# Patient Record
Sex: MALE | Race: WHITE | Employment: OTHER | RURAL
[De-identification: names, ages, dates, MRNs, and addresses within clinical notes are randomized per-mention and may not be internally consistent; named-entity substitution may affect disease eponyms.]

---

## 2017-01-09 ENCOUNTER — OFFICE VISIT (OUTPATIENT)
Dept: PRIMARY CARE CLINIC | Age: 62
End: 2017-01-09
Payer: COMMERCIAL

## 2017-01-09 VITALS
BODY MASS INDEX: 32.86 KG/M2 | RESPIRATION RATE: 20 BRPM | OXYGEN SATURATION: 98 % | HEART RATE: 88 BPM | WEIGHT: 216.8 LBS | SYSTOLIC BLOOD PRESSURE: 124 MMHG | DIASTOLIC BLOOD PRESSURE: 76 MMHG | TEMPERATURE: 98.4 F | HEIGHT: 68 IN

## 2017-01-09 DIAGNOSIS — D72.820 LYMPHOCYTOSIS: ICD-10-CM

## 2017-01-09 DIAGNOSIS — R73.02 GLUCOSE INTOLERANCE (IMPAIRED GLUCOSE TOLERANCE): ICD-10-CM

## 2017-01-09 DIAGNOSIS — J01.00 ACUTE NON-RECURRENT MAXILLARY SINUSITIS: ICD-10-CM

## 2017-01-09 DIAGNOSIS — E78.00 PURE HYPERCHOLESTEROLEMIA: Primary | ICD-10-CM

## 2017-01-09 DIAGNOSIS — I10 ESSENTIAL HYPERTENSION: ICD-10-CM

## 2017-01-09 DIAGNOSIS — Z13.29 THYROID DISORDER SCREEN: ICD-10-CM

## 2017-01-09 PROCEDURE — 99214 OFFICE O/P EST MOD 30 MIN: CPT | Performed by: NURSE PRACTITIONER

## 2017-01-09 RX ORDER — AMOXICILLIN AND CLAVULANATE POTASSIUM 875; 125 MG/1; MG/1
1 TABLET, FILM COATED ORAL 2 TIMES DAILY
Qty: 20 TABLET | Refills: 0 | Status: SHIPPED | OUTPATIENT
Start: 2017-01-09 | End: 2017-01-19

## 2017-01-09 ASSESSMENT — ENCOUNTER SYMPTOMS
GASTROINTESTINAL NEGATIVE: 1
EYES NEGATIVE: 1
SINUS PRESSURE: 1
COUGH: 1

## 2017-01-24 RX ORDER — ATORVASTATIN CALCIUM 40 MG/1
TABLET, FILM COATED ORAL
Qty: 30 TABLET | Refills: 0 | Status: SHIPPED | OUTPATIENT
Start: 2017-01-24 | End: 2017-04-12 | Stop reason: SDUPTHER

## 2017-03-16 RX ORDER — ASPIRIN 81 MG/1
81 TABLET ORAL DAILY
Qty: 90 TABLET | Refills: 3 | Status: SHIPPED | OUTPATIENT
Start: 2017-03-16

## 2017-03-16 RX ORDER — ERGOCALCIFEROL 1.25 MG/1
CAPSULE ORAL
Qty: 12 CAPSULE | Refills: 3 | Status: SHIPPED | OUTPATIENT
Start: 2017-03-16 | End: 2019-11-14 | Stop reason: ALTCHOICE

## 2017-03-16 RX ORDER — ASPIRIN 81 MG
TABLET, DELAYED RELEASE (ENTERIC COATED) ORAL
Qty: 30 TABLET | Refills: 9 | Status: SHIPPED | OUTPATIENT
Start: 2017-03-16 | End: 2017-04-12 | Stop reason: SDUPTHER

## 2017-03-16 RX ORDER — ERGOCALCIFEROL 1.25 MG/1
CAPSULE ORAL
Qty: 4 CAPSULE | Refills: 0 | Status: SHIPPED | OUTPATIENT
Start: 2017-03-16 | End: 2017-05-10 | Stop reason: SDUPTHER

## 2017-04-12 ENCOUNTER — OFFICE VISIT (OUTPATIENT)
Dept: CARDIOLOGY | Facility: CLINIC | Age: 62
End: 2017-04-12

## 2017-04-12 VITALS
HEART RATE: 78 BPM | BODY MASS INDEX: 33.71 KG/M2 | HEIGHT: 67 IN | DIASTOLIC BLOOD PRESSURE: 68 MMHG | SYSTOLIC BLOOD PRESSURE: 114 MMHG | WEIGHT: 214.8 LBS

## 2017-04-12 DIAGNOSIS — Z72.0 TOBACCO ABUSE: ICD-10-CM

## 2017-04-12 DIAGNOSIS — I10 ESSENTIAL HYPERTENSION: ICD-10-CM

## 2017-04-12 DIAGNOSIS — I25.10 CORONARY ARTERY DISEASE INVOLVING NATIVE CORONARY ARTERY OF NATIVE HEART WITHOUT ANGINA PECTORIS: Primary | ICD-10-CM

## 2017-04-12 DIAGNOSIS — E78.5 HYPERLIPIDEMIA LDL GOAL <70: ICD-10-CM

## 2017-04-12 PROCEDURE — 99213 OFFICE O/P EST LOW 20 MIN: CPT | Performed by: INTERNAL MEDICINE

## 2017-04-12 RX ORDER — ASPIRIN 81 MG/1
81 TABLET ORAL DAILY
Qty: 90 TABLET | Refills: 3 | Status: SHIPPED | OUTPATIENT
Start: 2017-04-12 | End: 2018-04-07

## 2017-04-12 RX ORDER — NITROGLYCERIN 0.4 MG/1
TABLET SUBLINGUAL
Qty: 100 TABLET | Refills: 11 | Status: SHIPPED | OUTPATIENT
Start: 2017-04-12 | End: 2021-01-05 | Stop reason: SDUPTHER

## 2017-04-12 RX ORDER — METOPROLOL SUCCINATE 25 MG/1
25 TABLET, EXTENDED RELEASE ORAL DAILY
Qty: 90 TABLET | Refills: 3 | Status: SHIPPED | OUTPATIENT
Start: 2017-04-12 | End: 2018-07-17 | Stop reason: SDUPTHER

## 2017-04-12 RX ORDER — ATORVASTATIN CALCIUM 40 MG/1
40 TABLET, FILM COATED ORAL NIGHTLY
Qty: 90 TABLET | Refills: 3 | Status: SHIPPED | OUTPATIENT
Start: 2017-04-12 | End: 2018-07-17 | Stop reason: SDUPTHER

## 2017-04-12 RX ORDER — LISINOPRIL 5 MG/1
5 TABLET ORAL DAILY
Qty: 90 TABLET | Refills: 3 | Status: SHIPPED | OUTPATIENT
Start: 2017-04-12 | End: 2018-07-17 | Stop reason: SDUPTHER

## 2017-04-12 NOTE — ASSESSMENT & PLAN NOTE
· Asymptomatic  · Continue low-dose aspirin, high intensity statin therapy and beta blocker therapy

## 2017-04-12 NOTE — PROGRESS NOTES
Encounter Date:04/12/2017    Patient ID: Erik Bowser is a  62 y.o. male who resides in [place], [place].    CC/Reason for visit:  Coronary Artery Disease (Follow up); Hypertension; and Hyperlipidemia          Erik Bowser returns to my office today as a follow up for coronary artery disease, hypertension, and hyperlipidemia. Since last visit, patient has been feeling well overall from a cardiac standpoint. He notes he gets fatigued sometimes. He denies chest pressure/tightness. He does note that he occasionally feels fluttering in his chest. He is struggling with shoulder pain due to his rotator cuff is, which have been repaired previously.    Review of Systems   Constitution: Negative for weakness and malaise/fatigue.   HENT: Positive for tinnitus.    Eyes: Negative for vision loss in left eye and vision loss in right eye.   Cardiovascular: Negative for chest pain, dyspnea on exertion, near-syncope, orthopnea, palpitations, paroxysmal nocturnal dyspnea and syncope.   Respiratory: Positive for snoring.    Hematologic/Lymphatic: Bruises/bleeds easily.   Musculoskeletal: Positive for arthritis, gout and muscle cramps. Negative for myalgias.   Neurological: Negative for brief paralysis, excessive daytime sleepiness, focal weakness, numbness and paresthesias.   All other systems reviewed and are negative.      The patient's past medical, social, and family history reviewed in the patient's electronic medical record.    Allergies  Rosuvastatin    Outpatient Prescriptions Marked as Taking for the 4/12/17 encounter (Office Visit) with Ronan Lacy MD   Medication Sig Dispense Refill   • aspirin (ASPIRIN LOW DOSE) 81 MG EC tablet Take 1 tablet by mouth Daily for 360 days. 90 tablet 3   • atorvastatin (LIPITOR) 40 MG tablet Take 1 tablet by mouth Every Night for 360 days. 90 tablet 3   • B Complex Vitamins (VITAMIN-B COMPLEX PO) Take  by mouth Daily.     • Ergocalciferol (VITAMIN D2 PO) Take  by mouth 1  "(One) Time Per Week.     • lisinopril (PRINIVIL,ZESTRIL) 5 MG tablet Take 1 tablet by mouth Daily. 90 tablet 3   • metoprolol succinate XL (TOPROL-XL) 25 MG 24 hr tablet Take 1 tablet by mouth Daily. 90 tablet 3   • [DISCONTINUED] ASPIRIN LOW DOSE 81 MG EC tablet TAKE ONE TABLET BY MOUTH DAILY 30 tablet 9   • [DISCONTINUED] atorvastatin (LIPITOR) 40 MG tablet TAKE ONE TABLET BY MOUTH EVERY NIGHT AT BEDTIME . NEEDS LABS FOR ADDITIONAL REFILLS 30 tablet 0   • [DISCONTINUED] lisinopril (PRINIVIL,ZESTRIL) 5 MG tablet Take 1 tablet by mouth daily. 90 tablet 3   • [DISCONTINUED] metoprolol succinate XL (TOPROL-XL) 25 MG 24 hr tablet Take 1 tablet by mouth daily. 90 tablet 3         Blood pressure 114/68, pulse 78, height 67\" (170.2 cm), weight 214 lb 12.8 oz (97.4 kg).  Body mass index is 33.64 kg/(m^2).    Physical Exam   Constitutional: He is oriented to person, place, and time. He appears well-developed and well-nourished.   HENT:   Head: Normocephalic and atraumatic.   Eyes: Pupils are equal, round, and reactive to light. No scleral icterus.   Neck: No JVD present. Carotid bruit is not present. No thyromegaly present.   Cardiovascular: Normal rate, regular rhythm, S1 normal and S2 normal.  Exam reveals no gallop.    No murmur heard.  Pulmonary/Chest: Effort normal and breath sounds normal.   Abdominal: Soft. There is no tenderness.   Neurological: He is alert and oriented to person, place, and time.   Skin: Skin is warm and dry. No cyanosis. Nails show no clubbing.   Psychiatric: He has a normal mood and affect. His behavior is normal.       Data Review:     Procedures         Problem List Items Addressed This Visit        Cardiovascular and Mediastinum    Coronary artery disease involving native coronary artery of native heart without angina pectoris - Primary    Overview     · Cardiac catheterization for inferior STEMI by Dr. Mahendra Lacy (1/20/2015):  RCA occlusion status post ISH (Xience 3.5 x 23 mm).  LVEF " 45%.  · Echocardiogram (1/21/2015):  LVEF 55%, no valvular abnormalities, 01/21/2015.         Current Assessment & Plan     · Asymptomatic  · Continue low-dose aspirin, high intensity statin therapy and beta blocker therapy         Relevant Medications    aspirin (ASPIRIN LOW DOSE) 81 MG EC tablet    metoprolol succinate XL (TOPROL-XL) 25 MG 24 hr tablet    nitroglycerin (NITROSTAT) 0.4 MG SL tablet    Essential hypertension    Current Assessment & Plan     · Controlled         Relevant Medications    metoprolol succinate XL (TOPROL-XL) 25 MG 24 hr tablet    lisinopril (PRINIVIL,ZESTRIL) 5 MG tablet    Hyperlipidemia LDL goal <70    Overview     · High intensity statin therapy indicated given the presence coronary disease         Current Assessment & Plan     · Continue atorvastatin with a goal LDL less than 70         Relevant Medications    atorvastatin (LIPITOR) 40 MG tablet       Other    Tobacco abuse    Current Assessment & Plan     · Tobacco cessation strongly recommended  · Patient declined assistance                    · Continue medical therapy  · Tobacco cessation recommended  Return in about 1 year (around 4/12/2018).      Ronan Lacy MD  4/12/2017    Scribed for Ronan Lacy MD by Ricike Sullivan. 4/12/2017  4:56 PM    I, Ronan Lacy MD, personally performed the services described in this documentation as scribed by the above named individual in my presence, and it is both accurate and complete.  4/12/2017  4:56 PM

## 2017-05-10 ENCOUNTER — OFFICE VISIT (OUTPATIENT)
Dept: PRIMARY CARE CLINIC | Age: 62
End: 2017-05-10
Payer: COMMERCIAL

## 2017-05-10 ENCOUNTER — HOSPITAL ENCOUNTER (OUTPATIENT)
Dept: OTHER | Age: 62
Discharge: HOME | End: 2017-05-10
Attending: NURSE PRACTITIONER | Admitting: NURSE PRACTITIONER

## 2017-05-10 VITALS
HEART RATE: 74 BPM | BODY MASS INDEX: 32.58 KG/M2 | WEIGHT: 215 LBS | HEIGHT: 68 IN | OXYGEN SATURATION: 96 % | DIASTOLIC BLOOD PRESSURE: 68 MMHG | SYSTOLIC BLOOD PRESSURE: 118 MMHG | RESPIRATION RATE: 20 BRPM

## 2017-05-10 DIAGNOSIS — G89.29 CHRONIC PAIN OF BOTH SHOULDERS: ICD-10-CM

## 2017-05-10 DIAGNOSIS — E78.5 HYPERLIPIDEMIA: ICD-10-CM

## 2017-05-10 DIAGNOSIS — M79.642 LEFT HAND PAIN: ICD-10-CM

## 2017-05-10 DIAGNOSIS — Z13.29 THYROID DISORDER SCREEN: ICD-10-CM

## 2017-05-10 DIAGNOSIS — R73.02 GLUCOSE INTOLERANCE (IMPAIRED GLUCOSE TOLERANCE): ICD-10-CM

## 2017-05-10 DIAGNOSIS — E78.00 PURE HYPERCHOLESTEROLEMIA: ICD-10-CM

## 2017-05-10 DIAGNOSIS — M25.512 CHRONIC PAIN OF BOTH SHOULDERS: Primary | ICD-10-CM

## 2017-05-10 DIAGNOSIS — M25.512 CHRONIC PAIN OF BOTH SHOULDERS: ICD-10-CM

## 2017-05-10 DIAGNOSIS — G89.29 CHRONIC PAIN OF BOTH SHOULDERS: Primary | ICD-10-CM

## 2017-05-10 DIAGNOSIS — M25.511 CHRONIC PAIN OF BOTH SHOULDERS: Primary | ICD-10-CM

## 2017-05-10 DIAGNOSIS — I10 ESSENTIAL HYPERTENSION: ICD-10-CM

## 2017-05-10 DIAGNOSIS — M25.511 CHRONIC PAIN OF BOTH SHOULDERS: ICD-10-CM

## 2017-05-10 LAB
A/G RATIO: 2 (ref 0.8–2)
ALBUMIN SERPL-MCNC: 4.3 G/DL (ref 3.4–4.8)
ALP BLD-CCNC: 91 U/L (ref 25–100)
ALT SERPL-CCNC: 24 U/L (ref 4–36)
ANION GAP SERPL CALCULATED.3IONS-SCNC: 12 MMOL/L (ref 3–16)
AST SERPL-CCNC: 22 U/L (ref 8–33)
BILIRUB SERPL-MCNC: 1.4 MG/DL (ref 0.3–1.2)
BUN BLDV-MCNC: 12 MG/DL (ref 6–20)
CALCIUM SERPL-MCNC: 9.8 MG/DL (ref 8.5–10.5)
CHLORIDE BLD-SCNC: 103 MMOL/L (ref 98–107)
CHOLESTEROL, TOTAL: 158 MG/DL (ref 0–200)
CO2: 26 MMOL/L (ref 20–30)
CREAT SERPL-MCNC: 0.7 MG/DL (ref 0.4–1.2)
GFR AFRICAN AMERICAN: >59
GFR NON-AFRICAN AMERICAN: >59
GLOBULIN: 2.2 G/DL
GLUCOSE BLD-MCNC: 107 MG/DL (ref 74–106)
HBA1C MFR BLD: 5.6 %
HCT VFR BLD CALC: 45.9 % (ref 40–54)
HDLC SERPL-MCNC: 32 MG/DL (ref 40–60)
HEMOGLOBIN: 15.8 G/DL (ref 13–18)
LDL CHOLESTEROL CALCULATED: 76 MG/DL
MCH RBC QN AUTO: 32.2 PG (ref 27–32)
MCHC RBC AUTO-ENTMCNC: 34.4 G/DL (ref 31–35)
MCV RBC AUTO: 93.7 FL (ref 80–100)
PDW BLD-RTO: 13 % (ref 11–16)
PLATELET # BLD: 327 K/UL (ref 150–400)
PMV BLD AUTO: 10 FL (ref 6–10)
POTASSIUM SERPL-SCNC: 4.6 MMOL/L (ref 3.4–5.1)
RBC # BLD: 4.9 M/UL (ref 4.5–6)
SODIUM BLD-SCNC: 141 MMOL/L (ref 136–145)
TOTAL PROTEIN: 6.5 G/DL (ref 6.4–8.3)
TRIGL SERPL-MCNC: 248 MG/DL (ref 0–249)
TSH SERPL DL<=0.05 MIU/L-ACNC: 2.2 UIU/ML (ref 0.35–5.5)
VLDLC SERPL CALC-MCNC: 50 MG/DL
WBC # BLD: 11.7 K/UL (ref 4–11)

## 2017-05-10 PROCEDURE — 99214 OFFICE O/P EST MOD 30 MIN: CPT | Performed by: NURSE PRACTITIONER

## 2017-05-10 RX ORDER — TRAMADOL HYDROCHLORIDE 50 MG/1
50 TABLET ORAL 2 TIMES DAILY PRN
Qty: 60 TABLET | Refills: 2 | Status: SHIPPED | OUTPATIENT
Start: 2017-05-10 | End: 2017-06-09

## 2017-05-10 ASSESSMENT — ENCOUNTER SYMPTOMS
RESPIRATORY NEGATIVE: 1
EYES NEGATIVE: 1
GASTROINTESTINAL NEGATIVE: 1

## 2017-05-12 ENCOUNTER — TELEPHONE (OUTPATIENT)
Dept: PRIMARY CARE CLINIC | Age: 62
End: 2017-05-12

## 2017-05-24 ENCOUNTER — OFFICE VISIT (OUTPATIENT)
Dept: PRIMARY CARE CLINIC | Age: 62
End: 2017-05-24
Payer: COMMERCIAL

## 2017-05-24 VITALS
SYSTOLIC BLOOD PRESSURE: 130 MMHG | HEART RATE: 68 BPM | BODY MASS INDEX: 32.69 KG/M2 | OXYGEN SATURATION: 93 % | WEIGHT: 215 LBS | DIASTOLIC BLOOD PRESSURE: 70 MMHG

## 2017-05-24 DIAGNOSIS — R60.0 FACIAL EDEMA: ICD-10-CM

## 2017-05-24 DIAGNOSIS — J01.90 ACUTE SINUSITIS, RECURRENCE NOT SPECIFIED, UNSPECIFIED LOCATION: Primary | ICD-10-CM

## 2017-05-24 PROCEDURE — 96372 THER/PROPH/DIAG INJ SC/IM: CPT | Performed by: NURSE PRACTITIONER

## 2017-05-24 PROCEDURE — 99213 OFFICE O/P EST LOW 20 MIN: CPT | Performed by: NURSE PRACTITIONER

## 2017-05-24 RX ORDER — DEXAMETHASONE SODIUM PHOSPHATE 10 MG/ML
6 INJECTION, SOLUTION INTRAMUSCULAR; INTRAVENOUS ONCE
Status: COMPLETED | OUTPATIENT
Start: 2017-05-24 | End: 2017-05-24

## 2017-05-24 RX ORDER — METHYLPREDNISOLONE 4 MG/1
TABLET ORAL
Qty: 1 KIT | Refills: 0 | Status: SHIPPED | OUTPATIENT
Start: 2017-05-24 | End: 2017-09-20

## 2017-05-24 RX ORDER — AMOXICILLIN AND CLAVULANATE POTASSIUM 875; 125 MG/1; MG/1
1 TABLET, FILM COATED ORAL 2 TIMES DAILY WITH MEALS
Qty: 20 TABLET | Refills: 0 | Status: SHIPPED | OUTPATIENT
Start: 2017-05-24 | End: 2017-06-03

## 2017-05-24 RX ADMIN — DEXAMETHASONE SODIUM PHOSPHATE 6 MG: 10 INJECTION, SOLUTION INTRAMUSCULAR; INTRAVENOUS at 16:21

## 2017-05-24 ASSESSMENT — ENCOUNTER SYMPTOMS
FACIAL SWELLING: 1
GASTROINTESTINAL NEGATIVE: 1
RESPIRATORY NEGATIVE: 1
EYE REDNESS: 1

## 2017-06-11 ASSESSMENT — ENCOUNTER SYMPTOMS
SINUS PRESSURE: 1
SHORTNESS OF BREATH: 0
EYE PAIN: 0
ABDOMINAL PAIN: 0
VOMITING: 0
SORE THROAT: 0
NAUSEA: 0
COUGH: 0

## 2017-09-07 ENCOUNTER — TELEPHONE (OUTPATIENT)
Dept: PRIMARY CARE CLINIC | Age: 62
End: 2017-09-07

## 2017-09-20 ENCOUNTER — OFFICE VISIT (OUTPATIENT)
Dept: PRIMARY CARE CLINIC | Age: 62
End: 2017-09-20
Payer: COMMERCIAL

## 2017-09-20 VITALS
HEART RATE: 78 BPM | WEIGHT: 215.4 LBS | HEIGHT: 68 IN | BODY MASS INDEX: 32.64 KG/M2 | RESPIRATION RATE: 20 BRPM | SYSTOLIC BLOOD PRESSURE: 126 MMHG | DIASTOLIC BLOOD PRESSURE: 70 MMHG | OXYGEN SATURATION: 96 %

## 2017-09-20 DIAGNOSIS — M65.332 TRIGGER MIDDLE FINGER OF LEFT HAND: ICD-10-CM

## 2017-09-20 DIAGNOSIS — D72.820 LYMPHOCYTOSIS: ICD-10-CM

## 2017-09-20 DIAGNOSIS — E78.00 PURE HYPERCHOLESTEROLEMIA: ICD-10-CM

## 2017-09-20 DIAGNOSIS — I10 ESSENTIAL HYPERTENSION: ICD-10-CM

## 2017-09-20 DIAGNOSIS — M25.512 ACUTE PAIN OF LEFT SHOULDER: ICD-10-CM

## 2017-09-20 DIAGNOSIS — J01.00 ACUTE NON-RECURRENT MAXILLARY SINUSITIS: Primary | ICD-10-CM

## 2017-09-20 DIAGNOSIS — Z13.29 THYROID DISORDER SCREEN: ICD-10-CM

## 2017-09-20 PROCEDURE — 99214 OFFICE O/P EST MOD 30 MIN: CPT | Performed by: NURSE PRACTITIONER

## 2017-09-20 RX ORDER — AMOXICILLIN AND CLAVULANATE POTASSIUM 875; 125 MG/1; MG/1
1 TABLET, FILM COATED ORAL 2 TIMES DAILY
Qty: 20 TABLET | Refills: 0 | Status: SHIPPED | OUTPATIENT
Start: 2017-09-20 | End: 2017-09-30

## 2017-09-20 RX ORDER — METHYLPREDNISOLONE 4 MG/1
TABLET ORAL
Qty: 21 TABLET | Refills: 0 | Status: SHIPPED | OUTPATIENT
Start: 2017-09-20 | End: 2017-09-26

## 2017-09-20 RX ORDER — TRAMADOL HYDROCHLORIDE 50 MG/1
50 TABLET ORAL 2 TIMES DAILY PRN
Qty: 40 TABLET | Refills: 0 | Status: SHIPPED | OUTPATIENT
Start: 2017-09-20 | End: 2017-09-30

## 2017-09-20 ASSESSMENT — ENCOUNTER SYMPTOMS
GASTROINTESTINAL NEGATIVE: 1
RHINORRHEA: 1
RESPIRATORY NEGATIVE: 1
EYE DISCHARGE: 1
SORE THROAT: 1

## 2017-12-29 ENCOUNTER — NURSE ONLY (OUTPATIENT)
Dept: PRIMARY CARE CLINIC | Age: 62
End: 2017-12-29
Payer: COMMERCIAL

## 2017-12-29 VITALS
WEIGHT: 221 LBS | DIASTOLIC BLOOD PRESSURE: 74 MMHG | SYSTOLIC BLOOD PRESSURE: 130 MMHG | BODY MASS INDEX: 33.49 KG/M2 | HEIGHT: 68 IN | OXYGEN SATURATION: 96 % | RESPIRATION RATE: 20 BRPM | HEART RATE: 75 BPM

## 2017-12-29 DIAGNOSIS — Z48.02 VISIT FOR SUTURE REMOVAL: Primary | ICD-10-CM

## 2017-12-29 PROCEDURE — 99212 OFFICE O/P EST SF 10 MIN: CPT | Performed by: FAMILY MEDICINE

## 2017-12-29 ASSESSMENT — ENCOUNTER SYMPTOMS
DIARRHEA: 0
CONSTIPATION: 0
ABDOMINAL PAIN: 0
COUGH: 0
NAUSEA: 0
VOMITING: 0
SORE THROAT: 0
RHINORRHEA: 0
SHORTNESS OF BREATH: 0

## 2017-12-29 NOTE — PATIENT INSTRUCTIONS
dispose of used patches by folding them in half so that the sticky sides meet, and then flushing them down a toilet. They should not be placed in the household trash where children or pets can find them. · If you have any questions, ask your provider or pharmacist for more information. · Be sure to keep all appointments for provider visits or tests. We are committed to providing you with the best care possible. In order to help us achieve these goals please remember to bring all medications, herbal products, and over the counter supplements with you to each visit. If your provider has ordered testing for you, please be sure to follow up with our office if you have not received results within 7 days after the testing took place. *If you receive a survey after visiting one of our offices, please take time to share your experience concerning your physician office visit. These surveys are confidential and no health information about you is shared. We are eager to improve for you and we are counting on your feedback to help make that happen. ips to Help You Stop Smoking       Cigarette smoking is a preventable cause of death in the [place]. If you have thought about quitting but haven't been able to, here are some reasons why you should and some ways to do it. Here's Why   Quitting smoking now can decrease your risk of getting smoking-related illnesses like:   Heart disease   Stroke   Several types of cancer, including:   Lung   Mouth   Esophagus   Larynx   Bladder   Pancreas   Kidney   Chronic lung diseases:   Bronchitis   Emphysema   Asthma   Cataracts   Macular degeneration   Thyroid conditions   Hearing loss   Erectile dysfunction   Dementia   Osteoporosis   Here's How   Once you've decided to quit smoking, set your target quit date a few weeks away.  In the time leading up to your quit day, try some of these ideas offered by the [address] [place] [address] [place] to help you successfully quit smoking. For the best results, work with your doctor. Together, you can test your lung function and compare the results to those of a nonsmoking person. The results can be given to you as your lung age. Finding out your lung age right after having the test done may help you to stop smoking. Your doctor can also discuss with you all of your options and refer you to smoking-cessation support groups. You may wish to use nicotine replacement (gum, patches, inhaler) or one of the prescription medications that have been shown to increase quit rates and prolong abstinence from smoking. But whatever you and your doctor decide on these matters, it will still be you who decides when an how to quit. Here are some techniques:   Switch Brands   Switch to a brand you find distasteful. Change to a brand that is low in tar and nicotine a couple of weeks before your target quit date. This will help change your smoking behavior. However, do not smoke more cigarettes, inhale them more often or more deeply, or place your fingertips over the holes in the filters. All of these actions will increase your nicotine intake, and the idea is to get your body used to functioning without nicotine. Cut Down the Number of Cigarettes You Smoke   Smoke only half of each cigarette. Each day, postpone the lighting of your first cigarette by one hour. Decide you'll only smoke during odd or even hours of the day. Decide beforehand how many cigarettes you'll smoke during the day. For each additional cigarette, give a dollar to your favorite laurent. Change your eating habits to help you cut down. For example, drink milk, which many people consider incompatible with smoking. End meals or snacks with something that won't lead to a cigarette. Reach for a glass of juice instead of a cigarette for a \"pick-me-up. \"   Remember: Cutting down can help you quit, but it's not a substitute for quitting.  If you're down to about seven cigarettes a day, it's time to set your target quit date, and get ready to stick to it. Don't Smoke \"Automatically\"   Smoke only those cigarettes you really want. Catch yourself before you light up a cigarette out of pure habit. Don't empty your ashtrays. This will remind you of how many cigarettes you've smoked each day, and the sight and the smell of stale cigarettes butts will be very unpleasant. Make yourself aware of each cigarette by using the opposite hand or putting cigarettes in an unfamiliar location or a different pocket to break the automatic reach. If you light up many times during the day without even thinking about it, try to look in a mirror each time you put a match to your cigarette. You may decide you don't need it. Make Smoking Inconvenient   Stop buying cigarettes by the carton. Wait until one pack is empty before you buy another. Stop carrying cigarettes with you at home or at work. Make them difficult to get to. Make Smoking Unpleasant   Smoke only under circumstances that aren't especially pleasurable for you. If you like to smoke with others, smoke alone. Turn your chair to an empty corner and focus only on the cigarette you are smoking and all its many negative effects. Collect all your cigarette butts in one large glass container as a visual reminder of the filth made by smoking. Just Before Quitting   Practice going without cigarettes. Don't think of never smoking again. Think of quitting in terms of one day at a time . Tell yourself you won't smoke today, and then don't. Clean your clothes to rid them of the cigarette smell, which can linger a long time. On the Day You Quit   Throw away all your cigarettes and matches. Hide your lighters and ashtrays. Visit the dentist and have your teeth cleaned to get rid of tobacco stains. Notice how nice they look and resolve to keep them that way.    Make a list of things you'd like to buy for

## 2017-12-29 NOTE — PROGRESS NOTES
SUBJECTIVE:    Patient ID: Mariam Romero is a 65 y.o. male. Chief Complaint   Patient presents with    Suture / Staple Removal     x 10 days ago, dermatology took off spot on left side of face. HPI: Patient states he had a skin cancer cut off the left side of his face 10 days ago. He states it was squamous cell carcinoma. He denies any pain. He has been keeping the incision clean. He does have sutures that need removed today. Patient's medications, allergies, past medical, surgical, social and family histories were reviewed and updated as appropriate. Review of Systems   Constitutional: Negative for chills, fatigue and fever. HENT: Negative for congestion, ear pain, rhinorrhea and sore throat. Respiratory: Negative for cough and shortness of breath. Cardiovascular: Negative for chest pain, palpitations and leg swelling. Gastrointestinal: Negative for abdominal pain, constipation, diarrhea, nausea and vomiting. Skin:        Left face incision       OBJECTIVE:  /74 (Site: Right Arm, Position: Sitting)   Pulse 75   Resp 20   Ht 5' 7.5\" (1.715 m)   Wt 221 lb (100.2 kg)   SpO2 96% Comment: ROOM AIR  BMI 34.10 kg/m²      Wt Readings from Last 2 Encounters:   12/29/17 221 lb (100.2 kg)   09/20/17 215 lb 6.4 oz (97.7 kg)     BP Readings from Last 2 Encounters:   12/29/17 130/74   09/20/17 126/70      Pulse Readings from Last 2 Encounters:   12/29/17 75   09/20/17 78     Body mass index is 34.1 kg/m². Physical Exam   Constitutional: He is oriented to person, place, and time. He appears well-developed and well-nourished. HENT:   Head: Normocephalic and atraumatic. Right Ear: External ear normal.   Left Ear: External ear normal.   Eyes: Conjunctivae and EOM are normal.   Cardiovascular: Normal rate, regular rhythm and normal heart sounds. Exam reveals no gallop and no friction rub. No murmur heard.   Pulmonary/Chest: Effort normal and breath sounds normal.

## 2018-04-13 ENCOUNTER — OFFICE VISIT (OUTPATIENT)
Dept: CARDIOLOGY | Facility: CLINIC | Age: 63
End: 2018-04-13

## 2018-04-13 ENCOUNTER — RESULTS ENCOUNTER (OUTPATIENT)
Dept: CARDIOLOGY | Facility: CLINIC | Age: 63
End: 2018-04-13

## 2018-04-13 VITALS
HEART RATE: 69 BPM | BODY MASS INDEX: 33.4 KG/M2 | HEIGHT: 68 IN | DIASTOLIC BLOOD PRESSURE: 60 MMHG | SYSTOLIC BLOOD PRESSURE: 118 MMHG | WEIGHT: 220.4 LBS

## 2018-04-13 DIAGNOSIS — E78.5 HYPERLIPIDEMIA LDL GOAL <70: ICD-10-CM

## 2018-04-13 DIAGNOSIS — I25.10 CORONARY ARTERY DISEASE INVOLVING NATIVE CORONARY ARTERY OF NATIVE HEART WITHOUT ANGINA PECTORIS: Primary | ICD-10-CM

## 2018-04-13 DIAGNOSIS — I10 ESSENTIAL HYPERTENSION: ICD-10-CM

## 2018-04-13 DIAGNOSIS — Z72.0 TOBACCO ABUSE: ICD-10-CM

## 2018-04-13 PROBLEM — C95.91 LEUKEMIA IN REMISSION: Status: ACTIVE | Noted: 2018-04-13

## 2018-04-13 LAB
ALBUMIN SERPL-MCNC: 4.2 G/DL (ref 3.5–5)
ALBUMIN/GLOB SERPL: 1.4 G/DL (ref 1–2)
ALP SERPL-CCNC: 101 U/L (ref 38–126)
ALT SERPL-CCNC: 56 U/L (ref 13–69)
AST SERPL-CCNC: 38 U/L (ref 15–46)
BILIRUB SERPL-MCNC: 1.8 MG/DL (ref 0.2–1.3)
BUN SERPL-MCNC: 15 MG/DL (ref 7–20)
BUN/CREAT SERPL: 18.8 (ref 6.3–21.9)
CALCIUM SERPL-MCNC: 9.9 MG/DL (ref 8.4–10.2)
CHLORIDE SERPL-SCNC: 103 MMOL/L (ref 98–107)
CHOLEST SERPL-MCNC: 163 MG/DL (ref 0–199)
CO2 SERPL-SCNC: 29 MMOL/L (ref 26–30)
CREAT SERPL-MCNC: 0.8 MG/DL (ref 0.6–1.3)
GFR SERPLBLD CREATININE-BSD FMLA CKD-EPI: 119 ML/MIN/1.73
GFR SERPLBLD CREATININE-BSD FMLA CKD-EPI: 98 ML/MIN/1.73
GLOBULIN SER CALC-MCNC: 2.9 GM/DL
GLUCOSE SERPL-MCNC: 100 MG/DL (ref 74–98)
HDLC SERPL-MCNC: 34 MG/DL (ref 40–60)
LDLC SERPL CALC-MCNC: 59 MG/DL (ref 0–99)
POTASSIUM SERPL-SCNC: 4.6 MMOL/L (ref 3.5–5.1)
PROT SERPL-MCNC: 7.1 G/DL (ref 6.3–8.2)
SODIUM SERPL-SCNC: 142 MMOL/L (ref 137–145)
TRIGL SERPL-MCNC: 350 MG/DL
VLDLC SERPL CALC-MCNC: 70 MG/DL

## 2018-04-13 PROCEDURE — 99214 OFFICE O/P EST MOD 30 MIN: CPT | Performed by: NURSE PRACTITIONER

## 2018-04-13 RX ORDER — ASPIRIN 81 MG/1
81 TABLET ORAL NIGHTLY
COMMUNITY
End: 2020-06-26

## 2018-04-13 RX ORDER — NAPROXEN SODIUM 220 MG
220 TABLET ORAL AS NEEDED
COMMUNITY

## 2018-04-13 NOTE — ASSESSMENT & PLAN NOTE
· Continue aspirin 81 mg daily  · Continue Toprol-XL 25 mg daily  · Continue sublingual nitroglycerin for any episodes of angina

## 2018-04-13 NOTE — PROGRESS NOTES
Encounter Date:04/13/2018    Patient ID: Erik Bowser is a 63 y.o. male who resides in [place], [place]    CC/Reason for visit:  Coronary Artery Disease            Erik Bowser returns today for follow-up of his coronary artery disease and cardiac risk factors.  Since his last visit he did have to have several skin cancers removed.  He has had no hospitalizations and has not experienced any chest pain, dyspnea, orthopnea, palpitations or syncope.  His wife accompanies him at today's visit and is wondering if we should doa  routine stress test as she is concerned about him sleeping a lot and having fatigue..  The patient thinks his tiredness is because of working and he does not feel anything has changed over the past year.  He does have a history of leukemia that is apparently in remission.  He is followed by Dr. Ramires for this.  He works as a  and the current job he is doing has required quite a bit at use of his hands and he notices that he gets bilateral hand numbness and tingling.  He stopped working for a while and his symptoms resolved.  He does experience some shoulder pain due to his rotator cuff that it been previously repaired.  Before his heart attack he experienced chest pain and upper epigastric pain and he has not experienced any symptoms reminiscent of how he felt before his heart attack.  He takes daily statin therapy but has not had recent blood work.  He reports his blood pressures have stayed well controlled.  He continues to smoke 2 packs of cigarettes per day.    Review of Systems   Eyes: Positive for blurred vision.   Cardiovascular: Positive for cyanosis.   Respiratory: Positive for snoring.    Skin: Positive for dry skin and skin cancer.   Musculoskeletal: Positive for arthritis, gout, joint swelling and muscle cramps.   Neurological: Positive for numbness.   All other systems reviewed and are negative.      The patient's past medical, social, family history and ROS reviewed in  "the patient's electronic medical record.    Allergies  Rosuvastatin    Outpatient Prescriptions Marked as Taking for the 4/13/18 encounter (Office Visit) with SOBEIDA Menard   Medication Sig Dispense Refill   • aspirin 81 MG EC tablet Take 81 mg by mouth Daily.     • atorvastatin (LIPITOR) 40 MG tablet Take 1 tablet by mouth Every Night for 360 days. 90 tablet 3   • Cholecalciferol (VITAMIN D3) 5000 units capsule capsule Take 5,000 Units by mouth Daily.     • lisinopril (PRINIVIL,ZESTRIL) 5 MG tablet Take 1 tablet by mouth Daily. 90 tablet 3   • metoprolol succinate XL (TOPROL-XL) 25 MG 24 hr tablet Take 1 tablet by mouth Daily. 90 tablet 3   • naproxen sodium (ALEVE) 220 MG tablet Take 220 mg by mouth As Needed.     • nitroglycerin (NITROSTAT) 0.4 MG SL tablet 1 under the tongue as needed for angina, may repeat q5mins for up three doses 100 tablet 11         Blood pressure 118/60, pulse 69, height 172.7 cm (68\"), weight 100 kg (220 lb 6.4 oz).  Body mass index is 33.51 kg/m².    Physical Exam   Constitutional: He is oriented to person, place, and time. He appears well-developed and well-nourished.   HENT:   Head: Normocephalic and atraumatic.   Eyes: Pupils are equal, round, and reactive to light. No scleral icterus.   Neck: No JVD present. Carotid bruit is not present. No thyromegaly present.   Cardiovascular: Normal rate, regular rhythm, S1 normal and S2 normal.  Exam reveals no gallop.    No murmur heard.  Pulmonary/Chest: Effort normal and breath sounds normal.   Abdominal: Soft. There is no hepatosplenomegaly. There is no tenderness.   Neurological: He is alert and oriented to person, place, and time.   Skin: Skin is warm and dry. No cyanosis. Nails show no clubbing.   Psychiatric: He has a normal mood and affect. His behavior is normal.       Data Review:   Procedures       Problem List Items Addressed This Visit        Cardiovascular and Mediastinum    Coronary artery disease involving native " coronary artery of native heart without angina pectoris - Primary    Overview     · Cardiac catheterization for inferior STEMI by Dr. Mahendra Lacy (1/20/2015):  RCA occlusion status post ISH (Xience 3.5 x 23 mm).  LVEF 45%.  · Echocardiogram (1/21/2015):  LVEF 55%, no valvular abnormalities, 01/21/2015.         Current Assessment & Plan     · Continue aspirin 81 mg daily  · Continue Toprol-XL 25 mg daily  · Continue sublingual nitroglycerin for any episodes of angina         Hyperlipidemia LDL goal <70    Overview     · High intensity statin therapy indicated given the presence coronary disease         Current Assessment & Plan     · Continue Lipitor 40 mg daily         Relevant Orders    Comprehensive Metabolic Panel    Lipid Panel    Essential hypertension    Current Assessment & Plan     · Hypertension is controlled  · Continue Toprol-XL 25 mg daily  · Continue lisinopril 5 mg daily            Other    Tobacco abuse    Overview     · Smokes 2 PPD         Current Assessment & Plan     · Over 10 minutes spent counseling patient on importance of smoking cessation for heart attack and stroke prevention           Other Visit Diagnoses    None.       The patient is doing well from a cardiovascular standpoint.  I assured him his fatigue and bilateral numbness and tingling in his hands were likely not cardiac related.  I do not feel it it is necessary for him to undergo a stress test at this time and he agrees with that decision.  I spent over 10 minutes counseling him on the importance of smoking cessation for stroke and heart attack prevention.  I instructed him to contact us if he begins to experience anginal symptoms reminiscent of how he felt before his last heart attack and we would certainly order a stress test.  If for his wife and family's piece of mine they wish to have a stress test that can contact us and we will order it to be performed at [facility].  I will send him for CMP and lipid profile today  and schedule follow-up 12 months or sooner if needed       · Continue current medications  · Obtain CMP and lipid profile the day  · Patient to contact us if he experiences anginal symptoms and we will order a myocardial perfusion study.  If for his wife's piece of mine he wishes to have a stress test he can contact us and we will order it  · Patient needs to stop smoking for stroke and heart attack prevention  · Follow-up 12 months or sooner if needed    Vickie Holliday, SOBEIDA  4/13/2018

## 2018-04-13 NOTE — ASSESSMENT & PLAN NOTE
· Over 10 minutes spent counseling patient on importance of smoking cessation for heart attack and stroke prevention

## 2018-07-17 DIAGNOSIS — I25.10 CORONARY ARTERY DISEASE INVOLVING NATIVE CORONARY ARTERY OF NATIVE HEART WITHOUT ANGINA PECTORIS: ICD-10-CM

## 2018-07-17 DIAGNOSIS — I10 ESSENTIAL HYPERTENSION: ICD-10-CM

## 2018-07-17 DIAGNOSIS — E78.5 HYPERLIPIDEMIA LDL GOAL <70: ICD-10-CM

## 2018-07-17 RX ORDER — ATORVASTATIN CALCIUM 40 MG/1
TABLET, FILM COATED ORAL
Qty: 90 TABLET | Refills: 2 | Status: SHIPPED | OUTPATIENT
Start: 2018-07-17 | End: 2019-09-11 | Stop reason: SDUPTHER

## 2018-07-17 RX ORDER — LISINOPRIL 5 MG/1
TABLET ORAL
Qty: 90 TABLET | Refills: 2 | Status: SHIPPED | OUTPATIENT
Start: 2018-07-17 | End: 2019-10-10

## 2018-07-17 RX ORDER — METOPROLOL SUCCINATE 25 MG/1
TABLET, EXTENDED RELEASE ORAL
Qty: 90 TABLET | Refills: 2 | Status: SHIPPED | OUTPATIENT
Start: 2018-07-17 | End: 2019-09-11 | Stop reason: SDUPTHER

## 2018-09-23 ENCOUNTER — OFFICE VISIT (OUTPATIENT)
Dept: PRIMARY CARE CLINIC | Age: 63
End: 2018-09-23
Payer: COMMERCIAL

## 2018-09-23 VITALS
SYSTOLIC BLOOD PRESSURE: 110 MMHG | HEIGHT: 68 IN | HEART RATE: 76 BPM | TEMPERATURE: 99.3 F | WEIGHT: 215 LBS | BODY MASS INDEX: 32.58 KG/M2 | OXYGEN SATURATION: 94 % | DIASTOLIC BLOOD PRESSURE: 68 MMHG

## 2018-09-23 DIAGNOSIS — J40 BRONCHITIS: ICD-10-CM

## 2018-09-23 DIAGNOSIS — H66.90 ACUTE OTITIS MEDIA, UNSPECIFIED OTITIS MEDIA TYPE: Primary | ICD-10-CM

## 2018-09-23 PROCEDURE — 99213 OFFICE O/P EST LOW 20 MIN: CPT | Performed by: NURSE PRACTITIONER

## 2018-09-23 PROCEDURE — 96372 THER/PROPH/DIAG INJ SC/IM: CPT | Performed by: NURSE PRACTITIONER

## 2018-09-23 RX ORDER — METHYLPREDNISOLONE SODIUM SUCCINATE 40 MG/ML
40 INJECTION, POWDER, LYOPHILIZED, FOR SOLUTION INTRAMUSCULAR; INTRAVENOUS ONCE
Qty: 40 MG | Refills: 0 | Status: SHIPPED | OUTPATIENT
Start: 2018-09-23 | End: 2018-09-23

## 2018-09-23 RX ORDER — AZITHROMYCIN 250 MG/1
TABLET, FILM COATED ORAL
Qty: 1 PACKET | Refills: 0 | Status: SHIPPED | OUTPATIENT
Start: 2018-09-23 | End: 2018-09-27

## 2018-09-23 RX ORDER — METHYLPREDNISOLONE 4 MG/1
4 TABLET ORAL SEE ADMIN INSTRUCTIONS
Qty: 1 KIT | Refills: 0 | Status: SHIPPED | OUTPATIENT
Start: 2018-09-23 | End: 2018-09-29

## 2018-09-23 RX ORDER — CEFTRIAXONE 500 MG/1
1 INJECTION, POWDER, FOR SOLUTION INTRAMUSCULAR; INTRAVENOUS ONCE
Status: COMPLETED | OUTPATIENT
Start: 2018-09-23 | End: 2018-09-23

## 2018-09-23 RX ADMIN — CEFTRIAXONE 1 G: 500 INJECTION, POWDER, FOR SOLUTION INTRAMUSCULAR; INTRAVENOUS at 12:09

## 2018-09-23 NOTE — PROGRESS NOTES
Subjective:      Patient ID: Irineo Arrington is a 59 y.o. male. HPI c/o dizziness and HA for the past 3 months. Symptoms have worsened the past 2 days. He is having dizziness when he is laying down. Changing positions makes the symptoms worse. Nothing improves the symptoms. Associated symptoms include sinus and ear congestion . Denies f/c, cp, sob, n/v/d. Review of Systems    Objective:   Physical Exam   Constitutional: He is oriented to person, place, and time. He appears well-developed and well-nourished. No distress. HENT:   Head: Normocephalic. Right Ear: External ear normal. Tympanic membrane is erythematous. A middle ear effusion is present. Left Ear: External ear normal. Tympanic membrane is erythematous. A middle ear effusion is present. Ears:    Nose: Nose normal.   Eyes: Pupils are equal, round, and reactive to light. Conjunctivae and EOM are normal. Right eye exhibits no discharge. Left eye exhibits no discharge. Neck: Normal range of motion. Neck supple. No thyromegaly present. Cardiovascular: Normal rate, regular rhythm and normal heart sounds. Exam reveals no gallop and no friction rub. No murmur heard. Pulmonary/Chest: Effort normal. No respiratory distress. He has wheezes in the right upper field and the left upper field. He has rhonchi in the right upper field, the right middle field, the right lower field, the left upper field, the left middle field and the left lower field. He has no rales. Coarse breath sounds throughout   Abdominal: Soft. Bowel sounds are normal. He exhibits no distension and no mass. There is no tenderness. Musculoskeletal: Normal range of motion. He exhibits no edema, tenderness or deformity. Lymphadenopathy:     He has no cervical adenopathy. Neurological: He is alert and oriented to person, place, and time. Skin: Skin is warm and dry. No rash noted. He is not diaphoretic. No erythema. No pallor.    Psychiatric: He has a normal mood and affect. His behavior is normal. Judgment and thought content normal.       Assessment:      Bilateral otitis media, bronchitis      Plan: 1. Zpack  2. medrol harmeet   3. mucinex  4. enc and counseled to cease smoking  5. solu medrol  6.  KATHY Zee - NP

## 2018-09-27 PROBLEM — Z13.29 THYROID DISORDER SCREEN: Status: RESOLVED | Noted: 2017-01-09 | Resolved: 2018-09-27

## 2018-12-19 ENCOUNTER — OFFICE VISIT (OUTPATIENT)
Dept: PRIMARY CARE CLINIC | Age: 63
End: 2018-12-19
Payer: COMMERCIAL

## 2018-12-19 ENCOUNTER — HOSPITAL ENCOUNTER (OUTPATIENT)
Facility: HOSPITAL | Age: 63
Discharge: HOME | End: 2018-12-19
Payer: COMMERCIAL

## 2018-12-19 VITALS
WEIGHT: 219.4 LBS | HEIGHT: 68 IN | DIASTOLIC BLOOD PRESSURE: 52 MMHG | OXYGEN SATURATION: 97 % | HEART RATE: 86 BPM | TEMPERATURE: 98.3 F | BODY MASS INDEX: 33.25 KG/M2 | RESPIRATION RATE: 16 BRPM | SYSTOLIC BLOOD PRESSURE: 106 MMHG

## 2018-12-19 DIAGNOSIS — Z23 NEED FOR HEPATITIS A VACCINATION: ICD-10-CM

## 2018-12-19 DIAGNOSIS — E78.00 PURE HYPERCHOLESTEROLEMIA: ICD-10-CM

## 2018-12-19 DIAGNOSIS — R73.02 GLUCOSE INTOLERANCE (IMPAIRED GLUCOSE TOLERANCE): ICD-10-CM

## 2018-12-19 DIAGNOSIS — I10 ESSENTIAL HYPERTENSION: Primary | ICD-10-CM

## 2018-12-19 LAB
A/G RATIO: 2 (ref 0.8–2)
ALBUMIN SERPL-MCNC: 4.2 G/DL (ref 3.4–4.8)
ALP BLD-CCNC: 103 U/L (ref 25–100)
ALT SERPL-CCNC: 23 U/L (ref 4–36)
ANION GAP SERPL CALCULATED.3IONS-SCNC: 12 MMOL/L (ref 3–16)
AST SERPL-CCNC: 21 U/L (ref 8–33)
BILIRUB SERPL-MCNC: 1.2 MG/DL (ref 0.3–1.2)
BUN BLDV-MCNC: 12 MG/DL (ref 6–20)
CALCIUM SERPL-MCNC: 9.7 MG/DL (ref 8.5–10.5)
CHLORIDE BLD-SCNC: 101 MMOL/L (ref 98–107)
CHOLESTEROL, TOTAL: 166 MG/DL (ref 0–200)
CO2: 27 MMOL/L (ref 20–30)
CREAT SERPL-MCNC: 0.8 MG/DL (ref 0.4–1.2)
GFR AFRICAN AMERICAN: >59
GFR NON-AFRICAN AMERICAN: >59
GLOBULIN: 2.1 G/DL
GLUCOSE BLD-MCNC: 120 MG/DL (ref 74–106)
HCT VFR BLD CALC: 45 % (ref 40–54)
HDLC SERPL-MCNC: 35 MG/DL (ref 40–60)
HEMOGLOBIN: 14.8 G/DL (ref 13–18)
LDL CHOLESTEROL CALCULATED: 72 MG/DL
MCH RBC QN AUTO: 31.7 PG (ref 27–32)
MCHC RBC AUTO-ENTMCNC: 32.9 G/DL (ref 31–35)
MCV RBC AUTO: 96.4 FL (ref 80–100)
PDW BLD-RTO: 12.5 % (ref 11–16)
PLATELET # BLD: 337 K/UL (ref 150–400)
PMV BLD AUTO: 9.9 FL (ref 6–10)
POTASSIUM SERPL-SCNC: 4 MMOL/L (ref 3.4–5.1)
RBC # BLD: 4.67 M/UL (ref 4.5–6)
SODIUM BLD-SCNC: 140 MMOL/L (ref 136–145)
TOTAL PROTEIN: 6.3 G/DL (ref 6.4–8.3)
TRIGL SERPL-MCNC: 296 MG/DL (ref 0–249)
TSH SERPL DL<=0.05 MIU/L-ACNC: 2.55 UIU/ML (ref 0.35–5.5)
VLDLC SERPL CALC-MCNC: 59 MG/DL
WBC # BLD: 13.3 K/UL (ref 4–11)

## 2018-12-19 PROCEDURE — 84443 ASSAY THYROID STIM HORMONE: CPT

## 2018-12-19 PROCEDURE — 80053 COMPREHEN METABOLIC PANEL: CPT

## 2018-12-19 PROCEDURE — 90471 IMMUNIZATION ADMIN: CPT | Performed by: NURSE PRACTITIONER

## 2018-12-19 PROCEDURE — 85027 COMPLETE CBC AUTOMATED: CPT

## 2018-12-19 PROCEDURE — 99213 OFFICE O/P EST LOW 20 MIN: CPT | Performed by: NURSE PRACTITIONER

## 2018-12-19 PROCEDURE — 36415 COLL VENOUS BLD VENIPUNCTURE: CPT

## 2018-12-19 PROCEDURE — 90632 HEPA VACCINE ADULT IM: CPT | Performed by: NURSE PRACTITIONER

## 2018-12-19 PROCEDURE — 80061 LIPID PANEL: CPT

## 2018-12-19 ASSESSMENT — PATIENT HEALTH QUESTIONNAIRE - PHQ9
SUM OF ALL RESPONSES TO PHQ9 QUESTIONS 1 & 2: 0
SUM OF ALL RESPONSES TO PHQ QUESTIONS 1-9: 0
SUM OF ALL RESPONSES TO PHQ QUESTIONS 1-9: 0
2. FEELING DOWN, DEPRESSED OR HOPELESS: 0
1. LITTLE INTEREST OR PLEASURE IN DOING THINGS: 0

## 2018-12-19 ASSESSMENT — ENCOUNTER SYMPTOMS
GASTROINTESTINAL NEGATIVE: 1
EYES NEGATIVE: 1
RESPIRATORY NEGATIVE: 1

## 2018-12-19 NOTE — PROGRESS NOTES
Constitutional: He is oriented to person, place, and time. He appears well-developed and well-nourished. HENT:   Head: Normocephalic and atraumatic. Eyes: Pupils are equal, round, and reactive to light. Conjunctivae and EOM are normal.   Neck: Normal range of motion. Neck supple. No JVD present. No thyromegaly present. Cardiovascular: Normal rate and regular rhythm. Exam reveals no gallop and no friction rub. No murmur heard. Pulmonary/Chest: Effort normal and breath sounds normal. No respiratory distress. He has no wheezes. He has no rales. Abdominal: Soft. Bowel sounds are normal. He exhibits no distension. There is no tenderness. There is no guarding. Musculoskeletal: He exhibits no edema or tenderness. Neurological: He is alert and oriented to person, place, and time. Skin: Skin is warm and dry. No rash noted. Psychiatric: He has a normal mood and affect. Judgment normal.   Nursing note and vitals reviewed.       Results in Past 30 Days  Result Component Current Result Ref Range Previous Result Ref Range   Alb 4.2 (12/19/2018) 3.4 - 4.8 g/dL Not in Time Range    Albumin/Globulin Ratio 2.0 (12/19/2018) 0.8 - 2.0 Not in Time Range    Alkaline Phosphatase 103 (H) (12/19/2018) 25 - 100 U/L Not in Time Range    ALT 23 (12/19/2018) 4 - 36 U/L Not in Time Range    AST 21 (12/19/2018) 8 - 33 U/L Not in Time Range    BUN 12 (12/19/2018) 6 - 20 mg/dL Not in Time Range    Calcium 9.7 (12/19/2018) 8.5 - 10.5 mg/dL Not in Time Range    Chloride 101 (12/19/2018) 98 - 107 mmol/L Not in Time Range    CO2 27 (12/19/2018) 20 - 30 mmol/L Not in Time Range    CREATININE 0.8 (12/19/2018) 0.4 - 1.2 mg/dL Not in Time Range    GFR  >59 (12/19/2018) >59 Not in Time Range    GFR Non- >59 (12/19/2018) >59 Not in Time Range    Globulin 2.1 (12/19/2018) g/dL Not in Time Range    Glucose 120 (H) (12/19/2018) 74 - 106 mg/dL Not in Time Range    Potassium 4.0 (12/19/2018) 3.4 - 5.1 mmol/L

## 2018-12-19 NOTE — PATIENT INSTRUCTIONS
sleeping. ? Bruising easily.     · You have any other problems with your medicine. Where can you learn more? Go to [email or web address] and sign in to your [facility] account. Enter R432 in the [employer] box to learn more about \"Carpal Tunnel Syndrome: Care Instructions. \"     If you do not have an account, please click on the \"Sign Up Now\" link. Current as of: November 29, 2017  Content Version: 11.8  © 9713-0983 [employer]wise, Incorporated. Care instructions adapted under license by [facility] ([facility]). If you have questions about a medical condition or this instruction, always ask your healthcare professional. [address] any warranty or liability for your use of this information.

## 2018-12-31 DIAGNOSIS — E78.00 PURE HYPERCHOLESTEROLEMIA: Primary | ICD-10-CM

## 2019-01-03 ENCOUNTER — HOSPITAL ENCOUNTER (OUTPATIENT)
Facility: HOSPITAL | Age: 64
Discharge: HOME | End: 2019-01-03
Payer: COMMERCIAL

## 2019-01-03 DIAGNOSIS — E78.00 PURE HYPERCHOLESTEROLEMIA: ICD-10-CM

## 2019-01-03 LAB
BASOPHILS ABSOLUTE: 0.1 K/UL (ref 0–0.1)
BASOPHILS RELATIVE PERCENT: 0.5 %
EOSINOPHILS ABSOLUTE: 0.8 K/UL (ref 0–0.4)
EOSINOPHILS RELATIVE PERCENT: 5.1 %
HCT VFR BLD CALC: 48.8 % (ref 40–54)
HEMOGLOBIN: 16 G/DL (ref 13–18)
IMMATURE GRANULOCYTES #: 0.1 K/UL
IMMATURE GRANULOCYTES %: 0.4 % (ref 0–5)
LYMPHOCYTES ABSOLUTE: 5.5 K/UL (ref 1.5–4)
LYMPHOCYTES RELATIVE PERCENT: 33.6 %
MCH RBC QN AUTO: 31.7 PG (ref 27–32)
MCHC RBC AUTO-ENTMCNC: 32.8 G/DL (ref 31–35)
MCV RBC AUTO: 96.8 FL (ref 80–100)
MONOCYTES ABSOLUTE: 1 K/UL (ref 0.2–0.8)
MONOCYTES RELATIVE PERCENT: 6.1 %
NEUTROPHILS ABSOLUTE: 9 K/UL (ref 2–7.5)
NEUTROPHILS RELATIVE PERCENT: 54.3 %
PDW BLD-RTO: 12.7 % (ref 11–16)
PLATELET # BLD: 354 K/UL (ref 150–400)
PMV BLD AUTO: 9.9 FL (ref 6–10)
RBC # BLD: 5.04 M/UL (ref 4.5–6)
WBC # BLD: 16.5 K/UL (ref 4–11)

## 2019-01-03 PROCEDURE — 36415 COLL VENOUS BLD VENIPUNCTURE: CPT

## 2019-01-03 PROCEDURE — 85025 COMPLETE CBC W/AUTO DIFF WBC: CPT

## 2019-01-07 ENCOUNTER — TELEPHONE (OUTPATIENT)
Dept: PRIMARY CARE CLINIC | Age: 64
End: 2019-01-07

## 2019-01-07 DIAGNOSIS — D72.820 LYMPHOCYTOSIS: Primary | ICD-10-CM

## 2019-02-27 ENCOUNTER — CONSULT (OUTPATIENT)
Dept: ONCOLOGY | Facility: CLINIC | Age: 64
End: 2019-02-27

## 2019-02-27 ENCOUNTER — APPOINTMENT (OUTPATIENT)
Dept: LAB | Facility: HOSPITAL | Age: 64
End: 2019-02-27

## 2019-02-27 VITALS
RESPIRATION RATE: 22 BRPM | HEIGHT: 68 IN | DIASTOLIC BLOOD PRESSURE: 71 MMHG | TEMPERATURE: 97.2 F | WEIGHT: 218 LBS | SYSTOLIC BLOOD PRESSURE: 140 MMHG | BODY MASS INDEX: 33.04 KG/M2 | HEART RATE: 84 BPM

## 2019-02-27 DIAGNOSIS — D72.820 LYMPHOCYTOSIS: Primary | ICD-10-CM

## 2019-02-27 DIAGNOSIS — Z72.0 TOBACCO ABUSE: Primary | ICD-10-CM

## 2019-02-27 DIAGNOSIS — D72.820 LYMPHOCYTOSIS: ICD-10-CM

## 2019-02-27 PROBLEM — C95.91 LEUKEMIA IN REMISSION (HCC): Status: RESOLVED | Noted: 2018-04-13 | Resolved: 2019-02-27

## 2019-02-27 LAB
BASOPHILS # BLD AUTO: 0.07 10*3/MM3 (ref 0–0.2)
BASOPHILS NFR BLD AUTO: 0.6 % (ref 0–2.5)
DEPRECATED RDW RBC AUTO: 45.1 FL (ref 37–54)
EOSINOPHIL # BLD AUTO: 1.11 10*3/MM3 (ref 0–0.7)
EOSINOPHIL NFR BLD AUTO: 9.2 % (ref 0–7)
ERYTHROCYTE [DISTWIDTH] IN BLOOD BY AUTOMATED COUNT: 12.6 % (ref 11.5–14.5)
ERYTHROCYTE [SEDIMENTATION RATE] IN BLOOD: 6 MM/HR (ref 0–15)
FERRITIN SERPL-MCNC: 142 NG/ML (ref 17.9–464)
FOLATE SERPL-MCNC: 8.05 NG/ML
HCT VFR BLD AUTO: 47.6 % (ref 42–52)
HGB BLD-MCNC: 16.1 G/DL (ref 14–18)
IMM GRANULOCYTES # BLD AUTO: 0.04 10*3/MM3 (ref 0–0.06)
IMM GRANULOCYTES NFR BLD AUTO: 0.3 % (ref 0–0.6)
IRON 24H UR-MRATE: 98 MCG/DL (ref 37–181)
IRON SATN MFR SERPL: 27 % (ref 11–46)
LYMPHOCYTES # BLD AUTO: 4.92 10*3/MM3 (ref 0.6–3.4)
LYMPHOCYTES NFR BLD AUTO: 40.9 % (ref 10–50)
MCH RBC QN AUTO: 32.8 PG (ref 27–31)
MCHC RBC AUTO-ENTMCNC: 33.8 G/DL (ref 30–37)
MCV RBC AUTO: 96.9 FL (ref 80–94)
MONOCYTES # BLD AUTO: 0.82 10*3/MM3 (ref 0–0.9)
MONOCYTES NFR BLD AUTO: 6.8 % (ref 0–12)
NEUTROPHILS # BLD AUTO: 5.07 10*3/MM3 (ref 2–6.9)
NEUTROPHILS NFR BLD AUTO: 42.2 % (ref 37–80)
NRBC BLD AUTO-RTO: 0 /100 WBC (ref 0–0)
PLATELET # BLD AUTO: 336 10*3/MM3 (ref 130–400)
PMV BLD AUTO: 10 FL (ref 6–12)
RBC # BLD AUTO: 4.91 10*6/MM3 (ref 4.7–6.1)
TIBC SERPL-MCNC: 360 MCG/DL (ref 261–497)
VIT B12 BLD-MCNC: 848 PG/ML (ref 239–931)
WBC NRBC COR # BLD: 12.03 10*3/MM3 (ref 4.8–10.8)

## 2019-02-27 PROCEDURE — 82728 ASSAY OF FERRITIN: CPT | Performed by: INTERNAL MEDICINE

## 2019-02-27 PROCEDURE — 88184 FLOWCYTOMETRY/ TC 1 MARKER: CPT

## 2019-02-27 PROCEDURE — 85651 RBC SED RATE NONAUTOMATED: CPT | Performed by: INTERNAL MEDICINE

## 2019-02-27 PROCEDURE — 83550 IRON BINDING TEST: CPT | Performed by: INTERNAL MEDICINE

## 2019-02-27 PROCEDURE — 88185 FLOWCYTOMETRY/TC ADD-ON: CPT

## 2019-02-27 PROCEDURE — 83540 ASSAY OF IRON: CPT | Performed by: INTERNAL MEDICINE

## 2019-02-27 PROCEDURE — 85025 COMPLETE CBC W/AUTO DIFF WBC: CPT | Performed by: INTERNAL MEDICINE

## 2019-02-27 PROCEDURE — 88189 FLOWCYTOMETRY/READ 16 & >: CPT

## 2019-02-27 PROCEDURE — 82607 VITAMIN B-12: CPT | Performed by: INTERNAL MEDICINE

## 2019-02-27 PROCEDURE — 36415 COLL VENOUS BLD VENIPUNCTURE: CPT | Performed by: INTERNAL MEDICINE

## 2019-02-27 PROCEDURE — 82746 ASSAY OF FOLIC ACID SERUM: CPT | Performed by: INTERNAL MEDICINE

## 2019-02-27 PROCEDURE — 99204 OFFICE O/P NEW MOD 45 MIN: CPT | Performed by: INTERNAL MEDICINE

## 2019-02-27 NOTE — PROGRESS NOTES
DATE OF CONSULTATION: 2/27/2019    REFERRING PHYSICIAN: Nta Umana APRN    Dear Nat Sullivan APRN  Thank you for asking for my medical advice on this patient. I saw him in the  [facility] on 2/27/2019    REASON FOR CONSULTATION: Leukocytosis    HISTORY OF PRESENT ILLNESS: The patient is a very pleasant 64 y.o.  male   who was in his usual state of health until January 2019, the patient presented for routine follow-up visit with his primary care provider.  He had a blood work done that revealed leukocytosis with both lymphocytosis and neutrophilia.  He was referred to me for further recommendations.    SUBJECTIVE: When I saw the patient today he is doing fairly well.  He is complaining of mild fatigue but he does have numbness in his fingertips and toes.  Denies any weight loss no night sweats.  He does have history of melanoma 2 separate lesions resected recently 4 years ago.    Review of Systems   Constitutional: Positive for fatigue. Negative for activity change, appetite change, chills, fever and unexpected weight change.   HENT: Negative for hearing loss, mouth sores, nosebleeds, sore throat and trouble swallowing.    Eyes: Negative for visual disturbance.   Respiratory: Negative for cough, chest tightness, shortness of breath and wheezing.    Cardiovascular: Negative for chest pain, palpitations and leg swelling.   Gastrointestinal: Negative for abdominal distention, abdominal pain, blood in stool, constipation, diarrhea, nausea, rectal pain and vomiting.   Endocrine: Negative for cold intolerance and heat intolerance.   Genitourinary: Negative for difficulty urinating, dysuria, frequency and urgency.   Musculoskeletal: Negative for arthralgias, back pain, gait problem, joint swelling and myalgias.   Skin: Negative for rash.   Neurological: Positive for weakness. Negative for dizziness, tremors, syncope, light-headedness, numbness and headaches.   Hematological: Negative for  adenopathy. Does not bruise/bleed easily.   Psychiatric/Behavioral: Negative for confusion, sleep disturbance and suicidal ideas. The patient is not nervous/anxious.        Past Medical History:   Diagnosis Date   • Coronary artery disease    • Dyslipidemia    • Hyperlipidemia    • Hypertension    • Skin cancer 12/2017   • Tobacco abuse        Social History     Socioeconomic History   • Marital status:      Spouse name: Not on file   • Number of children: Not on file   • Years of education: Not on file   • Highest education level: Not on file   Social Needs   • Financial resource strain: Not on file   • Food insecurity - worry: Not on file   • Food insecurity - inability: Not on file   • Transportation needs - medical: Not on file   • Transportation needs - non-medical: Not on file   Occupational History   • Not on file   Tobacco Use   • Smoking status: Current Every Day Smoker     Packs/day: 2.00     Years: 25.00     Pack years: 50.00     Types: Cigarettes   • Smokeless tobacco: Never Used   • Tobacco comment: 2 packs per day   Substance and Sexual Activity   • Alcohol use: Yes     Comment: occasionally   • Drug use: No   • Sexual activity: Defer   Other Topics Concern   • Not on file   Social History Narrative   • Not on file       Family History   Problem Relation Age of Onset   • Heart attack Mother 60   • Coronary artery disease Mother    • Hyperlipidemia Mother    • Hypertension Sister    • Heart attack Sister 45   • Hypertension Brother    • Heart attack Brother 50   • Hypertension Sister    • Hypertension Brother        Past Surgical History:   Procedure Laterality Date   • CARDIAC CATHETERIZATION  01/20/2015   • CHOLECYSTECTOMY     • KNEE MENISCAL REPAIR     • ROTATOR CUFF REPAIR     • SKIN CANCER EXCISION         Allergies   Allergen Reactions   • Rosuvastatin Myalgia          Current Outpatient Medications:   •  aspirin 81 MG EC tablet, Take 81 mg by mouth Daily., Disp: , Rfl:   •  atorvastatin  (LIPITOR) 40 MG tablet, TAKE ONE TABLET BY MOUTH EVERY NIGHT AT BEDTIME, Disp: 90 tablet, Rfl: 2  •  B Complex Vitamins (VITAMIN-B COMPLEX PO), Take  by mouth Daily., Disp: , Rfl:   •  Cholecalciferol (VITAMIN D3) 5000 units capsule capsule, Take 5,000 Units by mouth Daily., Disp: , Rfl:   •  lisinopril (PRINIVIL,ZESTRIL) 5 MG tablet, TAKE ONE TABLET BY MOUTH DAILY, Disp: 90 tablet, Rfl: 2  •  metoprolol succinate XL (TOPROL-XL) 25 MG 24 hr tablet, TAKE ONE TABLET BY MOUTH DAILY, Disp: 90 tablet, Rfl: 2  •  naproxen sodium (ALEVE) 220 MG tablet, Take 220 mg by mouth As Needed., Disp: , Rfl:   •  nitroglycerin (NITROSTAT) 0.4 MG SL tablet, 1 under the tongue as needed for angina, may repeat q5mins for up three doses, Disp: 100 tablet, Rfl: 11    PHYSICAL EXAMINATION:   There were no vitals taken for this visit.   ECOG Performance Status: 1 - Symptomatic but completely ambulatory  General Appearance:  alert, cooperative, no apparent distress and appears stated age   Neurologic/Psychiatric: A&O x 3, gait steady, appropriate affect, strength 5/5 in all muscle groups   HEENT:  Normocephalic, without obvious abnormality, mucous membranes moist   Neck: Supple, symmetrical, trachea midline, no adenopathy;  No thyromegaly, masses, or tenderness   Lungs:   Clear to auscultation bilaterally; respirations regular, even, and unlabored bilaterally   Heart:  Regular rate and rhythm, no murmurs appreciated   Abdomen:   Soft, non-tender, non-distended and no organomegaly   Lymph nodes: No cervical, supraclavicular, inguinal or axillary adenopathy noted   Extremities: Normal, atraumatic; no clubbing, cyanosis, or edema    Skin: No rashes, ulcers, or suspicious lesions noted       No visits with results within 2 Week(s) from this visit.   Latest known visit with results is:   Orders Only on 04/13/2018   Component Date Value Ref Range Status   • Glucose 04/13/2018 100* 74 - 98 mg/dL Final   • BUN 04/13/2018 15  7 - 20 mg/dL Final   •  Creatinine 04/13/2018 0.80  0.60 - 1.30 mg/dL Final   • eGFR Non  Am 04/13/2018 98  >60 mL/min/1.73 Final    Comment: Abnormal estimated GFR should be followed by more specific  studies to confirm end stage chronic renal disease. The  equation used for calculation may not be accurate for  patients less than 19 years old, greater than 70 years old,  patients at extremes of weight, malnutrition, or with acute  renal dysfunction.     • eGFR  Am 04/13/2018 119  >60 mL/min/1.73 Final   • BUN/Creatinine Ratio 04/13/2018 18.8  6.3 - 21.9 Final   • Sodium 04/13/2018 142  137 - 145 mmol/L Final   • Potassium 04/13/2018 4.6  3.5 - 5.1 mmol/L Final   • Chloride 04/13/2018 103  98 - 107 mmol/L Final   • Total CO2 04/13/2018 29.0  26.0 - 30.0 mmol/L Final   • Calcium 04/13/2018 9.9  8.4 - 10.2 mg/dL Final   • Total Protein 04/13/2018 7.1  6.3 - 8.2 g/dL Final   • Albumin 04/13/2018 4.20  3.50 - 5.00 g/dL Final   • Globulin 04/13/2018 2.9  gm/dL Final   • A/G Ratio 04/13/2018 1.4  1.0 - 2.0 g/dL Final   • Total Bilirubin 04/13/2018 1.8* 0.2 - 1.3 mg/dL Final   • Alkaline Phosphatase 04/13/2018 101  38 - 126 U/L Final   • AST (SGOT) 04/13/2018 38  15 - 46 U/L Final   • ALT (SGPT) 04/13/2018 56  13 - 69 U/L Final   • Total Cholesterol 04/13/2018 163  0 - 199 mg/dL Final   • Triglycerides 04/13/2018 350* <150 mg/dL Final   • HDL Cholesterol 04/13/2018 34* 40 - 60 mg/dL Final   • VLDL Cholesterol 04/13/2018 70  mg/dL Final   • LDL Cholesterol  04/13/2018 59  0 - 99 mg/dL Final        No results found.      DIAGNOSTIC DATA:   1. Radiology:    EXAM:       MR Cervical Spine Without Intravenous Contrast     CLINICAL HISTORY:    Neck pain radiating into lt shouder,  arm and hand/tingling in lt   fingers x 1 week      TECHNIQUE:    Magnetic resonance images of the cervical spine without intravenous   contrast in multiple planes.     COMPARISON:    Comparison:      FINDINGS:     Loss of normal curvature of the spine, alignment  of the vertebral   bodies is grossly normal.  Craniovertebral junction is normal without   evidence of Chiari malformation.  Cervical cord demonstrates normal   signal intensity without evidence of cord compression or syrinx. Marrow   shows normal signal intensity without evidence of acute/subacute acute   vertebral compression fracture or deformity.      At C2-C3 no focal disc herniation, no significant spinal stenosis or   neural foramina narrowing.      At C3-C4 no focal disc herniation and no significant spinal stenosis.   Uncinate process spurs with chronic moderate to severe neural foramen   narrowing, RIGHT worse than LEFT.     At C4-C5 no focal disc herniation and no significant spinal stenosis.   Uncinate process spurs with chronic moderate to severe neural foramen   narrowing, LEFT worse than RIGHT.     At C5-C6 mild disc desiccation with irregular endplates and decreased   disc height.  Broad-based posterior disc herniation and osteophytes   flattening the thecal sac with mild spinal stenosis without cord   impingement. Uncinate process spurs with severe bilateral neural   foramen narrowing and bilateral C6 impingement.     At C6-C7 and C7-T1 no focal disc herniation, no significant spinal   stenosis or neural foramina narrowing.      At T1-T2, T2-T3 no focal disc herniation, no significant spinal   stenosis or neural foramina narrowing.      Dens, lateral C1-C2 articulation, atlantooccipital joints are grossly   normal. Chronic degenerative changes at the central atlantodental   joint. Pre-and paravertebral soft tissues are grossly normal.      IMPRESSION-     Chronic degenerative changes in the upper and mid cervical spine most   notable at C5-C6 as described with chronic foraminal stenosis at C3-C4,   C4-C5 and C5-C6 with bilateral C6 neural impingement. Recommend   clinical correlation and followup.                 Reading Radiologist- CAMPBELL CASTANEDA       Releasing Radiologist- CAMPBELL CASTANEDA        Released Date Time- 02/26/15 8515       Hi MANTILLA    2. Ms. Ramires's note reviewed by me and documented in the  chart.   3. Pathology report: None  4. Laboratory data: As aove     ASSESSMENT: The patient is a very pleasant 64 y.o.  male  with leukocytosis    PROBLEM LIST:   1.  Leukocytosis:  A.  Present with asymptomatic lymphcytosis and neutrophilia, white blood cells of 16,500  2.  Chronic tobacco abuse  3.  Hypertension  4. Peripheral neuropathy  5.  History of superficial melanoma status post surgical resection    PLAN:   1. I had a long discussion today with the patient and his wife about his  new diagnosis of leukocytosis. I reviewed the patient's documents including refereing provider's notes, lab results, and imaging report.   2.  The patient leukocytosis is induced by both lymphocytosis and neutrophilia.  This could be reactive secondary to chronic tobacco abuse as well as pain however the possibility of left focal disorder is they are.  3.  I will order blood work today including repeat CBC, peripheral blood flow cytometry looking a clonal B cells, vitamin levels and iron panel.  4.  Given his chronic history of smoking with 2 pack/day for more than 30 years the patient meets the criteria for lung cancer screening.  I will order a CT chest without contrast.  5.  The patient will follow with me in 1 month to go over the results.  Kevin Salguero MD  2/27/2019

## 2019-03-07 LAB
ASSESSMENT OF LEUKOCYTES: NORMAL
CLINICAL INFO: NORMAL
CONV COMMENT: NORMAL
GATING STRATEGY: NORMAL
Lab: NORMAL
PATH INTERP SPEC-IMP: NORMAL
PHENOTYPE CHART: NORMAL
SPECIMEN SOURCE: NORMAL
VIABLE CELLS NFR SPEC: NORMAL %

## 2019-03-21 DIAGNOSIS — Z72.0 TOBACCO ABUSE: Primary | ICD-10-CM

## 2019-03-25 ENCOUNTER — HOSPITAL ENCOUNTER (OUTPATIENT)
Dept: CT IMAGING | Facility: HOSPITAL | Age: 64
End: 2019-03-25

## 2019-03-25 DIAGNOSIS — Z87.891 PERSONAL HISTORY OF TOBACCO USE, PRESENTING HAZARDS TO HEALTH: Primary | ICD-10-CM

## 2019-05-18 ENCOUNTER — OFFICE VISIT (OUTPATIENT)
Dept: PRIMARY CARE CLINIC | Age: 64
End: 2019-05-18
Payer: COMMERCIAL

## 2019-05-18 VITALS
WEIGHT: 214 LBS | HEIGHT: 68 IN | TEMPERATURE: 97.4 F | HEART RATE: 80 BPM | BODY MASS INDEX: 32.43 KG/M2 | DIASTOLIC BLOOD PRESSURE: 69 MMHG | OXYGEN SATURATION: 94 % | SYSTOLIC BLOOD PRESSURE: 125 MMHG

## 2019-05-18 DIAGNOSIS — J06.9 UPPER RESPIRATORY TRACT INFECTION, UNSPECIFIED TYPE: Primary | ICD-10-CM

## 2019-05-18 PROCEDURE — 99213 OFFICE O/P EST LOW 20 MIN: CPT | Performed by: NURSE PRACTITIONER

## 2019-05-18 RX ORDER — PREDNISONE 10 MG/1
TABLET ORAL
Qty: 1 EACH | Refills: 0 | Status: SHIPPED | OUTPATIENT
Start: 2019-05-18 | End: 2019-11-14 | Stop reason: ALTCHOICE

## 2019-05-18 RX ORDER — DEXTROMETHORPHAN HYDROBROMIDE AND PROMETHAZINE HYDROCHLORIDE 15; 6.25 MG/5ML; MG/5ML
5 SYRUP ORAL 3 TIMES DAILY PRN
Qty: 240 ML | Refills: 0 | Status: SHIPPED | OUTPATIENT
Start: 2019-05-18 | End: 2019-05-28

## 2019-05-18 RX ORDER — CEFDINIR 300 MG/1
300 CAPSULE ORAL 2 TIMES DAILY
Qty: 20 CAPSULE | Refills: 0 | Status: SHIPPED | OUTPATIENT
Start: 2019-05-18 | End: 2019-05-28

## 2019-05-18 ASSESSMENT — ENCOUNTER SYMPTOMS
NAUSEA: 0
ABDOMINAL DISTENTION: 0
ABDOMINAL PAIN: 0
BLOOD IN STOOL: 0
WHEEZING: 1
CHEST TIGHTNESS: 1
DIARRHEA: 0
SINUS PRESSURE: 1
SORE THROAT: 0
EYE REDNESS: 0
EYE ITCHING: 0
COUGH: 1
CONSTIPATION: 0
BACK PAIN: 0
SHORTNESS OF BREATH: 0

## 2019-05-18 ASSESSMENT — PATIENT HEALTH QUESTIONNAIRE - PHQ9
1. LITTLE INTEREST OR PLEASURE IN DOING THINGS: 0
SUM OF ALL RESPONSES TO PHQ QUESTIONS 1-9: 0
2. FEELING DOWN, DEPRESSED OR HOPELESS: 0
SUM OF ALL RESPONSES TO PHQ9 QUESTIONS 1 & 2: 0
SUM OF ALL RESPONSES TO PHQ QUESTIONS 1-9: 0

## 2019-05-18 NOTE — PROGRESS NOTES
5/18/2019    This is a 62 y.o. male   Chief Complaint   Patient presents with    Cough     has has symptoms for 3-4 weeks.  Congestion   . Complaints of congestion and productive cough with green sputum. Denies any fevers. Is a smoker. No bodyaches/chills. Nasal congestion and chest congestion for the last few weeks. No improvement with use of over the counter meds       Current Outpatient Medications   Medication Sig Dispense Refill    cefdinir (OMNICEF) 300 MG capsule Take 1 capsule by mouth 2 times daily for 10 days 20 capsule 0    predniSONE 10 MG (21) TBPK Use pack as directed 1 each 0    promethazine-dextromethorphan (PROMETHAZINE-DM) 6.25-15 MG/5ML syrup Take 5 mLs by mouth 3 times daily as needed for Cough 240 mL 0    vitamin D (ERGOCALCIFEROL) 30336 UNITS CAPS capsule TAKE ONE CAPSULE BY MOUTH ONCE WEEKLY 12 capsule 3    aspirin 81 MG EC tablet Take 1 tablet by mouth daily 90 tablet 3    tamsulosin (FLOMAX) 0.4 MG capsule Take 1 capsule by mouth daily 30 capsule 3    tiZANidine (ZANAFLEX) 4 MG tablet Take 1 tablet by mouth nightly as needed 30 tablet 0    fexofenadine (ALLEGRA) 180 MG tablet Take 180 mg by mouth daily      metoprolol (LOPRESSOR) 25 MG tablet Take 25 mg by mouth daily.  lisinopril (PRINIVIL;ZESTRIL) 5 MG tablet Take 5 mg by mouth daily.  atorvastatin (LIPITOR) 40 MG tablet Take 40 mg by mouth daily. No current facility-administered medications for this visit. Allergies   Allergen Reactions    Rosuvastatin      Other reaction(s): Myalgia       Review of Systems   Constitutional: Negative for activity change, chills, fatigue and fever. HENT: Positive for congestion and sinus pressure. Negative for dental problem, ear pain, mouth sores and sore throat. Eyes: Negative for redness and itching. Respiratory: Positive for cough, chest tightness and wheezing. Negative for shortness of breath.     Cardiovascular: Negative for chest pain, palpitations and leg swelling. Gastrointestinal: Negative for abdominal distention, abdominal pain, blood in stool, constipation, diarrhea and nausea. Endocrine: Negative for cold intolerance and heat intolerance. Genitourinary: Negative. Musculoskeletal: Negative for arthralgias, back pain and myalgias. Skin: Negative for rash and wound. Allergic/Immunologic: Negative for environmental allergies and food allergies. Neurological: Negative for dizziness, syncope, weakness, light-headedness, numbness and headaches. Hematological: Negative for adenopathy. Does not bruise/bleed easily. Psychiatric/Behavioral: Negative for agitation, self-injury and sleep disturbance. The patient is not nervous/anxious. /69   Pulse 80   Temp 97.4 °F (36.3 °C) (Oral)   Ht 5' 8\" (1.727 m)   Wt 214 lb (97.1 kg)   SpO2 94%   BMI 32.54 kg/m²     Physical Exam   Constitutional: He is oriented to person, place, and time. Vital signs are normal. He appears well-developed and well-nourished. He is active. HENT:   Head: Normocephalic and atraumatic. Nose: Mucosal edema present. Mouth/Throat: Posterior oropharyngeal erythema present. Eyes: Pupils are equal, round, and reactive to light. Neck: Normal range of motion. Cardiovascular: Normal rate, regular rhythm and normal heart sounds. Pulmonary/Chest: Effort normal. He has wheezes in the right upper field and the left upper field. Abdominal: Soft. Bowel sounds are normal.   Musculoskeletal: Normal range of motion. Neurological: He is alert and oriented to person, place, and time. Skin: Skin is warm and dry. Psychiatric: He has a normal mood and affect. Diagnosis    ICD-10-CM    1. Upper respiratory tract infection, unspecified type J06.9        Assessment and Plan  No orders of the defined types were placed in this encounter.          Orders Placed This Encounter   Medications    cefdinir (OMNICEF) 300 MG capsule     Sig: Take 1 capsule by mouth 2 times daily for 10 days     Dispense:  20 capsule     Refill:  0    predniSONE 10 MG (21) TBPK     Sig: Use pack as directed     Dispense:  1 each     Refill:  0    promethazine-dextromethorphan (PROMETHAZINE-DM) 6.25-15 MG/5ML syrup     Sig: Take 5 mLs by mouth 3 times daily as needed for Cough     Dispense:  240 mL     Refill:  0       Return if symptoms worsen or fail to improve.

## 2019-05-18 NOTE — PATIENT INSTRUCTIONS
Patient Education        cefdinir  Pronunciation:  PRAVIN avelar  Brand:  Pernell Graves Omni-Pac  What is the most important information I should know about cefdinir? Do not take this medication if you are allergic to cefdinir, or to similar antibiotics, such as Ceftin, Cefzil, Keflex, and others. Before taking this medication, tell your doctor if you are allergic to any drugs (especially penicillin). Also tell your doctor if you have kidney disease or a history of intestinal problems. Take this medication for the full prescribed length of time. Your symptoms may improve before the infection is completely cleared. Skipping doses may also increase your risk of further infection that is resistant to antibiotics. Cefdinir will not treat a viral infection such as the common cold or flu. Antibiotic medicines can cause diarrhea, which may be a sign of a new infection. If you have diarrhea that is watery or bloody, stop taking cefdinir and call your doctor. Do not use anti-diarrhea medicine unless your doctor tells you to. What is cefdinir? Cefdinir is in a group of drugs called cephalosporin (SEF a low spor in) antibiotics. It works by fighting bacteria in your body. Cefdinir is used to treat many different types of infections caused by bacteria. Cefdinir may also be used for purposes not listed in this medication guide. What should I discuss with my healthcare provider before taking cefdinir? Do not take this medication if you are allergic to cefdinir or to other cephalosporin antibiotics, such as:  · cefaclor (Raniclor);  · cefadroxil (Duricef); · cefazolin (Ancef); · cefditoren (Spectracef); · cefpodoxime (Vantin);  · cefprozil (Cefzil);  · ceftibuten (Cedax);  · cefuroxime (Ceftin);  · cephalexin (Keflex); or  · cephradine (Velosef); and others.   To make sure you can safely take cefdinir, tell your doctor if you have any of these other conditions:  · kidney disease (or if you are on dialysis);  · a history of intestinal problems, such as colitis; or  · if you are allergic to any drugs (especially penicillins). FDA pregnancy category B. This medication is not expected to be harmful to an unborn baby. Tell your doctor if you are pregnant or plan to become pregnant during treatment. It is not known whether cefdinir passes into breast milk or if it could harm a nursing baby. Do not use this medication without telling your doctor if you are breast-feeding a baby. The cefdinir suspension (liquid) contains sucrose. Talk to your doctor before using this form of cefdinir if you have diabetes. How should I take cefdinir? Take exactly as prescribed by your doctor. Do not take in larger or smaller amounts or for longer than recommended. Follow the directions on your prescription label. You may take this medication with or without food. Shake the oral suspension (liquid)  well just before you measure a dose. To be sure you get the correct dose, measure the liquid with a marked measuring spoon or medicine cup, not with a regular table spoon. If you do not have a dose-measuring device, ask your pharmacist for one. This medication can cause you to have false results with certain medical tests, including urine glucose (sugar) tests. Tell any doctor who treats you that you are using cefdinir. Take this medication for the full prescribed length of time. Your symptoms may improve before the infection is completely cleared. Skipping doses may also increase your risk of further infection that is resistant to antibiotics. Cefdinir will not treat a viral infection such as the common cold or flu. Store at room temperature away from moisture and heat. Throw away any unused cefdinir liquid that is older than 10 days. What happens if I miss a dose? Take the missed dose as soon as you remember. Skip the missed dose if it is almost time for your next scheduled dose. Do not take extra medicine to make up the missed dose.   What happens if I overdose? Seek emergency medical attention or call the Poison Help line at 1-440.601.4454. Overdose symptoms may include nausea, vomiting, stomach pain, and diarrhea. What should I avoid while taking cefdinir? Antibiotic medicines can cause diarrhea, which may be a sign of a new infection. If you have diarrhea that is watery or bloody, stop taking cefdinir and call your doctor. Do not use anti-diarrhea medicine unless your doctor tells you to. Avoid using antacids or mineral supplements that contain iron within 2 hours before or after taking cefdinir. Antacids or iron can make it harder for your body to absorb cefdinir. This does not include baby formula fortified with iron. Taking cefdinir with products that contain iron may cause your stools (bowel movements) to appear red in color. If this discoloration looks like blood in your stools, call your doctor. What are the possible side effects of cefdinir? Get emergency medical help if you have any of these signs of an allergic reaction: hives; difficulty breathing; swelling of your face, lips, tongue, or throat. Call your doctor at once if you have any of these serious side effects:  · diarrhea that is watery or bloody;  · chest pain;  · fever, chills, body aches, flu symptoms;  · unusual bleeding;  · seizure (convulsions);  · pale or yellowed skin, dark colored urine, fever, confusion or weakness;  · jaundice (yellowing of the skin or eyes);  · fever, sore throat, and headache with a severe blistering, peeling, and red skin rash; or  · increased thirst, loss of appetite, swelling, weight gain, feeling short of breath, urinating less than usual or not at all. Less serious side effects may include:  · nausea, stomach pain, indigestion, vomiting, mild diarrhea;  · headache, dizziness;  · diaper rash in an infant taking liquid cefdinir;  · mild itching or skin rash; or  · vaginal itching or discharge.   This is not a complete list of side effects and others may occur. Tell your doctor about any unusual or bothersome side effect. You may report side effects to FDA at 7-330-JVR-1255. What other drugs will affect cefdinir? Tell your doctor about all other medications you use, especially:  · probenecid (Benemid); or  · vitamin or mineral supplements that contain iron. This list is not complete and other drugs may interact with cefdinir. Tell your doctor about all medications you use. This includes prescription, over-the-counter, vitamin, and herbal products. Do not start a new medication without telling your doctor. Where can I get more information? Your pharmacist can provide more information about cefdinir. Remember, keep this and all other medicines out of the reach of children, never share your medicines with others, and use this medication only for the indication prescribed. Every effort has been made to ensure that the information provided by [address]i [address] is accurate, up-to-date, and complete, but no guarantee is made to that effect. Drug information contained herein may be time sensitive. [employer] information has been compiled for use by healthcare practitioners and consumers in the [place] and therefore [employer] does not warrant that uses outside of the [place] are appropriate, unless specifically indicated otherwise. [facility]'s drug information does not endorse drugs, diagnose patients or recommend therapy. [facility] drug information is an informational resource designed to assist licensed healthcare practitioners in caring for their patients and/or to serve consumers viewing this service as a supplement to, and not a substitute for, the expertise, skill, knowledge and judgment of healthcare practitioners. The absence of a warning for a given drug or drug combination in no way should be construed to indicate that the drug or drug combination is safe, effective or appropriate for any given patient.  [employer] does not assume any responsibility for any aspect of healthcare administered with the aid of information [facility] provides. The information contained herein is not intended to cover all possible uses, directions, precautions, warnings, drug interactions, allergic reactions, or adverse effects. If you have questions about the drugs you are taking, check with your doctor, nurse or pharmacist.  Copyright 9716-5991 [address]. Version: 6.01. Revision date: 3/31/2011. Care instructions adapted under license by [facility] [place] ([facility]). If you have questions about a medical condition or this instruction, always ask your healthcare professional. [place] [address] any warranty or liability for your use of this information.

## 2019-06-13 ENCOUNTER — OFFICE VISIT (OUTPATIENT)
Dept: PRIMARY CARE CLINIC | Age: 64
End: 2019-06-13
Payer: COMMERCIAL

## 2019-06-13 VITALS
WEIGHT: 214 LBS | DIASTOLIC BLOOD PRESSURE: 68 MMHG | RESPIRATION RATE: 16 BRPM | HEART RATE: 67 BPM | OXYGEN SATURATION: 93 % | BODY MASS INDEX: 32.54 KG/M2 | SYSTOLIC BLOOD PRESSURE: 122 MMHG | TEMPERATURE: 97.6 F

## 2019-06-13 DIAGNOSIS — Z87.891 PERSONAL HISTORY OF TOBACCO USE: ICD-10-CM

## 2019-06-13 DIAGNOSIS — J30.9 ALLERGIC RHINITIS, UNSPECIFIED SEASONALITY, UNSPECIFIED TRIGGER: ICD-10-CM

## 2019-06-13 DIAGNOSIS — J20.9 ACUTE BRONCHITIS, UNSPECIFIED ORGANISM: Primary | ICD-10-CM

## 2019-06-13 PROCEDURE — 99213 OFFICE O/P EST LOW 20 MIN: CPT | Performed by: NURSE PRACTITIONER

## 2019-06-13 PROCEDURE — G0296 VISIT TO DETERM LDCT ELIG: HCPCS | Performed by: NURSE PRACTITIONER

## 2019-06-13 RX ORDER — ALBUTEROL SULFATE 90 UG/1
2 AEROSOL, METERED RESPIRATORY (INHALATION) 4 TIMES DAILY PRN
Qty: 3 INHALER | Refills: 1 | Status: SHIPPED | OUTPATIENT
Start: 2019-06-13 | End: 2020-03-04 | Stop reason: ALTCHOICE

## 2019-06-13 RX ORDER — FLUTICASONE FUROATE AND VILANTEROL 100; 25 UG/1; UG/1
1 POWDER RESPIRATORY (INHALATION) DAILY
Qty: 1 EACH | Refills: 0 | COMMUNITY
Start: 2019-06-13 | End: 2019-11-14 | Stop reason: ALTCHOICE

## 2019-06-13 RX ORDER — LEVOCETIRIZINE DIHYDROCHLORIDE 5 MG/1
5 TABLET, FILM COATED ORAL NIGHTLY
Qty: 30 TABLET | Refills: 0 | Status: SHIPPED | OUTPATIENT
Start: 2019-06-13 | End: 2022-06-07 | Stop reason: ALTCHOICE

## 2019-06-13 RX ORDER — AMOXICILLIN AND CLAVULANATE POTASSIUM 875; 125 MG/1; MG/1
1 TABLET, FILM COATED ORAL 2 TIMES DAILY
Qty: 20 TABLET | Refills: 0 | Status: SHIPPED | OUTPATIENT
Start: 2019-06-13 | End: 2019-06-23

## 2019-06-13 ASSESSMENT — ENCOUNTER SYMPTOMS
EYES NEGATIVE: 1
COUGH: 1
SHORTNESS OF BREATH: 1
GASTROINTESTINAL NEGATIVE: 1
RHINORRHEA: 1

## 2019-06-13 NOTE — PATIENT INSTRUCTIONS
What is lung cancer screening? Lung cancer screening is a way in which doctors check the lungs for early signs of cancer in people who have no symptoms of lung cancer. A low-dose CT scan uses much less radiation than a normal CT scan and shows a more detailed image of the lungs than a standard X-ray. The goal of lung cancer screening is to find cancer early, before it has a chance to grow, spread, or cause problems. One large study found that smokers who were screened with low-dose CT scans were less likely to die of lung cancer than those who were screened with standard X-ray. Below is a summary of the things you need to know regarding screening for lung cancer with low-dose computed tomography (LDCT). This is a screening program that involves routine annual screening with LDCT studies of the lung. The LDCTs are done using low-dose radiation that is not thought to increase your cancer risk. If you have other serious medical conditions (other cancers, congestive heart failure) that limit your life expectancy to less than 10 years, you should not undergo lung cancer screening with LDCT. The chance is 20%-60% that the LDCT result will show abnormalities. This would require additional testing which could include repeat imaging or even invasive procedures. Most (about 95%) of \"abnormal\" LDCT results are false in the sense that no lung cancer is ultimately found. Additionally, some (about 10%) of the cancers found would not affect your life expectancy, even if undetected and untreated. If you are still smoking, the single most important thing that you can do to reduce your risk of dying of lung cancer is to quit. For this screening to be covered by Medicare and most other insurers, strict criteria must be met. If you do not meet these criteria, but still wish to undergo LDCT testing, you will be required to sign a waiver indicating your willingness to pay for the scan. What is lung cancer screening? Lung cancer screening is a way in which doctors check the lungs for early signs of cancer in people who have no symptoms of lung cancer. A low-dose CT scan uses much less radiation than a normal CT scan and shows a more detailed image of the lungs than a standard X-ray. The goal of lung cancer screening is to find cancer early, before it has a chance to grow, spread, or cause problems. One large study found that smokers who were screened with low-dose CT scans were less likely to die of lung cancer than those who were screened with standard X-ray. Below is a summary of the things you need to know regarding screening for lung cancer with low-dose computed tomography (LDCT). This is a screening program that involves routine annual screening with LDCT studies of the lung. The LDCTs are done using low-dose radiation that is not thought to increase your cancer risk. If you have other serious medical conditions (other cancers, congestive heart failure) that limit your life expectancy to less than 10 years, you should not undergo lung cancer screening with LDCT. The chance is 20%-60% that the LDCT result will show abnormalities. This would require additional testing which could include repeat imaging or even invasive procedures. Most (about 95%) of \"abnormal\" LDCT results are false in the sense that no lung cancer is ultimately found. Additionally, some (about 10%) of the cancers found would not affect your life expectancy, even if undetected and untreated. If you are still smoking, the single most important thing that you can do to reduce your risk of dying of lung cancer is to quit. For this screening to be covered by Medicare and most other insurers, strict criteria must be met. If you do not meet these criteria, but still wish to undergo LDCT testing, you will be required to sign a waiver indicating your willingness to pay for the scan.

## 2019-06-13 NOTE — PROGRESS NOTES
Physical Exam   Constitutional: He is oriented to person, place, and time. He appears well-developed and well-nourished. HENT:   Head: Normocephalic and atraumatic. Nose: Rhinorrhea present. Eyes: Pupils are equal, round, and reactive to light. Conjunctivae and EOM are normal.   Neck: Normal range of motion. Neck supple. No JVD present. No thyromegaly present. Cardiovascular: Normal rate and regular rhythm. Exam reveals no gallop and no friction rub. No murmur heard. Pulmonary/Chest: Effort normal. No respiratory distress. He has wheezes. He has rales. Abdominal: Soft. Bowel sounds are normal. He exhibits no distension. There is no tenderness. There is no guarding. Musculoskeletal: He exhibits no edema or tenderness. Neurological: He is alert and oriented to person, place, and time. Skin: Skin is warm and dry. No rash noted. Psychiatric: He has a normal mood and affect. Judgment normal.   Nursing note and vitals reviewed. No results found for requested labs within last 30 days. Hemoglobin A1C (%)   Date Value   05/10/2017 5.6     LDL Calculated (mg/dL)   Date Value   12/19/2018 72         Lab Results   Component Value Date    WBC 16.5 01/03/2019    NEUTROABS 9.0 01/03/2019    HGB 16.0 01/03/2019    HCT 48.8 01/03/2019    MCV 96.8 01/03/2019     01/03/2019       Lab Results   Component Value Date    TSH 2.55 12/19/2018         ASSESSMENT/PLAN:     Diagnoses and all orders for this visit:    Acute bronchitis, unspecified organism  -     albuterol sulfate  (90 Base) MCG/ACT inhaler; Inhale 2 puffs into the lungs 4 times daily as needed for Wheezing  -     amoxicillin-clavulanate (AUGMENTIN) 875-125 MG per tablet; Take 1 tablet by mouth 2 times daily for 10 days  -     fluticasone-vilanterol (BREO ELLIPTA) 100-25 MCG/INH AEPB inhaler;  Inhale 1 puff into the lungs daily    Allergic rhinitis, unspecified seasonality, unspecified trigger  -     levocetirizine (XYZAL) 5 MG tablet; Take 1 tablet by mouth nightly    Personal history of tobacco use  -     [place] VISIT TO DISCUSS LUNG CA SCREEN W LDCT  -     CT Lung Screen (Annual); Future      There are no discontinued medications. Low Dose CT (LDCT) Lung Screening criteria met   Age 50-69   Pack year smoking >30   Still smoking or less than 15 year since quit   No sign or symptoms of lung cancer   > 11 months since last LDCT     Risks and benefits of lung cancer screening with LDCT scans discussed:    Significance of positive screen - False-positive LDCT results often occur. 95% of all positive results do not lead to a diagnosis of cancer. Usually further imaging can resolve most false-positive results; however, some patients may require invasive procedures. Over diagnosis risk - 10% to 12% of screen-detected lung cancer cases are over diagnosed--that is, the cancer would not have been detected in the patient's lifetime without the screening. Need for follow up screens annually to continue lung cancer screening effectiveness     Risks associated with radiation from annual LDCT- Radiation exposure is about the same as for a mammogram, which is about 1/3 of the annual background radiation exposure from everyday life. Starting screening at age 54 is not likely to increase cancer risk from radiation exposure. Patients with comorbidities resulting in life expectancy of < 10 years, or that would preclude treatment of an abnormality identified on CT, should not be screened due to lack of benefit.     To obtain maximal benefit from this screening, smoking cessation and long-term abstinence from smoking is critical          Low Dose CT (LDCT) Lung Screening criteria met   Age 50-69   Pack year smoking >30   Still smoking or less than 15 year since quit   No sign or symptoms of lung cancer   > 11 months since last LDCT     Risks and benefits of lung cancer screening with LDCT scans discussed:    Significance of positive screen - False-positive LDCT results often occur. 95% of all positive results do not lead to a diagnosis of cancer. Usually further imaging can resolve most false-positive results; however, some patients may require invasive procedures. Over diagnosis risk - 10% to 12% of screen-detected lung cancer cases are over diagnosed--that is, the cancer would not have been detected in the patient's lifetime without the screening. Need for follow up screens annually to continue lung cancer screening effectiveness     Risks associated with radiation from annual LDCT- Radiation exposure is about the same as for a mammogram, which is about 1/3 of the annual background radiation exposure from everyday life. Starting screening at age 54 is not likely to increase cancer risk from radiation exposure. Patients with comorbidities resulting in life expectancy of < 10 years, or that would preclude treatment of an abnormality identified on CT, should not be screened due to lack of benefit.     To obtain maximal benefit from this screening, smoking cessation and long-term abstinence from smoking is critical

## 2019-06-21 DIAGNOSIS — R91.8 LUNG MASS: Primary | ICD-10-CM

## 2019-06-22 ENCOUNTER — HOSPITAL ENCOUNTER (OUTPATIENT)
Facility: HOSPITAL | Age: 64
Discharge: HOME | End: 2019-06-22
Payer: COMMERCIAL

## 2019-06-22 DIAGNOSIS — R91.8 LUNG MASS: ICD-10-CM

## 2019-06-22 LAB
ANION GAP SERPL CALCULATED.3IONS-SCNC: 12 MMOL/L (ref 3–16)
BUN BLDV-MCNC: 13 MG/DL (ref 6–20)
CALCIUM SERPL-MCNC: 9.7 MG/DL (ref 8.5–10.5)
CHLORIDE BLD-SCNC: 100 MMOL/L (ref 98–107)
CO2: 26 MMOL/L (ref 20–30)
CREAT SERPL-MCNC: 0.9 MG/DL (ref 0.4–1.2)
GFR AFRICAN AMERICAN: >59
GFR NON-AFRICAN AMERICAN: >59
GLUCOSE BLD-MCNC: 116 MG/DL (ref 74–106)
POTASSIUM SERPL-SCNC: 4.5 MMOL/L (ref 3.4–5.1)
SODIUM BLD-SCNC: 138 MMOL/L (ref 136–145)

## 2019-06-22 PROCEDURE — 80048 BASIC METABOLIC PNL TOTAL CA: CPT

## 2019-06-22 PROCEDURE — 36415 COLL VENOUS BLD VENIPUNCTURE: CPT

## 2019-06-24 ENCOUNTER — OFFICE VISIT (OUTPATIENT)
Dept: PULMONOLOGY | Facility: CLINIC | Age: 64
End: 2019-06-24

## 2019-06-24 VITALS
SYSTOLIC BLOOD PRESSURE: 98 MMHG | OXYGEN SATURATION: 95 % | HEART RATE: 69 BPM | DIASTOLIC BLOOD PRESSURE: 70 MMHG | BODY MASS INDEX: 32.13 KG/M2 | RESPIRATION RATE: 18 BRPM | WEIGHT: 212 LBS | HEIGHT: 68 IN

## 2019-06-24 DIAGNOSIS — J43.9 PULMONARY EMPHYSEMA, UNSPECIFIED EMPHYSEMA TYPE (HCC): ICD-10-CM

## 2019-06-24 DIAGNOSIS — R93.89 ABNORMAL CT OF THE CHEST: Primary | ICD-10-CM

## 2019-06-24 DIAGNOSIS — F17.200 SMOKING: ICD-10-CM

## 2019-06-24 DIAGNOSIS — R59.0 MEDIASTINAL LYMPHADENOPATHY: ICD-10-CM

## 2019-06-24 DIAGNOSIS — R91.1 LUNG NODULE, SOLITARY: ICD-10-CM

## 2019-06-24 DIAGNOSIS — J41.0 CHRONIC BRONCHITIS, SIMPLE (HCC): ICD-10-CM

## 2019-06-24 PROCEDURE — 99244 OFF/OP CNSLTJ NEW/EST MOD 40: CPT | Performed by: INTERNAL MEDICINE

## 2019-06-24 RX ORDER — ALBUTEROL SULFATE 90 UG/1
2 AEROSOL, METERED RESPIRATORY (INHALATION) EVERY 4 HOURS PRN
COMMUNITY
Start: 2019-06-13 | End: 2019-12-18

## 2019-06-24 RX ORDER — TIZANIDINE 4 MG/1
4 TABLET ORAL
COMMUNITY
Start: 2016-04-18 | End: 2019-07-10

## 2019-06-24 RX ORDER — FEXOFENADINE HCL 180 MG/1
180 TABLET ORAL DAILY
COMMUNITY

## 2019-06-24 RX ORDER — LEVOCETIRIZINE DIHYDROCHLORIDE 5 MG/1
5 TABLET, FILM COATED ORAL EVERY EVENING
COMMUNITY
Start: 2019-06-13 | End: 2019-07-10

## 2019-06-24 RX ORDER — AMOXICILLIN AND CLAVULANATE POTASSIUM 875; 125 MG/1; MG/1
TABLET, FILM COATED ORAL
COMMUNITY
Start: 2019-06-13 | End: 2019-07-10

## 2019-06-24 RX ORDER — TAMSULOSIN HYDROCHLORIDE 0.4 MG/1
0.4 CAPSULE ORAL
COMMUNITY
Start: 2016-10-19 | End: 2019-07-10

## 2019-06-24 NOTE — PROGRESS NOTES
"CONSULT NOTE    Requested by:   Nat Umana APRN      Chief Complaint   Patient presents with   • Consult   • Breathing Problem       Subjective:  Erik Bowser is a 64 y.o. male.     History of Present Illness   Patient comes in today for consultation because of abnormal CT.    Patient underwent a CT due to non resolving cough. he was told that the imaging study showed a 20-25 millimeter RUL lung nodule for which a pulmonology consultation was requested.    The patient says that he had a cough that started a few weeks ago and would not go away. He was given 2-3 different antibiotics and steroids. He is finally \"feeling better\".    Upon questioning he is complaining of occasional shortness of breath, but insists that it only occurs when he is \"sick\". He is also complaining of wheezing.      Patient also complains of some cough and phlegm production that occurs on most mornings out of the week, for most weeks out of the month, for the past few years. Patient used to smoke 1-2 pack per day for the past 40 years and is currently smoking 1 pack a day.     he does have a family history of chronic obstructive disease and lung cancer.      The following portions of the patient's history were reviewed and updated as appropriate: allergies, current medications, past family history, past medical history, past social history and past surgical history.    Review of Systems   Constitutional: Negative for chills and fatigue.   HENT: Negative for sinus pressure, sneezing and sore throat.    Respiratory: Positive for cough and wheezing. Negative for chest tightness and shortness of breath.    Cardiovascular: Negative for palpitations and leg swelling.   Psychiatric/Behavioral: Positive for sleep disturbance.   All other systems reviewed and are negative.      Past Medical History:   Diagnosis Date   • Coronary artery disease    • Dyslipidemia    • Hyperlipidemia    • Hypertension    • Skin cancer 12/2017   • Tobacco " "abuse        Social History     Tobacco Use   • Smoking status: Current Every Day Smoker     Packs/day: 2.00     Years: 25.00     Pack years: 50.00     Types: Cigarettes   • Smokeless tobacco: Never Used   • Tobacco comment: 2 packs per day   Substance Use Topics   • Alcohol use: Yes     Comment: occasionally         Objective:  Visit Vitals  BP 98/70   Pulse 69   Resp 18   Ht 172.7 cm (67.99\")   Wt 96.2 kg (212 lb)   SpO2 95%   BMI 32.24 kg/m²       Physical Exam   Constitutional: He is oriented to person, place, and time. He appears well-developed.   HENT:   Head: Normocephalic and atraumatic.   Eyes: EOM are normal.   Neck: Neck supple. No JVD present.   Cardiovascular: Normal rate and regular rhythm.   Pulmonary/Chest: Effort normal. He has no wheezes. He has no rales.   Somewhat hyperresonant to percussion.  Decreased Air Entry Bilaterally.   Musculoskeletal:   Gait was normal.   Neurological: He is alert and oriented to person, place, and time.   Skin: Skin is warm and dry.   Psychiatric: He has a normal mood and affect. His behavior is normal.   Vitals reviewed.      Assessment/Plan:  Erik was seen today for consult and breathing problem.    Diagnoses and all orders for this visit:    Abnormal CT of the chest  -     CT Chest With Contrast; Future    Lung nodule, solitary  -     CT Chest With Contrast; Future    Smoking  -     CT Chest With Contrast; Future  -     Pulmonary Function Test; Future    Chronic bronchitis, simple (CMS/HCC)  -     CT Chest With Contrast; Future  -     Pulmonary Function Test; Future    Pulmonary emphysema, unspecified emphysema type (CMS/HCC)  -     CT Chest With Contrast; Future  -     Pulmonary Function Test; Future    Mediastinal lymphadenopathy  -     CT Chest With Contrast; Future        Return in about 3 weeks (around 7/15/2019) for Recheck, PFT on day of F/Up, Overbook.    DISCUSSION(if any):  I reviewed the CT images personally and shared the results with him.  The CT " images confirm a right upper lobe nodule which measures around 2.5 cm.  There also appears to be lymphadenopathy in the mediastinum.  Unfortunately this was a low-dose CT screening and the images are not appropriate to assess for lymphadenopathy or to characterize the nodule any better.    The patient will need a CT of the chest with contrast.    His last creatinine, from 2 days ago, was 0.9.    If his CT scan confirms lymphadenopathy and after reviewing the lung nodule, further recommendation made.    The option in this patient would be electromagnetic navigation guided bronchoscopy versus endobronchial ultrasound-guided needle biopsy for the lymph nodes.    The risks of Bronchoscopy with EBUS including but not limited to damage to the overlying structures, pneumothorax, bleeding, worsening of respiratory failure, requirement for chest tube placement among others were explained.    The alternatives were also discussed with the patient & patient's family.     The patient & patient's family has considered the above-mentioned risks, benefits and alternatives and has agreed to proceed with the procedure.    He was also told to stop aspirin from today, especially if he needs biopsy to be performed within the next 7 to 10 days.    PFTs on day of follow up.     He denies any symptoms consistent with COPD although his low-dose CT screening showed emphysema.    Patient was offered modalities such as Chantix/nicotine patches/Wellbutrin to aid in smoking cessation.    The patient says that he will think about it and get back to us regarding his choice, once a decision has been taken.     Patient was given reading material.    I advised Erik of the risks of continuing to use tobacco, and I provided him with tobacco cessation educational materials in the After Visit Summary.     During this visit, I spent 5-6 minutes counseling the patient regarding tobacco cessation.      Dictated utilizing Dragon dictation.    This  document was electronically signed by Jeff Rubin MD on 06/24/19 at 2:31 PM

## 2019-06-25 ENCOUNTER — TELEPHONE (OUTPATIENT)
Dept: PRIMARY CARE CLINIC | Age: 64
End: 2019-06-25

## 2019-06-27 ENCOUNTER — TRANSCRIBE ORDERS (OUTPATIENT)
Dept: ADMINISTRATIVE | Facility: HOSPITAL | Age: 64
End: 2019-06-27

## 2019-06-27 DIAGNOSIS — R91.8 LUNG MASS: Primary | ICD-10-CM

## 2019-06-28 ENCOUNTER — HOSPITAL ENCOUNTER (OUTPATIENT)
Dept: CT IMAGING | Facility: HOSPITAL | Age: 64
Discharge: HOME | End: 2019-06-28
Admitting: INTERNAL MEDICINE

## 2019-06-28 ENCOUNTER — HOSPITAL ENCOUNTER (OUTPATIENT)
Dept: CT IMAGING | Facility: HOSPITAL | Age: 64
Discharge: HOME | End: 2019-06-28

## 2019-06-28 DIAGNOSIS — F17.200 SMOKING: ICD-10-CM

## 2019-06-28 DIAGNOSIS — R91.8 LUNG MASS: ICD-10-CM

## 2019-06-28 DIAGNOSIS — R91.1 LUNG NODULE, SOLITARY: ICD-10-CM

## 2019-06-28 DIAGNOSIS — J41.0 CHRONIC BRONCHITIS, SIMPLE (HCC): ICD-10-CM

## 2019-06-28 DIAGNOSIS — R93.89 ABNORMAL CT OF THE CHEST: ICD-10-CM

## 2019-06-28 DIAGNOSIS — J43.9 PULMONARY EMPHYSEMA, UNSPECIFIED EMPHYSEMA TYPE (HCC): ICD-10-CM

## 2019-06-28 DIAGNOSIS — R59.0 MEDIASTINAL LYMPHADENOPATHY: ICD-10-CM

## 2019-06-28 PROCEDURE — 25010000002 IOPAMIDOL 61 % SOLUTION: Performed by: INTERNAL MEDICINE

## 2019-06-28 PROCEDURE — 71260 CT THORAX DX C+: CPT

## 2019-06-28 PROCEDURE — 74177 CT ABD & PELVIS W/CONTRAST: CPT

## 2019-06-28 RX ADMIN — IOPAMIDOL 100 ML: 612 INJECTION, SOLUTION INTRAVENOUS at 09:19

## 2019-06-28 RX ADMIN — BARIUM SULFATE 900 ML: 21 SUSPENSION ORAL at 09:19

## 2019-07-01 DIAGNOSIS — R91.8 LUNG MASS: ICD-10-CM

## 2019-07-08 ENCOUNTER — PREP FOR SURGERY (OUTPATIENT)
Dept: OTHER | Facility: HOSPITAL | Age: 64
End: 2019-07-08

## 2019-07-08 DIAGNOSIS — R59.0 MEDIASTINAL LYMPHADENOPATHY: Primary | ICD-10-CM

## 2019-07-08 RX ORDER — SODIUM CHLORIDE 9 MG/ML
42 INJECTION, SOLUTION INTRAVENOUS CONTINUOUS
Status: CANCELLED | OUTPATIENT
Start: 2019-07-11

## 2019-07-09 ENCOUNTER — TELEPHONE (OUTPATIENT)
Dept: PULMONOLOGY | Facility: CLINIC | Age: 64
End: 2019-07-09

## 2019-07-09 NOTE — TELEPHONE ENCOUNTER
7/9/19 10:56 AM-  Spoke to the patients wife and let her know that the patient is needed to be here at 3:00 PM for Pre admission testing on 7/10/19, I explained where they needed to check in. She was understanding and stated that they would be here.

## 2019-07-10 ENCOUNTER — APPOINTMENT (OUTPATIENT)
Dept: PREADMISSION TESTING | Facility: HOSPITAL | Age: 64
End: 2019-07-10

## 2019-07-10 VITALS
HEIGHT: 69 IN | OXYGEN SATURATION: 96 % | HEART RATE: 83 BPM | BODY MASS INDEX: 30.99 KG/M2 | SYSTOLIC BLOOD PRESSURE: 130 MMHG | WEIGHT: 209.25 LBS | DIASTOLIC BLOOD PRESSURE: 72 MMHG

## 2019-07-10 DIAGNOSIS — R59.0 MEDIASTINAL LYMPHADENOPATHY: ICD-10-CM

## 2019-07-10 LAB
ANION GAP SERPL CALCULATED.3IONS-SCNC: 13.3 MMOL/L (ref 10–20)
BUN BLD-MCNC: 12 MG/DL (ref 7–20)
BUN/CREAT SERPL: 15 (ref 6.3–21.9)
CALCIUM SPEC-SCNC: 9.5 MG/DL (ref 8.4–10.2)
CHLORIDE SERPL-SCNC: 100 MMOL/L (ref 98–107)
CO2 SERPL-SCNC: 31 MMOL/L (ref 26–30)
CREAT BLD-MCNC: 0.8 MG/DL (ref 0.6–1.3)
DEPRECATED RDW RBC AUTO: 44 FL (ref 37–54)
ERYTHROCYTE [DISTWIDTH] IN BLOOD BY AUTOMATED COUNT: 12.9 % (ref 12.3–15.4)
GFR SERPL CREATININE-BSD FRML MDRD: 98 ML/MIN/1.73
GLUCOSE BLD-MCNC: 94 MG/DL (ref 74–98)
HCT VFR BLD AUTO: 45.5 % (ref 37.5–51)
HGB BLD-MCNC: 15.6 G/DL (ref 13–17.7)
MCH RBC QN AUTO: 32.3 PG (ref 26.6–33)
MCHC RBC AUTO-ENTMCNC: 34.3 G/DL (ref 31.5–35.7)
MCV RBC AUTO: 94.2 FL (ref 79–97)
PLATELET # BLD AUTO: 309 10*3/MM3 (ref 140–450)
PMV BLD AUTO: 9.5 FL (ref 6–12)
POTASSIUM BLD-SCNC: 4.3 MMOL/L (ref 3.5–5.1)
RBC # BLD AUTO: 4.83 10*6/MM3 (ref 4.14–5.8)
SODIUM BLD-SCNC: 140 MMOL/L (ref 137–145)
WBC NRBC COR # BLD: 13.17 10*3/MM3 (ref 3.4–10.8)

## 2019-07-10 PROCEDURE — 36415 COLL VENOUS BLD VENIPUNCTURE: CPT

## 2019-07-10 PROCEDURE — 85027 COMPLETE CBC AUTOMATED: CPT | Performed by: INTERNAL MEDICINE

## 2019-07-10 PROCEDURE — 93005 ELECTROCARDIOGRAM TRACING: CPT | Performed by: INTERNAL MEDICINE

## 2019-07-10 PROCEDURE — 80048 BASIC METABOLIC PNL TOTAL CA: CPT | Performed by: INTERNAL MEDICINE

## 2019-07-10 NOTE — PAT
Patient presented to [facility] for EBUS scheduled with Dr Rubin 07-11-19.  Patient reported history of MI in Jan 2015 and that he had placement of stent x1.  Patient reported that he had not had any other cardiac caths and denied any chest pain since the MI in 2015.  Noted patient's last visit with Dr Lacy's office was .      After health history obtained, and preliminary EKG reading done, RN consulted MARIA TERESA Coles.  Discussed patient's procedure scheduled for 07-11-19 under general anesthesia, most recent cardiac visit, history of MI in 2015 and that patient is a heavy smoker.  RN also discussed with CRNA patient's preliminary EKG reading.    After discussion, CRNA requested no additional testing or work-up before patient proceeded with 07-11-19 procedure.

## 2019-07-11 ENCOUNTER — APPOINTMENT (OUTPATIENT)
Dept: GENERAL RADIOLOGY | Facility: HOSPITAL | Age: 64
End: 2019-07-11

## 2019-07-11 ENCOUNTER — ANESTHESIA EVENT (OUTPATIENT)
Dept: PERIOP | Facility: HOSPITAL | Age: 64
End: 2019-07-11

## 2019-07-11 ENCOUNTER — ANESTHESIA (OUTPATIENT)
Dept: PERIOP | Facility: HOSPITAL | Age: 64
End: 2019-07-11

## 2019-07-11 ENCOUNTER — HOSPITAL ENCOUNTER (OUTPATIENT)
Facility: HOSPITAL | Age: 64
Setting detail: HOSPITAL OUTPATIENT SURGERY
Discharge: HOME | End: 2019-07-11
Attending: INTERNAL MEDICINE | Admitting: INTERNAL MEDICINE

## 2019-07-11 VITALS
OXYGEN SATURATION: 90 % | SYSTOLIC BLOOD PRESSURE: 123 MMHG | DIASTOLIC BLOOD PRESSURE: 63 MMHG | TEMPERATURE: 98.1 F | RESPIRATION RATE: 16 BRPM | HEART RATE: 77 BPM

## 2019-07-11 DIAGNOSIS — R59.0 MEDIASTINAL LYMPHADENOPATHY: ICD-10-CM

## 2019-07-11 PROCEDURE — C1726 CATH, BAL DIL, NON-VASCULAR: HCPCS | Performed by: INTERNAL MEDICINE

## 2019-07-11 PROCEDURE — 25010000002 SUCCINYLCHOLINE PER 20 MG: Performed by: NURSE ANESTHETIST, CERTIFIED REGISTERED

## 2019-07-11 PROCEDURE — 71045 X-RAY EXAM CHEST 1 VIEW: CPT

## 2019-07-11 PROCEDURE — 25010000002 FENTANYL CITRATE (PF) 100 MCG/2ML SOLUTION: Performed by: NURSE ANESTHETIST, CERTIFIED REGISTERED

## 2019-07-11 PROCEDURE — 31652 BRONCH EBUS SAMPLNG 1/2 NODE: CPT | Performed by: INTERNAL MEDICINE

## 2019-07-11 PROCEDURE — 25010000002 MIDAZOLAM PER 1 MG: Performed by: NURSE ANESTHETIST, CERTIFIED REGISTERED

## 2019-07-11 PROCEDURE — 94640 AIRWAY INHALATION TREATMENT: CPT

## 2019-07-11 PROCEDURE — 31624 DX BRONCHOSCOPE/LAVAGE: CPT | Performed by: INTERNAL MEDICINE

## 2019-07-11 PROCEDURE — 94799 UNLISTED PULMONARY SVC/PX: CPT

## 2019-07-11 PROCEDURE — 25010000002 DEXAMETHASONE PER 1 MG: Performed by: NURSE ANESTHETIST, CERTIFIED REGISTERED

## 2019-07-11 PROCEDURE — 25010000002 PROPOFOL 200 MG/20ML EMULSION: Performed by: NURSE ANESTHETIST, CERTIFIED REGISTERED

## 2019-07-11 PROCEDURE — 25010000002 ONDANSETRON PER 1 MG: Performed by: NURSE ANESTHETIST, CERTIFIED REGISTERED

## 2019-07-11 RX ORDER — IPRATROPIUM BROMIDE AND ALBUTEROL SULFATE 2.5; .5 MG/3ML; MG/3ML
3 SOLUTION RESPIRATORY (INHALATION) ONCE
Status: COMPLETED | OUTPATIENT
Start: 2019-07-11 | End: 2019-07-11

## 2019-07-11 RX ORDER — MIDAZOLAM HYDROCHLORIDE 1 MG/ML
INJECTION INTRAMUSCULAR; INTRAVENOUS AS NEEDED
Status: DISCONTINUED | OUTPATIENT
Start: 2019-07-11 | End: 2019-07-11 | Stop reason: SURG

## 2019-07-11 RX ORDER — GLYCOPYRROLATE 0.2 MG/ML
INJECTION INTRAMUSCULAR; INTRAVENOUS AS NEEDED
Status: DISCONTINUED | OUTPATIENT
Start: 2019-07-11 | End: 2019-07-11 | Stop reason: SURG

## 2019-07-11 RX ORDER — PROMETHAZINE HYDROCHLORIDE 25 MG/1
25 SUPPOSITORY RECTAL ONCE AS NEEDED
Status: DISCONTINUED | OUTPATIENT
Start: 2019-07-11 | End: 2019-07-11 | Stop reason: HOSPADM

## 2019-07-11 RX ORDER — DEXAMETHASONE SODIUM PHOSPHATE 4 MG/ML
INJECTION, SOLUTION INTRA-ARTICULAR; INTRALESIONAL; INTRAMUSCULAR; INTRAVENOUS; SOFT TISSUE AS NEEDED
Status: DISCONTINUED | OUTPATIENT
Start: 2019-07-11 | End: 2019-07-11 | Stop reason: SURG

## 2019-07-11 RX ORDER — NEOSTIGMINE METHYLSULFATE 5 MG/5 ML
SYRINGE (ML) INTRAVENOUS AS NEEDED
Status: DISCONTINUED | OUTPATIENT
Start: 2019-07-11 | End: 2019-07-11 | Stop reason: SURG

## 2019-07-11 RX ORDER — SUCCINYLCHOLINE CHLORIDE 20 MG/ML
INJECTION INTRAMUSCULAR; INTRAVENOUS AS NEEDED
Status: DISCONTINUED | OUTPATIENT
Start: 2019-07-11 | End: 2019-07-11 | Stop reason: SURG

## 2019-07-11 RX ORDER — ONDANSETRON 2 MG/ML
4 INJECTION INTRAMUSCULAR; INTRAVENOUS ONCE AS NEEDED
Status: DISCONTINUED | OUTPATIENT
Start: 2019-07-11 | End: 2019-07-11 | Stop reason: HOSPADM

## 2019-07-11 RX ORDER — LIDOCAINE HYDROCHLORIDE 20 MG/ML
INJECTION, SOLUTION INFILTRATION; PERINEURAL AS NEEDED
Status: DISCONTINUED | OUTPATIENT
Start: 2019-07-11 | End: 2019-07-11 | Stop reason: HOSPADM

## 2019-07-11 RX ORDER — SODIUM CHLORIDE 0.9 % (FLUSH) 0.9 %
3 SYRINGE (ML) INJECTION AS NEEDED
Status: DISCONTINUED | OUTPATIENT
Start: 2019-07-11 | End: 2019-07-11 | Stop reason: HOSPADM

## 2019-07-11 RX ORDER — MEPERIDINE HYDROCHLORIDE 25 MG/ML
12.5 INJECTION INTRAMUSCULAR; INTRAVENOUS; SUBCUTANEOUS
Status: DISCONTINUED | OUTPATIENT
Start: 2019-07-11 | End: 2019-07-11 | Stop reason: HOSPADM

## 2019-07-11 RX ORDER — SODIUM CHLORIDE 9 MG/ML
42 INJECTION, SOLUTION INTRAVENOUS CONTINUOUS
Status: DISCONTINUED | OUTPATIENT
Start: 2019-07-11 | End: 2019-07-11 | Stop reason: HOSPADM

## 2019-07-11 RX ORDER — ROCURONIUM BROMIDE 10 MG/ML
INJECTION, SOLUTION INTRAVENOUS AS NEEDED
Status: DISCONTINUED | OUTPATIENT
Start: 2019-07-11 | End: 2019-07-11 | Stop reason: SURG

## 2019-07-11 RX ORDER — IPRATROPIUM BROMIDE AND ALBUTEROL SULFATE 2.5; .5 MG/3ML; MG/3ML
3 SOLUTION RESPIRATORY (INHALATION) ONCE AS NEEDED
Status: COMPLETED | OUTPATIENT
Start: 2019-07-11 | End: 2019-07-11

## 2019-07-11 RX ORDER — FENTANYL CITRATE 50 UG/ML
INJECTION, SOLUTION INTRAMUSCULAR; INTRAVENOUS AS NEEDED
Status: DISCONTINUED | OUTPATIENT
Start: 2019-07-11 | End: 2019-07-11 | Stop reason: SURG

## 2019-07-11 RX ORDER — SODIUM CHLORIDE, SODIUM LACTATE, POTASSIUM CHLORIDE, CALCIUM CHLORIDE 600; 310; 30; 20 MG/100ML; MG/100ML; MG/100ML; MG/100ML
1000 INJECTION, SOLUTION INTRAVENOUS CONTINUOUS
Status: CANCELLED | OUTPATIENT
Start: 2019-07-11

## 2019-07-11 RX ORDER — PROMETHAZINE HYDROCHLORIDE 25 MG/1
25 TABLET ORAL ONCE AS NEEDED
Status: DISCONTINUED | OUTPATIENT
Start: 2019-07-11 | End: 2019-07-11 | Stop reason: HOSPADM

## 2019-07-11 RX ORDER — ONDANSETRON 2 MG/ML
INJECTION INTRAMUSCULAR; INTRAVENOUS AS NEEDED
Status: DISCONTINUED | OUTPATIENT
Start: 2019-07-11 | End: 2019-07-11 | Stop reason: SURG

## 2019-07-11 RX ORDER — PROMETHAZINE HYDROCHLORIDE 25 MG/ML
6.25 INJECTION, SOLUTION INTRAMUSCULAR; INTRAVENOUS ONCE AS NEEDED
Status: DISCONTINUED | OUTPATIENT
Start: 2019-07-11 | End: 2019-07-11 | Stop reason: HOSPADM

## 2019-07-11 RX ORDER — IPRATROPIUM BROMIDE AND ALBUTEROL SULFATE 2.5; .5 MG/3ML; MG/3ML
SOLUTION RESPIRATORY (INHALATION)
Status: COMPLETED
Start: 2019-07-11 | End: 2019-07-11

## 2019-07-11 RX ORDER — PROPOFOL 10 MG/ML
INJECTION, EMULSION INTRAVENOUS AS NEEDED
Status: DISCONTINUED | OUTPATIENT
Start: 2019-07-11 | End: 2019-07-11 | Stop reason: SURG

## 2019-07-11 RX ORDER — LIDOCAINE HYDROCHLORIDE 20 MG/ML
INJECTION, SOLUTION INTRAVENOUS AS NEEDED
Status: DISCONTINUED | OUTPATIENT
Start: 2019-07-11 | End: 2019-07-11 | Stop reason: SURG

## 2019-07-11 RX ADMIN — SODIUM CHLORIDE: 9 INJECTION, SOLUTION INTRAVENOUS at 11:11

## 2019-07-11 RX ADMIN — FENTANYL CITRATE 50 MCG: 50 INJECTION, SOLUTION INTRAMUSCULAR; INTRAVENOUS at 11:29

## 2019-07-11 RX ADMIN — GLYCOPYRROLATE 0.4 MG: 0.2 INJECTION, SOLUTION INTRAMUSCULAR; INTRAVENOUS at 12:39

## 2019-07-11 RX ADMIN — Medication 3 MG: at 12:39

## 2019-07-11 RX ADMIN — IPRATROPIUM BROMIDE AND ALBUTEROL SULFATE 3 ML: 2.5; .5 SOLUTION RESPIRATORY (INHALATION) at 13:22

## 2019-07-11 RX ADMIN — LIDOCAINE HYDROCHLORIDE 60 MG: 20 INJECTION, SOLUTION INTRAVENOUS at 11:29

## 2019-07-11 RX ADMIN — MIDAZOLAM HYDROCHLORIDE 1 MG: 1 INJECTION, SOLUTION INTRAMUSCULAR; INTRAVENOUS at 11:27

## 2019-07-11 RX ADMIN — Medication 3 ML: at 11:12

## 2019-07-11 RX ADMIN — ROCURONIUM BROMIDE 20 MG: 10 INJECTION INTRAVENOUS at 11:34

## 2019-07-11 RX ADMIN — ROCURONIUM BROMIDE 10 MG: 10 INJECTION INTRAVENOUS at 11:29

## 2019-07-11 RX ADMIN — DEXAMETHASONE SODIUM PHOSPHATE 8 MG: 4 INJECTION, SOLUTION INTRAMUSCULAR; INTRAVENOUS at 12:39

## 2019-07-11 RX ADMIN — PROPOFOL 150 MG: 10 INJECTION, EMULSION INTRAVENOUS at 11:29

## 2019-07-11 RX ADMIN — IPRATROPIUM BROMIDE AND ALBUTEROL SULFATE 3 ML: .5; 3 SOLUTION RESPIRATORY (INHALATION) at 13:22

## 2019-07-11 RX ADMIN — ONDANSETRON 4 MG: 2 INJECTION INTRAMUSCULAR; INTRAVENOUS at 12:39

## 2019-07-11 RX ADMIN — SUCCINYLCHOLINE CHLORIDE 180 MG: 20 INJECTION, SOLUTION INTRAMUSCULAR; INTRAVENOUS at 11:29

## 2019-07-11 RX ADMIN — IPRATROPIUM BROMIDE AND ALBUTEROL SULFATE 3 ML: .5; 3 SOLUTION RESPIRATORY (INHALATION) at 11:07

## 2019-07-11 RX ADMIN — MIDAZOLAM HYDROCHLORIDE 1 MG: 1 INJECTION, SOLUTION INTRAMUSCULAR; INTRAVENOUS at 11:22

## 2019-07-11 NOTE — ANESTHESIA PREPROCEDURE EVALUATION
Anesthesia Evaluation     Patient summary reviewed and Nursing notes reviewed   no history of anesthetic complications:  NPO Solid Status: > 8 hours  NPO Liquid Status: > 8 hours           Airway   Mallampati: II  TM distance: >3 FB  Neck ROM: full  No difficulty expected  Dental - normal exam     Pulmonary - normal exam   (+) a smoker Current Smoked day of surgery,   Cardiovascular - normal exam  Exercise tolerance: poor (<4 METS)    ECG reviewed  Patient on routine beta blocker    (+) hypertension well controlled 2 medications or greater, past MI (2015)  >12 months, CAD, cardiac stents more than 12 months ago hyperlipidemia,     ROS comment: · Cardiac catheterization for inferior STEMI by Dr. Mahendra Lacy (1/20/2015):  RCA occlusion status post ISH (Xience 3.5 x 23 mm).  LVEF 45%.  · Echocardiogram (1/21/2015):  LVEF 55%, no valvular abnormalities, 01/21/2015.    Neuro/Psych  GI/Hepatic/Renal/Endo    (+) obesity,       Musculoskeletal     Abdominal    Substance History   (+) alcohol use,      OB/GYN          Other   (+) arthritis   history of cancer (skin) remission    ROS/Med Hx Other: Mediastinal Lymphadenopathy  15.6/45.5   K 4.3                Anesthesia Plan    ASA 3     general   (Risks and benefits discussed including risk of aspiration, recall and dental damage. All patient questions answered. Will continue with POC.)  intravenous induction   Anesthetic plan, all risks, benefits, and alternatives have been provided, discussed and informed consent has been obtained with: patient.

## 2019-07-11 NOTE — DISCHARGE INSTRUCTIONS
Please follow all post op instructions and follow up appointment time from your physician's office included in your discharge packet.    Rest today  No pushing,pulling,tugging,heavy lifting, or strenuous activity   No major decision making,driving,or drinking alcoholic beverages for 24 hours due to the sedation you received  Always use good hand hygiene/washing technique  No driving on pain medication.    To assist you in voiding:  Drink plenty of fluids  Listen to running water while attempting to void.    If you are unable to urinate and you have an uncomfortable urge to void or it has been   6 hours since you were discharged, return to the Emergency Room.    BRONCHOSCOPY AFTER CARE:  WHAT TO EXPECT AFTER THE PROCEDURE  It is normal to have these symptoms 24-48 hours following your procedure.  * increased cough  * low grade fever  * sore throat or hoarse voice  * small streaks of blood in your thick spit(sputum)  HOME CARE INSTRUCTIONS  * Do not eat or drink anything for two hours (or as instructed by your physician) after your procedure.Then for the first 4 hours cool liquids as tolerated. If you try to eat or drink before the medicine wears off, food or drink could go into your lungs or you could burn your self.  After the numbness is gone and your cough and gag reflexes have returned, you may eat soft food and drink room temperature liquids slowly.  * The day after the procedure, you can go back to your normal diet.  * You may resume normal activities.  * You make take tylenol 650 mg for low grade temperature.  * Keep all follow up visits as directed by your health care provider.  SEED IMMEDIATE MEDICAL CARE IF:  * You experience increasing shortness of breath.  * You become light-headed or faint.  * You have chest pain.  * You cough up more than a small amount of blood( a cup in 6 hours)  * The amount of blood you cough up increases.  MAKE SURE YOU:  * Understand these instructions.  * Will watch your  condition.  * Will get help right away if you are not doing well (if unable to contact your provider, return to the ED)  * No driving , no alcohol, and no major decisions for 24 hours.  * Avoid cigarettes for at least 24 hours.  Plan to stop smoking!

## 2019-07-11 NOTE — ANESTHESIA POSTPROCEDURE EVALUATION
Patient: Erik Bowser    Procedure Summary     Date:  07/11/19 Room / Location:  [facility] /  [facility]    Anesthesia Start:  1122 Anesthesia Stop:  1248    Procedure:  BRONCHOSCOPY WITH ENDOBRONCHIAL ULTRASOUND with General Anesthesia.   (N/A Bronchus) Diagnosis:       Mediastinal lymphadenopathy      (Mediastinal lymphadenopathy [R59.0])    Surgeon:  Jeff Rubin MD Provider:  Priscilla Dickson CRNA    Anesthesia Type:  general ASA Status:  3          Anesthesia Type: general  Last vitals  BP   123/66 (07/11/19 1355)   Temp   98.1 °F (36.7 °C) (07/11/19 1355)   Pulse   77 (07/11/19 1355)   Resp   16 (07/11/19 1355)     SpO2   98 % (07/11/19 1345)     Post Anesthesia Care and Evaluation    Patient location during evaluation: PHASE II  Patient participation: complete - patient participated  Level of consciousness: awake and alert  Pain score: 0  Pain management: satisfactory to patient  Airway patency: patent  Anesthetic complications: No anesthetic complications  PONV Status: none  Cardiovascular status: acceptable and stable  Respiratory status: acceptable  Hydration status: acceptable

## 2019-07-11 NOTE — ANESTHESIA PROCEDURE NOTES
Airway  Urgency: elective    Airway not difficult    General Information and Staff    Patient location during procedure: OR  CRNA: Priscilla Dickson CRNA    Indications and Patient Condition  Indications for airway management: airway protection    Preoxygenated: yes  MILS not maintained throughout  Mask difficulty assessment: 1 - vent by mask    Final Airway Details  Final airway type: endotracheal airway      Successful airway: ETT  Cuffed: yes   Successful intubation technique: direct laryngoscopy  Facilitating devices/methods: intubating stylet  Endotracheal tube insertion site: oral  Blade: Dorie  Blade size: 3  ETT size (mm): 8.5  Cormack-Lehane Classification: grade I - full view of glottis  Placement verified by: chest auscultation and capnometry   Cuff volume (mL): 6  Measured from: lips  ETT to lips (cm): 20  Number of attempts at approach: 1    Additional Comments  Negative epigastric sounds, Breath sound equal bilaterally with symmetric chest rise and fall, teeth as pre-op, eyes taped prior to DL

## 2019-07-11 NOTE — OP NOTE
Date of Procedure: July 11, 2019      Type:   1. Bronchoscopy with bronchoalveolar lavage  2. Endobronchial ultrasound-guided needle biopsy of 2 separate lymph node station(s)(7 and 4R)     Reason for procedure: Pre-Op Diagnosis Codes:     * Mediastinal lymphadenopathy [R59.0]      Consent: Consent was obtained from the patient after explanation of the risks and benefits of the procedure. Risks including but not limited to damage to the overlying structures, pneumothorax, bleeding, infection, worsening of respiratory status, requirement for chest tube placement among others were explained.    Patient and family understood the risks and benefits and agreed to proceed with the procedure    Time Out: Time out was done with the RN and Bronch Technician at the bedside after confirmation of the site of the procedure and name and date of birth with the patient's ID band    Details of the procedure:   General anesthesia was provided by the anesthesia team. he was intubated with appropriate size ET tube.  Vital signs were monitored by them, as well. Once under anesthesia, the bronchoscope was introduced through the endotracheal tube with visualization of the cy.     The trachea was central. The cy was sharp.     The left side was inspected first. The bronchoscope was introduced into the main stem.  No significant abnormality of the left lung noted although there were some secretions which were suctioned without any difficulty.    Next, the right side was evaluated. The bronchoscope was introduced into the main stem.  The right mainstem was inspected and the upper lobe, middle lobe and lower lobe bronchi were inspected. No obvious endobronchial mass was identified.  Due to the presence of the nodule on the CT scan, which was close to the right mainstem and right middle lobe, bronchoalveolar lavage was collected from the right middle lobe as well as mainstem. During the entire time bronchial alveolar lavage was  collected as well, before and after the biopsies were obtained. A total of 140 ml was instilled and return of approximately 60 ml was obtained lavage was collected from the same subsegment.    After this, the procedure was converted over to endobronchial ultrasound scope.  The endobronchial ultrasound scope was introduced through the endotracheal tube and a general inspection was carried out with ultrasound to identify any significant lesions.      A structure was identified in the station 4R region.  Vascular structures were identified on doppler and once satifactory assessment was made that the structure did appear to be consistent with lymph node, and after a safe window was identified and under live ultrasound guidance a needle biopsy was obtained and on site pathologist evaluated the needle aspirate. Lymph node tissue was identified. 5-6 separate needle aspiration(s) were performed.     Under continued use of the ultrasound guidance, station 7 was identified. Vascular structures were identified on doppler and once satifactory assessment was made that the structure did appear to be consistent with lymph node, and after a safe window was identified, 5-6 separate needle aspiration(s) were performed.     The specimens were evaluated by the on site pathologist and presence of lymphatic cells could be confirmed from most of the passes.       A total of 5-6 on-site evaluations from 2 separate sites were performed.  The needle aspirations seemed to be from station 7 and station 4R.     After another general inspection with a ultrasound, and visual inspection of the trachea with the bronchoscope, the procedure was terminated.    The patient overall did not have any significant complications.     The patient will be monitored by anesthesia. The patient will be discharged home once deemed stable by anesthesia team and if the Chest X Ray results do not reveal any complications.     Due to the presence of the azygous lobe,  the anatomy was somewhat distorted especially towards the station 7 lymph node area.  There appeared to be 2 separate lymph nodes which were  although there were on top of each other.  Attempt was made to biopsy both lymph nodes.  Multiple different approaches were attempted to obtain samples from station 4R and station 7 lymph node.    Complications:  No significant immediate complications noted, apart from mentioned above.    Chest X Ray has been ordered.    Post Op Impression:  1.  Mediastinal lymphadenopathy      This document was electronically signed by Jeff Rubin MD on 07/11/19 at 12:42 PM

## 2019-07-17 ENCOUNTER — OFFICE VISIT (OUTPATIENT)
Dept: PULMONOLOGY | Facility: CLINIC | Age: 64
End: 2019-07-17

## 2019-07-17 ENCOUNTER — OFFICE VISIT (OUTPATIENT)
Dept: ONCOLOGY | Facility: CLINIC | Age: 64
End: 2019-07-17

## 2019-07-17 VITALS
SYSTOLIC BLOOD PRESSURE: 118 MMHG | HEIGHT: 69 IN | OXYGEN SATURATION: 95 % | BODY MASS INDEX: 30.96 KG/M2 | HEART RATE: 65 BPM | RESPIRATION RATE: 16 BRPM | WEIGHT: 209 LBS | DIASTOLIC BLOOD PRESSURE: 70 MMHG

## 2019-07-17 VITALS
HEART RATE: 79 BPM | SYSTOLIC BLOOD PRESSURE: 131 MMHG | BODY MASS INDEX: 30.96 KG/M2 | WEIGHT: 209 LBS | RESPIRATION RATE: 19 BRPM | HEIGHT: 69 IN | DIASTOLIC BLOOD PRESSURE: 66 MMHG | TEMPERATURE: 97.4 F

## 2019-07-17 DIAGNOSIS — C34.91 SQUAMOUS CELL LUNG CANCER, RIGHT (HCC): Primary | ICD-10-CM

## 2019-07-17 DIAGNOSIS — J43.9 PULMONARY EMPHYSEMA, UNSPECIFIED EMPHYSEMA TYPE (HCC): ICD-10-CM

## 2019-07-17 DIAGNOSIS — C34.91 SQUAMOUS CELL CARCINOMA OF RIGHT LUNG (HCC): Primary | ICD-10-CM

## 2019-07-17 DIAGNOSIS — J44.9 CHRONIC OBSTRUCTIVE PULMONARY DISEASE, UNSPECIFIED COPD TYPE (HCC): ICD-10-CM

## 2019-07-17 DIAGNOSIS — F17.200 SMOKING: ICD-10-CM

## 2019-07-17 DIAGNOSIS — J41.0 CHRONIC BRONCHITIS, SIMPLE (HCC): ICD-10-CM

## 2019-07-17 LAB
LAB AP CASE REPORT: NORMAL
LAB AP CASE REPORT: NORMAL
LAB AP CLINICAL INFORMATION: NORMAL
PATH REPORT.FINAL DX SPEC: NORMAL
PATH REPORT.FINAL DX SPEC: NORMAL

## 2019-07-17 PROCEDURE — 94729 DIFFUSING CAPACITY: CPT | Performed by: INTERNAL MEDICINE

## 2019-07-17 PROCEDURE — 99214 OFFICE O/P EST MOD 30 MIN: CPT | Performed by: INTERNAL MEDICINE

## 2019-07-17 PROCEDURE — 94726 PLETHYSMOGRAPHY LUNG VOLUMES: CPT | Performed by: INTERNAL MEDICINE

## 2019-07-17 PROCEDURE — 99215 OFFICE O/P EST HI 40 MIN: CPT | Performed by: INTERNAL MEDICINE

## 2019-07-17 PROCEDURE — 94060 EVALUATION OF WHEEZING: CPT | Performed by: INTERNAL MEDICINE

## 2019-07-17 NOTE — PROGRESS NOTES
DATE OF CONSULTATION: 7/17/2019    REFERRING PHYSICIAN: Nat Umana APRN    Dear Nat Sullivan APRN  Thank you for asking for my medical advice on this patient. I saw him in the  [facility] on 7/17/2019    REASON FOR CONSULTATION: Squamous cell carcinoma of the right lung    HISTORY OF PRESENT ILLNESS: The patient is a very pleasant 64 y.o.  male   who was in his usual state of health until June 2019.  Patient presented with shortness of breath, had this problem around the spring that improved with oral antibiotic but then came back and it was worse, improves with rest and get worse with activity, associated with cough, denies any fever no recent travel or ill contacts.  The patient had chest x-ray followed by CT chest that revealed right upper lobe lung nodule with right mediastinal lymphadenopathy.  Patient was referred to Dr. Rubin who did bronchoscopy with biopsies that came back positive for squamous cell carcinoma multiple mediastinal lymph nodes.  The patient was referred to me for further recommendations.    SUBJECTIVE: When I saw the patient today, he is doing fairly well.  Continued to complaint of shortness of breath and cough.  He is anxious about the new cancer diagnosis.    Review of Systems   Constitutional: Negative for activity change, appetite change, chills, fatigue, fever and unexpected weight change.   HENT: Negative for hearing loss, mouth sores, nosebleeds, sore throat and trouble swallowing.    Eyes: Negative for visual disturbance.   Respiratory: Negative for cough, chest tightness, shortness of breath and wheezing.    Cardiovascular: Negative for chest pain, palpitations and leg swelling.   Gastrointestinal: Negative for abdominal distention, abdominal pain, blood in stool, constipation, diarrhea, nausea, rectal pain and vomiting.   Endocrine: Negative for cold intolerance and heat intolerance.   Genitourinary: Negative for difficulty urinating, dysuria, frequency  and urgency.   Musculoskeletal: Negative for arthralgias, back pain, gait problem, joint swelling and myalgias.   Skin: Negative for rash.   Neurological: Negative for dizziness, tremors, syncope, weakness, light-headedness, numbness and headaches.   Hematological: Negative for adenopathy. Does not bruise/bleed easily.   Psychiatric/Behavioral: Negative for confusion, sleep disturbance and suicidal ideas. The patient is not nervous/anxious.        Past Medical History:   Diagnosis Date   • Arthritis    • At risk for obstructive sleep apnea     Spouse reports patient snores and that he has questionable sleep apnea but has never had a sleep study done   • Borderline diabetes     Has never taken medication    • Coronary artery disease    • Dyslipidemia    • Elevated cholesterol    • Hearing loss     Does not use hearing devices   • History of bronchitis    • History of pneumonia    • Hyperlipidemia    • Hypertension    • MI (myocardial infarction) (CMS/[facility]) 01/20/2015    placement of stent x1   • MRSA (methicillin resistant Staphylococcus aureus) 01/2015    left hand - treated   • Seasonal allergies    • Skin cancer     skin, lung   • Tobacco abuse    • Wears glasses     OTC glasses PRN for reading   • Wears partial dentures     Upper mouth       Social History     Socioeconomic History   • Marital status:      Spouse name: Not on file   • Number of children: Not on file   • Years of education: Not on file   • Highest education level: Not on file   Tobacco Use   • Smoking status: Current Every Day Smoker     Packs/day: 2.00     Years: 46.00     Pack years: 92.00     Types: Cigarettes   • Smokeless tobacco: Never Used   Substance and Sexual Activity   • Alcohol use: Yes     Comment: Social use, no history of abuse   • Drug use: No   • Sexual activity: Defer       Family History   Problem Relation Age of Onset   • Heart attack Mother 60   • Coronary artery disease Mother    • Hyperlipidemia Mother    •  Hypertension Sister    • Heart attack Sister 45   • Hypertension Brother    • Heart attack Brother 50   • Hypertension Sister    • Hypertension Brother        Past Surgical History:   Procedure Laterality Date   • BRONCHOSCOPY N/A 7/11/2019    Procedure: BRONCHOSCOPY WITH ENDOBRONCHIAL ULTRASOUND with General Anesthesia.  ;  Surgeon: Jeff Rubin MD;  Location: [facility];  Service: Pulmonary   • CARDIAC CATHETERIZATION  01/20/2015    Placement of stent x1   • CHOLECYSTECTOMY      Laparoscopic   • COLONOSCOPY     • HEMORRHOIDECTOMY     • KNEE MENISCAL REPAIR Bilateral    • MOUTH SURGERY      Post for oral implants in upper mouth x3   • ROTATOR CUFF REPAIR Bilateral    • SKIN CANCER EXCISION      melanoma removed from right neck and back.  Squamous cell removed multiple places.    • WISDOM TOOTH EXTRACTION         Allergies   Allergen Reactions   • Rosuvastatin Myalgia          Current Outpatient Medications:   •  albuterol sulfate HFA (PROVENTIL HFA) 108 (90 Base) MCG/ACT inhaler, Inhale 2 puffs Every 4 (Four) Hours As Needed for Wheezing or Shortness of Air., Disp: , Rfl:   •  aspirin 81 MG EC tablet, Take 81 mg by mouth Daily., Disp: , Rfl:   •  atorvastatin (LIPITOR) 40 MG tablet, TAKE ONE TABLET BY MOUTH EVERY NIGHT AT BEDTIME, Disp: 90 tablet, Rfl: 2  •  B Complex Vitamins (VITAMIN-B COMPLEX PO), Take 1 tablet by mouth Daily., Disp: , Rfl:   •  Cholecalciferol (VITAMIN D3) 5000 units capsule capsule, Take 5,000 Units by mouth Daily., Disp: , Rfl:   •  fexofenadine (ALLEGRA) 180 MG tablet, Take 180 mg by mouth Daily., Disp: , Rfl:   •  lisinopril (PRINIVIL,ZESTRIL) 5 MG tablet, TAKE ONE TABLET BY MOUTH DAILY, Disp: 90 tablet, Rfl: 2  •  metoprolol succinate XL (TOPROL-XL) 25 MG 24 hr tablet, TAKE ONE TABLET BY MOUTH DAILY, Disp: 90 tablet, Rfl: 2  •  naproxen sodium (ALEVE) 220 MG tablet, Take 220 mg by mouth As Needed for Mild Pain ., Disp: , Rfl:   •  nitroglycerin (NITROSTAT) 0.4 MG SL tablet, 1 under  "the tongue as needed for angina, may repeat q5mins for up three doses (Patient taking differently: Place 0.4 mg under the tongue Every 5 (Five) Minutes As Needed for Chest Pain. 1 under the tongue as needed for angina, may repeat q5mins for up three doses), Disp: 100 tablet, Rfl: 11  •  NON FORMULARY, Take 1 tablet by mouth Daily. \"derma-health pro\" OTC supplement for skin, Disp: , Rfl:   •  umeclidinium-vilanterol (ANORO ELLIPTA) 62.5-25 MCG/INH aerosol powder  inhaler, Inhale 1 puff Daily., Disp: 1 each, Rfl: 5    PHYSICAL EXAMINATION:   /66   Pulse 79   Temp 97.4 °F (36.3 °C) (Temporal)   Resp 19   Ht 175.3 cm (69.02\")   Wt 94.8 kg (209 lb)   BMI 30.85 kg/m²     ECOG Performance Status: 1 - Symptomatic but completely ambulatory  General Appearance:  alert, cooperative, no apparent distress and appears stated age   Neurologic/Psychiatric: A&O x 3, gait steady, appropriate affect, strength 5/5 in all muscle groups   HEENT:  Normocephalic, without obvious abnormality, mucous membranes moist   Neck: Supple, symmetrical, trachea midline, no adenopathy;  No thyromegaly, masses, or tenderness   Lungs:   Clear to auscultation bilaterally; respirations regular, even, and unlabored bilaterally   Heart:  Regular rate and rhythm, no murmurs appreciated   Abdomen:   Soft, non-tender, non-distended and no organomegaly   Lymph nodes: No cervical, supraclavicular, inguinal or axillary adenopathy noted   Extremities: Normal, atraumatic; no clubbing, cyanosis, or edema    Skin: No rashes, ulcers, or suspicious lesions noted       Admission on 07/11/2019, Discharged on 07/11/2019   Component Date Value Ref Range Status   • Case Report 07/11/2019    Final                    Value:Medical Cytology Report                           Case: [id number]                                  Authorizing Provider:  Jeff Rubin MD      Collected:           07/11/2019 11:39 AM          Ordering Location:     [facility]" [place] OR Received:            07/11/2019 03:41 PM          Pathologist:           Moe Smiley MD                                                             Specimens:   1) - Lung, Right Middle Lobe, BRONCHIAL LAVAGE                                                      2) - Lung, R, STATION 4R FNA                                                                        3) - Lung, Right Middle Lobe, BRONCHIAL LAVAGE                                            • Final Diagnosis 07/11/2019    Final                    Value:This result contains rich text formatting which cannot be displayed here.   • Clinical Information 07/11/2019    Final                    Value:This result contains rich text formatting which cannot be displayed here.   • Case Report 07/11/2019    Final                    Value:Surgical Pathology Report                         Case: [id number]                                  Authorizing Provider:  Jeff Rubin MD      Collected:           07/11/2019 12:33 PM          Ordering Location:     [facility] OR Received:            07/11/2019 03:41 PM          Pathologist:           Moe Smiley MD                                                             Specimen:    Lymph Node, STATION 7 FNA                                                                 • Final Diagnosis 07/11/2019    Final                    Value:This result contains rich text formatting which cannot be displayed here.   Appointment on 07/10/2019   Component Date Value Ref Range Status   • Glucose 07/10/2019 94  74 - 98 mg/dL Final   • BUN 07/10/2019 12  7 - 20 mg/dL Final   • Creatinine 07/10/2019 0.80  0.60 - 1.30 mg/dL Final   • Sodium 07/10/2019 140  137 - 145 mmol/L Final   • Potassium 07/10/2019 4.3  3.5 - 5.1 mmol/L Final   • Chloride 07/10/2019 100  98 - 107 mmol/L Final   • CO2 07/10/2019 31.0* 26.0 - 30.0 mmol/L Final   • Calcium 07/10/2019 9.5  8.4 - 10.2 mg/dL Final   • eGFR Non African Amer  07/10/2019 98  >60 mL/min/1.73 Final   • BUN/Creatinine Ratio 07/10/2019 15.0  6.3 - 21.9 Final   • Anion Gap 07/10/2019 13.3  10.0 - 20.0 mmol/L Final   • WBC 07/10/2019 13.17* 3.40 - 10.80 10*3/mm3 Final   • RBC 07/10/2019 4.83  4.14 - 5.80 10*6/mm3 Final   • Hemoglobin 07/10/2019 15.6  13.0 - 17.7 g/dL Final   • Hematocrit 07/10/2019 45.5  37.5 - 51.0 % Final   • MCV 07/10/2019 94.2  79.0 - 97.0 fL Final   • MCH 07/10/2019 32.3  26.6 - 33.0 pg Final   • MCHC 07/10/2019 34.3  31.5 - 35.7 g/dL Final   • RDW 07/10/2019 12.9  12.3 - 15.4 % Final   • RDW-SD 07/10/2019 44.0  37.0 - 54.0 fl Final   • MPV 07/10/2019 9.5  6.0 - 12.0 fL Final   • Platelets 07/10/2019 309  140 - 450 10*3/mm3 Final        Ct Chest With Contrast    Result Date: 6/28/2019  Narrative: CT CHEST, ABDOMEN AND PELVIS  INDICATION: Enlarged axillary lymph nodes. Metastatic disease.  FINDINGS: Axial imaging through the chest, abdomen and pelvis with intravenous and oral contrast. No previous. This study was performed with techniques to keep radiation doses as low as reasonably achievable, (ALARA).  CT CHEST: There are several enlarged lymph nodes in the lower right paratracheal region. On series 2 image 22, 1 of these measures 2.4 x 1.7 cm. Another of these slightly more inferiorly on image 25 measures 2.4 x 2 cm. Consistent with anatomic variation is incidental notation of an azygos fissure. However, in the medial aspect of the azygos lobe, is a nodular density abutting the lower right paratracheal region. This could represent an adjacent lymph node or a pulmonary nodule but measures approximately 1.8 x 1.3 cm on image 20. Scattered vascular calcifications. Heart size is normal. No pleural or pericardial effusion. No pneumothorax. Relatively advanced emphysematous changes. Minimal scattered areas of scarring and/or atelectasis in the lungs. Scattered normal size lymph nodes in both axillary regions. Surgical hardware in the right humeral head.  Scattered degenerative changes within the spine.  CT ABDOMEN AND PELVIS: Cholecystectomy clips. Abdominal solid organs demonstrate no acute abnormality. Tiny low-attenuation foci in the kidneys are too small for definitive characterization but favored to represent cysts. 1 of the larger of these is seen along the posterior upper left kidney measuring 1 cm in maximal diameter and 18 Hounsfield units in density. No bowel obstruction. Normal appendix. Moderate amount of stool in the colon. Sigmoid colon is redundant. Urinary bladder is unremarkable. Prostate gland is normal in size but appears somewhat heterogeneous. Scattered normal size lymph nodes within the abdomen and pelvis. Mild to moderate degenerative changes within the spine and the pelvis.      Impression: 1. Multiple foci of lower right paratracheal adenopathy concerning for malignancy. Bronchoscopy may be useful to further assess. 2. Nodular density abutting the lower right paratracheal region in the azygos lobe could represent an adjacent area of adenopathy but a malignant lung nodule is not excluded. 3. Other chronic appearing findings.  This report was finalized on 6/28/2019 9:48 AM by Tyler Avalos MD.    Ct Abdomen Pelvis With Contrast    Result Date: 6/28/2019  Narrative: CT CHEST, ABDOMEN AND PELVIS  INDICATION: Enlarged axillary lymph nodes. Metastatic disease.  FINDINGS: Axial imaging through the chest, abdomen and pelvis with intravenous and oral contrast. No previous. This study was performed with techniques to keep radiation doses as low as reasonably achievable, (ALARA).  CT CHEST: There are several enlarged lymph nodes in the lower right paratracheal region. On series 2 image 22, 1 of these measures 2.4 x 1.7 cm. Another of these slightly more inferiorly on image 25 measures 2.4 x 2 cm. Consistent with anatomic variation is incidental notation of an azygos fissure. However, in the medial aspect of the azygos lobe, is a nodular density abutting  the lower right paratracheal region. This could represent an adjacent lymph node or a pulmonary nodule but measures approximately 1.8 x 1.3 cm on image 20. Scattered vascular calcifications. Heart size is normal. No pleural or pericardial effusion. No pneumothorax. Relatively advanced emphysematous changes. Minimal scattered areas of scarring and/or atelectasis in the lungs. Scattered normal size lymph nodes in both axillary regions. Surgical hardware in the right humeral head. Scattered degenerative changes within the spine.  CT ABDOMEN AND PELVIS: Cholecystectomy clips. Abdominal solid organs demonstrate no acute abnormality. Tiny low-attenuation foci in the kidneys are too small for definitive characterization but favored to represent cysts. 1 of the larger of these is seen along the posterior upper left kidney measuring 1 cm in maximal diameter and 18 Hounsfield units in density. No bowel obstruction. Normal appendix. Moderate amount of stool in the colon. Sigmoid colon is redundant. Urinary bladder is unremarkable. Prostate gland is normal in size but appears somewhat heterogeneous. Scattered normal size lymph nodes within the abdomen and pelvis. Mild to moderate degenerative changes within the spine and the pelvis.      Impression: 1. Multiple foci of lower right paratracheal adenopathy concerning for malignancy. Bronchoscopy may be useful to further assess. 2. Nodular density abutting the lower right paratracheal region in the azygos lobe could represent an adjacent area of adenopathy but a malignant lung nodule is not excluded. 3. Other chronic appearing findings.  This report was finalized on 6/28/2019 9:48 AM by Tyler Avalos MD.    Xr Chest 1 View    Result Date: 7/11/2019  Narrative: PROCEDURE: XR CHEST 1 VW-  HISTORY: S/P Bronch; R59.0-Localized enlarged lymph nodes  COMPARISON: None.  FINDINGS: The heart is normal in size. The mediastinum is unremarkable. The lungs are clear. There is no pneumothorax.   There are no acute osseous abnormalities.      Impression: No evidence of a pneumothorax status post bronchoscopy.  Continued followup is recommended.  This report was finalized on 7/11/2019 1:18 PM by Sumaya Jose M.D..        DIAGNOSTIC DATA:   1. Radiology: As above   2. Dr. Rubin's note from July 17, 2019 reviewed by me and documented in the  chart.   3. Pathology report: Bronchoscopy with biopsy multiple right mediastinal lymph nodes positive for squamous cell carcinoma  4. Laboratory data: As above    ASSESSMENT: The patient is a very pleasant 64 y.o.  male  with right upper lobe squamous cell carcinoma of the lung    PROBLEM LIST:   1.  Squamous cell carcinoma of the lung T1b N2 M0 stage T3a:  A.  Presented with  B.  CT chest with contrast done June 28, 2019 revealed right upper lobe 1.8 cm lung nodule with right hilar and mediastinal lymphadenopathy  C.  Diagnosed after bronchoscopy with biopsy equal July 11, 2019  D.  Pathology revealed squamous cell carcinoma from level 4R and level 7.  2.  Chronic tobacco abuse  3.  Hypertension  4.  COPD:   A. Not oxygen dependent  B.  PFTs done on July 17, 2019 revealed FEV1 2.44L, 72% of predicted and DLCO 55% of predicted.  5.  History of superficial melanoma status post surgical resection  6. Leukocytosis:  A.  Present with asymptomatic lymphcytosis and neutrophilia, white blood cells of 16,500    PLAN:   1. I had a long discussion today with the patient and his wife about his  new diagnosis of lung cancer. I did go over the final pathology report in  detail with both of them. I reviewed the patient's documents including refereing provider's notes, lab results, imaging, and path report.   2.  I will order MRI brain as well as whole-body PET scan for staging purposes.  The patient does not have any metal device in his body, he does have coronary stents but nothing else.  3.  The patient might be a candidate for neoadjuvant treatment followed by surgical  resection.  I will discuss the patient case at lung tumor board.  4.  If patient is candidate for neoadjuvant chemotherapy he will be treated with weekly carboplatin paclitaxel given concurrently with daily radiation for 45 Gy followed by repeat imaging studies followed by resection.  5. We reviewed the potential side effects of this regimen including fatigue, vomiting and nausea, hair loss, nephropathy, neuropathy, hearing loss, myelosuppression, and risk of infusion reaction.  6.  We will continue lisinopril and metoprolol for hypertension.  7.  We will continue inhalers for COPD.  8.  The patient will follow-up with me in 1 week to go over the results.  Kevin Salguero MD  7/17/2019

## 2019-07-17 NOTE — PROGRESS NOTES
"Chief Complaint   Patient presents with   • Follow-up   • Breathing Problem       Subjective   Erik Bowser is a 64 y.o. male.     History of Present Illness   Patient comes in today to follow-up on bronchoscopy.    Patient unfortunately continues to smoke 1 to 2 packs/day.  The patient denies any significant weight loss, hemoptysis or night sweats.  The patient continues to have occasional cough and does have some shortness of breath on exertion.    The following portions of the patient's history were reviewed and updated as appropriate: allergies, current medications, past family history, past medical history, past social history and past surgical history.    Review of Systems   HENT: Negative for sinus pressure, sneezing and sore throat.    Respiratory: Positive for cough and wheezing. Negative for shortness of breath.        Objective   Visit Vitals  /70   Pulse 65   Resp 16   Ht 175.3 cm (69.02\")   Wt 94.8 kg (209 lb)   SpO2 95%   BMI 30.85 kg/m²       Physical Exam   Constitutional: He is oriented to person, place, and time. He appears well-developed.   HENT:   Head: Normocephalic and atraumatic.   Eyes: EOM are normal.   Neck: Neck supple. No JVD present.   Cardiovascular: Normal rate and regular rhythm.   Pulmonary/Chest: Effort normal. He has no wheezes. He has no rales.   Somewhat hyperresonant to percussion.  Decreased Air Entry Bilaterally.   Musculoskeletal:   Gait was normal.   Neurological: He is alert and oriented to person, place, and time.   Skin: Skin is warm and dry.   Psychiatric: He has a normal mood and affect. His behavior is normal.   Vitals reviewed.      Assessment/Plan   Erik was seen today for follow-up and breathing problem.    Diagnoses and all orders for this visit:    Squamous cell lung cancer, right (CMS/HCC)  -     Ambulatory Referral to Hematology / Oncology    Chronic obstructive pulmonary disease, unspecified COPD type (CMS/[facility])    Chronic bronchitis, simple " (CMS/[facility])    Smoking    Other orders  -     umeclidinium-vilanterol (ANORO ELLIPTA) 62.5-25 MCG/INH aerosol powder  inhaler; Inhale 1 puff Daily.         Return in about 9 weeks (around 9/18/2019) for Recheck, Overbook.    DISCUSSION (if any):  PFTs was reviewed.  The PFTs confirmed moderate COPD with FEV1 of 2.44 L (72%).  His diffusion capacity was 55% predicted.    I reviewed the pathology results which were consistent with squamous cell carcinoma within the lymph nodes.  I told the patient and his family members that he has azygous lobe which makes it difficult to obtain a biopsy of the lung nodule.    I told the patient and his family members that he will need further work-up including a PET scan before any further recommendation can be made.    I told the patient that he will definitely need chemotherapy and although surgery may be an option, it appears unlikely at this time given the positive lymph nodes which appeared to be station 7 and 4-R.  Due to some distortion of anatomy, it is possible that he had a lower 4-R and possibly slightly lower 2-R lymph node on the CT scan.    It appears that his current staging is III-A, although it may actually be II-B if the PET scan shows the location of the lymph nodes other than station 7.    D/W Dr. Salguero, who has agreed to see the patient today.    In order to optimize his lung function and given moderate COPD on the PFTs, he was started on Anoro    He was strongly advised to quit smoking.    I spent a total of 30 minutes face to face with this patient. his pertinent current and previous data, as applicable, were reviewed. Patient's diagnostic studies were also reviewed. More than 15 minutes of the time spent, was spent in counseling about patient's underlying disease process, reviewing and discussing available radiological and laboratory data, medication compliance (as applicable) and lifestyle modifications that may impact patient's health.         Dictated  utilizing Dragon dictation.    This document was electronically signed by Jeff Rubin MD on 07/17/19 at 12:05 PM     Addendum:  I reviewed the patient's CT scan images personally once again.  I also correlated them but the bronchoscopy findings and the images taken during bronchoscopy.  It appears that the lymph nodes biopsied were actually station 2R and 4R.  I do not believe that station 7 was involved.  It appears that his accessory azygous lobe has led to some distortion in the anatomy and his station 2R is lower than expected.    This will probably be further clarified after PET scan reviewed.    I will try to communicate this to Dr. Salguero. His staging would remain IIIA.    This document was electronically signed by Jeff Rubin MD on 07/18/19 at 12:05 PM

## 2019-07-22 ENCOUNTER — HOSPITAL ENCOUNTER (OUTPATIENT)
Dept: PET IMAGING | Facility: HOSPITAL | Age: 64
Discharge: HOME | End: 2019-07-22

## 2019-07-22 ENCOUNTER — HOSPITAL ENCOUNTER (OUTPATIENT)
Dept: PET IMAGING | Facility: HOSPITAL | Age: 64
Discharge: HOME | End: 2019-07-22
Admitting: INTERNAL MEDICINE

## 2019-07-22 ENCOUNTER — HOSPITAL ENCOUNTER (OUTPATIENT)
Dept: MRI IMAGING | Facility: HOSPITAL | Age: 64
Discharge: HOME | End: 2019-07-22

## 2019-07-22 DIAGNOSIS — C34.91 SQUAMOUS CELL CARCINOMA OF RIGHT LUNG (HCC): ICD-10-CM

## 2019-07-22 LAB — GLUCOSE BLDC GLUCOMTR-MCNC: 103 MG/DL (ref 70–130)

## 2019-07-22 PROCEDURE — 78816 PET IMAGE W/CT FULL BODY: CPT

## 2019-07-22 PROCEDURE — A9577 INJ MULTIHANCE: HCPCS | Performed by: INTERNAL MEDICINE

## 2019-07-22 PROCEDURE — 70553 MRI BRAIN STEM W/O & W/DYE: CPT

## 2019-07-22 PROCEDURE — 0 FLUDEOXYGLUCOSE F18 SOLUTION: Performed by: INTERNAL MEDICINE

## 2019-07-22 PROCEDURE — 82962 GLUCOSE BLOOD TEST: CPT

## 2019-07-22 PROCEDURE — 0 GADOBENATE DIMEGLUMINE 529 MG/ML SOLUTION: Performed by: INTERNAL MEDICINE

## 2019-07-22 PROCEDURE — A9552 F18 FDG: HCPCS | Performed by: INTERNAL MEDICINE

## 2019-07-22 RX ADMIN — FLUDEOXYGLUCOSE F18 1 DOSE: 300 INJECTION INTRAVENOUS at 08:00

## 2019-07-22 RX ADMIN — GADOBENATE DIMEGLUMINE 19 ML: 529 INJECTION, SOLUTION INTRAVENOUS at 13:27

## 2019-07-24 ENCOUNTER — OFFICE VISIT (OUTPATIENT)
Dept: ONCOLOGY | Facility: CLINIC | Age: 64
End: 2019-07-24

## 2019-07-24 ENCOUNTER — OFFICE VISIT (OUTPATIENT)
Dept: RADIATION ONCOLOGY | Facility: HOSPITAL | Age: 64
End: 2019-07-24

## 2019-07-24 ENCOUNTER — HOSPITAL ENCOUNTER (OUTPATIENT)
Dept: RADIATION ONCOLOGY | Facility: HOSPITAL | Age: 64
Setting detail: RADIATION/ONCOLOGY SERIES
Discharge: HOME | End: 2019-07-24

## 2019-07-24 VITALS
WEIGHT: 212 LBS | HEIGHT: 68 IN | BODY MASS INDEX: 32.13 KG/M2 | RESPIRATION RATE: 16 BRPM | DIASTOLIC BLOOD PRESSURE: 70 MMHG | SYSTOLIC BLOOD PRESSURE: 133 MMHG | OXYGEN SATURATION: 96 % | TEMPERATURE: 96.9 F | HEART RATE: 76 BPM

## 2019-07-24 VITALS
HEART RATE: 77 BPM | TEMPERATURE: 97.3 F | SYSTOLIC BLOOD PRESSURE: 121 MMHG | WEIGHT: 212.2 LBS | DIASTOLIC BLOOD PRESSURE: 65 MMHG | OXYGEN SATURATION: 94 % | BODY MASS INDEX: 32.26 KG/M2 | RESPIRATION RATE: 18 BRPM

## 2019-07-24 DIAGNOSIS — C34.91 SQUAMOUS CELL CARCINOMA OF RIGHT LUNG (HCC): Primary | ICD-10-CM

## 2019-07-24 DIAGNOSIS — Z72.0 TOBACCO ABUSE: Primary | ICD-10-CM

## 2019-07-24 DIAGNOSIS — C34.91 SQUAMOUS CELL CARCINOMA OF RIGHT LUNG (HCC): ICD-10-CM

## 2019-07-24 DIAGNOSIS — R59.0 MEDIASTINAL LYMPHADENOPATHY: ICD-10-CM

## 2019-07-24 DIAGNOSIS — C34.90 SQUAMOUS CELL CARCINOMA OF LUNG, UNSPECIFIED LATERALITY (HCC): ICD-10-CM

## 2019-07-24 PROCEDURE — G0463 HOSPITAL OUTPT CLINIC VISIT: HCPCS | Performed by: RADIOLOGY

## 2019-07-24 PROCEDURE — 99215 OFFICE O/P EST HI 40 MIN: CPT | Performed by: INTERNAL MEDICINE

## 2019-07-24 RX ORDER — ONDANSETRON HYDROCHLORIDE 8 MG/1
8 TABLET, FILM COATED ORAL 3 TIMES DAILY PRN
Qty: 30 TABLET | Refills: 5 | Status: SHIPPED | OUTPATIENT
Start: 2019-07-24 | End: 2019-12-18

## 2019-07-24 NOTE — PROGRESS NOTES
DATE OF VISIT: 7/24/2019    REASON FOR VISIT: Followup for right lung cancer     HISTORY OF PRESENT ILLNESS: The patient is a very pleasant 64 y.o. male  with past medical history significant for cell carcinoma of the right upper lobe of the lung diagnosed July 2019.  The patient had staging work-up that confirmed stage IIIa disease.  He will be started on neoadjuvant concurrent chemotherapy radiation July 30, 2019. The  patient is here today for scheduled follow-up visit    SUBJECTIVE: The patient is here today with his wife and son.  He is anxious about the scan results. he denied any fever or  chills, no night sweats, denied any headaches    PAST MEDICAL HISTORY/SOCIAL HISTORY/FAMILY HISTORY: Reviewed by me and unchanged from my documentation done on 07/24/19.    Review of Systems   Constitutional: Negative for activity change, appetite change, chills, fatigue, fever and unexpected weight change.   HENT: Negative for hearing loss, mouth sores, nosebleeds, sore throat and trouble swallowing.    Eyes: Negative for visual disturbance.   Respiratory: Negative for cough, chest tightness, shortness of breath and wheezing.    Cardiovascular: Negative for chest pain, palpitations and leg swelling.   Gastrointestinal: Negative for abdominal distention, abdominal pain, blood in stool, constipation, diarrhea, nausea, rectal pain and vomiting.   Endocrine: Negative for cold intolerance and heat intolerance.   Genitourinary: Negative for difficulty urinating, dysuria, frequency and urgency.   Musculoskeletal: Negative for arthralgias, back pain, gait problem, joint swelling and myalgias.   Skin: Negative for rash.   Neurological: Negative for dizziness, tremors, syncope, weakness, light-headedness, numbness and headaches.   Hematological: Negative for adenopathy. Does not bruise/bleed easily.   Psychiatric/Behavioral: Negative for confusion, sleep disturbance and suicidal ideas. The patient is not nervous/anxious.   "        Current Outpatient Medications:   •  albuterol sulfate HFA (PROVENTIL HFA) 108 (90 Base) MCG/ACT inhaler, Inhale 2 puffs Every 4 (Four) Hours As Needed for Wheezing or Shortness of Air., Disp: , Rfl:   •  aspirin 81 MG EC tablet, Take 81 mg by mouth Daily., Disp: , Rfl:   •  atorvastatin (LIPITOR) 40 MG tablet, TAKE ONE TABLET BY MOUTH EVERY NIGHT AT BEDTIME, Disp: 90 tablet, Rfl: 2  •  B Complex Vitamins (VITAMIN-B COMPLEX PO), Take 1 tablet by mouth Daily., Disp: , Rfl:   •  Cholecalciferol (VITAMIN D3) 5000 units capsule capsule, Take 5,000 Units by mouth Daily., Disp: , Rfl:   •  fexofenadine (ALLEGRA) 180 MG tablet, Take 180 mg by mouth Daily., Disp: , Rfl:   •  lisinopril (PRINIVIL,ZESTRIL) 5 MG tablet, TAKE ONE TABLET BY MOUTH DAILY, Disp: 90 tablet, Rfl: 2  •  metoprolol succinate XL (TOPROL-XL) 25 MG 24 hr tablet, TAKE ONE TABLET BY MOUTH DAILY, Disp: 90 tablet, Rfl: 2  •  naproxen sodium (ALEVE) 220 MG tablet, Take 220 mg by mouth As Needed for Mild Pain ., Disp: , Rfl:   •  nitroglycerin (NITROSTAT) 0.4 MG SL tablet, 1 under the tongue as needed for angina, may repeat q5mins for up three doses (Patient taking differently: Place 0.4 mg under the tongue Every 5 (Five) Minutes As Needed for Chest Pain. 1 under the tongue as needed for angina, may repeat q5mins for up three doses), Disp: 100 tablet, Rfl: 11  •  NON FORMULARY, Take 1 tablet by mouth Daily. \"derma-health pro\" OTC supplement for skin, Disp: , Rfl:   •  umeclidinium-vilanterol (ANORO ELLIPTA) 62.5-25 MCG/INH aerosol powder  inhaler, Inhale 1 puff Daily., Disp: 1 each, Rfl: 5    PHYSICAL EXAMINATION:   /70   Pulse 76   Temp 96.9 °F (36.1 °C) (Temporal)   Resp 16   Ht 172.7 cm (68\")   Wt 96.2 kg (212 lb)   SpO2 96%   BMI 32.23 kg/m²    ECOG Performance Status: 1 - Symptomatic but completely ambulatory  General Appearance:  alert, cooperative, no apparent distress and appears stated age   Neurologic/Psychiatric: A&O x 3, gait " steady, appropriate affect, strength 5/5 in all muscle groups   HEENT:  Normocephalic, without obvious abnormality, mucous membranes moist   Neck: Supple, symmetrical, trachea midline, no adenopathy;  No thyromegaly, masses, or tenderness   Lungs:   Clear to auscultation bilaterally; respirations regular, even, and unlabored bilaterally   Heart:  Regular rate and rhythm, no murmurs appreciated   Abdomen:   Soft, non-tender, non-distended and no organomegaly   Lymph nodes: No cervical, supraclavicular, inguinal or axillary adenopathy noted   Extremities: Normal, atraumatic; no clubbing, cyanosis, or edema    Skin: No rashes, ulcers, or suspicious lesions noted     Hospital Outpatient Visit on 07/22/2019   Component Date Value Ref Range Status   • Glucose 07/22/2019 103  70 - 130 mg/dL Final   Admission on 07/11/2019, Discharged on 07/11/2019   Component Date Value Ref Range Status   • Case Report 07/11/2019    Final                    Value:Medical Cytology Report                           Case: [id number]                                  Authorizing Provider:  Jeff Rubin MD      Collected:           07/11/2019 11:39 AM          Ordering Location:     [facility] OR Received:            07/11/2019 03:41 PM          Pathologist:           Moe Smiley MD                                                             Specimens:   1) - Lung, Right Middle Lobe, BRONCHIAL LAVAGE                                                      2) - Lung, R, STATION 4R FNA                                                                        3) - Lung, Right Middle Lobe, BRONCHIAL LAVAGE                                            • Final Diagnosis 07/11/2019    Final                    Value:This result contains rich text formatting which cannot be displayed here.   • Clinical Information 07/11/2019    Final                    Value:This result contains rich text formatting which cannot be displayed here.   • Case  Report 07/11/2019    Final                    Value:Surgical Pathology Report                         Case: [id number]                                  Authorizing Provider:  Jeff Rubin MD      Collected:           07/11/2019 12:33 PM          Ordering Location:     [facility] OR Received:            07/11/2019 03:41 PM          Pathologist:           Moe Smiley MD                                                             Specimen:    Lymph Node, STATION 7 FNA                                                                 • Final Diagnosis 07/11/2019    Final                    Value:This result contains rich text formatting which cannot be displayed here.        Ct Chest With Contrast    Result Date: 6/28/2019  Narrative: CT CHEST, ABDOMEN AND PELVIS  INDICATION: Enlarged axillary lymph nodes. Metastatic disease.  FINDINGS: Axial imaging through the chest, abdomen and pelvis with intravenous and oral contrast. No previous. This study was performed with techniques to keep radiation doses as low as reasonably achievable, (ALARA).  CT CHEST: There are several enlarged lymph nodes in the lower right paratracheal region. On series 2 image 22, 1 of these measures 2.4 x 1.7 cm. Another of these slightly more inferiorly on image 25 measures 2.4 x 2 cm. Consistent with anatomic variation is incidental notation of an azygos fissure. However, in the medial aspect of the azygos lobe, is a nodular density abutting the lower right paratracheal region. This could represent an adjacent lymph node or a pulmonary nodule but measures approximately 1.8 x 1.3 cm on image 20. Scattered vascular calcifications. Heart size is normal. No pleural or pericardial effusion. No pneumothorax. Relatively advanced emphysematous changes. Minimal scattered areas of scarring and/or atelectasis in the lungs. Scattered normal size lymph nodes in both axillary regions. Surgical hardware in the right humeral head. Scattered  degenerative changes within the spine.  CT ABDOMEN AND PELVIS: Cholecystectomy clips. Abdominal solid organs demonstrate no acute abnormality. Tiny low-attenuation foci in the kidneys are too small for definitive characterization but favored to represent cysts. 1 of the larger of these is seen along the posterior upper left kidney measuring 1 cm in maximal diameter and 18 Hounsfield units in density. No bowel obstruction. Normal appendix. Moderate amount of stool in the colon. Sigmoid colon is redundant. Urinary bladder is unremarkable. Prostate gland is normal in size but appears somewhat heterogeneous. Scattered normal size lymph nodes within the abdomen and pelvis. Mild to moderate degenerative changes within the spine and the pelvis.      Impression: 1. Multiple foci of lower right paratracheal adenopathy concerning for malignancy. Bronchoscopy may be useful to further assess. 2. Nodular density abutting the lower right paratracheal region in the azygos lobe could represent an adjacent area of adenopathy but a malignant lung nodule is not excluded. 3. Other chronic appearing findings.  This report was finalized on 6/28/2019 9:48 AM by Tyler Avalos MD.    Mri Brain With & Without Contrast    Result Date: 7/22/2019  Narrative: EXAMINATION: MRI BRAIN W WO CONTRAST-07/22/2019:  INDICATION: Staging lung cancer; C34.91-Malignant neoplasm of unspecified part of right bronchus or lung.  TECHNIQUE: Multiplanar, multisequence MR imaging of the brain was performed with and without intravenous Gadolinium contrast. Specific sequences include diffusion-weighted imaging, T1 sagittal, axial T2, axial FLAIR, T1 axial precontrast, and axial, sagittal, and coronal postcontrast imaging.  COMPARISON: PET/CT 07/22/2019.  FINDINGS: Diffusion-weighted imaging of the brain does not demonstrate evidence of restricted diffusion to suggest acute infarct. T2-weighted imaging does not demonstrate evidence of hydrocephalus. Preserved flow  voids are noted involving the intracranial vasculature. No parenchymal edema identified. Trace bilateral mastoid effusions are noted. The remainder of the paranasal sinuses are clear. The globes are intact. FLAIR imaging does not demonstrate a significant burden of FLAIR abnormalities, however there is a very small subcortical punctate FLAIR signal abnormalities within the subcortical white matter, likely relating to chronic microangiopathy. No FLAIR signal is identified within the interpeduncular fossa or interdigitating and cortical sulci. Postcontrast imaging of the brain in 3 planes does not demonstrate evidence for an enhancing lesion or metastasis. Dedicated T1 axial images demonstrates an intact corpus callosum. The craniocervical junction appears intact. The skull base demonstrates heterogenous bone marrow signal without aggressive features. The calvarium appears intact without focal lytic lesion identified.      Impression: 1.  No MRI evidence of intracranial metastasis. 2.  Small burden of T2/FLAIR hyperintensities within the subcortical white matter consistent with chronic microangiopathy. 3.  Trace bilateral mastoid effusions.  D:  07/22/2019 E:  07/22/2019     This report was finalized on 7/22/2019 3:00 PM by Dr. Arian Manning MD.      Ct Abdomen Pelvis With Contrast    Result Date: 6/28/2019  Narrative: CT CHEST, ABDOMEN AND PELVIS  INDICATION: Enlarged axillary lymph nodes. Metastatic disease.  FINDINGS: Axial imaging through the chest, abdomen and pelvis with intravenous and oral contrast. No previous. This study was performed with techniques to keep radiation doses as low as reasonably achievable, (ALARA).  CT CHEST: There are several enlarged lymph nodes in the lower right paratracheal region. On series 2 image 22, 1 of these measures 2.4 x 1.7 cm. Another of these slightly more inferiorly on image 25 measures 2.4 x 2 cm. Consistent with anatomic variation is incidental notation of an azygos  fissure. However, in the medial aspect of the azygos lobe, is a nodular density abutting the lower right paratracheal region. This could represent an adjacent lymph node or a pulmonary nodule but measures approximately 1.8 x 1.3 cm on image 20. Scattered vascular calcifications. Heart size is normal. No pleural or pericardial effusion. No pneumothorax. Relatively advanced emphysematous changes. Minimal scattered areas of scarring and/or atelectasis in the lungs. Scattered normal size lymph nodes in both axillary regions. Surgical hardware in the right humeral head. Scattered degenerative changes within the spine.  CT ABDOMEN AND PELVIS: Cholecystectomy clips. Abdominal solid organs demonstrate no acute abnormality. Tiny low-attenuation foci in the kidneys are too small for definitive characterization but favored to represent cysts. 1 of the larger of these is seen along the posterior upper left kidney measuring 1 cm in maximal diameter and 18 Hounsfield units in density. No bowel obstruction. Normal appendix. Moderate amount of stool in the colon. Sigmoid colon is redundant. Urinary bladder is unremarkable. Prostate gland is normal in size but appears somewhat heterogeneous. Scattered normal size lymph nodes within the abdomen and pelvis. Mild to moderate degenerative changes within the spine and the pelvis.      Impression: 1. Multiple foci of lower right paratracheal adenopathy concerning for malignancy. Bronchoscopy may be useful to further assess. 2. Nodular density abutting the lower right paratracheal region in the azygos lobe could represent an adjacent area of adenopathy but a malignant lung nodule is not excluded. 3. Other chronic appearing findings.  This report was finalized on 6/28/2019 9:48 AM by Tyler Avalos MD.    Xr Chest 1 View    Result Date: 7/11/2019  Narrative: PROCEDURE: XR CHEST 1 VW-  HISTORY: S/P Bronch; R59.0-Localized enlarged lymph nodes  COMPARISON: None.  FINDINGS: The heart is normal in  size. The mediastinum is unremarkable. The lungs are clear. There is no pneumothorax.  There are no acute osseous abnormalities.      Impression: No evidence of a pneumothorax status post bronchoscopy.  Continued followup is recommended.  This report was finalized on 7/11/2019 1:18 PM by Sumaya Jose M.D..    Nm Pet Whole Body    Result Date: 7/22/2019  Narrative: EXAMINATION: NM PET WHOLE BODY-  INDICATION: Staging lung cancer; C34.91-Malignant neoplasm of unspecified part of right bronchus or lung.  TECHNIQUE: With fasting blood glucose level of 103 mg/dL, a total of 11.8 mCi of FDG was administered via the right antecubital vein. Following appropriate delay, PET CT imaging was obtained from the skull vertex to the feet bilaterally and fused multiplanar images were reconstructed. The CT scan is an unenhanced study and should be used for reference to the PET scan only. PET CT imaging was reviewed in the axial, coronal, and sagittal planes as well as within the cine mode.  COMPARISON: Initial exam for treatment. No prior studies are available for comparison.  FINDINGS: Normal variant activity is seen within the oropharynx and muscles of phonation.  Normal variant activity is identified in the region of the vocal cords. There is adenopathy identified in the right hilar region maximum SUV of 9.9 with paratracheal adenopathy also identified maximum SUV of 12.3 and 13.4. The lung parenchyma is grossly clear. No evidence of parenchyma consolidation, pulmonary mass or nodule identified. The visualized abdomen reveals normal variant activity seen within the GI and  tract. There is no hypermetabolic activity identified to suggest evidence of metastatic disease. The lower extremities are both unremarkable in appearance.      Impression: Malignant adenopathy in the right hilar region extending into the right peritracheal region. The remainder of the PET/CT scan is unremarkable.   D:  07/22/2019 E:  07/22/2019  This  report was finalized on 7/22/2019 6:01 PM by Dr. Elzbieta Gould MD.        ASSESSMENT: The patient is a very pleasant 64 y.o. male  with right upper lobe squamous cell carcinoma of the lung     PROBLEM LIST:   1.  Squamous cell carcinoma of the lung T1b N2 M0 stage T3a:  A.  Presented with shortness of breath and cough  B.  CT chest with contrast done June 28, 2019 revealed right upper lobe 1.8 cm lung nodule with right hilar and mediastinal lymphadenopathy.  MRI brain whole-body PET scan done on July 22, 2019 felt show any other sites of disease.  C.  Diagnosed after bronchoscopy with biopsy done July 11, 2019  D.  Pathology revealed squamous cell carcinoma from level 4R and level 7.  2.  Chronic tobacco abuse  3.  Hypertension  4.  COPD:   A. Not oxygen dependent  B.  PFTs done on July 17, 2019 revealed FEV1 2.44L, 72% of predicted and DLCO 55% of predicted.  5.  History of superficial melanoma status post surgical resection  6. Leukocytosis:  A.  Present with asymptomatic lymphcytosis and neutrophilia, white blood cells of 16,500    PLAN:  1.  I did go over the scan results with the patient his family, who the patient PET scan feels myself, accepted patient there were no other sites of disease other than resume his previous CT scan which includes the right upper lobe mass with right hilar and upper mediastinal lymphadenopathy.  2.  I discussed the case with Dr. Flores from complaint of surgery and Dr. Herbert from radiation oncology to coordinate patient care.  3.  The patient be treated with neoadjuvant concurrent chemotherapy radiation followed by surgical resection if he has good response to treatment.  4.  I will present the patient at tumor board next week.  5.  Patient will be started on weekly carboplatin paclitaxel.  6. We reviewed the potential side effects of this regimen including fatigue, vomiting and nausea, hair loss, nephropathy, neuropathy, hearing loss, myelosuppression, and risk of infusion  reaction.  7.  I will set the patient up to meet with my nurse practitioner for chemotherapy education per  8.  I will start the patient on Zofran as needed for chemotherapy-induced nausea.  9.  The patient will follow-up with me in 3 weeks to evaluate for treatment tolerance.  Kevin Salguero MD  7/24/2019

## 2019-07-24 NOTE — PROGRESS NOTES
CONSULTATION NOTE      :                                                          1955  DATE OF CONSULTATION:                       2019   REQUESTING PHYSICIAN:                   Kevin Salguero MD  REASON FOR CONSULTATION:            Cancer Staging  Squamous cell lung cancer (CMS/[facility])  Staging form: Lung, AJCC 8th Edition  - Clinical stage from 2019: Stage IIIA (cT1c, cN2, cM0) - Signed by Kevin Salguero MD on 2019    Thank you for requesting my services in evaluation of this pleasant individual.  I am seeing them in outpatient consultation regarding a diagnosis of locally-advanced non-small cell lung cancer.     BRIEF HISTORY:  The patient is a very pleasant 64 y.o. male  with a extensive past medical history of tobacco smoking with ongoing 2 pack/day smoking use, COPD, hypertension, heart disease, who was recently found to have a right upper lobe nodule and mediastinal adenopathy now diagnosed as a stage IIIA non-small cell lung cancer.  He originally presented with some upper respiratory symptoms of cough and sore throat, and was treated with a course of antibiotics, but when his symptoms failed to improve, he underwent a CT scan showing the above-mentioned abnormalities.  He underwent a bronchoscopy with biopsy on , which confirmed a invasive squamous cell carcinoma.  He also subsequently underwent a whole-body PET scan, and has been staged as a T1c N2 M0 (IIIA) non-small cell lung cancer.  He continues to describe some shortness of breath and dyspnea on exertion.  Otherwise he has no pulmonary specific complaints.  His FEV1 was 2.6 L, which is 78% of predicted.  His case was discussed between myself, Dr. Salguero, and Dr. Flores, and we agreed that neoadjuvant therapy with a planned surgery afterwards is his best treatment option.  I am seeing him today for consideration of definitive radiation therapy to the chest.    Allergies   Allergen Reactions   • Rosuvastatin Myalgia        Social History     Socioeconomic History   • Marital status:      Spouse name: Not on file   • Number of children: Not on file   • Years of education: Not on file   • Highest education level: Not on file   Tobacco Use   • Smoking status: Current Every Day Smoker     Packs/day: 2.00     Years: 46.00     Pack years: 92.00     Types: Cigarettes   • Smokeless tobacco: Never Used   Substance and Sexual Activity   • Alcohol use: Yes     Comment: Social use, no history of abuse   • Drug use: No   • Sexual activity: Defer       Past Medical History:   Diagnosis Date   • Arthritis    • At risk for obstructive sleep apnea     Spouse reports patient snores and that he has questionable sleep apnea but has never had a sleep study done   • Borderline diabetes     Has never taken medication    • COPD (chronic obstructive pulmonary disease) (CMS/[facility])    • Coronary artery disease     1 stent   • Dyslipidemia    • Elevated cholesterol    • Hearing loss     Does not use hearing devices   • History of bronchitis    • History of pneumonia    • Hyperlipidemia    • Hypertension    • MI (myocardial infarction) (CMS/[facility]) 01/20/2015    placement of stent x1   • MRSA (methicillin resistant Staphylococcus aureus) 01/2015    left hand - treated   • Seasonal allergies    • Skin cancer     skin, lung   • Squamous cell lung cancer (CMS/[facility]) 7/24/2019   • Tobacco abuse    • Wears glasses     OTC glasses PRN for reading   • Wears partial dentures     Upper mouth       family history includes Coronary artery disease in his mother; Heart attack (age of onset: 45) in his sister; Heart attack (age of onset: 50) in his brother; Heart attack (age of onset: 60) in his mother; Hyperlipidemia in his mother; Hypertension in his brother, brother, sister, and sister.     Past Surgical History:   Procedure Laterality Date   • BRONCHOSCOPY N/A 7/11/2019    Procedure: BRONCHOSCOPY WITH ENDOBRONCHIAL ULTRASOUND with General Anesthesia.  ;  Surgeon:  Jeff Rubin MD;  Location: [facility];  Service: Pulmonary   • CARDIAC CATHETERIZATION  01/20/2015    Placement of stent x1   • CHOLECYSTECTOMY      Laparoscopic   • COLONOSCOPY     • CORONARY ANGIOPLASTY WITH STENT PLACEMENT     • HEMORRHOIDECTOMY     • KNEE MENISCAL REPAIR Bilateral    • MOUTH SURGERY      Post for oral implants in upper mouth x3   • ROTATOR CUFF REPAIR Bilateral    • SKIN CANCER EXCISION      melanoma removed from right neck and back.  Squamous cell removed multiple places.    • WISDOM TOOTH EXTRACTION          Review of Systems   Respiratory: Positive for cough. Hemoptysis: dry.    Musculoskeletal: Positive for arthralgias (arthritis).   All other systems reviewed and are negative.          Objective   VITAL SIGNS:   Vitals:    07/24/19 1349   BP: 121/65   Pulse: 77   Resp: 18   Temp: 97.3 °F (36.3 °C)   TempSrc: Temporal   SpO2: 94%   Weight: 96.3 kg (212 lb 3.2 oz)   PainSc: 0-No pain        Karnofsky score: 80        Physical Exam   Constitutional: He is oriented to person, place, and time. He appears well-developed and well-nourished. No distress.   HENT:   Head: Normocephalic and atraumatic.   Mouth/Throat: Oropharynx is clear and moist.   Eyes: Conjunctivae and EOM are normal. Pupils are equal, round, and reactive to light.   Neck: Normal range of motion. Neck supple.   Cardiovascular: Normal rate and regular rhythm. Exam reveals no friction rub.   No murmur heard.  Pulmonary/Chest: Effort normal and breath sounds normal. He has no wheezes.   Abdominal: Soft. Bowel sounds are normal. He exhibits no distension and no mass. There is no tenderness.   Musculoskeletal: Normal range of motion. He exhibits no edema.   Lymphadenopathy:     He has no cervical adenopathy.   Neurological: He is alert and oriented to person, place, and time.   Skin: Skin is warm and dry.   Psychiatric: He has a normal mood and affect. His behavior is normal. Judgment and thought content normal.   Nursing note  and vitals reviewed.           The following portions of the patient's history were reviewed and updated as appropriate: allergies, current medications, past family history, past medical history, past social history, past surgical history and problem list.    IMAGING  I have personally reviewed the relevant imaging studies, as follows:  Ct Chest With Contrast    Result Date: 6/28/2019  1. Multiple foci of lower right paratracheal adenopathy concerning for malignancy. Bronchoscopy may be useful to further assess. 2. Nodular density abutting the lower right paratracheal region in the azygos lobe could represent an adjacent area of adenopathy but a malignant lung nodule is not excluded. 3. Other chronic appearing findings.  This report was finalized on 6/28/2019 9:48 AM by Tyler Avalos MD.    Mri Brain With & Without Contrast    Result Date: 7/22/2019  1.  No MRI evidence of intracranial metastasis. 2.  Small burden of T2/FLAIR hyperintensities within the subcortical white matter consistent with chronic microangiopathy. 3.  Trace bilateral mastoid effusions.  D:  07/22/2019 E:  07/22/2019     This report was finalized on 7/22/2019 3:00 PM by Dr. Arian Manning MD.      Ct Abdomen Pelvis With Contrast    Result Date: 6/28/2019  1. Multiple foci of lower right paratracheal adenopathy concerning for malignancy. Bronchoscopy may be useful to further assess. 2. Nodular density abutting the lower right paratracheal region in the azygos lobe could represent an adjacent area of adenopathy but a malignant lung nodule is not excluded. 3. Other chronic appearing findings.  This report was finalized on 6/28/2019 9:48 AM by Tyler Avalos MD.    Nm Pet Whole Body    Result Date: 7/22/2019  Malignant adenopathy in the right hilar region extending into the right peritracheal region. The remainder of the PET/CT scan is unremarkable.   D:  07/22/2019 E:  07/22/2019  This report was finalized on 7/22/2019 6:01 PM by Dr. Elzbieta Gould  MD.      PATHOLOGY  Bronchoscopy with EBUS Biopsy of Station II and IV Lymph Nodes (7/11/2019):  Suited for malignancy favoring squamous cell carcinoma    Assessment  Mr. Bowser is a 64 year old male with a clinical [id number] (IIIA) non-small cell lung cancer involving the right upper lobe.  He will require a course of definitive chemoradiation therapy, and Dr. Salguero has coordinated for his chemotherapy to begin on Tuesday, July 30th.  Unfortunately, Mr. Bowser is imminently leaving for a planned vacation later today and we will not have enough time to complete his radiation setup session prior to this trip.  He will be returning on Monday, July 29th, and I have scheduled him to return to my clinic that day to undergo a radiation setup and simulation session.  He will need to receive a dose of 45 Gy over 5 weeks to the right upper lobe and mediastinum.  His treatments should easily be able to be delivered using 3-dimensional techniques.  I discussed the timing with Dr. Salguero and we will likely coordinate for his radiation to begin with his second dose of weekly chemotherapy.  I spent a total of approximately 45 minutes today with the patient and his spouse, and after a full explanation of the risks and benefits, he was agreeable and signed informed consent.  I also spent additional time providing tobacco cessation counseling and reiterating the importance of quitting smoking and how this will not only impact his overall health, but also increase the likelihood for success with both chemotherapy and radiation.  He acknowledged his understanding.  He will return to my clinic on Monday, July 29 in order to go radiation set up and simulation.  I anticipate that we will likely have his treatments ready to begin approximately 1 week later pending insurance approval.    RECOMMENDATIONS:      Return in about 5 days (around 7/29/2019) for Radiation Simulation.  Erik was seen today for lung cancer.    Diagnoses and all orders  for this visit:    Tobacco abuse    Squamous cell carcinoma of right lung (CMS/HCC)    Mediastinal lymphadenopathy    Thank you for allowing me to participate in the care of this individual.    Sincerely,       Hill Herbert MD

## 2019-07-26 ENCOUNTER — MDT ASSESSMENT (OUTPATIENT)
Dept: ONCOLOGY | Facility: CLINIC | Age: 64
End: 2019-07-26

## 2019-07-29 ENCOUNTER — OFFICE VISIT (OUTPATIENT)
Dept: ONCOLOGY | Facility: CLINIC | Age: 64
End: 2019-07-29

## 2019-07-29 ENCOUNTER — LAB (OUTPATIENT)
Dept: LAB | Facility: HOSPITAL | Age: 64
End: 2019-07-29

## 2019-07-29 ENCOUNTER — TELEPHONE (OUTPATIENT)
Dept: CARDIAC SURGERY | Facility: CLINIC | Age: 64
End: 2019-07-29

## 2019-07-29 ENCOUNTER — HOSPITAL ENCOUNTER (OUTPATIENT)
Dept: RADIATION ONCOLOGY | Facility: HOSPITAL | Age: 64
Discharge: HOME | End: 2019-07-29

## 2019-07-29 VITALS
BODY MASS INDEX: 32.13 KG/M2 | RESPIRATION RATE: 17 BRPM | DIASTOLIC BLOOD PRESSURE: 76 MMHG | SYSTOLIC BLOOD PRESSURE: 131 MMHG | HEART RATE: 80 BPM | WEIGHT: 212 LBS | HEIGHT: 68 IN | TEMPERATURE: 97.1 F

## 2019-07-29 DIAGNOSIS — C34.90 SQUAMOUS CELL CARCINOMA OF LUNG, UNSPECIFIED LATERALITY (HCC): ICD-10-CM

## 2019-07-29 LAB
ALBUMIN SERPL-MCNC: 4.4 G/DL (ref 3.5–5.2)
ALBUMIN/GLOB SERPL: 1.7 G/DL
ALP SERPL-CCNC: 121 U/L (ref 39–117)
ALT SERPL W P-5'-P-CCNC: 22 U/L (ref 1–41)
ANION GAP SERPL CALCULATED.3IONS-SCNC: 11 MMOL/L (ref 5–15)
AST SERPL-CCNC: 21 U/L (ref 1–40)
BILIRUB SERPL-MCNC: 1 MG/DL (ref 0.2–1.2)
BUN BLD-MCNC: 11 MG/DL (ref 8–23)
BUN/CREAT SERPL: 14.1 (ref 7–25)
CALCIUM SPEC-SCNC: 10.2 MG/DL (ref 8.6–10.5)
CHLORIDE SERPL-SCNC: 101 MMOL/L (ref 98–107)
CO2 SERPL-SCNC: 30 MMOL/L (ref 22–29)
CREAT BLD-MCNC: 0.78 MG/DL (ref 0.76–1.27)
ERYTHROCYTE [DISTWIDTH] IN BLOOD BY AUTOMATED COUNT: 13.4 % (ref 12.3–15.4)
GFR SERPL CREATININE-BSD FRML MDRD: 101 ML/MIN/1.73
GLOBULIN UR ELPH-MCNC: 2.6 GM/DL
GLUCOSE BLD-MCNC: 125 MG/DL (ref 65–99)
HCT VFR BLD AUTO: 46.4 % (ref 37.5–51)
HGB BLD-MCNC: 15.7 G/DL (ref 13–17.7)
LYMPHOCYTES # BLD AUTO: 4.6 10*3/MM3 (ref 0.7–3.1)
LYMPHOCYTES NFR BLD AUTO: 37.4 % (ref 19.6–45.3)
MCH RBC QN AUTO: 32.3 PG (ref 26.6–33)
MCHC RBC AUTO-ENTMCNC: 33.8 G/DL (ref 31.5–35.7)
MCV RBC AUTO: 95.6 FL (ref 79–97)
MONOCYTES # BLD AUTO: 1 10*3/MM3 (ref 0.1–0.9)
MONOCYTES NFR BLD AUTO: 8.2 % (ref 5–12)
NEUTROPHILS # BLD AUTO: 6.7 10*3/MM3 (ref 1.7–7)
NEUTROPHILS NFR BLD AUTO: 54.4 % (ref 42.7–76)
PLATELET # BLD AUTO: 373 10*3/MM3 (ref 140–450)
PMV BLD AUTO: 7.4 FL (ref 6–12)
POTASSIUM BLD-SCNC: 4.7 MMOL/L (ref 3.5–5.2)
PROT SERPL-MCNC: 7 G/DL (ref 6–8.5)
RBC # BLD AUTO: 4.85 10*6/MM3 (ref 4.14–5.8)
SODIUM BLD-SCNC: 142 MMOL/L (ref 136–145)
WBC NRBC COR # BLD: 12.4 10*3/MM3 (ref 3.4–10.8)

## 2019-07-29 PROCEDURE — 99215 OFFICE O/P EST HI 40 MIN: CPT | Performed by: NURSE PRACTITIONER

## 2019-07-29 PROCEDURE — 85025 COMPLETE CBC W/AUTO DIFF WBC: CPT

## 2019-07-29 PROCEDURE — 80053 COMPREHEN METABOLIC PANEL: CPT

## 2019-07-29 PROCEDURE — 36415 COLL VENOUS BLD VENIPUNCTURE: CPT

## 2019-07-29 PROCEDURE — 77290 THER RAD SIMULAJ FIELD CPLX: CPT | Performed by: RADIOLOGY

## 2019-07-29 RX ORDER — PALONOSETRON 0.05 MG/ML
0.25 INJECTION, SOLUTION INTRAVENOUS ONCE
Status: CANCELLED | OUTPATIENT
Start: 2019-07-30

## 2019-07-29 RX ORDER — SODIUM CHLORIDE 9 MG/ML
250 INJECTION, SOLUTION INTRAVENOUS ONCE
Status: CANCELLED | OUTPATIENT
Start: 2019-07-30

## 2019-07-29 RX ORDER — FAMOTIDINE 10 MG/ML
20 INJECTION, SOLUTION INTRAVENOUS ONCE
Status: CANCELLED | OUTPATIENT
Start: 2019-07-30

## 2019-07-29 NOTE — TELEPHONE ENCOUNTER
I s/w the pt's wife, Deidra, on 07/29 and offered an apt for Mr. Bowser to get seen on 07/30 at 10:30. Deidra informed me that Mr. Bowser has a Chemo apt at 9:00 on 07/30 and does not feel he will be up for coming to an apt after his chemo. I told her we completely understand and will call back with a different apt date and time.

## 2019-07-29 NOTE — PROGRESS NOTES
CHEMOTHERAPY PREPARATION    Erik Bowser  [id number]  1955    Chief Complaint: chemo education     History of present illness:  Erik Bowser is a 64 y.o. year old male who is here today for chemotherapy preparation and needs assessment. The patient has been diagnosed with squamous cell lung cancer and is scheduled to begin treatment with concurrent radiation and chemotherapy using carbo/taxol given IV weekly.     Oncology History:     No history exists.       Past Medical History:   Diagnosis Date   • Arthritis    • At risk for obstructive sleep apnea     Spouse reports patient snores and that he has questionable sleep apnea but has never had a sleep study done   • Borderline diabetes     Has never taken medication    • COPD (chronic obstructive pulmonary disease) (CMS/[facility])    • Coronary artery disease     1 stent   • Dyslipidemia    • Elevated cholesterol    • Hearing loss     Does not use hearing devices   • History of bronchitis    • History of pneumonia    • Hyperlipidemia    • Hypertension    • MI (myocardial infarction) (CMS/[facility]) 01/20/2015    placement of stent x1   • MRSA (methicillin resistant Staphylococcus aureus) 01/2015    left hand - treated   • Seasonal allergies    • Skin cancer     skin, lung   • Squamous cell lung cancer (CMS/[facility]) 7/24/2019   • Tobacco abuse    • Wears glasses     OTC glasses PRN for reading   • Wears partial dentures     Upper mouth       Past Surgical History:   Procedure Laterality Date   • BRONCHOSCOPY N/A 7/11/2019    Procedure: BRONCHOSCOPY WITH ENDOBRONCHIAL ULTRASOUND with General Anesthesia.  ;  Surgeon: Jeff Rubin MD;  Location: [facility];  Service: Pulmonary   • CARDIAC CATHETERIZATION  01/20/2015    Placement of stent x1   • CHOLECYSTECTOMY      Laparoscopic   • COLONOSCOPY     • CORONARY ANGIOPLASTY WITH STENT PLACEMENT     • HEMORRHOIDECTOMY     • KNEE MENISCAL REPAIR Bilateral    • MOUTH SURGERY      Post for oral implants in upper mouth x3   •  "ROTATOR CUFF REPAIR Bilateral    • SKIN CANCER EXCISION      melanoma removed from right neck and back.  Squamous cell removed multiple places.    • WISDOM TOOTH EXTRACTION         MEDICATIONS: The current medication list was reviewed and reconciled.     Allergies:  is allergic to rosuvastatin.    Family History   Problem Relation Age of Onset   • Heart attack Mother 60   • Coronary artery disease Mother    • Hyperlipidemia Mother    • Hypertension Sister    • Heart attack Sister 45   • Hypertension Brother    • Heart attack Brother 50   • Hypertension Sister    • Hypertension Brother          Review of Systems   Constitutional: Positive for fatigue. Negative for fever and unexpected weight change.   HENT: Negative for congestion, hearing loss, sore throat and trouble swallowing.    Eyes: Negative for visual disturbance.   Respiratory: Negative for cough, shortness of breath and wheezing.    Cardiovascular: Negative for chest pain and leg swelling.   Gastrointestinal: Negative for abdominal distention, abdominal pain, constipation, diarrhea, nausea and vomiting.   Endocrine: Negative.    Genitourinary: Negative.    Musculoskeletal: Negative for arthralgias, back pain and gait problem.   Skin: Negative.    Allergic/Immunologic: Negative.    Neurological: Negative for dizziness, weakness, numbness and headaches.   Hematological: Negative for adenopathy. Does not bruise/bleed easily.   Psychiatric/Behavioral: The patient is nervous/anxious.    All other systems reviewed and are negative.      Physical Exam  Vital Signs: /76   Pulse 80   Temp 97.1 °F (36.2 °C) (Temporal)   Resp 17   Ht 172.7 cm (68\")   Wt 96.2 kg (212 lb)   BMI 32.23 kg/m²    General Appearance:  alert, cooperative, no apparent distress and appears stated age   Neurologic/Psychiatric: A&O x 3, gait steady, appropriate affect   HEENT:  Normocephalic, without obvious abnormality, mucous membranes moist   Lungs:   Clear to auscultation " bilaterally; respirations regular, even, and unlabored bilaterally   Heart:  Regular rate and rhythm, no murmurs appreciated   Extremities: Normal, atraumatic; no clubbing, cyanosis, or edema    Skin: No rashes, lesions, or abnormal coloration noted     ECOG Performance Status: (0) Fully active, able to carry on all predisease performance without restriction          NEEDS ASSESSMENTS    Genetics  The patient's new diagnosis and family history have been reviewed for genetic counseling needs. A genetic referral is not recommended.     Psychosocial  The patient has completed a PHQ-9 Depression Screening and the Distress Thermometer (DT) today.   PHQ-9 results show 1-4 (Minimal Depression). The patient scored their distress today as 0 on a scale of 0-10 with 0 being no distress and 10 being extreme distress.   Problems marked by the patient as being an issue for them within the last week include no problems.   Results were reviewed along with psychosocial resources offered by our cancer center. Our oncology social worker will be flagged for a DT score of 4 or above, and a same day call will be made for a score of 9 or 10. A mental health referral is not recommended at this time. The patient is not accepting of a referral to [facility].   Copies of patient's questionnaires will be scanned into EMR for details and further reference.    Barriers to care  A barriers form was also completed by the patient today. We discussed services offered by our facility to help him have adequate access to care. The patient was given the name and card for our Oncology Social Worker, Cait Perry. Based upon barriers assessment today, the patient will not require a follow-up call from the  to further discuss needs.   A copy of the barriers form will also be scanned into EMR for details and further reference.     VAD Assessment  The patient and I discussed planned intervenous chemotherapy as well as other IV  "treatments that are often needed throughout the course of treatment. These may include, but are not limited to blood transfusions, antibiotics, and IV hydration. The vasculature does appear to be adequate for multiple peripheral IVs throughout their treatment course. Discussed risks and benefits of VADs. The patient would not like to pursue Port-A-Cath insertion prior to initiation of treatment.     Advanced Care Planning  The patient and I discussed advanced care planning, \"Conversations that Matter\".   This service was offered, free of charge, for development of advance directives with a certified ACP facilitator.  The patient does not have an up-to-date advanced directive. This document is not on file with our office. The patient is not interested in an appointment with one of our facilitators to create or update their advanced directives.      Palliative Care  The patient and I discussed palliative care services. Palliative care is not the same as Hospice care. This is specialized medical care for people living with serious illness with the goal of improving quality of life for the patient and their family. [facility] has partnered with [facility] to offer our patients outpatient palliative care early along with their treatment to assist in coordination of care, symptom management, pain management, and medical decision making.  Oncology criteria for palliative care referral is not met at this time. The patient is not interested in a palliative care consultation.     Additional Referral needs  none      CHEMOTHERAPY EDUCATION    Booklets Given: Chemotherapy and You [x]  Eating Hints [x]    Sexuality/Fertility Books []      Chemotherapy/Biotherapy Education Sheets: (list all that apply)  nausea management, acid reflux management, diarrhea management, Cancer resourse contacts information and skin and mouth care                                                                                               "                                                                   Chemotherapy Regimen:   Treatment Plans     Name Type Plan dates Plan Provider         Active    OP LUNG PACLitaxel / CARBOplatin AUC=2 (Weekly) + XRT  ONCOLOGY TREATMENT  7/29/2019 - Present Kevin Salguero MD                    TOPICS EDUCATION PROVIDED COMMENTS   ANEMIA:  role of RBC, cause, s/s, ways to manage, role of transfusion [x]    THROMBOCYTOPENIA:  role of platelet, cause, s/s, ways to prevent bleeding, things to avoid, when to seek help [x]    NEUTROPENIA:  role of WBC, cause, infection precautions, s/s of infection, when to call MD [x]    NUTRITION & APPETITE CHANGES:  importance of maintaining healthy diet & weight, ways to manage to improve intake, dietary consult, exercise regimen [x]    DIARRHEA:  causes, s/s of dehydration, ways to manage, dietary changes, when to call MD [x]    CONSTIPATION:  causes, ways to manage, dietary changes, when to call MD [x]    NAUSEA & VOMITING:  cause, use of antiemetics, dietary changes, when to call MD [x]    MOUTH SORES:  causes, oral care, ways to manage [x]    ALOPECIA:  cause, ways to manage, resources [x]    INFERTILITY & SEXUALITY:  causes, fertility preservation options, sexuality changes, ways to manage, importance of birth control [x]    NERVOUS SYSTEM CHANGES:  causes, s/s, neuropathies, cognitive changes, ways to manage [x]    PAIN:  causes, ways to manage [x] ????   SKIN & NAIL CHANGES:  cause, s/s, ways to manage [x]    ORGAN TOXICITIES:  cause, s/s, need for diagnostic tests, labs, when to notify MD [x]    SURVIVORSHIP:  distress, distress assessment, secondary malignancies, early/late effects, follow-up, social issues, social support [x]    HOME CARE:  use of spill kits, storing of PO chemo, how to manage bodily fluids [x]    MISCELLANEOUS:  drug interactions, administration, vesicant, et [x]        Assessment and Plan:    Diagnoses and all orders for this visit:    Squamous cell  carcinoma of lung, unspecified laterality (CMS/HCC)  -     CBC and Differential; Future  -     Comprehensive metabolic panel; Future  -     Lab Appointment Request; Future  -     Clinic Appointment Request; Future  -     Infusion Appointment Request; Future    Other orders  -     sodium chloride 0.9 % infusion 250 mL  -     famotidine (PEPCID) injection 20 mg  -     diphenhydrAMINE (BENADRYL) 50 mg in sodium chloride 0.9 % 50 mL IVPB  -     palonosetron (ALOXI) injection 0.25 mg  -     dexamethasone (DECADRON) 12 mg in sodium chloride 0.9 % IVPB        This was a 50 minute face-to-face visit with 50 minutes spent in  counseling and coordination of care as documented above.   The patient and I have reviewed their new cancer diagnosis and scheduled treatment plan. Needs assessment was completed including genetics, psychosocial needs, barriers to care, VAD evaluation, advanced care planning, and palliative care services. Referrals have been ordered as appropriate based upon our evaluation and patient desires.     Chemotherapy teaching was also completed today as documented above. Adequate time was given to answer all questions to his satisfaction. Patient and family are aware of their care team members and contact information if they have questions or problems throughout the treatment course. Needs assessments and education has been completed. The patient is adequately prepared to begin treatment as scheduled.     RX for Zofran sent to patient's pharmacy for treatment induced nausea and vomiting.     He will have pretreatment labs drawn today including CBC and CMP.     He is having radiation simulation completed today with plan to start treatment next week.     The patient does not have an advanced directive or living will. We discussed the option for advanced care planning. He is going to consider this and notify Cait is he desires an appointment.     We will see the patient back for cycle #2 of chemotherapy to  evaluate treatment tolerance.   Jennifer Deras, APRN

## 2019-07-30 ENCOUNTER — HOSPITAL ENCOUNTER (OUTPATIENT)
Dept: ONCOLOGY | Facility: HOSPITAL | Age: 64
Setting detail: INFUSION SERIES
Discharge: HOME | End: 2019-07-30

## 2019-07-30 ENCOUNTER — DOCUMENTATION (OUTPATIENT)
Dept: NUTRITION | Facility: HOSPITAL | Age: 64
End: 2019-07-30

## 2019-07-30 VITALS
RESPIRATION RATE: 16 BRPM | SYSTOLIC BLOOD PRESSURE: 119 MMHG | BODY MASS INDEX: 31.98 KG/M2 | HEIGHT: 68 IN | WEIGHT: 211 LBS | DIASTOLIC BLOOD PRESSURE: 67 MMHG | TEMPERATURE: 97.8 F | HEART RATE: 72 BPM

## 2019-07-30 DIAGNOSIS — C34.90 SQUAMOUS CELL CARCINOMA OF LUNG, UNSPECIFIED LATERALITY (HCC): ICD-10-CM

## 2019-07-30 DIAGNOSIS — C34.90 SQUAMOUS CELL CARCINOMA OF LUNG, UNSPECIFIED LATERALITY (HCC): Primary | ICD-10-CM

## 2019-07-30 PROCEDURE — 25010000002 PALONOSETRON 0.25 MG/5ML SOLUTION PREFILLED SYRINGE: Performed by: NURSE PRACTITIONER

## 2019-07-30 PROCEDURE — 96375 TX/PRO/DX INJ NEW DRUG ADDON: CPT

## 2019-07-30 PROCEDURE — 25010000002 CARBOPLATIN PER 50 MG: Performed by: INTERNAL MEDICINE

## 2019-07-30 PROCEDURE — 25010000002 DIPHENHYDRAMINE PER 50 MG: Performed by: NURSE PRACTITIONER

## 2019-07-30 PROCEDURE — 96417 CHEMO IV INFUS EACH ADDL SEQ: CPT

## 2019-07-30 PROCEDURE — 25010000002 DEXAMETHASONE PER 1 MG: Performed by: NURSE PRACTITIONER

## 2019-07-30 PROCEDURE — 25010000002 PACLITAXEL PER 30 MG: Performed by: INTERNAL MEDICINE

## 2019-07-30 PROCEDURE — 96413 CHEMO IV INFUSION 1 HR: CPT

## 2019-07-30 RX ORDER — SODIUM CHLORIDE 9 MG/ML
250 INJECTION, SOLUTION INTRAVENOUS ONCE
Status: CANCELLED | OUTPATIENT
Start: 2019-08-13

## 2019-07-30 RX ORDER — DIPHENHYDRAMINE HYDROCHLORIDE 50 MG/ML
50 INJECTION INTRAMUSCULAR; INTRAVENOUS AS NEEDED
Status: CANCELLED | OUTPATIENT
Start: 2019-08-13

## 2019-07-30 RX ORDER — FAMOTIDINE 10 MG/ML
20 INJECTION, SOLUTION INTRAVENOUS AS NEEDED
Status: CANCELLED | OUTPATIENT
Start: 2019-07-30

## 2019-07-30 RX ORDER — PALONOSETRON 0.05 MG/ML
0.25 INJECTION, SOLUTION INTRAVENOUS ONCE
Status: CANCELLED | OUTPATIENT
Start: 2019-08-06

## 2019-07-30 RX ORDER — DIPHENHYDRAMINE HYDROCHLORIDE 50 MG/ML
50 INJECTION INTRAMUSCULAR; INTRAVENOUS AS NEEDED
Status: CANCELLED | OUTPATIENT
Start: 2019-07-30

## 2019-07-30 RX ORDER — SODIUM CHLORIDE 9 MG/ML
250 INJECTION, SOLUTION INTRAVENOUS ONCE
Status: CANCELLED | OUTPATIENT
Start: 2019-08-06

## 2019-07-30 RX ORDER — FAMOTIDINE 10 MG/ML
20 INJECTION, SOLUTION INTRAVENOUS AS NEEDED
Status: CANCELLED | OUTPATIENT
Start: 2019-08-06

## 2019-07-30 RX ORDER — SODIUM CHLORIDE 9 MG/ML
250 INJECTION, SOLUTION INTRAVENOUS ONCE
Status: COMPLETED | OUTPATIENT
Start: 2019-07-30 | End: 2019-07-30

## 2019-07-30 RX ORDER — PALONOSETRON 0.05 MG/ML
0.25 INJECTION, SOLUTION INTRAVENOUS ONCE
Status: CANCELLED | OUTPATIENT
Start: 2019-08-13

## 2019-07-30 RX ORDER — FAMOTIDINE 10 MG/ML
20 INJECTION, SOLUTION INTRAVENOUS ONCE
Status: COMPLETED | OUTPATIENT
Start: 2019-07-30 | End: 2019-07-30

## 2019-07-30 RX ORDER — FAMOTIDINE 10 MG/ML
20 INJECTION, SOLUTION INTRAVENOUS AS NEEDED
Status: CANCELLED | OUTPATIENT
Start: 2019-08-13

## 2019-07-30 RX ORDER — PALONOSETRON 0.05 MG/ML
0.25 INJECTION, SOLUTION INTRAVENOUS ONCE
Status: COMPLETED | OUTPATIENT
Start: 2019-07-30 | End: 2019-07-30

## 2019-07-30 RX ORDER — FAMOTIDINE 10 MG/ML
20 INJECTION, SOLUTION INTRAVENOUS ONCE
Status: CANCELLED | OUTPATIENT
Start: 2019-08-13

## 2019-07-30 RX ORDER — DIPHENHYDRAMINE HYDROCHLORIDE 50 MG/ML
50 INJECTION INTRAMUSCULAR; INTRAVENOUS AS NEEDED
Status: CANCELLED | OUTPATIENT
Start: 2019-08-06

## 2019-07-30 RX ORDER — FAMOTIDINE 10 MG/ML
20 INJECTION, SOLUTION INTRAVENOUS ONCE
Status: CANCELLED | OUTPATIENT
Start: 2019-08-06

## 2019-07-30 RX ADMIN — DIPHENHYDRAMINE HYDROCHLORIDE 50 MG: 50 INJECTION INTRAMUSCULAR; INTRAVENOUS at 09:55

## 2019-07-30 RX ADMIN — PACLITAXEL 105 MG: 6 INJECTION, SOLUTION INTRAVENOUS at 10:20

## 2019-07-30 RX ADMIN — DEXAMETHASONE SODIUM PHOSPHATE 12 MG: 4 INJECTION, SOLUTION INTRAMUSCULAR; INTRAVENOUS at 09:55

## 2019-07-30 RX ADMIN — SODIUM CHLORIDE 250 ML: 9 INJECTION, SOLUTION INTRAVENOUS at 09:46

## 2019-07-30 RX ADMIN — CARBOPLATIN 300 MG: 10 INJECTION, SOLUTION INTRAVENOUS at 11:38

## 2019-07-30 RX ADMIN — PALONOSETRON 0.25 MG: 0.25 INJECTION, SOLUTION INTRAVENOUS at 09:47

## 2019-07-30 RX ADMIN — FAMOTIDINE 20 MG: 10 INJECTION, SOLUTION INTRAVENOUS at 09:50

## 2019-07-31 PROCEDURE — 77300 RADIATION THERAPY DOSE PLAN: CPT | Performed by: RADIOLOGY

## 2019-07-31 PROCEDURE — 77334 RADIATION TREATMENT AID(S): CPT | Performed by: RADIOLOGY

## 2019-07-31 PROCEDURE — 77295 3-D RADIOTHERAPY PLAN: CPT | Performed by: RADIOLOGY

## 2019-08-06 ENCOUNTER — HOSPITAL ENCOUNTER (OUTPATIENT)
Dept: ONCOLOGY | Facility: HOSPITAL | Age: 64
Setting detail: INFUSION SERIES
Discharge: HOME | End: 2019-08-06

## 2019-08-06 ENCOUNTER — HOSPITAL ENCOUNTER (OUTPATIENT)
Dept: RADIATION ONCOLOGY | Facility: HOSPITAL | Age: 64
Discharge: HOME | End: 2019-08-06

## 2019-08-06 ENCOUNTER — HOSPITAL ENCOUNTER (OUTPATIENT)
Dept: RADIATION ONCOLOGY | Facility: HOSPITAL | Age: 64
Setting detail: RADIATION/ONCOLOGY SERIES
Discharge: HOME | End: 2019-08-06

## 2019-08-06 VITALS
HEIGHT: 68 IN | TEMPERATURE: 97.4 F | DIASTOLIC BLOOD PRESSURE: 67 MMHG | SYSTOLIC BLOOD PRESSURE: 148 MMHG | HEART RATE: 81 BPM | RESPIRATION RATE: 18 BRPM | BODY MASS INDEX: 32.28 KG/M2 | WEIGHT: 213 LBS

## 2019-08-06 VITALS — BODY MASS INDEX: 32.42 KG/M2 | WEIGHT: 213.2 LBS

## 2019-08-06 DIAGNOSIS — C34.90 SQUAMOUS CELL CARCINOMA OF LUNG, UNSPECIFIED LATERALITY (HCC): Primary | ICD-10-CM

## 2019-08-06 LAB
ALBUMIN SERPL-MCNC: 4 G/DL (ref 3.5–5.2)
ALBUMIN/GLOB SERPL: 1.4 G/DL
ALP SERPL-CCNC: 109 U/L (ref 39–117)
ALT SERPL W P-5'-P-CCNC: 26 U/L (ref 1–41)
ANION GAP SERPL CALCULATED.3IONS-SCNC: 11 MMOL/L (ref 5–15)
AST SERPL-CCNC: 21 U/L (ref 1–40)
BILIRUB SERPL-MCNC: 1.5 MG/DL (ref 0.2–1.2)
BUN BLD-MCNC: 13 MG/DL (ref 8–23)
BUN/CREAT SERPL: 14.3 (ref 7–25)
CALCIUM SPEC-SCNC: 9.5 MG/DL (ref 8.6–10.5)
CHLORIDE SERPL-SCNC: 101 MMOL/L (ref 98–107)
CO2 SERPL-SCNC: 28 MMOL/L (ref 22–29)
CREAT BLD-MCNC: 0.91 MG/DL (ref 0.76–1.27)
ERYTHROCYTE [DISTWIDTH] IN BLOOD BY AUTOMATED COUNT: 13.6 % (ref 12.3–15.4)
GFR SERPL CREATININE-BSD FRML MDRD: 84 ML/MIN/1.73
GLOBULIN UR ELPH-MCNC: 2.9 GM/DL
GLUCOSE BLD-MCNC: 134 MG/DL (ref 65–99)
HCT VFR BLD AUTO: 44.6 % (ref 37.5–51)
HGB BLD-MCNC: 15.2 G/DL (ref 13–17.7)
LYMPHOCYTES # BLD AUTO: 4.6 10*3/MM3 (ref 0.7–3.1)
LYMPHOCYTES NFR BLD AUTO: 31.6 % (ref 19.6–45.3)
MCH RBC QN AUTO: 32.4 PG (ref 26.6–33)
MCHC RBC AUTO-ENTMCNC: 34.2 G/DL (ref 31.5–35.7)
MCV RBC AUTO: 94.8 FL (ref 79–97)
MONOCYTES # BLD AUTO: 0.6 10*3/MM3 (ref 0.1–0.9)
MONOCYTES NFR BLD AUTO: 4 % (ref 5–12)
NEUTROPHILS # BLD AUTO: 9.4 10*3/MM3 (ref 1.7–7)
NEUTROPHILS NFR BLD AUTO: 64.4 % (ref 42.7–76)
PLATELET # BLD AUTO: 369 10*3/MM3 (ref 140–450)
PMV BLD AUTO: 7.8 FL (ref 6–12)
POTASSIUM BLD-SCNC: 4.1 MMOL/L (ref 3.5–5.2)
PROT SERPL-MCNC: 6.9 G/DL (ref 6–8.5)
RBC # BLD AUTO: 4.71 10*6/MM3 (ref 4.14–5.8)
SODIUM BLD-SCNC: 140 MMOL/L (ref 136–145)
WBC NRBC COR # BLD: 14.6 10*3/MM3 (ref 3.4–10.8)

## 2019-08-06 PROCEDURE — 96417 CHEMO IV INFUS EACH ADDL SEQ: CPT

## 2019-08-06 PROCEDURE — 85025 COMPLETE CBC W/AUTO DIFF WBC: CPT | Performed by: INTERNAL MEDICINE

## 2019-08-06 PROCEDURE — 25010000002 PACLITAXEL PER 30 MG: Performed by: INTERNAL MEDICINE

## 2019-08-06 PROCEDURE — 25010000002 PALONOSETRON 0.25 MG/5ML SOLUTION PREFILLED SYRINGE: Performed by: INTERNAL MEDICINE

## 2019-08-06 PROCEDURE — 80053 COMPREHEN METABOLIC PANEL: CPT | Performed by: INTERNAL MEDICINE

## 2019-08-06 PROCEDURE — 25010000002 CARBOPLATIN PER 50 MG: Performed by: INTERNAL MEDICINE

## 2019-08-06 PROCEDURE — 96367 TX/PROPH/DG ADDL SEQ IV INF: CPT

## 2019-08-06 PROCEDURE — 96375 TX/PRO/DX INJ NEW DRUG ADDON: CPT

## 2019-08-06 PROCEDURE — 25010000002 DEXAMETHASONE PER 1 MG: Performed by: INTERNAL MEDICINE

## 2019-08-06 PROCEDURE — 96415 CHEMO IV INFUSION ADDL HR: CPT

## 2019-08-06 PROCEDURE — 96413 CHEMO IV INFUSION 1 HR: CPT

## 2019-08-06 PROCEDURE — 82565 ASSAY OF CREATININE: CPT

## 2019-08-06 PROCEDURE — 25010000002 DIPHENHYDRAMINE PER 50 MG: Performed by: INTERNAL MEDICINE

## 2019-08-06 PROCEDURE — 77280 THER RAD SIMULAJ FIELD SMPL: CPT | Performed by: RADIOLOGY

## 2019-08-06 RX ORDER — FAMOTIDINE 10 MG/ML
20 INJECTION, SOLUTION INTRAVENOUS ONCE
Status: COMPLETED | OUTPATIENT
Start: 2019-08-06 | End: 2019-08-06

## 2019-08-06 RX ORDER — SODIUM CHLORIDE 9 MG/ML
250 INJECTION, SOLUTION INTRAVENOUS ONCE
Status: COMPLETED | OUTPATIENT
Start: 2019-08-06 | End: 2019-08-06

## 2019-08-06 RX ORDER — PALONOSETRON 0.05 MG/ML
0.25 INJECTION, SOLUTION INTRAVENOUS ONCE
Status: COMPLETED | OUTPATIENT
Start: 2019-08-06 | End: 2019-08-06

## 2019-08-06 RX ADMIN — SODIUM CHLORIDE 250 ML: 9 INJECTION, SOLUTION INTRAVENOUS at 10:20

## 2019-08-06 RX ADMIN — PACLITAXEL 105 MG: 6 INJECTION, SOLUTION INTRAVENOUS at 10:54

## 2019-08-06 RX ADMIN — PALONOSETRON 0.25 MG: 0.25 INJECTION, SOLUTION INTRAVENOUS at 10:29

## 2019-08-06 RX ADMIN — DEXAMETHASONE SODIUM PHOSPHATE 12 MG: 4 INJECTION, SOLUTION INTRA-ARTICULAR; INTRALESIONAL; INTRAMUSCULAR; INTRAVENOUS; SOFT TISSUE at 10:35

## 2019-08-06 RX ADMIN — CARBOPLATIN 280 MG: 10 INJECTION, SOLUTION INTRAVENOUS at 12:05

## 2019-08-06 RX ADMIN — FAMOTIDINE 20 MG: 10 INJECTION, SOLUTION INTRAVENOUS at 10:31

## 2019-08-06 RX ADMIN — DIPHENHYDRAMINE HYDROCHLORIDE 25 MG: 50 INJECTION INTRAMUSCULAR; INTRAVENOUS at 10:33

## 2019-08-06 NOTE — ADDENDUM NOTE
Encounter addended by: Leidy Howe RN on: 8/6/2019 3:08 PM   Actions taken: LDA properties accepted, Flowsheet accepted

## 2019-08-07 ENCOUNTER — HOSPITAL ENCOUNTER (OUTPATIENT)
Dept: RADIATION ONCOLOGY | Facility: HOSPITAL | Age: 64
Discharge: HOME | End: 2019-08-07

## 2019-08-07 PROCEDURE — 77387 GUIDANCE FOR RADJ TX DLVR: CPT | Performed by: RADIOLOGY

## 2019-08-07 PROCEDURE — 77412 RADIATION TX DELIVERY LVL 3: CPT | Performed by: RADIOLOGY

## 2019-08-08 ENCOUNTER — HOSPITAL ENCOUNTER (OUTPATIENT)
Dept: RADIATION ONCOLOGY | Facility: HOSPITAL | Age: 64
Discharge: HOME | End: 2019-08-08

## 2019-08-08 PROCEDURE — 77412 RADIATION TX DELIVERY LVL 3: CPT | Performed by: RADIOLOGY

## 2019-08-08 PROCEDURE — 77387 GUIDANCE FOR RADJ TX DLVR: CPT | Performed by: RADIOLOGY

## 2019-08-08 PROCEDURE — 77336 RADIATION PHYSICS CONSULT: CPT | Performed by: RADIOLOGY

## 2019-08-09 ENCOUNTER — DOCUMENTATION (OUTPATIENT)
Dept: NUTRITION | Facility: HOSPITAL | Age: 64
End: 2019-08-09

## 2019-08-09 ENCOUNTER — HOSPITAL ENCOUNTER (OUTPATIENT)
Dept: RADIATION ONCOLOGY | Facility: HOSPITAL | Age: 64
Discharge: HOME | End: 2019-08-09

## 2019-08-09 PROCEDURE — 77412 RADIATION TX DELIVERY LVL 3: CPT | Performed by: RADIOLOGY

## 2019-08-09 PROCEDURE — 77387 GUIDANCE FOR RADJ TX DLVR: CPT | Performed by: RADIOLOGY

## 2019-08-09 NOTE — PROGRESS NOTES
ONC Nutrition    Diagnosis:  Stage IIIA squamous cell lung cancer  Radiation: 45 Gy over 5 weeks  Chemotherapy: Neoadjuvant Carbo / Taxol - weekly - completed 2 of 7 cycles - Surgical resection following completion    Weight 213.2 lbs / 2% weight loss noted from UBW    Patient seen in RAD ONC during status check visit.  He is doing well; emphasized focus on maintaining nutritional intake with higher protein intake, nutrient dense food choices, adequate hydration.  Discussed the possible nutritional impact symptoms that he may experience with progression of radiation treatment and review of the nutrition education he received by Karmen Garcia RD during his initial chemotherapy.  Will continue to follow closely through treatment.

## 2019-08-12 ENCOUNTER — HOSPITAL ENCOUNTER (OUTPATIENT)
Dept: RADIATION ONCOLOGY | Facility: HOSPITAL | Age: 64
Discharge: HOME | End: 2019-08-12

## 2019-08-12 ENCOUNTER — LAB (OUTPATIENT)
Dept: LAB | Facility: HOSPITAL | Age: 64
End: 2019-08-12

## 2019-08-12 DIAGNOSIS — C34.90 SQUAMOUS CELL CARCINOMA OF LUNG, UNSPECIFIED LATERALITY (HCC): ICD-10-CM

## 2019-08-12 LAB
ALBUMIN SERPL-MCNC: 3.9 G/DL (ref 3.5–5.2)
ALBUMIN/GLOB SERPL: 1.4 G/DL
ALP SERPL-CCNC: 96 U/L (ref 39–117)
ALT SERPL W P-5'-P-CCNC: 25 U/L (ref 1–41)
ANION GAP SERPL CALCULATED.3IONS-SCNC: 9 MMOL/L (ref 5–15)
AST SERPL-CCNC: 20 U/L (ref 1–40)
BILIRUB SERPL-MCNC: 1.2 MG/DL (ref 0.2–1.2)
BUN BLD-MCNC: 12 MG/DL (ref 8–23)
BUN/CREAT SERPL: 16 (ref 7–25)
CALCIUM SPEC-SCNC: 9.5 MG/DL (ref 8.6–10.5)
CHLORIDE SERPL-SCNC: 101 MMOL/L (ref 98–107)
CO2 SERPL-SCNC: 29 MMOL/L (ref 22–29)
CREAT BLD-MCNC: 0.75 MG/DL (ref 0.76–1.27)
ERYTHROCYTE [DISTWIDTH] IN BLOOD BY AUTOMATED COUNT: 13.9 % (ref 12.3–15.4)
GFR SERPL CREATININE-BSD FRML MDRD: 105 ML/MIN/1.73
GLOBULIN UR ELPH-MCNC: 2.8 GM/DL
GLUCOSE BLD-MCNC: 95 MG/DL (ref 65–99)
HCT VFR BLD AUTO: 45.9 % (ref 37.5–51)
HGB BLD-MCNC: 15.1 G/DL (ref 13–17.7)
LYMPHOCYTES # BLD AUTO: 3.7 10*3/MM3 (ref 0.7–3.1)
LYMPHOCYTES NFR BLD AUTO: 40 % (ref 19.6–45.3)
MCH RBC QN AUTO: 32.5 PG (ref 26.6–33)
MCHC RBC AUTO-ENTMCNC: 32.8 G/DL (ref 31.5–35.7)
MCV RBC AUTO: 98.9 FL (ref 79–97)
MONOCYTES # BLD AUTO: 0.5 10*3/MM3 (ref 0.1–0.9)
MONOCYTES NFR BLD AUTO: 5.1 % (ref 5–12)
NEUTROPHILS # BLD AUTO: 5.1 10*3/MM3 (ref 1.7–7)
NEUTROPHILS NFR BLD AUTO: 54.9 % (ref 42.7–76)
PLATELET # BLD AUTO: 334 10*3/MM3 (ref 140–450)
PMV BLD AUTO: 7.1 FL (ref 6–12)
POTASSIUM BLD-SCNC: 4.4 MMOL/L (ref 3.5–5.2)
PROT SERPL-MCNC: 6.7 G/DL (ref 6–8.5)
RBC # BLD AUTO: 4.64 10*6/MM3 (ref 4.14–5.8)
SODIUM BLD-SCNC: 139 MMOL/L (ref 136–145)
WBC NRBC COR # BLD: 9.3 10*3/MM3 (ref 3.4–10.8)

## 2019-08-12 PROCEDURE — 77412 RADIATION TX DELIVERY LVL 3: CPT | Performed by: RADIOLOGY

## 2019-08-12 PROCEDURE — 36415 COLL VENOUS BLD VENIPUNCTURE: CPT

## 2019-08-12 PROCEDURE — 85025 COMPLETE CBC W/AUTO DIFF WBC: CPT

## 2019-08-12 PROCEDURE — 77387 GUIDANCE FOR RADJ TX DLVR: CPT | Performed by: RADIOLOGY

## 2019-08-12 PROCEDURE — 80053 COMPREHEN METABOLIC PANEL: CPT

## 2019-08-13 ENCOUNTER — DOCUMENTATION (OUTPATIENT)
Dept: NUTRITION | Facility: HOSPITAL | Age: 64
End: 2019-08-13

## 2019-08-13 ENCOUNTER — HOSPITAL ENCOUNTER (OUTPATIENT)
Dept: ONCOLOGY | Facility: HOSPITAL | Age: 64
Setting detail: INFUSION SERIES
Discharge: HOME | End: 2019-08-13

## 2019-08-13 ENCOUNTER — HOSPITAL ENCOUNTER (OUTPATIENT)
Dept: RADIATION ONCOLOGY | Facility: HOSPITAL | Age: 64
Discharge: HOME | End: 2019-08-13

## 2019-08-13 VITALS
HEIGHT: 68 IN | HEART RATE: 71 BPM | DIASTOLIC BLOOD PRESSURE: 70 MMHG | TEMPERATURE: 97.1 F | SYSTOLIC BLOOD PRESSURE: 126 MMHG | RESPIRATION RATE: 20 BRPM | WEIGHT: 212 LBS | BODY MASS INDEX: 32.13 KG/M2

## 2019-08-13 VITALS — WEIGHT: 213.3 LBS | BODY MASS INDEX: 32.43 KG/M2

## 2019-08-13 DIAGNOSIS — C34.90 SQUAMOUS CELL CARCINOMA OF LUNG, UNSPECIFIED LATERALITY (HCC): Primary | ICD-10-CM

## 2019-08-13 LAB — CREAT BLDA-MCNC: 0.8 MG/DL (ref 0.6–1.3)

## 2019-08-13 PROCEDURE — 25010000002 DIPHENHYDRAMINE PER 50 MG: Performed by: INTERNAL MEDICINE

## 2019-08-13 PROCEDURE — 77412 RADIATION TX DELIVERY LVL 3: CPT | Performed by: RADIOLOGY

## 2019-08-13 PROCEDURE — 25010000002 PACLITAXEL PER 30 MG: Performed by: INTERNAL MEDICINE

## 2019-08-13 PROCEDURE — 25010000002 PALONOSETRON 0.25 MG/5ML SOLUTION PREFILLED SYRINGE: Performed by: INTERNAL MEDICINE

## 2019-08-13 PROCEDURE — 96375 TX/PRO/DX INJ NEW DRUG ADDON: CPT

## 2019-08-13 PROCEDURE — 25010000002 DEXAMETHASONE PER 1 MG: Performed by: INTERNAL MEDICINE

## 2019-08-13 PROCEDURE — 96413 CHEMO IV INFUSION 1 HR: CPT

## 2019-08-13 PROCEDURE — 96417 CHEMO IV INFUS EACH ADDL SEQ: CPT

## 2019-08-13 PROCEDURE — 25010000002 CARBOPLATIN PER 50 MG: Performed by: INTERNAL MEDICINE

## 2019-08-13 PROCEDURE — 77387 GUIDANCE FOR RADJ TX DLVR: CPT | Performed by: RADIOLOGY

## 2019-08-13 RX ORDER — PALONOSETRON 0.05 MG/ML
0.25 INJECTION, SOLUTION INTRAVENOUS ONCE
Status: COMPLETED | OUTPATIENT
Start: 2019-08-13 | End: 2019-08-13

## 2019-08-13 RX ORDER — SODIUM CHLORIDE 9 MG/ML
250 INJECTION, SOLUTION INTRAVENOUS ONCE
Status: COMPLETED | OUTPATIENT
Start: 2019-08-13 | End: 2019-08-13

## 2019-08-13 RX ORDER — FAMOTIDINE 10 MG/ML
20 INJECTION, SOLUTION INTRAVENOUS ONCE
Status: COMPLETED | OUTPATIENT
Start: 2019-08-13 | End: 2019-08-13

## 2019-08-13 RX ADMIN — PACLITAXEL 105 MG: 6 INJECTION, SOLUTION INTRAVENOUS at 10:22

## 2019-08-13 RX ADMIN — SODIUM CHLORIDE 250 ML: 9 INJECTION, SOLUTION INTRAVENOUS at 10:04

## 2019-08-13 RX ADMIN — FAMOTIDINE 20 MG: 10 INJECTION, SOLUTION INTRAVENOUS at 10:04

## 2019-08-13 RX ADMIN — DIPHENHYDRAMINE HYDROCHLORIDE 25 MG: 50 INJECTION INTRAMUSCULAR; INTRAVENOUS at 10:04

## 2019-08-13 RX ADMIN — CARBOPLATIN 300 MG: 10 INJECTION, SOLUTION INTRAVENOUS at 11:27

## 2019-08-13 RX ADMIN — PALONOSETRON 0.25 MG: 0.25 INJECTION, SOLUTION INTRAVENOUS at 10:04

## 2019-08-13 RX ADMIN — DEXAMETHASONE SODIUM PHOSPHATE 12 MG: 4 INJECTION, SOLUTION INTRAMUSCULAR; INTRAVENOUS at 10:05

## 2019-08-13 NOTE — PROGRESS NOTES
ONC Nutrition     Diagnosis:  Stage IIIA squamous cell lung cancer  Radiation: 45 Gy over 5 weeks  Chemotherapy: Neoadjuvant Carbo / Taxol - weekly - completed 2 of 7 cycles - Surgical resection following completion     Weight 213.2 lbs / 2% weight loss noted from UBW / weight stable over the past week    Patient continues to do well with chemoradiation; states that he became constipated following last chemotherapy treatment which he finally managed with stool softeners.  Discussed management of constipation with higher fiber intake, adequate hydration, Miralax and stool softeners.  Patient education for bowel management provided to patient.

## 2019-08-14 ENCOUNTER — HOSPITAL ENCOUNTER (OUTPATIENT)
Dept: RADIATION ONCOLOGY | Facility: HOSPITAL | Age: 64
Discharge: HOME | End: 2019-08-14

## 2019-08-14 PROCEDURE — 77387 GUIDANCE FOR RADJ TX DLVR: CPT | Performed by: RADIOLOGY

## 2019-08-14 PROCEDURE — 77412 RADIATION TX DELIVERY LVL 3: CPT | Performed by: RADIOLOGY

## 2019-08-15 ENCOUNTER — HOSPITAL ENCOUNTER (OUTPATIENT)
Dept: RADIATION ONCOLOGY | Facility: HOSPITAL | Age: 64
Discharge: HOME | End: 2019-08-15

## 2019-08-15 PROCEDURE — 77387 GUIDANCE FOR RADJ TX DLVR: CPT | Performed by: RADIOLOGY

## 2019-08-15 PROCEDURE — 77336 RADIATION PHYSICS CONSULT: CPT | Performed by: RADIOLOGY

## 2019-08-15 PROCEDURE — 77412 RADIATION TX DELIVERY LVL 3: CPT | Performed by: RADIOLOGY

## 2019-08-16 ENCOUNTER — HOSPITAL ENCOUNTER (OUTPATIENT)
Dept: RADIATION ONCOLOGY | Facility: HOSPITAL | Age: 64
Discharge: HOME | End: 2019-08-16

## 2019-08-16 PROCEDURE — 77387 GUIDANCE FOR RADJ TX DLVR: CPT | Performed by: RADIOLOGY

## 2019-08-16 PROCEDURE — 77412 RADIATION TX DELIVERY LVL 3: CPT | Performed by: RADIOLOGY

## 2019-08-19 ENCOUNTER — LAB (OUTPATIENT)
Dept: LAB | Facility: HOSPITAL | Age: 64
End: 2019-08-19

## 2019-08-19 ENCOUNTER — HOSPITAL ENCOUNTER (OUTPATIENT)
Dept: RADIATION ONCOLOGY | Facility: HOSPITAL | Age: 64
Discharge: HOME | End: 2019-08-19

## 2019-08-19 DIAGNOSIS — C34.90 SQUAMOUS CELL CARCINOMA OF LUNG, UNSPECIFIED LATERALITY (HCC): ICD-10-CM

## 2019-08-19 LAB
ALBUMIN SERPL-MCNC: 4.2 G/DL (ref 3.5–5.2)
ALBUMIN/GLOB SERPL: 1.9 G/DL
ALP SERPL-CCNC: 96 U/L (ref 39–117)
ALT SERPL W P-5'-P-CCNC: 28 U/L (ref 1–41)
ANION GAP SERPL CALCULATED.3IONS-SCNC: 10 MMOL/L (ref 5–15)
AST SERPL-CCNC: 23 U/L (ref 1–40)
BILIRUB SERPL-MCNC: 1.5 MG/DL (ref 0.2–1.2)
BUN BLD-MCNC: 15 MG/DL (ref 8–23)
BUN/CREAT SERPL: 20.3 (ref 7–25)
CALCIUM SPEC-SCNC: 9.5 MG/DL (ref 8.6–10.5)
CHLORIDE SERPL-SCNC: 102 MMOL/L (ref 98–107)
CO2 SERPL-SCNC: 29 MMOL/L (ref 22–29)
CREAT BLD-MCNC: 0.74 MG/DL (ref 0.76–1.27)
ERYTHROCYTE [DISTWIDTH] IN BLOOD BY AUTOMATED COUNT: 13.9 % (ref 12.3–15.4)
GFR SERPL CREATININE-BSD FRML MDRD: 107 ML/MIN/1.73
GLOBULIN UR ELPH-MCNC: 2.2 GM/DL
GLUCOSE BLD-MCNC: 85 MG/DL (ref 65–99)
HCT VFR BLD AUTO: 43.4 % (ref 37.5–51)
HGB BLD-MCNC: 14.4 G/DL (ref 13–17.7)
LYMPHOCYTES # BLD AUTO: 3.3 10*3/MM3 (ref 0.7–3.1)
LYMPHOCYTES NFR BLD AUTO: 43.5 % (ref 19.6–45.3)
MCH RBC QN AUTO: 32.5 PG (ref 26.6–33)
MCHC RBC AUTO-ENTMCNC: 33.3 G/DL (ref 31.5–35.7)
MCV RBC AUTO: 97.5 FL (ref 79–97)
MONOCYTES # BLD AUTO: 0.4 10*3/MM3 (ref 0.1–0.9)
MONOCYTES NFR BLD AUTO: 5.9 % (ref 5–12)
NEUTROPHILS # BLD AUTO: 3.8 10*3/MM3 (ref 1.7–7)
NEUTROPHILS NFR BLD AUTO: 50.6 % (ref 42.7–76)
PLATELET # BLD AUTO: 288 10*3/MM3 (ref 140–450)
PMV BLD AUTO: 7 FL (ref 6–12)
POTASSIUM BLD-SCNC: 5 MMOL/L (ref 3.5–5.2)
PROT SERPL-MCNC: 6.4 G/DL (ref 6–8.5)
RBC # BLD AUTO: 4.45 10*6/MM3 (ref 4.14–5.8)
SODIUM BLD-SCNC: 141 MMOL/L (ref 136–145)
WBC NRBC COR # BLD: 7.5 10*3/MM3 (ref 3.4–10.8)

## 2019-08-19 PROCEDURE — 80053 COMPREHEN METABOLIC PANEL: CPT

## 2019-08-19 PROCEDURE — 85025 COMPLETE CBC W/AUTO DIFF WBC: CPT

## 2019-08-19 PROCEDURE — 36415 COLL VENOUS BLD VENIPUNCTURE: CPT

## 2019-08-19 PROCEDURE — 77387 GUIDANCE FOR RADJ TX DLVR: CPT | Performed by: RADIOLOGY

## 2019-08-19 PROCEDURE — 77412 RADIATION TX DELIVERY LVL 3: CPT | Performed by: RADIOLOGY

## 2019-08-20 ENCOUNTER — OFFICE VISIT (OUTPATIENT)
Dept: ONCOLOGY | Facility: CLINIC | Age: 64
End: 2019-08-20

## 2019-08-20 ENCOUNTER — HOSPITAL ENCOUNTER (OUTPATIENT)
Dept: ONCOLOGY | Facility: HOSPITAL | Age: 64
Setting detail: INFUSION SERIES
Discharge: HOME | End: 2019-08-20

## 2019-08-20 ENCOUNTER — HOSPITAL ENCOUNTER (OUTPATIENT)
Dept: RADIATION ONCOLOGY | Facility: HOSPITAL | Age: 64
Discharge: HOME | End: 2019-08-20

## 2019-08-20 VITALS — WEIGHT: 216.2 LBS | BODY MASS INDEX: 32.87 KG/M2

## 2019-08-20 VITALS
WEIGHT: 215 LBS | RESPIRATION RATE: 16 BRPM | BODY MASS INDEX: 32.58 KG/M2 | TEMPERATURE: 96.7 F | HEART RATE: 71 BPM | OXYGEN SATURATION: 94 % | SYSTOLIC BLOOD PRESSURE: 105 MMHG | DIASTOLIC BLOOD PRESSURE: 61 MMHG | HEIGHT: 68 IN

## 2019-08-20 VITALS — DIASTOLIC BLOOD PRESSURE: 76 MMHG | RESPIRATION RATE: 18 BRPM | SYSTOLIC BLOOD PRESSURE: 120 MMHG | HEART RATE: 63 BPM

## 2019-08-20 DIAGNOSIS — C34.91 SQUAMOUS CELL CARCINOMA OF RIGHT LUNG (HCC): Primary | ICD-10-CM

## 2019-08-20 DIAGNOSIS — C34.90 SQUAMOUS CELL CARCINOMA OF LUNG, UNSPECIFIED LATERALITY (HCC): ICD-10-CM

## 2019-08-20 DIAGNOSIS — C34.90 SQUAMOUS CELL CARCINOMA OF LUNG, UNSPECIFIED LATERALITY (HCC): Primary | ICD-10-CM

## 2019-08-20 PROCEDURE — 77387 GUIDANCE FOR RADJ TX DLVR: CPT | Performed by: RADIOLOGY

## 2019-08-20 PROCEDURE — 25010000002 CARBOPLATIN PER 50 MG: Performed by: INTERNAL MEDICINE

## 2019-08-20 PROCEDURE — 96368 THER/DIAG CONCURRENT INF: CPT

## 2019-08-20 PROCEDURE — 96415 CHEMO IV INFUSION ADDL HR: CPT

## 2019-08-20 PROCEDURE — 96413 CHEMO IV INFUSION 1 HR: CPT

## 2019-08-20 PROCEDURE — 25010000002 DIPHENHYDRAMINE PER 50 MG: Performed by: INTERNAL MEDICINE

## 2019-08-20 PROCEDURE — 25010000002 DEXAMETHASONE PER 1 MG: Performed by: INTERNAL MEDICINE

## 2019-08-20 PROCEDURE — 77412 RADIATION TX DELIVERY LVL 3: CPT | Performed by: RADIOLOGY

## 2019-08-20 PROCEDURE — 96417 CHEMO IV INFUS EACH ADDL SEQ: CPT

## 2019-08-20 PROCEDURE — 99214 OFFICE O/P EST MOD 30 MIN: CPT | Performed by: INTERNAL MEDICINE

## 2019-08-20 PROCEDURE — 25010000002 PACLITAXEL PER 30 MG: Performed by: INTERNAL MEDICINE

## 2019-08-20 PROCEDURE — 25010000002 PALONOSETRON 0.25 MG/5ML SOLUTION PREFILLED SYRINGE: Performed by: INTERNAL MEDICINE

## 2019-08-20 PROCEDURE — 96375 TX/PRO/DX INJ NEW DRUG ADDON: CPT

## 2019-08-20 PROCEDURE — 96367 TX/PROPH/DG ADDL SEQ IV INF: CPT

## 2019-08-20 RX ORDER — FAMOTIDINE 10 MG/ML
20 INJECTION, SOLUTION INTRAVENOUS ONCE
Status: CANCELLED | OUTPATIENT
Start: 2019-08-27

## 2019-08-20 RX ORDER — SODIUM CHLORIDE 9 MG/ML
250 INJECTION, SOLUTION INTRAVENOUS ONCE
Status: CANCELLED | OUTPATIENT
Start: 2019-08-20

## 2019-08-20 RX ORDER — FAMOTIDINE 10 MG/ML
20 INJECTION, SOLUTION INTRAVENOUS AS NEEDED
Status: CANCELLED | OUTPATIENT
Start: 2019-08-20

## 2019-08-20 RX ORDER — FAMOTIDINE 10 MG/ML
20 INJECTION, SOLUTION INTRAVENOUS AS NEEDED
Status: CANCELLED | OUTPATIENT
Start: 2019-08-27

## 2019-08-20 RX ORDER — SODIUM CHLORIDE 9 MG/ML
250 INJECTION, SOLUTION INTRAVENOUS ONCE
Status: CANCELLED | OUTPATIENT
Start: 2019-08-27

## 2019-08-20 RX ORDER — PALONOSETRON 0.05 MG/ML
0.25 INJECTION, SOLUTION INTRAVENOUS ONCE
Status: COMPLETED | OUTPATIENT
Start: 2019-08-20 | End: 2019-08-20

## 2019-08-20 RX ORDER — PALONOSETRON 0.05 MG/ML
0.25 INJECTION, SOLUTION INTRAVENOUS ONCE
Status: CANCELLED | OUTPATIENT
Start: 2019-08-20

## 2019-08-20 RX ORDER — DOCUSATE SODIUM 250 MG
250 CAPSULE ORAL 2 TIMES DAILY
COMMUNITY
End: 2019-10-10

## 2019-08-20 RX ORDER — SODIUM CHLORIDE 9 MG/ML
250 INJECTION, SOLUTION INTRAVENOUS ONCE
Status: COMPLETED | OUTPATIENT
Start: 2019-08-20 | End: 2019-08-20

## 2019-08-20 RX ORDER — DIPHENHYDRAMINE HYDROCHLORIDE 50 MG/ML
50 INJECTION INTRAMUSCULAR; INTRAVENOUS AS NEEDED
Status: CANCELLED | OUTPATIENT
Start: 2019-08-27

## 2019-08-20 RX ORDER — FAMOTIDINE 10 MG/ML
20 INJECTION, SOLUTION INTRAVENOUS ONCE
Status: COMPLETED | OUTPATIENT
Start: 2019-08-20 | End: 2019-08-20

## 2019-08-20 RX ORDER — DIPHENHYDRAMINE HYDROCHLORIDE 50 MG/ML
50 INJECTION INTRAMUSCULAR; INTRAVENOUS AS NEEDED
Status: CANCELLED | OUTPATIENT
Start: 2019-08-20

## 2019-08-20 RX ORDER — PALONOSETRON 0.05 MG/ML
0.25 INJECTION, SOLUTION INTRAVENOUS ONCE
Status: CANCELLED | OUTPATIENT
Start: 2019-08-27

## 2019-08-20 RX ORDER — FAMOTIDINE 10 MG/ML
20 INJECTION, SOLUTION INTRAVENOUS ONCE
Status: CANCELLED | OUTPATIENT
Start: 2019-08-20

## 2019-08-20 RX ADMIN — SODIUM CHLORIDE 250 ML: 9 INJECTION, SOLUTION INTRAVENOUS at 10:46

## 2019-08-20 RX ADMIN — FAMOTIDINE 20 MG: 10 INJECTION, SOLUTION INTRAVENOUS at 10:48

## 2019-08-20 RX ADMIN — DIPHENHYDRAMINE HYDROCHLORIDE 25 MG: 50 INJECTION INTRAMUSCULAR; INTRAVENOUS at 10:51

## 2019-08-20 RX ADMIN — PALONOSETRON 0.25 MG: 0.25 INJECTION, SOLUTION INTRAVENOUS at 10:46

## 2019-08-20 RX ADMIN — CARBOPLATIN 300 MG: 10 INJECTION, SOLUTION INTRAVENOUS at 12:32

## 2019-08-20 RX ADMIN — DEXAMETHASONE SODIUM PHOSPHATE 12 MG: 4 INJECTION, SOLUTION INTRAMUSCULAR; INTRAVENOUS at 10:50

## 2019-08-20 RX ADMIN — PACLITAXEL 105 MG: 6 INJECTION, SOLUTION INTRAVENOUS at 11:24

## 2019-08-20 NOTE — PROGRESS NOTES
DATE OF VISIT: 8/20/2019    REASON FOR VISIT: Followup for right lung cancer     HISTORY OF PRESENT ILLNESS: The patient is a very pleasant 64 y.o. male  with past medical history significant for cell carcinoma of the right upper lobe of the lung diagnosed July 2019.  The patient had staging work-up that confirmed stage IIIa disease.  He will be started on neoadjuvant concurrent chemotherapy radiation July 30, 2019. The  patient is here today for scheduled follow-up visit    SUBJECTIVE: The patient is here today with his wife.  He has been able to tolerate treatment fairly well.  He denies any fever chills no night sweats.  He has constipation.    PAST MEDICAL HISTORY/SOCIAL HISTORY/FAMILY HISTORY: Reviewed by me and unchanged from my documentation done on 08/20/19.    Review of Systems   Constitutional: Negative for activity change, appetite change, chills, fatigue, fever and unexpected weight change.   HENT: Negative for hearing loss, mouth sores, nosebleeds, sore throat and trouble swallowing.    Eyes: Negative for visual disturbance.   Respiratory: Negative for cough, chest tightness, shortness of breath and wheezing.    Cardiovascular: Negative for chest pain, palpitations and leg swelling.   Gastrointestinal: Positive for constipation. Negative for abdominal distention, abdominal pain, blood in stool, diarrhea, nausea, rectal pain and vomiting.   Endocrine: Negative for cold intolerance and heat intolerance.   Genitourinary: Negative for difficulty urinating, dysuria, frequency and urgency.   Musculoskeletal: Negative for arthralgias, back pain, gait problem, joint swelling and myalgias.   Skin: Negative for rash.   Neurological: Negative for dizziness, tremors, syncope, weakness, light-headedness, numbness and headaches.   Hematological: Negative for adenopathy. Does not bruise/bleed easily.   Psychiatric/Behavioral: Negative for confusion, sleep disturbance and suicidal ideas. The patient is not  "nervous/anxious.          Current Outpatient Medications:   •  albuterol sulfate HFA (PROVENTIL HFA) 108 (90 Base) MCG/ACT inhaler, Inhale 2 puffs Every 4 (Four) Hours As Needed for Wheezing or Shortness of Air., Disp: , Rfl:   •  aspirin 81 MG EC tablet, Take 81 mg by mouth Daily., Disp: , Rfl:   •  atorvastatin (LIPITOR) 40 MG tablet, TAKE ONE TABLET BY MOUTH EVERY NIGHT AT BEDTIME, Disp: 90 tablet, Rfl: 2  •  B Complex Vitamins (VITAMIN-B COMPLEX PO), Take 1 tablet by mouth Daily., Disp: , Rfl:   •  Cholecalciferol (VITAMIN D3) 5000 units capsule capsule, Take 5,000 Units by mouth Daily., Disp: , Rfl:   •  fexofenadine (ALLEGRA) 180 MG tablet, Take 180 mg by mouth Daily., Disp: , Rfl:   •  lisinopril (PRINIVIL,ZESTRIL) 5 MG tablet, TAKE ONE TABLET BY MOUTH DAILY, Disp: 90 tablet, Rfl: 2  •  metoprolol succinate XL (TOPROL-XL) 25 MG 24 hr tablet, TAKE ONE TABLET BY MOUTH DAILY, Disp: 90 tablet, Rfl: 2  •  naproxen sodium (ALEVE) 220 MG tablet, Take 220 mg by mouth As Needed for Mild Pain ., Disp: , Rfl:   •  nitroglycerin (NITROSTAT) 0.4 MG SL tablet, 1 under the tongue as needed for angina, may repeat q5mins for up three doses (Patient taking differently: Place 0.4 mg under the tongue Every 5 (Five) Minutes As Needed for Chest Pain. 1 under the tongue as needed for angina, may repeat q5mins for up three doses), Disp: 100 tablet, Rfl: 11  •  NON FORMULARY, Take 1 tablet by mouth Daily. \"derma-health pro\" OTC supplement for skin, Disp: , Rfl:   •  ondansetron (ZOFRAN) 8 MG tablet, Take 1 tablet by mouth 3 (Three) Times a Day As Needed for Nausea or Vomiting., Disp: 30 tablet, Rfl: 5  •  umeclidinium-vilanterol (ANORO ELLIPTA) 62.5-25 MCG/INH aerosol powder  inhaler, Inhale 1 puff Daily., Disp: 1 each, Rfl: 5    PHYSICAL EXAMINATION:   /61   Pulse 71   Temp 96.7 °F (35.9 °C) (Temporal)   Resp 16   Ht 172.7 cm (68\")   Wt 97.5 kg (215 lb)   SpO2 94%   BMI 32.69 kg/m²    ECOG Performance Status: 1 - " Symptomatic but completely ambulatory  General Appearance:  alert, cooperative, no apparent distress and appears stated age   Neurologic/Psychiatric: A&O x 3, gait steady, appropriate affect, strength 5/5 in all muscle groups   HEENT:  Normocephalic, without obvious abnormality, mucous membranes moist   Neck: Supple, symmetrical, trachea midline, no adenopathy;  No thyromegaly, masses, or tenderness   Lungs:   Clear to auscultation bilaterally; respirations regular, even, and unlabored bilaterally   Heart:  Regular rate and rhythm, no murmurs appreciated   Abdomen:   Soft, non-tender, non-distended and no organomegaly   Lymph nodes: No cervical, supraclavicular, inguinal or axillary adenopathy noted   Extremities: Normal, atraumatic; no clubbing, cyanosis, or edema    Skin: No rashes, ulcers, or suspicious lesions noted     Lab on 08/19/2019   Component Date Value Ref Range Status   • Glucose 08/19/2019 85  65 - 99 mg/dL Final   • BUN 08/19/2019 15  8 - 23 mg/dL Final   • Creatinine 08/19/2019 0.74* 0.76 - 1.27 mg/dL Final   • Sodium 08/19/2019 141  136 - 145 mmol/L Final   • Potassium 08/19/2019 5.0  3.5 - 5.2 mmol/L Final   • Chloride 08/19/2019 102  98 - 107 mmol/L Final   • CO2 08/19/2019 29.0  22.0 - 29.0 mmol/L Final   • Calcium 08/19/2019 9.5  8.6 - 10.5 mg/dL Final   • Total Protein 08/19/2019 6.4  6.0 - 8.5 g/dL Final   • Albumin 08/19/2019 4.20  3.50 - 5.20 g/dL Final   • ALT (SGPT) 08/19/2019 28  1 - 41 U/L Final   • AST (SGOT) 08/19/2019 23  1 - 40 U/L Final   • Alkaline Phosphatase 08/19/2019 96  39 - 117 U/L Final   • Total Bilirubin 08/19/2019 1.5* 0.2 - 1.2 mg/dL Final   • eGFR Non African Amer 08/19/2019 107  >60 mL/min/1.73 Final   • Globulin 08/19/2019 2.2  gm/dL Final   • A/G Ratio 08/19/2019 1.9  g/dL Final   • BUN/Creatinine Ratio 08/19/2019 20.3  7.0 - 25.0 Final   • Anion Gap 08/19/2019 10.0  5.0 - 15.0 mmol/L Final   • WBC 08/19/2019 7.50  3.40 - 10.80 10*3/mm3 Final   • RBC 08/19/2019 4.45   4.14 - 5.80 10*6/mm3 Final   • Hemoglobin 08/19/2019 14.4  13.0 - 17.7 g/dL Final   • Hematocrit 08/19/2019 43.4  37.5 - 51.0 % Final   • RDW 08/19/2019 13.9  12.3 - 15.4 % Final   • MCV 08/19/2019 97.5* 79.0 - 97.0 fL Final   • MCH 08/19/2019 32.5  26.6 - 33.0 pg Final   • MCHC 08/19/2019 33.3  31.5 - 35.7 g/dL Final   • MPV 08/19/2019 7.0  6.0 - 12.0 fL Final   • Platelets 08/19/2019 288  140 - 450 10*3/mm3 Final   • Neutrophil % 08/19/2019 50.6  42.7 - 76.0 % Final   • Lymphocyte % 08/19/2019 43.5  19.6 - 45.3 % Final   • Monocyte % 08/19/2019 5.9  5.0 - 12.0 % Final   • Neutrophils, Absolute 08/19/2019 3.80  1.70 - 7.00 10*3/mm3 Final   • Lymphocytes, Absolute 08/19/2019 3.30* 0.70 - 3.10 10*3/mm3 Final   • Monocytes, Absolute 08/19/2019 0.40  0.10 - 0.90 10*3/mm3 Final   Lab on 08/12/2019   Component Date Value Ref Range Status   • Glucose 08/12/2019 95  65 - 99 mg/dL Final   • BUN 08/12/2019 12  8 - 23 mg/dL Final   • Creatinine 08/12/2019 0.75* 0.76 - 1.27 mg/dL Final   • Sodium 08/12/2019 139  136 - 145 mmol/L Final   • Potassium 08/12/2019 4.4  3.5 - 5.2 mmol/L Final   • Chloride 08/12/2019 101  98 - 107 mmol/L Final   • CO2 08/12/2019 29.0  22.0 - 29.0 mmol/L Final   • Calcium 08/12/2019 9.5  8.6 - 10.5 mg/dL Final   • Total Protein 08/12/2019 6.7  6.0 - 8.5 g/dL Final   • Albumin 08/12/2019 3.90  3.50 - 5.20 g/dL Final   • ALT (SGPT) 08/12/2019 25  1 - 41 U/L Final   • AST (SGOT) 08/12/2019 20  1 - 40 U/L Final   • Alkaline Phosphatase 08/12/2019 96  39 - 117 U/L Final   • Total Bilirubin 08/12/2019 1.2  0.2 - 1.2 mg/dL Final   • eGFR Non African Amer 08/12/2019 105  >60 mL/min/1.73 Final   • Globulin 08/12/2019 2.8  gm/dL Final   • A/G Ratio 08/12/2019 1.4  g/dL Final   • BUN/Creatinine Ratio 08/12/2019 16.0  7.0 - 25.0 Final   • Anion Gap 08/12/2019 9.0  5.0 - 15.0 mmol/L Final   • WBC 08/12/2019 9.30  3.40 - 10.80 10*3/mm3 Final   • RBC 08/12/2019 4.64  4.14 - 5.80 10*6/mm3 Final   • Hemoglobin  08/12/2019 15.1  13.0 - 17.7 g/dL Final   • Hematocrit 08/12/2019 45.9  37.5 - 51.0 % Final   • RDW 08/12/2019 13.9  12.3 - 15.4 % Final   • MCV 08/12/2019 98.9* 79.0 - 97.0 fL Final   • MCH 08/12/2019 32.5  26.6 - 33.0 pg Final   • MCHC 08/12/2019 32.8  31.5 - 35.7 g/dL Final   • MPV 08/12/2019 7.1  6.0 - 12.0 fL Final   • Platelets 08/12/2019 334  140 - 450 10*3/mm3 Final   • Neutrophil % 08/12/2019 54.9  42.7 - 76.0 % Final   • Lymphocyte % 08/12/2019 40.0  19.6 - 45.3 % Final   • Monocyte % 08/12/2019 5.1  5.0 - 12.0 % Final   • Neutrophils, Absolute 08/12/2019 5.10  1.70 - 7.00 10*3/mm3 Final   • Lymphocytes, Absolute 08/12/2019 3.70* 0.70 - 3.10 10*3/mm3 Final   • Monocytes, Absolute 08/12/2019 0.50  0.10 - 0.90 10*3/mm3 Final        Mri Brain With & Without Contrast    Result Date: 7/22/2019  Narrative: EXAMINATION: MRI BRAIN W WO CONTRAST-07/22/2019:  INDICATION: Staging lung cancer; C34.91-Malignant neoplasm of unspecified part of right bronchus or lung.  TECHNIQUE: Multiplanar, multisequence MR imaging of the brain was performed with and without intravenous Gadolinium contrast. Specific sequences include diffusion-weighted imaging, T1 sagittal, axial T2, axial FLAIR, T1 axial precontrast, and axial, sagittal, and coronal postcontrast imaging.  COMPARISON: PET/CT 07/22/2019.  FINDINGS: Diffusion-weighted imaging of the brain does not demonstrate evidence of restricted diffusion to suggest acute infarct. T2-weighted imaging does not demonstrate evidence of hydrocephalus. Preserved flow voids are noted involving the intracranial vasculature. No parenchymal edema identified. Trace bilateral mastoid effusions are noted. The remainder of the paranasal sinuses are clear. The globes are intact. FLAIR imaging does not demonstrate a significant burden of FLAIR abnormalities, however there is a very small subcortical punctate FLAIR signal abnormalities within the subcortical white matter, likely relating to chronic  microangiopathy. No FLAIR signal is identified within the interpeduncular fossa or interdigitating and cortical sulci. Postcontrast imaging of the brain in 3 planes does not demonstrate evidence for an enhancing lesion or metastasis. Dedicated T1 axial images demonstrates an intact corpus callosum. The craniocervical junction appears intact. The skull base demonstrates heterogenous bone marrow signal without aggressive features. The calvarium appears intact without focal lytic lesion identified.      Impression: 1.  No MRI evidence of intracranial metastasis. 2.  Small burden of T2/FLAIR hyperintensities within the subcortical white matter consistent with chronic microangiopathy. 3.  Trace bilateral mastoid effusions.  D:  07/22/2019 E:  07/22/2019     This report was finalized on 7/22/2019 3:00 PM by Dr. Arian Manning MD.      Nm Pet Whole Body    Result Date: 7/22/2019  Narrative: EXAMINATION: NM PET WHOLE BODY-  INDICATION: Staging lung cancer; C34.91-Malignant neoplasm of unspecified part of right bronchus or lung.  TECHNIQUE: With fasting blood glucose level of 103 mg/dL, a total of 11.8 mCi of FDG was administered via the right antecubital vein. Following appropriate delay, PET CT imaging was obtained from the skull vertex to the feet bilaterally and fused multiplanar images were reconstructed. The CT scan is an unenhanced study and should be used for reference to the PET scan only. PET CT imaging was reviewed in the axial, coronal, and sagittal planes as well as within the cine mode.  COMPARISON: Initial exam for treatment. No prior studies are available for comparison.  FINDINGS: Normal variant activity is seen within the oropharynx and muscles of phonation.  Normal variant activity is identified in the region of the vocal cords. There is adenopathy identified in the right hilar region maximum SUV of 9.9 with paratracheal adenopathy also identified maximum SUV of 12.3 and 13.4. The lung parenchyma is  grossly clear. No evidence of parenchyma consolidation, pulmonary mass or nodule identified. The visualized abdomen reveals normal variant activity seen within the GI and  tract. There is no hypermetabolic activity identified to suggest evidence of metastatic disease. The lower extremities are both unremarkable in appearance.      Impression: Malignant adenopathy in the right hilar region extending into the right peritracheal region. The remainder of the PET/CT scan is unremarkable.   D:  07/22/2019 E:  07/22/2019  This report was finalized on 7/22/2019 6:01 PM by Dr. Elzbieta Gould MD.        ASSESSMENT: The patient is a very pleasant 64 y.o. male  with right upper lobe squamous cell carcinoma of the lung     PROBLEM LIST:   1.  Squamous cell carcinoma of the lung T1b N2 M0 stage T3a:  A.  Presented with shortness of breath and cough  B.  CT chest with contrast done June 28, 2019 revealed right upper lobe 1.8 cm lung nodule with right hilar and mediastinal lymphadenopathy.  MRI brain whole-body PET scan done on July 22, 2019 felt show any other sites of disease.  C.  Diagnosed after bronchoscopy with biopsy done July 11, 2019  D.  Pathology revealed squamous cell carcinoma from level 4R and level 7.  E.  Started neoadjuvant concurrent chemotherapy with radiation using weekly carbotaxol July 30, 2019.  Radiation was added August 6, 2019.  2.  Chronic tobacco abuse  3.  Hypertension  4.  COPD:   A. Not oxygen dependent  B.  PFTs done on July 17, 2019 revealed FEV1 2.44L, 72% of predicted and DLCO 55% of predicted.  5.  History of superficial melanoma status post surgical resection  6. Leukocytosis:  A.  Present with asymptomatic lymphcytosis and neutrophilia, white blood cells of 16,500    PLAN:  1.  I we will continue neoadjuvant concurrent chemotherapy with radiation using weekly carboplatin paclitaxel.  2.  This will be followed by surgical resection if he has good response to treatment.  3.  The patient is  scheduled to meet with Dr. Flores for surgical consultation.  4. We reviewed the potential side effects of this regimen including fatigue, vomiting and nausea, hair loss, nephropathy, neuropathy, hearing loss, myelosuppression, and risk of infusion reaction.  5.  I will continue the patient on Zofran as needed for chemotherapy-induced nausea.  6.  The patient will follow-up with me in 2 weeks to evaluate for treatment tolerance.  7.  I will continue to monitor patient blood work including blood counts kidney function liver function and electrolytes.  8.  Repeat the patient's scans after completion of his neoadjuvant course.  Kevin Salguero MD  8/20/2019

## 2019-08-21 ENCOUNTER — HOSPITAL ENCOUNTER (OUTPATIENT)
Dept: RADIATION ONCOLOGY | Facility: HOSPITAL | Age: 64
Discharge: HOME | End: 2019-08-21

## 2019-08-21 PROCEDURE — 77387 GUIDANCE FOR RADJ TX DLVR: CPT | Performed by: RADIOLOGY

## 2019-08-21 PROCEDURE — 77412 RADIATION TX DELIVERY LVL 3: CPT | Performed by: RADIOLOGY

## 2019-08-22 ENCOUNTER — HOSPITAL ENCOUNTER (OUTPATIENT)
Dept: RADIATION ONCOLOGY | Facility: HOSPITAL | Age: 64
Discharge: HOME | End: 2019-08-22

## 2019-08-22 PROCEDURE — 77387 GUIDANCE FOR RADJ TX DLVR: CPT | Performed by: RADIOLOGY

## 2019-08-22 PROCEDURE — 77412 RADIATION TX DELIVERY LVL 3: CPT | Performed by: RADIOLOGY

## 2019-08-23 ENCOUNTER — HOSPITAL ENCOUNTER (OUTPATIENT)
Dept: RADIATION ONCOLOGY | Facility: HOSPITAL | Age: 64
Discharge: HOME | End: 2019-08-23

## 2019-08-23 PROCEDURE — 77412 RADIATION TX DELIVERY LVL 3: CPT | Performed by: RADIOLOGY

## 2019-08-23 PROCEDURE — 77336 RADIATION PHYSICS CONSULT: CPT | Performed by: RADIOLOGY

## 2019-08-23 PROCEDURE — 77387 GUIDANCE FOR RADJ TX DLVR: CPT | Performed by: RADIOLOGY

## 2019-08-26 ENCOUNTER — TELEPHONE (OUTPATIENT)
Dept: ONCOLOGY | Facility: CLINIC | Age: 64
End: 2019-08-26

## 2019-08-26 ENCOUNTER — HOSPITAL ENCOUNTER (OUTPATIENT)
Dept: RADIATION ONCOLOGY | Facility: HOSPITAL | Age: 64
Discharge: HOME | End: 2019-08-26

## 2019-08-26 PROCEDURE — 77412 RADIATION TX DELIVERY LVL 3: CPT | Performed by: RADIOLOGY

## 2019-08-26 PROCEDURE — 77387 GUIDANCE FOR RADJ TX DLVR: CPT | Performed by: RADIOLOGY

## 2019-08-26 NOTE — TELEPHONE ENCOUNTER
Pt's wife states that he started developing a rash on Friday evening and it has progressively spread around waist, and up his side. Also states it is raised, red in color, and itchy. States it looks like whelps. Discussed with Yasmeen VIDALES, and advised wife to give pt benadryl, and zyrtec, and try hydrocortisone cream for itching. Encouraged wife to call back if it does not help, and states they will be in tomorrow 8/27 for infusion and will mention it. Also advised wife that if patient starts to feel worse or becomes short of breathe to go to the nearest ER. Pt's wife verbalizes understanding. Encouraged to call back to the triage line with any questions or concerns.

## 2019-08-27 ENCOUNTER — HOSPITAL ENCOUNTER (OUTPATIENT)
Dept: RADIATION ONCOLOGY | Facility: HOSPITAL | Age: 64
Discharge: HOME | End: 2019-08-27

## 2019-08-27 ENCOUNTER — HOSPITAL ENCOUNTER (OUTPATIENT)
Dept: ONCOLOGY | Facility: HOSPITAL | Age: 64
Setting detail: INFUSION SERIES
Discharge: HOME | End: 2019-08-27

## 2019-08-27 ENCOUNTER — DOCUMENTATION (OUTPATIENT)
Dept: NUTRITION | Facility: HOSPITAL | Age: 64
End: 2019-08-27

## 2019-08-27 VITALS
BODY MASS INDEX: 32.58 KG/M2 | RESPIRATION RATE: 16 BRPM | WEIGHT: 215 LBS | HEART RATE: 67 BPM | SYSTOLIC BLOOD PRESSURE: 145 MMHG | TEMPERATURE: 97.2 F | HEIGHT: 68 IN | DIASTOLIC BLOOD PRESSURE: 76 MMHG

## 2019-08-27 VITALS — BODY MASS INDEX: 32.9 KG/M2 | WEIGHT: 216.4 LBS

## 2019-08-27 DIAGNOSIS — C34.90 SQUAMOUS CELL CARCINOMA OF LUNG, UNSPECIFIED LATERALITY (HCC): Primary | ICD-10-CM

## 2019-08-27 LAB
ALBUMIN SERPL-MCNC: 3.8 G/DL (ref 3.5–5.2)
ALBUMIN/GLOB SERPL: 1.4 G/DL
ALP SERPL-CCNC: 89 U/L (ref 39–117)
ALT SERPL W P-5'-P-CCNC: 25 U/L (ref 1–41)
ANION GAP SERPL CALCULATED.3IONS-SCNC: 11 MMOL/L (ref 5–15)
AST SERPL-CCNC: 17 U/L (ref 1–40)
BILIRUB SERPL-MCNC: 1 MG/DL (ref 0.2–1.2)
BUN BLD-MCNC: 14 MG/DL (ref 8–23)
BUN/CREAT SERPL: 17.7 (ref 7–25)
CALCIUM SPEC-SCNC: 9.3 MG/DL (ref 8.6–10.5)
CHLORIDE SERPL-SCNC: 104 MMOL/L (ref 98–107)
CO2 SERPL-SCNC: 25 MMOL/L (ref 22–29)
CREAT BLD-MCNC: 0.79 MG/DL (ref 0.76–1.27)
CREAT BLDA-MCNC: 0.7 MG/DL (ref 0.6–1.3)
ERYTHROCYTE [DISTWIDTH] IN BLOOD BY AUTOMATED COUNT: 14.2 % (ref 12.3–15.4)
GFR SERPL CREATININE-BSD FRML MDRD: 99 ML/MIN/1.73
GLOBULIN UR ELPH-MCNC: 2.7 GM/DL
GLUCOSE BLD-MCNC: 103 MG/DL (ref 65–99)
HCT VFR BLD AUTO: 40.4 % (ref 37.5–51)
HGB BLD-MCNC: 13.5 G/DL (ref 13–17.7)
LYMPHOCYTES # BLD AUTO: 2.2 10*3/MM3 (ref 0.7–3.1)
LYMPHOCYTES NFR BLD AUTO: 37.7 % (ref 19.6–45.3)
MCH RBC QN AUTO: 32.6 PG (ref 26.6–33)
MCHC RBC AUTO-ENTMCNC: 33.4 G/DL (ref 31.5–35.7)
MCV RBC AUTO: 97.3 FL (ref 79–97)
MONOCYTES # BLD AUTO: 0.2 10*3/MM3 (ref 0.1–0.9)
MONOCYTES NFR BLD AUTO: 4.2 % (ref 5–12)
NEUTROPHILS # BLD AUTO: 3.4 10*3/MM3 (ref 1.7–7)
NEUTROPHILS NFR BLD AUTO: 58.1 % (ref 42.7–76)
PLATELET # BLD AUTO: 193 10*3/MM3 (ref 140–450)
PMV BLD AUTO: 6.9 FL (ref 6–12)
POTASSIUM BLD-SCNC: 4.3 MMOL/L (ref 3.5–5.2)
PROT SERPL-MCNC: 6.5 G/DL (ref 6–8.5)
RBC # BLD AUTO: 4.15 10*6/MM3 (ref 4.14–5.8)
SODIUM BLD-SCNC: 140 MMOL/L (ref 136–145)
WBC NRBC COR # BLD: 5.9 10*3/MM3 (ref 3.4–10.8)

## 2019-08-27 PROCEDURE — 25010000002 DEXAMETHASONE PER 1 MG: Performed by: INTERNAL MEDICINE

## 2019-08-27 PROCEDURE — 96375 TX/PRO/DX INJ NEW DRUG ADDON: CPT

## 2019-08-27 PROCEDURE — 25010000002 CARBOPLATIN PER 50 MG: Performed by: INTERNAL MEDICINE

## 2019-08-27 PROCEDURE — 82565 ASSAY OF CREATININE: CPT

## 2019-08-27 PROCEDURE — 77412 RADIATION TX DELIVERY LVL 3: CPT | Performed by: RADIOLOGY

## 2019-08-27 PROCEDURE — 80053 COMPREHEN METABOLIC PANEL: CPT | Performed by: INTERNAL MEDICINE

## 2019-08-27 PROCEDURE — 25010000002 PACLITAXEL PER 30 MG: Performed by: INTERNAL MEDICINE

## 2019-08-27 PROCEDURE — 96417 CHEMO IV INFUS EACH ADDL SEQ: CPT

## 2019-08-27 PROCEDURE — 77387 GUIDANCE FOR RADJ TX DLVR: CPT | Performed by: RADIOLOGY

## 2019-08-27 PROCEDURE — 25010000002 PALONOSETRON 0.25 MG/5ML SOLUTION PREFILLED SYRINGE: Performed by: INTERNAL MEDICINE

## 2019-08-27 PROCEDURE — 25010000002 DIPHENHYDRAMINE PER 50 MG: Performed by: INTERNAL MEDICINE

## 2019-08-27 PROCEDURE — 96413 CHEMO IV INFUSION 1 HR: CPT

## 2019-08-27 PROCEDURE — 85025 COMPLETE CBC W/AUTO DIFF WBC: CPT | Performed by: INTERNAL MEDICINE

## 2019-08-27 RX ORDER — PALONOSETRON 0.05 MG/ML
0.25 INJECTION, SOLUTION INTRAVENOUS ONCE
Status: COMPLETED | OUTPATIENT
Start: 2019-08-27 | End: 2019-08-27

## 2019-08-27 RX ORDER — FAMOTIDINE 10 MG/ML
20 INJECTION, SOLUTION INTRAVENOUS ONCE
Status: COMPLETED | OUTPATIENT
Start: 2019-08-27 | End: 2019-08-27

## 2019-08-27 RX ORDER — SODIUM CHLORIDE 9 MG/ML
250 INJECTION, SOLUTION INTRAVENOUS ONCE
Status: COMPLETED | OUTPATIENT
Start: 2019-08-27 | End: 2019-08-27

## 2019-08-27 RX ADMIN — FAMOTIDINE 20 MG: 10 INJECTION INTRAVENOUS at 11:14

## 2019-08-27 RX ADMIN — DIPHENHYDRAMINE HYDROCHLORIDE 25 MG: 50 INJECTION INTRAMUSCULAR; INTRAVENOUS at 11:14

## 2019-08-27 RX ADMIN — PACLITAXEL 105 MG: 6 INJECTION, SOLUTION INTRAVENOUS at 11:35

## 2019-08-27 RX ADMIN — CARBOPLATIN 300 MG: 10 INJECTION, SOLUTION INTRAVENOUS at 12:46

## 2019-08-27 RX ADMIN — DEXAMETHASONE SODIUM PHOSPHATE 12 MG: 4 INJECTION, SOLUTION INTRAMUSCULAR; INTRAVENOUS at 11:14

## 2019-08-27 RX ADMIN — SODIUM CHLORIDE 250 ML: 9 INJECTION, SOLUTION INTRAVENOUS at 11:11

## 2019-08-27 RX ADMIN — PALONOSETRON 0.25 MG: 0.25 INJECTION, SOLUTION INTRAVENOUS at 11:12

## 2019-08-27 NOTE — PROGRESS NOTES
[facility] Nutrition    Diagnosis:  Stage IIIA squamous cell lung cancer  Radiation: 45 Gy over 5 weeks  Chemotherapy: Neoadjuvant Carbo / Taxol - weekly - completed 4 of 7 cycles - Surgical resection following completion    Weight 216.4 lbs    Patient continues to do well with progression of treatment and verbalizes no issues except continued constipation.  He has initiated use of stool softeners which is beneficial.  Discussed use of Miralax and natural food laxatives; reinforced the importance of adequate hydration.   Will continue to follow.

## 2019-08-28 ENCOUNTER — HOSPITAL ENCOUNTER (OUTPATIENT)
Dept: RADIATION ONCOLOGY | Facility: HOSPITAL | Age: 64
Discharge: HOME | End: 2019-08-28

## 2019-08-28 PROCEDURE — 77387 GUIDANCE FOR RADJ TX DLVR: CPT | Performed by: RADIOLOGY

## 2019-08-28 PROCEDURE — 77412 RADIATION TX DELIVERY LVL 3: CPT | Performed by: RADIOLOGY

## 2019-08-29 ENCOUNTER — HOSPITAL ENCOUNTER (OUTPATIENT)
Dept: RADIATION ONCOLOGY | Facility: HOSPITAL | Age: 64
Discharge: HOME | End: 2019-08-29

## 2019-08-29 PROCEDURE — 77336 RADIATION PHYSICS CONSULT: CPT | Performed by: RADIOLOGY

## 2019-08-29 PROCEDURE — 77412 RADIATION TX DELIVERY LVL 3: CPT | Performed by: RADIOLOGY

## 2019-08-29 PROCEDURE — 77387 GUIDANCE FOR RADJ TX DLVR: CPT | Performed by: RADIOLOGY

## 2019-08-30 ENCOUNTER — HOSPITAL ENCOUNTER (OUTPATIENT)
Dept: RADIATION ONCOLOGY | Facility: HOSPITAL | Age: 64
Discharge: HOME | End: 2019-08-30

## 2019-08-30 PROCEDURE — 77412 RADIATION TX DELIVERY LVL 3: CPT | Performed by: RADIOLOGY

## 2019-08-30 PROCEDURE — 77387 GUIDANCE FOR RADJ TX DLVR: CPT | Performed by: RADIOLOGY

## 2019-09-03 ENCOUNTER — HOSPITAL ENCOUNTER (OUTPATIENT)
Dept: RADIATION ONCOLOGY | Facility: HOSPITAL | Age: 64
Setting detail: RADIATION/ONCOLOGY SERIES
Discharge: HOME | End: 2019-09-03

## 2019-09-03 ENCOUNTER — HOSPITAL ENCOUNTER (OUTPATIENT)
Dept: ONCOLOGY | Facility: HOSPITAL | Age: 64
Setting detail: INFUSION SERIES
Discharge: HOME | End: 2019-09-03

## 2019-09-03 ENCOUNTER — OFFICE VISIT (OUTPATIENT)
Dept: ONCOLOGY | Facility: CLINIC | Age: 64
End: 2019-09-03

## 2019-09-03 ENCOUNTER — HOSPITAL ENCOUNTER (OUTPATIENT)
Dept: RADIATION ONCOLOGY | Facility: HOSPITAL | Age: 64
Discharge: HOME | End: 2019-09-03

## 2019-09-03 VITALS
HEART RATE: 79 BPM | HEIGHT: 68 IN | SYSTOLIC BLOOD PRESSURE: 132 MMHG | BODY MASS INDEX: 32.89 KG/M2 | DIASTOLIC BLOOD PRESSURE: 71 MMHG | OXYGEN SATURATION: 95 % | TEMPERATURE: 97.8 F | WEIGHT: 217 LBS | RESPIRATION RATE: 16 BRPM

## 2019-09-03 VITALS — SYSTOLIC BLOOD PRESSURE: 128 MMHG | DIASTOLIC BLOOD PRESSURE: 72 MMHG | HEART RATE: 66 BPM

## 2019-09-03 VITALS — BODY MASS INDEX: 33.04 KG/M2 | WEIGHT: 217.3 LBS

## 2019-09-03 DIAGNOSIS — C34.90 SQUAMOUS CELL CARCINOMA OF LUNG, UNSPECIFIED LATERALITY (HCC): ICD-10-CM

## 2019-09-03 DIAGNOSIS — C34.91 SQUAMOUS CELL CARCINOMA OF RIGHT LUNG (HCC): Primary | ICD-10-CM

## 2019-09-03 DIAGNOSIS — C34.90 SQUAMOUS CELL CARCINOMA OF LUNG, UNSPECIFIED LATERALITY (HCC): Primary | ICD-10-CM

## 2019-09-03 LAB
ALBUMIN SERPL-MCNC: 4 G/DL (ref 3.5–5.2)
ALBUMIN/GLOB SERPL: 1.7 G/DL
ALP SERPL-CCNC: 88 U/L (ref 39–117)
ALT SERPL W P-5'-P-CCNC: 23 U/L (ref 1–41)
ANION GAP SERPL CALCULATED.3IONS-SCNC: 10 MMOL/L (ref 5–15)
AST SERPL-CCNC: 19 U/L (ref 1–40)
BILIRUB SERPL-MCNC: 1.2 MG/DL (ref 0.2–1.2)
BUN BLD-MCNC: 14 MG/DL (ref 8–23)
BUN/CREAT SERPL: 17.9 (ref 7–25)
CALCIUM SPEC-SCNC: 9.1 MG/DL (ref 8.6–10.5)
CHLORIDE SERPL-SCNC: 103 MMOL/L (ref 98–107)
CO2 SERPL-SCNC: 29 MMOL/L (ref 22–29)
CREAT BLD-MCNC: 0.78 MG/DL (ref 0.76–1.27)
CREAT BLDA-MCNC: 0.8 MG/DL (ref 0.6–1.3)
ERYTHROCYTE [DISTWIDTH] IN BLOOD BY AUTOMATED COUNT: 14.3 % (ref 12.3–15.4)
GFR SERPL CREATININE-BSD FRML MDRD: 101 ML/MIN/1.73
GLOBULIN UR ELPH-MCNC: 2.4 GM/DL
GLUCOSE BLD-MCNC: 96 MG/DL (ref 65–99)
HCT VFR BLD AUTO: 37.1 % (ref 37.5–51)
HGB BLD-MCNC: 12.5 G/DL (ref 13–17.7)
LYMPHOCYTES # BLD AUTO: 2 10*3/MM3 (ref 0.7–3.1)
LYMPHOCYTES NFR BLD AUTO: 41.1 % (ref 19.6–45.3)
MCH RBC QN AUTO: 33.1 PG (ref 26.6–33)
MCHC RBC AUTO-ENTMCNC: 33.8 G/DL (ref 31.5–35.7)
MCV RBC AUTO: 97.9 FL (ref 79–97)
MONOCYTES # BLD AUTO: 0.2 10*3/MM3 (ref 0.1–0.9)
MONOCYTES NFR BLD AUTO: 3.3 % (ref 5–12)
NEUTROPHILS # BLD AUTO: 2.7 10*3/MM3 (ref 1.7–7)
NEUTROPHILS NFR BLD AUTO: 55.6 % (ref 42.7–76)
PLATELET # BLD AUTO: 183 10*3/MM3 (ref 140–450)
PMV BLD AUTO: 6.8 FL (ref 6–12)
POTASSIUM BLD-SCNC: 4.3 MMOL/L (ref 3.5–5.2)
PROT SERPL-MCNC: 6.4 G/DL (ref 6–8.5)
RBC # BLD AUTO: 3.79 10*6/MM3 (ref 4.14–5.8)
SODIUM BLD-SCNC: 142 MMOL/L (ref 136–145)
WBC NRBC COR # BLD: 4.8 10*3/MM3 (ref 3.4–10.8)

## 2019-09-03 PROCEDURE — 77412 RADIATION TX DELIVERY LVL 3: CPT | Performed by: RADIOLOGY

## 2019-09-03 PROCEDURE — 25010000002 PALONOSETRON 0.25 MG/5ML SOLUTION PREFILLED SYRINGE: Performed by: NURSE PRACTITIONER

## 2019-09-03 PROCEDURE — 99214 OFFICE O/P EST MOD 30 MIN: CPT | Performed by: NURSE PRACTITIONER

## 2019-09-03 PROCEDURE — 80053 COMPREHEN METABOLIC PANEL: CPT | Performed by: NURSE PRACTITIONER

## 2019-09-03 PROCEDURE — 96375 TX/PRO/DX INJ NEW DRUG ADDON: CPT

## 2019-09-03 PROCEDURE — 77387 GUIDANCE FOR RADJ TX DLVR: CPT | Performed by: RADIOLOGY

## 2019-09-03 PROCEDURE — 82565 ASSAY OF CREATININE: CPT

## 2019-09-03 PROCEDURE — 96413 CHEMO IV INFUSION 1 HR: CPT

## 2019-09-03 PROCEDURE — 25010000002 DIPHENHYDRAMINE PER 50 MG: Performed by: NURSE PRACTITIONER

## 2019-09-03 PROCEDURE — 25010000002 CARBOPLATIN PER 50 MG: Performed by: NURSE PRACTITIONER

## 2019-09-03 PROCEDURE — 85025 COMPLETE CBC W/AUTO DIFF WBC: CPT | Performed by: NURSE PRACTITIONER

## 2019-09-03 PROCEDURE — 25010000002 PACLITAXEL PER 30 MG: Performed by: NURSE PRACTITIONER

## 2019-09-03 PROCEDURE — 25010000002 DEXAMETHASONE PER 1 MG: Performed by: NURSE PRACTITIONER

## 2019-09-03 PROCEDURE — 96417 CHEMO IV INFUS EACH ADDL SEQ: CPT

## 2019-09-03 RX ORDER — SODIUM CHLORIDE 9 MG/ML
250 INJECTION, SOLUTION INTRAVENOUS ONCE
Status: COMPLETED | OUTPATIENT
Start: 2019-09-03 | End: 2019-09-03

## 2019-09-03 RX ORDER — DIPHENHYDRAMINE HYDROCHLORIDE 50 MG/ML
50 INJECTION INTRAMUSCULAR; INTRAVENOUS AS NEEDED
Status: CANCELLED | OUTPATIENT
Start: 2019-09-03

## 2019-09-03 RX ORDER — SODIUM CHLORIDE 9 MG/ML
250 INJECTION, SOLUTION INTRAVENOUS ONCE
Status: CANCELLED | OUTPATIENT
Start: 2019-09-03

## 2019-09-03 RX ORDER — PALONOSETRON 0.05 MG/ML
0.25 INJECTION, SOLUTION INTRAVENOUS ONCE
Status: COMPLETED | OUTPATIENT
Start: 2019-09-03 | End: 2019-09-03

## 2019-09-03 RX ORDER — FAMOTIDINE 10 MG/ML
20 INJECTION, SOLUTION INTRAVENOUS ONCE
Status: CANCELLED | OUTPATIENT
Start: 2019-09-03

## 2019-09-03 RX ORDER — FAMOTIDINE 10 MG/ML
20 INJECTION, SOLUTION INTRAVENOUS AS NEEDED
Status: CANCELLED | OUTPATIENT
Start: 2019-09-03

## 2019-09-03 RX ORDER — PALONOSETRON 0.05 MG/ML
0.25 INJECTION, SOLUTION INTRAVENOUS ONCE
Status: CANCELLED | OUTPATIENT
Start: 2019-09-03

## 2019-09-03 RX ORDER — FAMOTIDINE 10 MG/ML
20 INJECTION, SOLUTION INTRAVENOUS ONCE
Status: COMPLETED | OUTPATIENT
Start: 2019-09-03 | End: 2019-09-03

## 2019-09-03 RX ORDER — LACTULOSE 10 G/15ML
20 SOLUTION ORAL 2 TIMES DAILY PRN
Qty: 946 ML | Refills: 1 | Status: SHIPPED | OUTPATIENT
Start: 2019-09-03 | End: 2019-10-10

## 2019-09-03 RX ADMIN — PALONOSETRON 0.25 MG: 0.25 INJECTION, SOLUTION INTRAVENOUS at 12:46

## 2019-09-03 RX ADMIN — CARBOPLATIN 300 MG: 10 INJECTION, SOLUTION INTRAVENOUS at 14:13

## 2019-09-03 RX ADMIN — SODIUM CHLORIDE 250 ML: 9 INJECTION, SOLUTION INTRAVENOUS at 12:46

## 2019-09-03 RX ADMIN — FAMOTIDINE 20 MG: 10 INJECTION, SOLUTION INTRAVENOUS at 12:50

## 2019-09-03 RX ADMIN — PACLITAXEL 105 MG: 6 INJECTION INTRAVENOUS at 13:08

## 2019-09-03 RX ADMIN — DEXAMETHASONE SODIUM PHOSPHATE 12 MG: 4 INJECTION, SOLUTION INTRAMUSCULAR; INTRAVENOUS at 12:50

## 2019-09-03 RX ADMIN — DIPHENHYDRAMINE HYDROCHLORIDE 25 MG: 50 INJECTION INTRAMUSCULAR; INTRAVENOUS at 12:49

## 2019-09-03 NOTE — PROGRESS NOTES
DATE OF VISIT: 9/3/2019    REASON FOR VISIT: Followup for right lung cancer     HISTORY OF PRESENT ILLNESS: The patient is a very pleasant 64 y.o. male  with past medical history significant for cell carcinoma of the right upper lobe of the lung diagnosed July 2019.  The patient had staging work-up that confirmed stage IIIa disease.  He will be started on neoadjuvant concurrent chemotherapy radiation July 30, 2019. The  patient is here today for scheduled follow-up visit    SUBJECTIVE:The patient is here today with his wife. He has been doing fairly well. He complains of constipation. He has been taking Miralax, stool softeners, and dulcolax that helps some, but he does not feel like he is able to empty completely. When his bowels are backed up he feels like he has more bloating. He has had some mild nausea, no vomiting. He has not tried his Zofran, but has it to take if needed. His breathing is stable with no new cough. He denies skin rash, but does have dry skin. He quit smoking 3 weeks ago. He would like a recommendation regarding a different CT surgeon.     PAST MEDICAL HISTORY/SOCIAL HISTORY/FAMILY HISTORY: Reviewed by me and unchanged from my documentation done on 09/03/19.    Review of Systems   Constitutional: Negative for activity change, appetite change, chills, fatigue, fever and unexpected weight change.   HENT: Negative for hearing loss, mouth sores, nosebleeds, sore throat and trouble swallowing.    Eyes: Negative for visual disturbance.   Respiratory: Negative for cough, chest tightness, shortness of breath and wheezing.    Cardiovascular: Negative for chest pain, palpitations and leg swelling.   Gastrointestinal: Positive for constipation. Negative for abdominal distention, abdominal pain, blood in stool, diarrhea, nausea, rectal pain and vomiting.   Endocrine: Negative for cold intolerance and heat intolerance.   Genitourinary: Negative for difficulty urinating, dysuria, frequency and urgency.    Musculoskeletal: Negative for arthralgias, back pain, gait problem, joint swelling and myalgias.   Skin: Negative for rash.        Dry skin   Neurological: Negative for dizziness, tremors, syncope, weakness, light-headedness, numbness and headaches.   Hematological: Negative for adenopathy. Does not bruise/bleed easily.   Psychiatric/Behavioral: Negative for confusion, sleep disturbance and suicidal ideas. The patient is not nervous/anxious.          Current Outpatient Medications:   •  albuterol sulfate HFA (PROVENTIL HFA) 108 (90 Base) MCG/ACT inhaler, Inhale 2 puffs Every 4 (Four) Hours As Needed for Wheezing or Shortness of Air., Disp: , Rfl:   •  aspirin 81 MG EC tablet, Take 81 mg by mouth Daily., Disp: , Rfl:   •  atorvastatin (LIPITOR) 40 MG tablet, TAKE ONE TABLET BY MOUTH EVERY NIGHT AT BEDTIME, Disp: 90 tablet, Rfl: 2  •  B Complex Vitamins (VITAMIN-B COMPLEX PO), Take 1 tablet by mouth Daily., Disp: , Rfl:   •  Cholecalciferol (VITAMIN D3) 5000 units capsule capsule, Take 5,000 Units by mouth Daily., Disp: , Rfl:   •  docusate sodium (COLACE) 250 MG capsule, Take 250 mg by mouth 2 (Two) Times a Day., Disp: , Rfl:   •  fexofenadine (ALLEGRA) 180 MG tablet, Take 180 mg by mouth Daily., Disp: , Rfl:   •  lisinopril (PRINIVIL,ZESTRIL) 5 MG tablet, TAKE ONE TABLET BY MOUTH DAILY, Disp: 90 tablet, Rfl: 2  •  metoprolol succinate XL (TOPROL-XL) 25 MG 24 hr tablet, TAKE ONE TABLET BY MOUTH DAILY, Disp: 90 tablet, Rfl: 2  •  naproxen sodium (ALEVE) 220 MG tablet, Take 220 mg by mouth As Needed for Mild Pain ., Disp: , Rfl:   •  nitroglycerin (NITROSTAT) 0.4 MG SL tablet, 1 under the tongue as needed for angina, may repeat q5mins for up three doses (Patient taking differently: Place 0.4 mg under the tongue Every 5 (Five) Minutes As Needed for Chest Pain. 1 under the tongue as needed for angina, may repeat q5mins for up three doses), Disp: 100 tablet, Rfl: 11  •  NON FORMULARY, Take 1 tablet by mouth Daily.  "\"derma-health pro\" OTC supplement for skin, Disp: , Rfl:   •  ondansetron (ZOFRAN) 8 MG tablet, Take 1 tablet by mouth 3 (Three) Times a Day As Needed for Nausea or Vomiting., Disp: 30 tablet, Rfl: 5  •  umeclidinium-vilanterol (ANORO ELLIPTA) 62.5-25 MCG/INH aerosol powder  inhaler, Inhale 1 puff Daily., Disp: 1 each, Rfl: 5    PHYSICAL EXAMINATION:   There were no vitals taken for this visit.   ECOG Performance Status: 1 - Symptomatic but completely ambulatory  General Appearance:  alert, cooperative, no apparent distress and appears stated age   Neurologic/Psychiatric: A&O x 3, gait steady, appropriate affect, strength 5/5 in all muscle groups   HEENT:  Normocephalic, without obvious abnormality, mucous membranes moist   Neck: Supple, symmetrical, trachea midline, no adenopathy;  No thyromegaly, masses, or tenderness   Lungs:   Clear to auscultation bilaterally; respirations regular, even, and unlabored bilaterally   Heart:  Regular rate and rhythm, no murmurs appreciated   Abdomen:   Soft, non-tender, non-distended and no organomegaly   Lymph nodes: No cervical, supraclavicular, inguinal or axillary adenopathy noted   Extremities: Normal, atraumatic; no clubbing, cyanosis, or edema    Skin: No rashes, ulcers, or suspicious lesions noted     Hospital Outpatient Visit on 08/27/2019   Component Date Value Ref Range Status   • Glucose 08/27/2019 103* 65 - 99 mg/dL Final   • BUN 08/27/2019 14  8 - 23 mg/dL Final   • Creatinine 08/27/2019 0.79  0.76 - 1.27 mg/dL Final   • Sodium 08/27/2019 140  136 - 145 mmol/L Final   • Potassium 08/27/2019 4.3  3.5 - 5.2 mmol/L Final   • Chloride 08/27/2019 104  98 - 107 mmol/L Final   • CO2 08/27/2019 25.0  22.0 - 29.0 mmol/L Final   • Calcium 08/27/2019 9.3  8.6 - 10.5 mg/dL Final   • Total Protein 08/27/2019 6.5  6.0 - 8.5 g/dL Final   • Albumin 08/27/2019 3.80  3.50 - 5.20 g/dL Final   • ALT (SGPT) 08/27/2019 25  1 - 41 U/L Final   • AST (SGOT) 08/27/2019 17  1 - 40 U/L Final "   • Alkaline Phosphatase 08/27/2019 89  39 - 117 U/L Final   • Total Bilirubin 08/27/2019 1.0  0.2 - 1.2 mg/dL Final   • eGFR Non African Amer 08/27/2019 99  >60 mL/min/1.73 Final   • Globulin 08/27/2019 2.7  gm/dL Final   • A/G Ratio 08/27/2019 1.4  g/dL Final   • BUN/Creatinine Ratio 08/27/2019 17.7  7.0 - 25.0 Final   • Anion Gap 08/27/2019 11.0  5.0 - 15.0 mmol/L Final   • WBC 08/27/2019 5.90  3.40 - 10.80 10*3/mm3 Final   • RBC 08/27/2019 4.15  4.14 - 5.80 10*6/mm3 Final   • Hemoglobin 08/27/2019 13.5  13.0 - 17.7 g/dL Final   • Hematocrit 08/27/2019 40.4  37.5 - 51.0 % Final   • RDW 08/27/2019 14.2  12.3 - 15.4 % Final   • MCV 08/27/2019 97.3* 79.0 - 97.0 fL Final   • MCH 08/27/2019 32.6  26.6 - 33.0 pg Final   • MCHC 08/27/2019 33.4  31.5 - 35.7 g/dL Final   • MPV 08/27/2019 6.9  6.0 - 12.0 fL Final   • Platelets 08/27/2019 193  140 - 450 10*3/mm3 Final   • Neutrophil % 08/27/2019 58.1  42.7 - 76.0 % Final   • Lymphocyte % 08/27/2019 37.7  19.6 - 45.3 % Final   • Monocyte % 08/27/2019 4.2* 5.0 - 12.0 % Final   • Neutrophils, Absolute 08/27/2019 3.40  1.70 - 7.00 10*3/mm3 Final   • Lymphocytes, Absolute 08/27/2019 2.20  0.70 - 3.10 10*3/mm3 Final   • Monocytes, Absolute 08/27/2019 0.20  0.10 - 0.90 10*3/mm3 Final   • Creatinine 08/27/2019 0.70  0.60 - 1.30 mg/dL Final    Serial Number: [id number]:  [id number]        No results found.    ASSESSMENT: The patient is a very pleasant 64 y.o. male  with right upper lobe squamous cell carcinoma of the lung     PROBLEM LIST:   1.  Squamous cell carcinoma of the lung T1b N2 M0 stage T3a:  A.  Presented with shortness of breath and cough  B.  CT chest with contrast done June 28, 2019 revealed right upper lobe 1.8 cm lung nodule with right hilar and mediastinal lymphadenopathy.  MRI brain whole-body PET scan done on July 22, 2019 felt show any other sites of disease.  C.  Diagnosed after bronchoscopy with biopsy done July 11, 2019  D.  Pathology revealed squamous cell  carcinoma from level 4R and level 7.  E.  Started neoadjuvant concurrent chemotherapy with radiation using weekly carbotaxol July 30, 2019.  Radiation was added August 6, 2019.  2.  Chronic tobacco abuse- Quit smoking August 2019.  3.  Hypertension  4.  COPD:   A. Not oxygen dependent  B.  PFTs done on July 17, 2019 revealed FEV1 2.44L, 72% of predicted and DLCO 55% of predicted.  5.  History of superficial melanoma status post surgical resection  6. Leukocytosis:  A.  Present with asymptomatic lymphcytosis and neutrophilia, white blood cells of 16,500  7. Constipation    PLAN:  1.  I we will continue neoadjuvant concurrent chemotherapy with radiation using weekly carboplatin/paclitaxel. He is scheduled to complete his adjuvant radiation course 9/11/2019.  2.  This will be followed by surgical resection if he has good response to treatment. We will refer the patient to CT surgery for surgical consultation. I discussed the patient's concern regarding surgical preference with Dr. Salguero. He still recommends Dr. Flores for surgery. He will discuss this with him further at his next visit.   3. Regarding his complaints of constipation, we will add Lactulose twice a day as needed for constipation. This will be added to his bowel regimen of Miralax, stool softeners, and Sennokot.   4. We reviewed the potential side effects of this regimen including fatigue, vomiting and nausea, hair loss, nephropathy, neuropathy, hearing loss, myelosuppression, and risk of infusion reaction.  5.  I will continue the patient on Zofran as needed for chemotherapy-induced nausea.  6.  The patient will follow-up with me in 4 weeks to evaluate for treatment tolerance.  7.  We will continue to monitor the patient's blood work including blood counts, kidney function, liver function, and electrolytes.  8. We will repeat the patient's scans after completion of his neoadjuvant course. These will be ordered for prior to return.     Jennifer Deras,  APRN  9/3/2019

## 2019-09-04 ENCOUNTER — HOSPITAL ENCOUNTER (OUTPATIENT)
Dept: RADIATION ONCOLOGY | Facility: HOSPITAL | Age: 64
Discharge: HOME | End: 2019-09-04

## 2019-09-04 PROCEDURE — 77412 RADIATION TX DELIVERY LVL 3: CPT | Performed by: RADIOLOGY

## 2019-09-04 PROCEDURE — 77387 GUIDANCE FOR RADJ TX DLVR: CPT | Performed by: RADIOLOGY

## 2019-09-05 ENCOUNTER — HOSPITAL ENCOUNTER (OUTPATIENT)
Dept: RADIATION ONCOLOGY | Facility: HOSPITAL | Age: 64
Discharge: HOME | End: 2019-09-05

## 2019-09-05 PROCEDURE — 77412 RADIATION TX DELIVERY LVL 3: CPT | Performed by: RADIOLOGY

## 2019-09-05 PROCEDURE — 77387 GUIDANCE FOR RADJ TX DLVR: CPT | Performed by: RADIOLOGY

## 2019-09-06 ENCOUNTER — DOCUMENTATION (OUTPATIENT)
Dept: NUTRITION | Facility: HOSPITAL | Age: 64
End: 2019-09-06

## 2019-09-06 ENCOUNTER — HOSPITAL ENCOUNTER (OUTPATIENT)
Dept: RADIATION ONCOLOGY | Facility: HOSPITAL | Age: 64
Discharge: HOME | End: 2019-09-06

## 2019-09-06 ENCOUNTER — TELEPHONE (OUTPATIENT)
Dept: ONCOLOGY | Facility: CLINIC | Age: 64
End: 2019-09-06

## 2019-09-06 PROCEDURE — 77412 RADIATION TX DELIVERY LVL 3: CPT | Performed by: RADIOLOGY

## 2019-09-06 PROCEDURE — 77387 GUIDANCE FOR RADJ TX DLVR: CPT | Performed by: RADIOLOGY

## 2019-09-06 PROCEDURE — 77336 RADIATION PHYSICS CONSULT: CPT | Performed by: RADIOLOGY

## 2019-09-06 NOTE — PROGRESS NOTES
ON[facility] Nutrition    Diagnosis:  Stage IIIA squamous cell lung cancer  Radiation: 45 Gy over 5 weeks / 4 remaining treatments  Chemotherapy: Neoadjuvant Carbo / Taxol - weekly - completed 6 of 7 cycles / last cycle scheduled for 9/10/19 - Surgical resection following completion     Weight 217.3 lbs    Patient continues to do well with progression of treatment, denying nutritional impact symptoms other than continuation of constipation issues and the expected fatigue as he nears completion of treatment.  Reinforced prior teaching for constipation management, encouraged attaining hydration goals of 100 (+) ounces per day; reviewed tips for treatment related fatigue.

## 2019-09-06 NOTE — TELEPHONE ENCOUNTER
Returned call to patient's wife and educated on medication for constipation.  Educated to use magnesium citrate taking 1/2 bottle and waiting several hours to see if that will move bowels, if not had a bowel movement patient can take the remainder of magnesium citrate.  Patient can also try enema.  Educated on daily bowel medication to prevent constipation.  Wife verbalized understanding.

## 2019-09-06 NOTE — TELEPHONE ENCOUNTER
----- Message from Josh Cohen sent at 9/6/2019 10:31 AM EDT -----  Regarding: Dr. Salguero, PT constipation issues  Contact: [phone]  PT wife stopped by to say he is having trouble with constipation, they wanted a call back from a RN regarding this issue and to see if they can get a suppository or something similar that might help.

## 2019-09-09 ENCOUNTER — HOSPITAL ENCOUNTER (OUTPATIENT)
Dept: RADIATION ONCOLOGY | Facility: HOSPITAL | Age: 64
Discharge: HOME | End: 2019-09-09

## 2019-09-09 ENCOUNTER — LAB (OUTPATIENT)
Dept: LAB | Facility: HOSPITAL | Age: 64
End: 2019-09-09

## 2019-09-09 DIAGNOSIS — C34.90 SQUAMOUS CELL CARCINOMA OF LUNG, UNSPECIFIED LATERALITY (HCC): ICD-10-CM

## 2019-09-09 DIAGNOSIS — C34.91 SQUAMOUS CELL CARCINOMA OF RIGHT LUNG (HCC): ICD-10-CM

## 2019-09-09 LAB
ALBUMIN SERPL-MCNC: 4.1 G/DL (ref 3.5–5.2)
ALBUMIN/GLOB SERPL: 2.2 G/DL
ALP SERPL-CCNC: 83 U/L (ref 39–117)
ALT SERPL W P-5'-P-CCNC: 23 U/L (ref 1–41)
ANION GAP SERPL CALCULATED.3IONS-SCNC: 8 MMOL/L (ref 5–15)
AST SERPL-CCNC: 18 U/L (ref 1–40)
BILIRUB SERPL-MCNC: 1.7 MG/DL (ref 0.2–1.2)
BUN BLD-MCNC: 11 MG/DL (ref 8–23)
BUN/CREAT SERPL: 13.3 (ref 7–25)
CALCIUM SPEC-SCNC: 9.3 MG/DL (ref 8.6–10.5)
CHLORIDE SERPL-SCNC: 104 MMOL/L (ref 98–107)
CO2 SERPL-SCNC: 30 MMOL/L (ref 22–29)
CREAT BLD-MCNC: 0.83 MG/DL (ref 0.76–1.27)
ERYTHROCYTE [DISTWIDTH] IN BLOOD BY AUTOMATED COUNT: 14.8 % (ref 12.3–15.4)
GFR SERPL CREATININE-BSD FRML MDRD: 94 ML/MIN/1.73
GLOBULIN UR ELPH-MCNC: 1.9 GM/DL
GLUCOSE BLD-MCNC: 108 MG/DL (ref 65–99)
HCT VFR BLD AUTO: 37.1 % (ref 37.5–51)
HGB BLD-MCNC: 12.3 G/DL (ref 13–17.7)
LYMPHOCYTES # BLD AUTO: 1.7 10*3/MM3 (ref 0.7–3.1)
LYMPHOCYTES NFR BLD AUTO: 33.8 % (ref 19.6–45.3)
MCH RBC QN AUTO: 32.9 PG (ref 26.6–33)
MCHC RBC AUTO-ENTMCNC: 33.3 G/DL (ref 31.5–35.7)
MCV RBC AUTO: 99.1 FL (ref 79–97)
MONOCYTES # BLD AUTO: 0.4 10*3/MM3 (ref 0.1–0.9)
MONOCYTES NFR BLD AUTO: 7.5 % (ref 5–12)
NEUTROPHILS # BLD AUTO: 2.9 10*3/MM3 (ref 1.7–7)
NEUTROPHILS NFR BLD AUTO: 58.7 % (ref 42.7–76)
PLATELET # BLD AUTO: 214 10*3/MM3 (ref 140–450)
PMV BLD AUTO: 6.3 FL (ref 6–12)
POTASSIUM BLD-SCNC: 4.7 MMOL/L (ref 3.5–5.2)
PROT SERPL-MCNC: 6 G/DL (ref 6–8.5)
RBC # BLD AUTO: 3.75 10*6/MM3 (ref 4.14–5.8)
SODIUM BLD-SCNC: 142 MMOL/L (ref 136–145)
WBC NRBC COR # BLD: 5 10*3/MM3 (ref 3.4–10.8)

## 2019-09-09 PROCEDURE — 77412 RADIATION TX DELIVERY LVL 3: CPT | Performed by: RADIOLOGY

## 2019-09-09 PROCEDURE — 80053 COMPREHEN METABOLIC PANEL: CPT

## 2019-09-09 PROCEDURE — 36415 COLL VENOUS BLD VENIPUNCTURE: CPT

## 2019-09-09 PROCEDURE — 85025 COMPLETE CBC W/AUTO DIFF WBC: CPT

## 2019-09-09 RX ORDER — FAMOTIDINE 10 MG/ML
20 INJECTION, SOLUTION INTRAVENOUS ONCE
Status: CANCELLED | OUTPATIENT
Start: 2019-09-10

## 2019-09-09 RX ORDER — PALONOSETRON 0.05 MG/ML
0.25 INJECTION, SOLUTION INTRAVENOUS ONCE
Status: CANCELLED | OUTPATIENT
Start: 2019-09-10

## 2019-09-09 RX ORDER — DIPHENHYDRAMINE HYDROCHLORIDE 50 MG/ML
50 INJECTION INTRAMUSCULAR; INTRAVENOUS AS NEEDED
Status: CANCELLED | OUTPATIENT
Start: 2019-09-10

## 2019-09-09 RX ORDER — FAMOTIDINE 10 MG/ML
20 INJECTION, SOLUTION INTRAVENOUS AS NEEDED
Status: CANCELLED | OUTPATIENT
Start: 2019-09-10

## 2019-09-09 RX ORDER — SODIUM CHLORIDE 9 MG/ML
250 INJECTION, SOLUTION INTRAVENOUS ONCE
Status: CANCELLED | OUTPATIENT
Start: 2019-09-10

## 2019-09-10 ENCOUNTER — HOSPITAL ENCOUNTER (OUTPATIENT)
Dept: RADIATION ONCOLOGY | Facility: HOSPITAL | Age: 64
Discharge: HOME | End: 2019-09-10

## 2019-09-10 ENCOUNTER — HOSPITAL ENCOUNTER (OUTPATIENT)
Dept: ONCOLOGY | Facility: HOSPITAL | Age: 64
Setting detail: INFUSION SERIES
Discharge: HOME | End: 2019-09-10

## 2019-09-10 VITALS
BODY MASS INDEX: 32.74 KG/M2 | HEART RATE: 75 BPM | WEIGHT: 216 LBS | TEMPERATURE: 97 F | HEIGHT: 68 IN | DIASTOLIC BLOOD PRESSURE: 71 MMHG | SYSTOLIC BLOOD PRESSURE: 131 MMHG | RESPIRATION RATE: 20 BRPM

## 2019-09-10 VITALS — BODY MASS INDEX: 32.92 KG/M2 | WEIGHT: 216.5 LBS

## 2019-09-10 DIAGNOSIS — C34.90 SQUAMOUS CELL CARCINOMA OF LUNG, UNSPECIFIED LATERALITY (HCC): Primary | ICD-10-CM

## 2019-09-10 PROCEDURE — 77412 RADIATION TX DELIVERY LVL 3: CPT | Performed by: RADIOLOGY

## 2019-09-10 PROCEDURE — 25010000002 DEXAMETHASONE PER 1 MG: Performed by: NURSE PRACTITIONER

## 2019-09-10 PROCEDURE — 25010000002 DIPHENHYDRAMINE PER 50 MG: Performed by: NURSE PRACTITIONER

## 2019-09-10 PROCEDURE — 25010000002 CARBOPLATIN PER 50 MG: Performed by: NURSE PRACTITIONER

## 2019-09-10 PROCEDURE — 96375 TX/PRO/DX INJ NEW DRUG ADDON: CPT

## 2019-09-10 PROCEDURE — 25010000002 PALONOSETRON 0.25 MG/5ML SOLUTION PREFILLED SYRINGE: Performed by: NURSE PRACTITIONER

## 2019-09-10 PROCEDURE — 96417 CHEMO IV INFUS EACH ADDL SEQ: CPT

## 2019-09-10 PROCEDURE — 77387 GUIDANCE FOR RADJ TX DLVR: CPT | Performed by: RADIOLOGY

## 2019-09-10 PROCEDURE — 25010000002 PACLITAXEL PER 30 MG: Performed by: NURSE PRACTITIONER

## 2019-09-10 PROCEDURE — 96413 CHEMO IV INFUSION 1 HR: CPT

## 2019-09-10 RX ORDER — FAMOTIDINE 10 MG/ML
20 INJECTION, SOLUTION INTRAVENOUS ONCE
Status: COMPLETED | OUTPATIENT
Start: 2019-09-10 | End: 2019-09-10

## 2019-09-10 RX ORDER — SODIUM CHLORIDE 9 MG/ML
250 INJECTION, SOLUTION INTRAVENOUS ONCE
Status: COMPLETED | OUTPATIENT
Start: 2019-09-10 | End: 2019-09-10

## 2019-09-10 RX ORDER — PALONOSETRON 0.05 MG/ML
0.25 INJECTION, SOLUTION INTRAVENOUS ONCE
Status: COMPLETED | OUTPATIENT
Start: 2019-09-10 | End: 2019-09-10

## 2019-09-10 RX ADMIN — DIPHENHYDRAMINE HYDROCHLORIDE 25 MG: 50 INJECTION INTRAMUSCULAR; INTRAVENOUS at 12:20

## 2019-09-10 RX ADMIN — PALONOSETRON 0.25 MG: 0.25 INJECTION, SOLUTION INTRAVENOUS at 12:15

## 2019-09-10 RX ADMIN — FAMOTIDINE 20 MG: 10 INJECTION, SOLUTION INTRAVENOUS at 12:17

## 2019-09-10 RX ADMIN — CARBOPLATIN 300 MG: 10 INJECTION, SOLUTION INTRAVENOUS at 14:04

## 2019-09-10 RX ADMIN — PACLITAXEL 105 MG: 6 INJECTION INTRAVENOUS at 13:03

## 2019-09-10 RX ADMIN — DEXAMETHASONE SODIUM PHOSPHATE 12 MG: 4 INJECTION, SOLUTION INTRAMUSCULAR; INTRAVENOUS at 12:20

## 2019-09-10 RX ADMIN — SODIUM CHLORIDE 250 ML: 9 INJECTION, SOLUTION INTRAVENOUS at 12:15

## 2019-09-11 ENCOUNTER — HOSPITAL ENCOUNTER (OUTPATIENT)
Dept: RADIATION ONCOLOGY | Facility: HOSPITAL | Age: 64
Discharge: HOME | End: 2019-09-11

## 2019-09-11 DIAGNOSIS — E78.5 HYPERLIPIDEMIA LDL GOAL <70: ICD-10-CM

## 2019-09-11 DIAGNOSIS — I25.10 CORONARY ARTERY DISEASE INVOLVING NATIVE CORONARY ARTERY OF NATIVE HEART WITHOUT ANGINA PECTORIS: ICD-10-CM

## 2019-09-11 PROCEDURE — 77412 RADIATION TX DELIVERY LVL 3: CPT | Performed by: RADIOLOGY

## 2019-09-11 PROCEDURE — 77387 GUIDANCE FOR RADJ TX DLVR: CPT | Performed by: RADIOLOGY

## 2019-09-11 RX ORDER — METOPROLOL SUCCINATE 25 MG/1
TABLET, EXTENDED RELEASE ORAL
Qty: 90 TABLET | Refills: 0 | Status: SHIPPED | OUTPATIENT
Start: 2019-09-11 | End: 2020-03-16

## 2019-09-11 RX ORDER — ATORVASTATIN CALCIUM 40 MG/1
TABLET, FILM COATED ORAL
Qty: 90 TABLET | Refills: 0 | Status: ON HOLD | OUTPATIENT
Start: 2019-09-11 | End: 2019-10-14 | Stop reason: SINTOL

## 2019-09-20 NOTE — RADIATION COMPLETION NOTES
DATE OF COMPLETION: 9/11/2019  DIAGNOSIS: Right Upper Lobe Non-small Cell Lung Cancer    REFERRING: Kevin Salguero MD        Erik Anders completed radiation therapy today.      BACKGROUND: Erik Bowser is a 64-year-old gentleman who has been recently diagnosed with a stage IIIa non-small cell lung cancer of the right upper lobe of the lung.  He was treated with neoadjuvant chemoradiation therapy with intentions to refer for surgical resection.  His radiation treatments were delivered as detailed below:    Treatment Summary     Dates of Therapy: 8/7/2019 - 9/11/2019  Treatment Site: Right Lung and Mediastinum  Dose: 45 Gy in 25 fractions of 1.8 Gy each  Technique: Treatments were delivered using 3-dimensional techniques using mixed 6 MV and 10 MV photon fields.    Treatment Course and Tolerance: Mr. Bowser tolerated radiation treatments very well.  He developed the expected grade 1 fatigue and esophagitis that was managed with Magic mouthwash.    The initial follow up visit will be in 1 month, with scheduled repeat scans in 3 weeks with CT Surgery re-evaluation at that time.    Erik Bowser knows to call if any problems or concerns develop in the meantime.     Electronically signed by: Hill Herbert MD                    Cc: Nat Umana APRN

## 2019-09-23 ENCOUNTER — HOSPITAL ENCOUNTER (OUTPATIENT)
Dept: CT IMAGING | Facility: HOSPITAL | Age: 64
Discharge: HOME | End: 2019-09-23
Admitting: NURSE PRACTITIONER

## 2019-09-23 DIAGNOSIS — C34.91 SQUAMOUS CELL CARCINOMA OF RIGHT LUNG (HCC): ICD-10-CM

## 2019-09-23 PROCEDURE — 74177 CT ABD & PELVIS W/CONTRAST: CPT

## 2019-09-23 PROCEDURE — 71260 CT THORAX DX C+: CPT

## 2019-09-23 PROCEDURE — 25010000002 IOPAMIDOL 61 % SOLUTION: Performed by: NURSE PRACTITIONER

## 2019-09-23 RX ADMIN — IOPAMIDOL 100 ML: 612 INJECTION, SOLUTION INTRAVENOUS at 10:53

## 2019-09-25 ENCOUNTER — OFFICE VISIT (OUTPATIENT)
Dept: PULMONOLOGY | Facility: CLINIC | Age: 64
End: 2019-09-25

## 2019-09-25 VITALS
HEART RATE: 70 BPM | HEIGHT: 68 IN | WEIGHT: 218 LBS | RESPIRATION RATE: 18 BRPM | BODY MASS INDEX: 33.04 KG/M2 | SYSTOLIC BLOOD PRESSURE: 122 MMHG | OXYGEN SATURATION: 94 % | DIASTOLIC BLOOD PRESSURE: 76 MMHG

## 2019-09-25 DIAGNOSIS — J41.0 CHRONIC BRONCHITIS, SIMPLE (HCC): ICD-10-CM

## 2019-09-25 DIAGNOSIS — F17.200 SMOKING: ICD-10-CM

## 2019-09-25 DIAGNOSIS — C34.91 SQUAMOUS CELL LUNG CANCER, RIGHT (HCC): Primary | ICD-10-CM

## 2019-09-25 DIAGNOSIS — J43.9 PULMONARY EMPHYSEMA, UNSPECIFIED EMPHYSEMA TYPE (HCC): ICD-10-CM

## 2019-09-25 PROCEDURE — 90732 PPSV23 VACC 2 YRS+ SUBQ/IM: CPT | Performed by: INTERNAL MEDICINE

## 2019-09-25 PROCEDURE — 90471 IMMUNIZATION ADMIN: CPT | Performed by: INTERNAL MEDICINE

## 2019-09-25 PROCEDURE — 99214 OFFICE O/P EST MOD 30 MIN: CPT | Performed by: INTERNAL MEDICINE

## 2019-10-01 ENCOUNTER — OFFICE VISIT (OUTPATIENT)
Dept: CARDIAC SURGERY | Facility: CLINIC | Age: 64
End: 2019-10-01

## 2019-10-01 ENCOUNTER — LAB (OUTPATIENT)
Dept: LAB | Facility: HOSPITAL | Age: 64
End: 2019-10-01

## 2019-10-01 ENCOUNTER — OFFICE VISIT (OUTPATIENT)
Dept: ONCOLOGY | Facility: CLINIC | Age: 64
End: 2019-10-01

## 2019-10-01 VITALS
BODY MASS INDEX: 32.74 KG/M2 | DIASTOLIC BLOOD PRESSURE: 78 MMHG | OXYGEN SATURATION: 97 % | SYSTOLIC BLOOD PRESSURE: 139 MMHG | WEIGHT: 216 LBS | HEIGHT: 68 IN | RESPIRATION RATE: 20 BRPM | TEMPERATURE: 97.3 F | HEART RATE: 76 BPM

## 2019-10-01 VITALS
HEART RATE: 72 BPM | BODY MASS INDEX: 32.08 KG/M2 | DIASTOLIC BLOOD PRESSURE: 69 MMHG | OXYGEN SATURATION: 98 % | WEIGHT: 216.6 LBS | SYSTOLIC BLOOD PRESSURE: 130 MMHG | TEMPERATURE: 97.4 F | HEIGHT: 69 IN

## 2019-10-01 DIAGNOSIS — C34.91 SQUAMOUS CELL CARCINOMA OF RIGHT LUNG (HCC): ICD-10-CM

## 2019-10-01 DIAGNOSIS — C34.91 SQUAMOUS CELL CARCINOMA OF RIGHT LUNG (HCC): Primary | ICD-10-CM

## 2019-10-01 LAB
ALBUMIN SERPL-MCNC: 4.2 G/DL (ref 3.5–5.2)
ALBUMIN/GLOB SERPL: 1.7 G/DL
ALP SERPL-CCNC: 105 U/L (ref 39–117)
ALT SERPL W P-5'-P-CCNC: 25 U/L (ref 1–41)
ANION GAP SERPL CALCULATED.3IONS-SCNC: 10 MMOL/L (ref 5–15)
AST SERPL-CCNC: 22 U/L (ref 1–40)
BILIRUB SERPL-MCNC: 1.7 MG/DL (ref 0.2–1.2)
BUN BLD-MCNC: 11 MG/DL (ref 8–23)
BUN/CREAT SERPL: 12.9 (ref 7–25)
CALCIUM SPEC-SCNC: 9.2 MG/DL (ref 8.6–10.5)
CHLORIDE SERPL-SCNC: 103 MMOL/L (ref 98–107)
CO2 SERPL-SCNC: 28 MMOL/L (ref 22–29)
CREAT BLD-MCNC: 0.85 MG/DL (ref 0.76–1.27)
ERYTHROCYTE [DISTWIDTH] IN BLOOD BY AUTOMATED COUNT: 18.7 % (ref 12.3–15.4)
GFR SERPL CREATININE-BSD FRML MDRD: 91 ML/MIN/1.73
GLOBULIN UR ELPH-MCNC: 2.5 GM/DL
GLUCOSE BLD-MCNC: 114 MG/DL (ref 65–99)
HCT VFR BLD AUTO: 39.7 % (ref 37.5–51)
HGB BLD-MCNC: 13.2 G/DL (ref 13–17.7)
LYMPHOCYTES # BLD AUTO: 2.5 10*3/MM3 (ref 0.7–3.1)
LYMPHOCYTES NFR BLD AUTO: 36.4 % (ref 19.6–45.3)
MCH RBC QN AUTO: 33.7 PG (ref 26.6–33)
MCHC RBC AUTO-ENTMCNC: 33.2 G/DL (ref 31.5–35.7)
MCV RBC AUTO: 101.5 FL (ref 79–97)
MONOCYTES # BLD AUTO: 0.7 10*3/MM3 (ref 0.1–0.9)
MONOCYTES NFR BLD AUTO: 10.3 % (ref 5–12)
NEUTROPHILS # BLD AUTO: 3.6 10*3/MM3 (ref 1.7–7)
NEUTROPHILS NFR BLD AUTO: 53.3 % (ref 42.7–76)
PLATELET # BLD AUTO: 241 10*3/MM3 (ref 140–450)
PMV BLD AUTO: 6.2 FL (ref 6–12)
POTASSIUM BLD-SCNC: 4.1 MMOL/L (ref 3.5–5.2)
PROT SERPL-MCNC: 6.7 G/DL (ref 6–8.5)
RBC # BLD AUTO: 3.91 10*6/MM3 (ref 4.14–5.8)
SODIUM BLD-SCNC: 141 MMOL/L (ref 136–145)
WBC NRBC COR # BLD: 6.8 10*3/MM3 (ref 3.4–10.8)

## 2019-10-01 PROCEDURE — 36415 COLL VENOUS BLD VENIPUNCTURE: CPT

## 2019-10-01 PROCEDURE — 99214 OFFICE O/P EST MOD 30 MIN: CPT | Performed by: INTERNAL MEDICINE

## 2019-10-01 PROCEDURE — 85025 COMPLETE CBC W/AUTO DIFF WBC: CPT

## 2019-10-01 PROCEDURE — 99204 OFFICE O/P NEW MOD 45 MIN: CPT | Performed by: THORACIC SURGERY (CARDIOTHORACIC VASCULAR SURGERY)

## 2019-10-01 PROCEDURE — 80053 COMPREHEN METABOLIC PANEL: CPT

## 2019-10-01 NOTE — H&P (VIEW-ONLY)
10/01/2019  Patient Information  Erik Bowser                                                                                          [address]  [place] [address]   1955  'PCP/Referring Physician'  Nat Umana, APRN  [phone]  Kevin Salguero MD  [phone]  Chief Complaint   Patient presents with   • Lung Cancer     Referred by Dr. Salguero for right upper lobe lung cancer       History of Present Illness: 64-year-old  male with a history of hypertension, hyperlipidemia, coronary artery disease status post stenting, COPD and tobacco abuse who presents with a right upper lobe lung cancer.  The patient underwent lung cancer screening CT scan that demonstrated a right upper lobe mass.  He subsequently had a biopsy that was consistent with squamous cell carcinoma in the Station 4 and 7 distribution.  He denies cough, hemoptysis, weight loss, lymphadenopathy, fevers or chills.  The patient finished his neoadjuvant chemotherapy and radiation 2 weeks ago.      Patient Active Problem List   Diagnosis   • Coronary artery disease involving native coronary artery of native heart without angina pectoris   • Essential hypertension   • Hyperlipidemia LDL goal <70   • Tobacco abuse   • Lymphocytosis   • Mediastinal lymphadenopathy   • Squamous cell lung cancer (CMS/HCC)     Past Medical History:   Diagnosis Date   • Arthritis    • At risk for obstructive sleep apnea     Spouse reports patient snores and that he has questionable sleep apnea but has never had a sleep study done   • Borderline diabetes     Has never taken medication    • COPD (chronic obstructive pulmonary disease) (CMS/[facility])    • Coronary artery disease     1 stent   • Dyslipidemia    • Elevated cholesterol    • Hearing loss     Does not use hearing devices   • History of bronchitis    • History of pneumonia    • Hyperlipidemia    • Hypertension    • MI (myocardial infarction) (CMS/HCC) 01/20/2015    placement of stent x1   • MRSA  (methicillin resistant Staphylococcus aureus) 01/2015    left hand - treated   • Seasonal allergies    • Skin cancer     skin, lung   • Squamous cell lung cancer (CMS/HCC) 7/24/2019   • Tobacco abuse    • Wears glasses     OTC glasses PRN for reading   • Wears partial dentures     Upper mouth     Past Surgical History:   Procedure Laterality Date   • BRONCHOSCOPY N/A 7/11/2019    Procedure: BRONCHOSCOPY WITH ENDOBRONCHIAL ULTRASOUND with General Anesthesia.  ;  Surgeon: Jeff Rubin MD;  Location: [facility];  Service: Pulmonary   • CARDIAC CATHETERIZATION  01/20/2015    Placement of stent x1   • CATARACT EXTRACTION Right    • CHOLECYSTECTOMY      Laparoscopic   • COLONOSCOPY     • CORONARY ANGIOPLASTY WITH STENT PLACEMENT     • HEMORRHOIDECTOMY     • KNEE MENISCAL REPAIR Bilateral    • MOUTH SURGERY      Post for oral implants in upper mouth x3   • ROTATOR CUFF REPAIR Bilateral    • SKIN CANCER EXCISION      melanoma removed from right neck and back.  Squamous cell removed multiple places.    • WISDOM TOOTH EXTRACTION         Current Outpatient Medications:   •  albuterol sulfate HFA (PROVENTIL HFA) 108 (90 Base) MCG/ACT inhaler, Inhale 2 puffs Every 4 (Four) Hours As Needed for Wheezing or Shortness of Air., Disp: , Rfl:   •  aspirin 81 MG EC tablet, Take 81 mg by mouth Daily., Disp: , Rfl:   •  atorvastatin (LIPITOR) 40 MG tablet, TAKE ONE TABLET BY MOUTH EVERY NIGHT AT BEDTIME, Disp: 90 tablet, Rfl: 0  •  B Complex Vitamins (VITAMIN-B COMPLEX PO), Take 1 tablet by mouth Daily., Disp: , Rfl:   •  Cholecalciferol (VITAMIN D3) 5000 units capsule capsule, Take 5,000 Units by mouth Daily., Disp: , Rfl:   •  docusate sodium (COLACE) 250 MG capsule, Take 250 mg by mouth 2 (Two) Times a Day., Disp: , Rfl:   •  fexofenadine (ALLEGRA) 180 MG tablet, Take 180 mg by mouth Daily., Disp: , Rfl:   •  lactulose (CHRONULAC) 10 GM/15ML solution, Take 30 mL by mouth 2 (Two) Times a Day As Needed (constipation)., Disp: 946 mL,  "Rfl: 1  •  lisinopril (PRINIVIL,ZESTRIL) 5 MG tablet, TAKE ONE TABLET BY MOUTH DAILY, Disp: 90 tablet, Rfl: 2  •  metoprolol succinate XL (TOPROL-XL) 25 MG 24 hr tablet, TAKE ONE TABLET BY MOUTH DAILY, Disp: 90 tablet, Rfl: 0  •  naproxen sodium (ALEVE) 220 MG tablet, Take 220 mg by mouth As Needed for Mild Pain ., Disp: , Rfl:   •  nitroglycerin (NITROSTAT) 0.4 MG SL tablet, 1 under the tongue as needed for angina, may repeat q5mins for up three doses (Patient taking differently: Place 0.4 mg under the tongue Every 5 (Five) Minutes As Needed for Chest Pain. 1 under the tongue as needed for angina, may repeat q5mins for up three doses), Disp: 100 tablet, Rfl: 11  •  NON FORMULARY, Take 1 tablet by mouth Daily. \"derma-health pro\" OTC supplement for skin, Disp: , Rfl:   •  ondansetron (ZOFRAN) 8 MG tablet, Take 1 tablet by mouth 3 (Three) Times a Day As Needed for Nausea or Vomiting., Disp: 30 tablet, Rfl: 5  •  tiotropium bromide-olodaterol (STIOLTO RESPIMAT) 2.5-2.5 MCG/ACT aerosol solution inhaler, Inhale 2 puffs Daily. Could not tolerate Anoro., Disp: 1 inhaler, Rfl: 5  Allergies   Allergen Reactions   • Rosuvastatin Myalgia     Social History     Socioeconomic History   • Marital status:      Spouse name: Not on file   • Number of children: 2   • Years of education: Not on file   • Highest education level: Not on file   Occupational History   • Occupation: [employer]     Employer: RETIRED   Tobacco Use   • Smoking status: Former Smoker     Packs/day: 2.00     Years: 46.00     Pack years: 92.00     Types: Cigarettes     Last attempt to quit: 2019     Years since quittin.1   • Smokeless tobacco: Never Used   Substance and Sexual Activity   • Alcohol use: Yes     Comment: Social use, no history of abuse   • Drug use: No   • Sexual activity: Defer   Social History Narrative    Lives in [place], [place] with spouse     Family History   Problem Relation Age of Onset   • Heart attack Mother 60   • " "Coronary artery disease Mother    • Hyperlipidemia Mother    • Lung cancer Mother    • Emphysema Father    • Hypertension Sister    • Heart attack Sister 45   • Hypertension Brother    • Heart attack Brother 50   • Hypertension Sister    • Hypertension Brother    • Diabetes Brother      Review of Systems   Constitution: Negative for chills, fever, malaise/fatigue, night sweats and weight loss.   HENT: Negative for hearing loss, odynophagia and sore throat.    Cardiovascular: Positive for leg swelling. Negative for chest pain, dyspnea on exertion, orthopnea and palpitations.   Respiratory: Positive for cough. Negative for hemoptysis.    Endocrine: Negative for cold intolerance, heat intolerance, polydipsia, polyphagia and polyuria.   Hematologic/Lymphatic: Bruises/bleeds easily.   Skin: Negative for itching and rash.   Musculoskeletal: Positive for arthritis, gout and joint pain. Negative for joint swelling and myalgias.   Gastrointestinal: Positive for constipation. Negative for abdominal pain, diarrhea, hematemesis, hematochezia, melena, nausea and vomiting.   Genitourinary: Negative for dysuria, frequency and hematuria.   Neurological: Positive for numbness (tingling in hands). Negative for focal weakness, headaches and seizures.   Psychiatric/Behavioral: Negative for suicidal ideas.   All other systems reviewed and are negative.    Vitals:    10/01/19 1031   BP: 130/69   BP Location: Right arm   Patient Position: Sitting   Pulse: 72   Temp: 97.4 °F (36.3 °C)   TempSrc: Temporal   SpO2: 98%   Weight: 98.2 kg (216 lb 9.6 oz)   Height: 175.3 cm (69\")      Physical Exam   Constitutional: He is oriented to person, place, and time. He appears well-developed and well-nourished. No distress.    male who appears his stated age and is present with his wife.   HENT:   Head: Normocephalic and atraumatic.   Eyes: Conjunctivae and EOM are normal. No scleral icterus.   Neck: Normal range of motion. Neck supple. No JVD " present. No tracheal deviation present.   Cardiovascular: Normal rate, regular rhythm and normal heart sounds. Exam reveals no gallop and no friction rub.   No murmur heard.  Pulmonary/Chest: Effort normal and breath sounds normal. No stridor. No respiratory distress. He has no wheezes. He has no rales.   Abdominal: Soft. He exhibits no distension and no mass. There is no tenderness. There is no rebound and no guarding.   Musculoskeletal: Normal range of motion. He exhibits no deformity.   Lymphadenopathy:     He has no cervical adenopathy.     He has no axillary adenopathy.        Right: No supraclavicular adenopathy present.        Left: No supraclavicular adenopathy present.   Neurological: He is alert and oriented to person, place, and time.   Skin: No rash noted. No erythema.   Psychiatric: He has a normal mood and affect. His behavior is normal. Judgment and thought content normal.       Labs/Imaging:  -MRI of the brain performed 7/22/2019, demonstrates no evidence of intracranial metastasis.  There is chronic microangiopathy.  -PET/CT performed 7/22/2019, personally reviewed, demonstrates right hilar and paratracheal adenopathy with an SUV of 9.9 and 13.4.  No evidence of metastatic disease.  -Bronchoscopy/EBUS performed 7/11/2019, right middle lobe lavage negative for malignancy, FNA of 4R and 7 malignant  -CT of the chest performed 6/28/2019, personally reviewed, demonstrates a 1.8 x 1.3 cm nodule present in the azygos lobe of the lung.  2 paratracheal lymph nodes are present measuring 2.4 cm.  -Pulmonary function test performed 7/17/2019, FEV1 2.44 (72% predicted, DLCO 55% predicted  -CT of the chest performed 9/23/2019, personally reviewed, demonstrates interval improvement of the right upper lobe nodule and paratracheal lymphadenopathy.    Assessment/Plan:  64-year-old  male with a history of hypertension, hyperlipidemia, coronary artery disease status post stenting, COPD and tobacco abuse who  presents with a right upper lobe lung cancer ([id number] Clinical stage IIIA).  The patient has undergone neoadjuvant treatment with adequate response seen on repeat imaging.  The patient is a reasonable candidate for bronchoscopy, right VATS, right upper lobectomy, mediastinal lymph node dissection and intercostal nerve blocks with cryoablation.  The risks and benefits of surgery were discussed with the patient including pain, bleeding, infection, air leak, myocardial infarction and death.  The patient understood these risks and wished to proceed with surgery.  He will need cardiac clearance from his cardiologist Dr. Lacy prior to surgery.    Patient Active Problem List   Diagnosis   • Coronary artery disease involving native coronary artery of native heart without angina pectoris   • Essential hypertension   • Hyperlipidemia LDL goal <70   • Tobacco abuse   • Lymphocytosis   • Mediastinal lymphadenopathy   • Squamous cell lung cancer (CMS/HCC)

## 2019-10-01 NOTE — PROGRESS NOTES
DATE OF VISIT: 10/1/2019    REASON FOR VISIT: Followup for right lung cancer     HISTORY OF PRESENT ILLNESS: The patient is a very pleasant 64 y.o. male  with past medical history significant for cell carcinoma of the right upper lobe of the lung diagnosed July 2019.  The patient had staging work-up that confirmed stage IIIa disease.  He will be started on neoadjuvant concurrent chemotherapy radiation July 30, 2019.  This was completed September 2019.  The  patient is here today for scheduled follow-up visit    SUBJECTIVE: The patient is here today with his wife.  He is recovering from chemotherapy.  His energy is coming back but he is anxious about the scan results.  He is having constipation.    PAST MEDICAL HISTORY/SOCIAL HISTORY/FAMILY HISTORY: Reviewed by me and unchanged from my documentation done on 10/01/19.    Review of Systems   Constitutional: Negative for activity change, appetite change, chills, fatigue, fever and unexpected weight change.   HENT: Negative for hearing loss, mouth sores, nosebleeds, sore throat and trouble swallowing.    Eyes: Negative for visual disturbance.   Respiratory: Negative for cough, chest tightness, shortness of breath and wheezing.    Cardiovascular: Negative for chest pain, palpitations and leg swelling.   Gastrointestinal: Positive for constipation. Negative for abdominal distention, abdominal pain, blood in stool, diarrhea, nausea, rectal pain and vomiting.   Endocrine: Negative for cold intolerance and heat intolerance.   Genitourinary: Negative for difficulty urinating, dysuria, frequency and urgency.   Musculoskeletal: Negative for arthralgias, back pain, gait problem, joint swelling and myalgias.   Skin: Negative for rash.   Neurological: Negative for dizziness, tremors, syncope, weakness, light-headedness, numbness and headaches.   Hematological: Negative for adenopathy. Does not bruise/bleed easily.   Psychiatric/Behavioral: Negative for confusion, sleep disturbance  "and suicidal ideas. The patient is not nervous/anxious.          Current Outpatient Medications:   •  albuterol sulfate HFA (PROVENTIL HFA) 108 (90 Base) MCG/ACT inhaler, Inhale 2 puffs Every 4 (Four) Hours As Needed for Wheezing or Shortness of Air., Disp: , Rfl:   •  aspirin 81 MG EC tablet, Take 81 mg by mouth Daily., Disp: , Rfl:   •  atorvastatin (LIPITOR) 40 MG tablet, TAKE ONE TABLET BY MOUTH EVERY NIGHT AT BEDTIME, Disp: 90 tablet, Rfl: 0  •  B Complex Vitamins (VITAMIN-B COMPLEX PO), Take 1 tablet by mouth Daily., Disp: , Rfl:   •  Cholecalciferol (VITAMIN D3) 5000 units capsule capsule, Take 5,000 Units by mouth Daily., Disp: , Rfl:   •  docusate sodium (COLACE) 250 MG capsule, Take 250 mg by mouth 2 (Two) Times a Day., Disp: , Rfl:   •  fexofenadine (ALLEGRA) 180 MG tablet, Take 180 mg by mouth Daily., Disp: , Rfl:   •  lactulose (CHRONULAC) 10 GM/15ML solution, Take 30 mL by mouth 2 (Two) Times a Day As Needed (constipation)., Disp: 946 mL, Rfl: 1  •  lisinopril (PRINIVIL,ZESTRIL) 5 MG tablet, TAKE ONE TABLET BY MOUTH DAILY, Disp: 90 tablet, Rfl: 2  •  metoprolol succinate XL (TOPROL-XL) 25 MG 24 hr tablet, TAKE ONE TABLET BY MOUTH DAILY, Disp: 90 tablet, Rfl: 0  •  naproxen sodium (ALEVE) 220 MG tablet, Take 220 mg by mouth As Needed for Mild Pain ., Disp: , Rfl:   •  nitroglycerin (NITROSTAT) 0.4 MG SL tablet, 1 under the tongue as needed for angina, may repeat q5mins for up three doses (Patient taking differently: Place 0.4 mg under the tongue Every 5 (Five) Minutes As Needed for Chest Pain. 1 under the tongue as needed for angina, may repeat q5mins for up three doses), Disp: 100 tablet, Rfl: 11  •  NON FORMULARY, Take 1 tablet by mouth Daily. \"derma-health pro\" OTC supplement for skin, Disp: , Rfl:   •  ondansetron (ZOFRAN) 8 MG tablet, Take 1 tablet by mouth 3 (Three) Times a Day As Needed for Nausea or Vomiting., Disp: 30 tablet, Rfl: 5  •  tiotropium bromide-olodaterol (STIOLTO RESPIMAT) 2.5-2.5 " "MCG/ACT aerosol solution inhaler, Inhale 2 puffs Daily. Could not tolerate Anoro., Disp: 1 inhaler, Rfl: 5    PHYSICAL EXAMINATION:   /78 Comment: LUE  Pulse 76   Temp 97.3 °F (36.3 °C) (Temporal)   Resp 20   Ht 172.7 cm (68\")   Wt 98 kg (216 lb)   SpO2 97% Comment: RA  BMI 32.84 kg/m²    ECOG Performance Status: 1 - Symptomatic but completely ambulatory  General Appearance:  alert, cooperative, no apparent distress and appears stated age   Neurologic/Psychiatric: A&O x 3, gait steady, appropriate affect, strength 5/5 in all muscle groups   HEENT:  Normocephalic, without obvious abnormality, mucous membranes moist   Neck: Supple, symmetrical, trachea midline, no adenopathy;  No thyromegaly, masses, or tenderness   Lungs:   Clear to auscultation bilaterally; respirations regular, even, and unlabored bilaterally   Heart:  Regular rate and rhythm, no murmurs appreciated   Abdomen:   Soft, non-tender, non-distended and no organomegaly   Lymph nodes: No cervical, supraclavicular, inguinal or axillary adenopathy noted   Extremities: Normal, atraumatic; no clubbing, cyanosis, or edema    Skin: No rashes, ulcers, or suspicious lesions noted     Lab on 10/01/2019   Component Date Value Ref Range Status   • Glucose 10/01/2019 114* 65 - 99 mg/dL Final   • BUN 10/01/2019 11  8 - 23 mg/dL Final   • Creatinine 10/01/2019 0.85  0.76 - 1.27 mg/dL Final   • Sodium 10/01/2019 141  136 - 145 mmol/L Final   • Potassium 10/01/2019 4.1  3.5 - 5.2 mmol/L Final   • Chloride 10/01/2019 103  98 - 107 mmol/L Final   • CO2 10/01/2019 28.0  22.0 - 29.0 mmol/L Final   • Calcium 10/01/2019 9.2  8.6 - 10.5 mg/dL Final   • Total Protein 10/01/2019 6.7  6.0 - 8.5 g/dL Final   • Albumin 10/01/2019 4.20  3.50 - 5.20 g/dL Final   • ALT (SGPT) 10/01/2019 25  1 - 41 U/L Final   • AST (SGOT) 10/01/2019 22  1 - 40 U/L Final   • Alkaline Phosphatase 10/01/2019 105  39 - 117 U/L Final   • Total Bilirubin 10/01/2019 1.7* 0.2 - 1.2 mg/dL Final   • " eGFR Non African Amer 10/01/2019 91  >60 mL/min/1.73 Final   • Globulin 10/01/2019 2.5  gm/dL Final   • A/G Ratio 10/01/2019 1.7  g/dL Final   • BUN/Creatinine Ratio 10/01/2019 12.9  7.0 - 25.0 Final   • Anion Gap 10/01/2019 10.0  5.0 - 15.0 mmol/L Final   • WBC 10/01/2019 6.80  3.40 - 10.80 10*3/mm3 Final   • RBC 10/01/2019 3.91* 4.14 - 5.80 10*6/mm3 Final   • Hemoglobin 10/01/2019 13.2  13.0 - 17.7 g/dL Final   • Hematocrit 10/01/2019 39.7  37.5 - 51.0 % Final   • RDW 10/01/2019 18.7* 12.3 - 15.4 % Final   • MCV 10/01/2019 101.5* 79.0 - 97.0 fL Final   • MCH 10/01/2019 33.7* 26.6 - 33.0 pg Final   • MCHC 10/01/2019 33.2  31.5 - 35.7 g/dL Final   • MPV 10/01/2019 6.2  6.0 - 12.0 fL Final   • Platelets 10/01/2019 241  140 - 450 10*3/mm3 Final   • Neutrophil % 10/01/2019 53.3  42.7 - 76.0 % Final   • Lymphocyte % 10/01/2019 36.4  19.6 - 45.3 % Final   • Monocyte % 10/01/2019 10.3  5.0 - 12.0 % Final   • Neutrophils, Absolute 10/01/2019 3.60  1.70 - 7.00 10*3/mm3 Final   • Lymphocytes, Absolute 10/01/2019 2.50  0.70 - 3.10 10*3/mm3 Final   • Monocytes, Absolute 10/01/2019 0.70  0.10 - 0.90 10*3/mm3 Final        Ct Chest With Contrast    Result Date: 9/23/2019  Narrative: EXAMINATION: CT ABDOMEN PELVIS W CONTRAST-, CT CHEST W CONTRAST- 09/23/2019  INDICATION: C34.91-Malignant neoplasm of unspecified part of right bronchus or lung; follow-up lung cancer squamous cell  TECHNIQUE: Multiple axial CT imaging was obtained of the chest, abdomen and pelvis following the administration of intravenous contrast.  The radiation dose reduction device was turned on for each scan per the ALARA (As Low as Reasonably Achievable) protocol.  COMPARISON: PET/CT scan dated 07/22/2019.  FINDINGS: CHEST: Thyroid is homogeneous in appearance. The previously seen adenopathy within the right paratracheal region has resolved in the interval. Previously seen nodule identified within the right upper lobe is also resolved in the interval. There is  no evidence of residual disease. Azygous lobe is identified again on the right. Cardiac chambers within normal limits. No pericardial effusion. The thyroid is homogeneous. Vascular calcifications seen within the coronary vessels and great vessels. There is no pericardial effusion. No pleural effusion or pneumothorax. The lung parenchyma reveals emphysematous and chronic changes seen diffusely throughout the lung fields. The tiny nodular density identified within the medial aspect of the right upper lobe is 7 to 8 mm now in size and significantly improved. The adenopathy has resolved. Remainder of the lung fields are clear.  ABDOMEN AND PELVIS:  Within the abdomen and pelvis the liver is homogeneous. The spleen is unremarkable. Kidneys and adrenal glands within normal limits. No abdominal or retroperitoneal lymphadenopathy. No free fluid or free air. No abnormal mass or fluid collections identified. The abdominal portion of the gastrointestinal tract within normal limits. No free fluid or free air. No abnormal mass or fluid collections identified.  The pelvic organs are unremarkable. The pelvic portion of the the gastrointestinal tract within normal limits. No free fluid or free air. No abnormal mass or fluid collections identified. Bony structures reveal degenerative changes seen within the spine and pelvis.      Impression: Significant improvement seen in the examination with decrease seen in size of the nodule within the medial aspect of the right upper lobe as well as nonvisualization now seen of the right paratracheal adenopathy. The nodule is approximately 7 mm in size. Significant improvement in the interval.  D:  09/23/2019 E:  09/23/2019  This report was finalized on 9/23/2019 4:37 PM by Dr. Elzbieta Gould MD.      Ct Abdomen Pelvis With Contrast    Result Date: 9/23/2019  Narrative: EXAMINATION: CT ABDOMEN PELVIS W CONTRAST-, CT CHEST W CONTRAST- 09/23/2019  INDICATION: C34.91-Malignant neoplasm of  unspecified part of right bronchus or lung; follow-up lung cancer squamous cell  TECHNIQUE: Multiple axial CT imaging was obtained of the chest, abdomen and pelvis following the administration of intravenous contrast.  The radiation dose reduction device was turned on for each scan per the ALARA (As Low as Reasonably Achievable) protocol.  COMPARISON: PET/CT scan dated 07/22/2019.  FINDINGS: CHEST: Thyroid is homogeneous in appearance. The previously seen adenopathy within the right paratracheal region has resolved in the interval. Previously seen nodule identified within the right upper lobe is also resolved in the interval. There is no evidence of residual disease. Azygous lobe is identified again on the right. Cardiac chambers within normal limits. No pericardial effusion. The thyroid is homogeneous. Vascular calcifications seen within the coronary vessels and great vessels. There is no pericardial effusion. No pleural effusion or pneumothorax. The lung parenchyma reveals emphysematous and chronic changes seen diffusely throughout the lung fields. The tiny nodular density identified within the medial aspect of the right upper lobe is 7 to 8 mm now in size and significantly improved. The adenopathy has resolved. Remainder of the lung fields are clear.  ABDOMEN AND PELVIS:  Within the abdomen and pelvis the liver is homogeneous. The spleen is unremarkable. Kidneys and adrenal glands within normal limits. No abdominal or retroperitoneal lymphadenopathy. No free fluid or free air. No abnormal mass or fluid collections identified. The abdominal portion of the gastrointestinal tract within normal limits. No free fluid or free air. No abnormal mass or fluid collections identified.  The pelvic organs are unremarkable. The pelvic portion of the the gastrointestinal tract within normal limits. No free fluid or free air. No abnormal mass or fluid collections identified. Bony structures reveal degenerative changes seen  within the spine and pelvis.      Impression: Significant improvement seen in the examination with decrease seen in size of the nodule within the medial aspect of the right upper lobe as well as nonvisualization now seen of the right paratracheal adenopathy. The nodule is approximately 7 mm in size. Significant improvement in the interval.  D:  09/23/2019 E:  09/23/2019  This report was finalized on 9/23/2019 4:37 PM by Dr. Elzbieta Gould MD.        ASSESSMENT: The patient is a very pleasant 64 y.o. male  with right upper lobe squamous cell carcinoma of the lung     PROBLEM LIST:   1.  Squamous cell carcinoma of the lung T1b N2 M0 stage T3a:  A.  Presented with shortness of breath and cough  B.  CT chest with contrast done June 28, 2019 revealed right upper lobe 1.8 cm lung nodule with right hilar and mediastinal lymphadenopathy.  MRI brain whole-body PET scan done on July 22, 2019 felt show any other sites of disease.  C.  Diagnosed after bronchoscopy with biopsy done July 11, 2019  D.  Pathology revealed squamous cell carcinoma from level 4R and level 7.  E.  Started neoadjuvant concurrent chemotherapy with radiation using weekly carbotaxol July 30, 2019.  Radiation was added August 6, 2019. This was completed 09/11/2019.  2.  Chronic tobacco abuse  3.  Hypertension  4.  COPD:   A. Not oxygen dependent  B.  PFTs done on July 17, 2019 revealed FEV1 2.44L, 72% of predicted and DLCO 55% of predicted.  5.  History of superficial melanoma status post surgical resection  6. Leukocytosis:  A.  Present with asymptomatic lymphcytosis and neutrophilia, white blood cells of 16,500    PLAN:  1.  I did go over the scan results with the patient his family reassured him he is having good response to treatment.  I reviewed the films myself I have compared to the previous study done July 2019.  2.  The patient be referred to Dr. Flores for surgical resection.  3.  I will follow-up on the final pathology report, further  treatments will depend on residual disease.  4.  The patient will follow-up with me in 6 weeks [place] office  5.  I will continue the patient on Zofran as needed for chemotherapy-induced nausea.  6.  The patient will follow-up with me in 2 weeks to evaluate for treatment tolerance.  7.  I will continue to monitor patient blood work including blood counts kidney function liver function and electrolytes.      Kevin Salguero MD  10/1/2019

## 2019-10-01 NOTE — PROGRESS NOTES
10/01/2019  Patient Information  Erik Bowser                                                                                          [address]  [place] [address]   1955  'PCP/Referring Physician'  Nat Umana, APRN  [phone]  Kevin Salguero MD  [phone]  Chief Complaint   Patient presents with   • Lung Cancer     Referred by Dr. Salguero for right upper lobe lung cancer       History of Present Illness: 64-year-old  male with a history of hypertension, hyperlipidemia, coronary artery disease status post stenting, COPD and tobacco abuse who presents with a right upper lobe lung cancer.  The patient underwent lung cancer screening CT scan that demonstrated a right upper lobe mass.  He subsequently had a biopsy that was consistent with squamous cell carcinoma in the Station 4 and 7 distribution.  He denies cough, hemoptysis, weight loss, lymphadenopathy, fevers or chills.  The patient finished his neoadjuvant chemotherapy and radiation 2 weeks ago.      Patient Active Problem List   Diagnosis   • Coronary artery disease involving native coronary artery of native heart without angina pectoris   • Essential hypertension   • Hyperlipidemia LDL goal <70   • Tobacco abuse   • Lymphocytosis   • Mediastinal lymphadenopathy   • Squamous cell lung cancer (CMS/HCC)     Past Medical History:   Diagnosis Date   • Arthritis    • At risk for obstructive sleep apnea     Spouse reports patient snores and that he has questionable sleep apnea but has never had a sleep study done   • Borderline diabetes     Has never taken medication    • COPD (chronic obstructive pulmonary disease) (CMS/[facility])    • Coronary artery disease     1 stent   • Dyslipidemia    • Elevated cholesterol    • Hearing loss     Does not use hearing devices   • History of bronchitis    • History of pneumonia    • Hyperlipidemia    • Hypertension    • MI (myocardial infarction) (CMS/HCC) 01/20/2015    placement of stent x1   • MRSA  (methicillin resistant Staphylococcus aureus) 01/2015    left hand - treated   • Seasonal allergies    • Skin cancer     skin, lung   • Squamous cell lung cancer (CMS/HCC) 7/24/2019   • Tobacco abuse    • Wears glasses     OTC glasses PRN for reading   • Wears partial dentures     Upper mouth     Past Surgical History:   Procedure Laterality Date   • BRONCHOSCOPY N/A 7/11/2019    Procedure: BRONCHOSCOPY WITH ENDOBRONCHIAL ULTRASOUND with General Anesthesia.  ;  Surgeon: Jeff Rubin MD;  Location: [facility];  Service: Pulmonary   • CARDIAC CATHETERIZATION  01/20/2015    Placement of stent x1   • CATARACT EXTRACTION Right    • CHOLECYSTECTOMY      Laparoscopic   • COLONOSCOPY     • CORONARY ANGIOPLASTY WITH STENT PLACEMENT     • HEMORRHOIDECTOMY     • KNEE MENISCAL REPAIR Bilateral    • MOUTH SURGERY      Post for oral implants in upper mouth x3   • ROTATOR CUFF REPAIR Bilateral    • SKIN CANCER EXCISION      melanoma removed from right neck and back.  Squamous cell removed multiple places.    • WISDOM TOOTH EXTRACTION         Current Outpatient Medications:   •  albuterol sulfate HFA (PROVENTIL HFA) 108 (90 Base) MCG/ACT inhaler, Inhale 2 puffs Every 4 (Four) Hours As Needed for Wheezing or Shortness of Air., Disp: , Rfl:   •  aspirin 81 MG EC tablet, Take 81 mg by mouth Daily., Disp: , Rfl:   •  atorvastatin (LIPITOR) 40 MG tablet, TAKE ONE TABLET BY MOUTH EVERY NIGHT AT BEDTIME, Disp: 90 tablet, Rfl: 0  •  B Complex Vitamins (VITAMIN-B COMPLEX PO), Take 1 tablet by mouth Daily., Disp: , Rfl:   •  Cholecalciferol (VITAMIN D3) 5000 units capsule capsule, Take 5,000 Units by mouth Daily., Disp: , Rfl:   •  docusate sodium (COLACE) 250 MG capsule, Take 250 mg by mouth 2 (Two) Times a Day., Disp: , Rfl:   •  fexofenadine (ALLEGRA) 180 MG tablet, Take 180 mg by mouth Daily., Disp: , Rfl:   •  lactulose (CHRONULAC) 10 GM/15ML solution, Take 30 mL by mouth 2 (Two) Times a Day As Needed (constipation)., Disp: 946 mL,  "Rfl: 1  •  lisinopril (PRINIVIL,ZESTRIL) 5 MG tablet, TAKE ONE TABLET BY MOUTH DAILY, Disp: 90 tablet, Rfl: 2  •  metoprolol succinate XL (TOPROL-XL) 25 MG 24 hr tablet, TAKE ONE TABLET BY MOUTH DAILY, Disp: 90 tablet, Rfl: 0  •  naproxen sodium (ALEVE) 220 MG tablet, Take 220 mg by mouth As Needed for Mild Pain ., Disp: , Rfl:   •  nitroglycerin (NITROSTAT) 0.4 MG SL tablet, 1 under the tongue as needed for angina, may repeat q5mins for up three doses (Patient taking differently: Place 0.4 mg under the tongue Every 5 (Five) Minutes As Needed for Chest Pain. 1 under the tongue as needed for angina, may repeat q5mins for up three doses), Disp: 100 tablet, Rfl: 11  •  NON FORMULARY, Take 1 tablet by mouth Daily. \"derma-health pro\" OTC supplement for skin, Disp: , Rfl:   •  ondansetron (ZOFRAN) 8 MG tablet, Take 1 tablet by mouth 3 (Three) Times a Day As Needed for Nausea or Vomiting., Disp: 30 tablet, Rfl: 5  •  tiotropium bromide-olodaterol (STIOLTO RESPIMAT) 2.5-2.5 MCG/ACT aerosol solution inhaler, Inhale 2 puffs Daily. Could not tolerate Anoro., Disp: 1 inhaler, Rfl: 5  Allergies   Allergen Reactions   • Rosuvastatin Myalgia     Social History     Socioeconomic History   • Marital status:      Spouse name: Not on file   • Number of children: 2   • Years of education: Not on file   • Highest education level: Not on file   Occupational History   • Occupation: [employer]     Employer: RETIRED   Tobacco Use   • Smoking status: Former Smoker     Packs/day: 2.00     Years: 46.00     Pack years: 92.00     Types: Cigarettes     Last attempt to quit: 2019     Years since quittin.1   • Smokeless tobacco: Never Used   Substance and Sexual Activity   • Alcohol use: Yes     Comment: Social use, no history of abuse   • Drug use: No   • Sexual activity: Defer   Social History Narrative    Lives in [place], [place] with spouse     Family History   Problem Relation Age of Onset   • Heart attack Mother 60   • " "Coronary artery disease Mother    • Hyperlipidemia Mother    • Lung cancer Mother    • Emphysema Father    • Hypertension Sister    • Heart attack Sister 45   • Hypertension Brother    • Heart attack Brother 50   • Hypertension Sister    • Hypertension Brother    • Diabetes Brother      Review of Systems   Constitution: Negative for chills, fever, malaise/fatigue, night sweats and weight loss.   HENT: Negative for hearing loss, odynophagia and sore throat.    Cardiovascular: Positive for leg swelling. Negative for chest pain, dyspnea on exertion, orthopnea and palpitations.   Respiratory: Positive for cough. Negative for hemoptysis.    Endocrine: Negative for cold intolerance, heat intolerance, polydipsia, polyphagia and polyuria.   Hematologic/Lymphatic: Bruises/bleeds easily.   Skin: Negative for itching and rash.   Musculoskeletal: Positive for arthritis, gout and joint pain. Negative for joint swelling and myalgias.   Gastrointestinal: Positive for constipation. Negative for abdominal pain, diarrhea, hematemesis, hematochezia, melena, nausea and vomiting.   Genitourinary: Negative for dysuria, frequency and hematuria.   Neurological: Positive for numbness (tingling in hands). Negative for focal weakness, headaches and seizures.   Psychiatric/Behavioral: Negative for suicidal ideas.   All other systems reviewed and are negative.    Vitals:    10/01/19 1031   BP: 130/69   BP Location: Right arm   Patient Position: Sitting   Pulse: 72   Temp: 97.4 °F (36.3 °C)   TempSrc: Temporal   SpO2: 98%   Weight: 98.2 kg (216 lb 9.6 oz)   Height: 175.3 cm (69\")      Physical Exam   Constitutional: He is oriented to person, place, and time. He appears well-developed and well-nourished. No distress.    male who appears his stated age and is present with his wife.   HENT:   Head: Normocephalic and atraumatic.   Eyes: Conjunctivae and EOM are normal. No scleral icterus.   Neck: Normal range of motion. Neck supple. No JVD " present. No tracheal deviation present.   Cardiovascular: Normal rate, regular rhythm and normal heart sounds. Exam reveals no gallop and no friction rub.   No murmur heard.  Pulmonary/Chest: Effort normal and breath sounds normal. No stridor. No respiratory distress. He has no wheezes. He has no rales.   Abdominal: Soft. He exhibits no distension and no mass. There is no tenderness. There is no rebound and no guarding.   Musculoskeletal: Normal range of motion. He exhibits no deformity.   Lymphadenopathy:     He has no cervical adenopathy.     He has no axillary adenopathy.        Right: No supraclavicular adenopathy present.        Left: No supraclavicular adenopathy present.   Neurological: He is alert and oriented to person, place, and time.   Skin: No rash noted. No erythema.   Psychiatric: He has a normal mood and affect. His behavior is normal. Judgment and thought content normal.       Labs/Imaging:  -MRI of the brain performed 7/22/2019, demonstrates no evidence of intracranial metastasis.  There is chronic microangiopathy.  -PET/CT performed 7/22/2019, personally reviewed, demonstrates right hilar and paratracheal adenopathy with an SUV of 9.9 and 13.4.  No evidence of metastatic disease.  -Bronchoscopy/EBUS performed 7/11/2019, right middle lobe lavage negative for malignancy, FNA of 4R and 7 malignant  -CT of the chest performed 6/28/2019, personally reviewed, demonstrates a 1.8 x 1.3 cm nodule present in the azygos lobe of the lung.  2 paratracheal lymph nodes are present measuring 2.4 cm.  -Pulmonary function test performed 7/17/2019, FEV1 2.44 (72% predicted, DLCO 55% predicted  -CT of the chest performed 9/23/2019, personally reviewed, demonstrates interval improvement of the right upper lobe nodule and paratracheal lymphadenopathy.    Assessment/Plan:  64-year-old  male with a history of hypertension, hyperlipidemia, coronary artery disease status post stenting, COPD and tobacco abuse who  presents with a right upper lobe lung cancer ([id number] Clinical stage IIIA).  The patient has undergone neoadjuvant treatment with adequate response seen on repeat imaging.  The patient is a reasonable candidate for bronchoscopy, right VATS, right upper lobectomy, mediastinal lymph node dissection and intercostal nerve blocks with cryoablation.  The risks and benefits of surgery were discussed with the patient including pain, bleeding, infection, air leak, myocardial infarction and death.  The patient understood these risks and wished to proceed with surgery.  He will need cardiac clearance from his cardiologist Dr. Lacy prior to surgery.    Patient Active Problem List   Diagnosis   • Coronary artery disease involving native coronary artery of native heart without angina pectoris   • Essential hypertension   • Hyperlipidemia LDL goal <70   • Tobacco abuse   • Lymphocytosis   • Mediastinal lymphadenopathy   • Squamous cell lung cancer (CMS/HCC)

## 2019-10-03 NOTE — PROGRESS NOTES
[facility]  Office Visit Note    Encounter Date:10/04/2019    Patient ID: Erik Bowser is a 64 y.o. male who resides in [place], [place]    CC/Reason for visit:  Coronary Artery Disease and surgery clearance           Problem List Items Addressed This Visit        Cardiovascular and Mediastinum    Coronary artery disease involving native coronary artery of native heart without angina pectoris - Primary    Overview     · Cardiac catheterization for inferior STEMI by Dr. Mahendra Lacy (1/20/2015):  RCA occlusion status post ISH (Xience 3.5 x 23 mm).  LVEF 45%.  · Echocardiogram (1/21/2015):  LVEF 55%, no valvular abnormalities, 01/21/2015.         Current Assessment & Plan     · Continue aspirin 81 mg daily  · Continue metoprolol succinate 25 mg daily  · Sublingual nitroglycerin for any episodes of angina         Relevant Orders    ECG 12 Lead    Hyperlipidemia LDL goal <70    Overview     · High intensity statin therapy indicated given the presence coronary disease         Current Assessment & Plan     · Continue Lipitor 40 mg daily         Essential hypertension    Current Assessment & Plan     · Hypertension is controlled  · Continue metoprolol succinate 25 mg daily  · Continue lisinopril 5 mg daily            Other    Tobacco abuse    Overview     · Smokes 2 PPD         Current Assessment & Plan     · Quit 8 weeks ago             The patient is requesting cardiac clearance to undergo right upper lobectomy with Dr. Titus Flores in an expedited manner due to being diagnosed with lung cancer.  He has no signs or symptoms similar to what he experienced in the past with his STEMI.  His EKG is mildly different when compared to EKG in July.  I discussed this patient with Dr. Chet Ng we both agree that although his EKG has some changes from previous one it is in his best interest in addition to proceed immediately with surgery particularly given he has no anginal symptoms.  We  will follow along in the perioperative period.       · Continue current medications  · Cleared to undergo upper lobectomy with Dr. Titus Flores  · We will follow in the perioperative period.  Return in about 6 months (around 4/4/2020), or if symptoms worsen or fail to improve, for Follow-up with Dr. Lacy next visit.            Erik Bowser returns today for follow-up of his coronary artery disease and cardiac risk factors.  Since his last visit the patient underwent a bronchoscopy with Dr. Rubin due to mediastinal lymphadenopathy.  His biopsies came back positive for squamous cell carcinoma.  He has been undergoing chemotherapy and radiation and his tumor has now decreased enough in size that they are wanting him to have expedited surgery on the right upper lobe with Dr. Titus Flores and he is requesting cardiac clearance.  The patient reports in the past before his STEMI his anginal equivalent was upper epigastric/indigestion type symptoms.  He has not had any symptoms reminiscent of how he felt back then.  He does get short of breath but attributes it to his lung cancer as it is not progressed or really worsened.  When he was diagnosed he quit smoking and he is remained compliant over the last 8 weeks.  His last echocardiogram in 2015 showed a normal LVEF and no valvular abnormalities.  His blood pressures have stayed well controlled.  He has not needed to use his nitroglycerin at all.    Review of Systems   Constitution: Negative for weakness and malaise/fatigue.   Eyes: Negative for vision loss in left eye and vision loss in right eye.   Cardiovascular: Negative for chest pain, dyspnea on exertion, near-syncope, orthopnea, palpitations, paroxysmal nocturnal dyspnea and syncope.   Musculoskeletal: Negative for myalgias.   Neurological: Negative for brief paralysis, excessive daytime sleepiness, focal weakness, numbness and paresthesias.   All other systems reviewed and are negative.      The patient's past  "medical, social, family history and ROS reviewed in the patient's electronic medical record.    Allergies  Rosuvastatin    Outpatient Medications Marked as Taking for the 10/4/19 encounter (Office Visit) with Sil Holliday APRN   Medication Sig Dispense Refill   • albuterol sulfate HFA (PROVENTIL HFA) 108 (90 Base) MCG/ACT inhaler Inhale 2 puffs Every 4 (Four) Hours As Needed for Wheezing or Shortness of Air.     • aspirin 81 MG EC tablet Take 81 mg by mouth Daily.     • atorvastatin (LIPITOR) 40 MG tablet TAKE ONE TABLET BY MOUTH EVERY NIGHT AT BEDTIME 90 tablet 0   • B Complex Vitamins (VITAMIN-B COMPLEX PO) Take 1 tablet by mouth Daily.     • Cholecalciferol (VITAMIN D3) 5000 units capsule capsule Take 5,000 Units by mouth Daily.     • docusate sodium (COLACE) 250 MG capsule Take 250 mg by mouth 2 (Two) Times a Day.     • fexofenadine (ALLEGRA) 180 MG tablet Take 180 mg by mouth Daily.     • lactulose (CHRONULAC) 10 GM/15ML solution Take 30 mL by mouth 2 (Two) Times a Day As Needed (constipation). 946 mL 1   • lisinopril (PRINIVIL,ZESTRIL) 5 MG tablet TAKE ONE TABLET BY MOUTH DAILY 90 tablet 2   • metoprolol succinate XL (TOPROL-XL) 25 MG 24 hr tablet TAKE ONE TABLET BY MOUTH DAILY 90 tablet 0   • naproxen sodium (ALEVE) 220 MG tablet Take 220 mg by mouth As Needed for Mild Pain .     • nitroglycerin (NITROSTAT) 0.4 MG SL tablet 1 under the tongue as needed for angina, may repeat q5mins for up three doses (Patient taking differently: Place 0.4 mg under the tongue Every 5 (Five) Minutes As Needed for Chest Pain. 1 under the tongue as needed for angina, may repeat q5mins for up three doses) 100 tablet 11   • NON FORMULARY Take 1 tablet by mouth Daily. \"derma-health pro\" OTC supplement for skin     • ondansetron (ZOFRAN) 8 MG tablet Take 1 tablet by mouth 3 (Three) Times a Day As Needed for Nausea or Vomiting. 30 tablet 5           Blood pressure 128/70, pulse 81, height 175.3 cm (69\"), weight 98 kg (216 lb), " SpO2 97 %.  Body mass index is 31.9 kg/m².  Vitals:    10/04/19 1050   Patient Position: Sitting       Physical Exam   Constitutional: He is oriented to person, place, and time. He appears well-developed and well-nourished.   HENT:   Head: Normocephalic and atraumatic.   Eyes: Pupils are equal, round, and reactive to light. No scleral icterus.   Neck: No JVD present. Carotid bruit is not present. No thyromegaly present.   Cardiovascular: Normal rate and regular rhythm. Exam reveals no gallop.   No murmur heard.  Pulmonary/Chest: Effort normal and breath sounds normal.   Abdominal: Soft. He exhibits no distension. There is no hepatosplenomegaly.   Musculoskeletal: He exhibits no edema.   Neurological: He is alert and oriented to person, place, and time.   Skin: Skin is warm and dry.   Psychiatric: He has a normal mood and affect. His behavior is normal.       Data Review (reviewed with patient):       ECG 12 Lead  Date/Time: 10/4/2019 11:06 AM  Performed by: Sil Holliday APRN  Authorized by: Sil Holliday APRN   Comparison: compared with previous ECG from 7/11/2019  Comparison to previous ECG: EKG today shows right bundle branch block however when compared to prior EKG a left anterior fascicular block is now present  Rhythm: sinus rhythm  BPM: 75  Conduction: right bundle branch block, left anterior fascicular block and bifascicular block    Clinical impression: abnormal EKG  Comments: Sinus rhythm  Left axis deviation  Right bundle branch block with left anterior fascicular block  UT interval 160 MS  QRS duration 140 MS  QT/QTc 424/450 MS            Lab Results   Component Value Date    TRIG 350 (H) 04/13/2018    HDL 34 (L) 04/13/2018    LDL 59 04/13/2018    AST 22 10/01/2019    ALT 25 10/01/2019       Lab Results   Component Value Date    HGBA1C 5.4 01/21/2015           SOBEIDA Menard  10/4/2019

## 2019-10-04 ENCOUNTER — OFFICE VISIT (OUTPATIENT)
Dept: CARDIOLOGY | Facility: CLINIC | Age: 64
End: 2019-10-04

## 2019-10-04 VITALS
DIASTOLIC BLOOD PRESSURE: 70 MMHG | SYSTOLIC BLOOD PRESSURE: 128 MMHG | HEART RATE: 81 BPM | HEIGHT: 69 IN | BODY MASS INDEX: 31.99 KG/M2 | OXYGEN SATURATION: 97 % | WEIGHT: 216 LBS

## 2019-10-04 DIAGNOSIS — E78.5 HYPERLIPIDEMIA LDL GOAL <70: ICD-10-CM

## 2019-10-04 DIAGNOSIS — I10 ESSENTIAL HYPERTENSION: ICD-10-CM

## 2019-10-04 DIAGNOSIS — I25.10 CORONARY ARTERY DISEASE INVOLVING NATIVE CORONARY ARTERY OF NATIVE HEART WITHOUT ANGINA PECTORIS: Primary | ICD-10-CM

## 2019-10-04 DIAGNOSIS — Z72.0 TOBACCO ABUSE: ICD-10-CM

## 2019-10-04 PROCEDURE — 99214 OFFICE O/P EST MOD 30 MIN: CPT | Performed by: NURSE PRACTITIONER

## 2019-10-04 PROCEDURE — 93000 ELECTROCARDIOGRAM COMPLETE: CPT | Performed by: NURSE PRACTITIONER

## 2019-10-04 NOTE — ASSESSMENT & PLAN NOTE
· Continue aspirin 81 mg daily  · Continue metoprolol succinate 25 mg daily  · Sublingual nitroglycerin for any episodes of angina

## 2019-10-04 NOTE — ASSESSMENT & PLAN NOTE
· Hypertension is controlled  · Continue metoprolol succinate 25 mg daily  · Continue lisinopril 5 mg daily

## 2019-10-07 ENCOUNTER — PREP FOR SURGERY (OUTPATIENT)
Dept: OTHER | Facility: HOSPITAL | Age: 64
End: 2019-10-07

## 2019-10-07 DIAGNOSIS — C34.91 MALIGNANT NEOPLASM OF RIGHT LUNG, UNSPECIFIED PART OF LUNG (HCC): Primary | ICD-10-CM

## 2019-10-07 RX ORDER — CHLORHEXIDINE GLUCONATE 500 MG/1
1 CLOTH TOPICAL EVERY 12 HOURS PRN
Status: CANCELLED | OUTPATIENT
Start: 2019-10-07

## 2019-10-10 ENCOUNTER — HOSPITAL ENCOUNTER (OUTPATIENT)
Dept: GENERAL RADIOLOGY | Facility: HOSPITAL | Age: 64
Discharge: HOME | End: 2019-10-10
Admitting: PHYSICIAN ASSISTANT

## 2019-10-10 ENCOUNTER — APPOINTMENT (OUTPATIENT)
Dept: PREADMISSION TESTING | Facility: HOSPITAL | Age: 64
End: 2019-10-10

## 2019-10-10 ENCOUNTER — HOSPITAL ENCOUNTER (OUTPATIENT)
Dept: PULMONOLOGY | Facility: HOSPITAL | Age: 64
Discharge: HOME | End: 2019-10-10

## 2019-10-10 VITALS — OXYGEN SATURATION: 92 % | BODY MASS INDEX: 33.91 KG/M2 | HEIGHT: 68 IN | WEIGHT: 223.77 LBS

## 2019-10-10 DIAGNOSIS — C34.91 MALIGNANT NEOPLASM OF RIGHT LUNG, UNSPECIFIED PART OF LUNG (HCC): ICD-10-CM

## 2019-10-10 LAB
ABO GROUP BLD: NORMAL
ALBUMIN SERPL-MCNC: 4.4 G/DL (ref 3.5–5.2)
ALP SERPL-CCNC: 97 U/L (ref 39–117)
ALT SERPL W P-5'-P-CCNC: 28 U/L (ref 1–41)
ANION GAP SERPL CALCULATED.3IONS-SCNC: 9 MMOL/L (ref 5–15)
AST SERPL-CCNC: 22 U/L (ref 1–40)
BASOPHILS # BLD AUTO: 0.04 10*3/MM3 (ref 0–0.2)
BASOPHILS NFR BLD AUTO: 0.6 % (ref 0–1.5)
BILIRUB CONJ SERPL-MCNC: 0.3 MG/DL (ref 0.2–0.3)
BILIRUB INDIRECT SERPL-MCNC: 1.2 MG/DL
BILIRUB SERPL-MCNC: 1.5 MG/DL (ref 0.2–1.2)
BLD GP AB SCN SERPL QL: NEGATIVE
BUN BLD-MCNC: 12 MG/DL (ref 8–23)
BUN/CREAT SERPL: 13.6 (ref 7–25)
CALCIUM SPEC-SCNC: 9.6 MG/DL (ref 8.6–10.5)
CHLORIDE SERPL-SCNC: 102 MMOL/L (ref 98–107)
CO2 SERPL-SCNC: 33 MMOL/L (ref 22–29)
CREAT BLD-MCNC: 0.88 MG/DL (ref 0.76–1.27)
DEPRECATED RDW RBC AUTO: 62.1 FL (ref 37–54)
EOSINOPHIL # BLD AUTO: 0.68 10*3/MM3 (ref 0–0.4)
EOSINOPHIL NFR BLD AUTO: 9.7 % (ref 0.3–6.2)
ERYTHROCYTE [DISTWIDTH] IN BLOOD BY AUTOMATED COUNT: 16.9 % (ref 12.3–15.4)
GFR SERPL CREATININE-BSD FRML MDRD: 87 ML/MIN/1.73
GLUCOSE BLD-MCNC: 100 MG/DL (ref 65–99)
HBA1C MFR BLD: 5.7 % (ref 4.8–5.6)
HCT VFR BLD AUTO: 39 % (ref 37.5–51)
HGB BLD-MCNC: 13.1 G/DL (ref 13–17.7)
IMM GRANULOCYTES # BLD AUTO: 0.02 10*3/MM3 (ref 0–0.05)
IMM GRANULOCYTES NFR BLD AUTO: 0.3 % (ref 0–0.5)
INR PPP: 0.96 (ref 0.85–1.16)
LYMPHOCYTES # BLD AUTO: 2.34 10*3/MM3 (ref 0.7–3.1)
LYMPHOCYTES NFR BLD AUTO: 33.2 % (ref 19.6–45.3)
MCH RBC QN AUTO: 34.2 PG (ref 26.6–33)
MCHC RBC AUTO-ENTMCNC: 33.6 G/DL (ref 31.5–35.7)
MCV RBC AUTO: 101.8 FL (ref 79–97)
MONOCYTES # BLD AUTO: 0.87 10*3/MM3 (ref 0.1–0.9)
MONOCYTES NFR BLD AUTO: 12.4 % (ref 5–12)
NEUTROPHILS # BLD AUTO: 3.09 10*3/MM3 (ref 1.7–7)
NEUTROPHILS NFR BLD AUTO: 43.8 % (ref 42.7–76)
NRBC BLD AUTO-RTO: 0 /100 WBC (ref 0–0.2)
PLATELET # BLD AUTO: 220 10*3/MM3 (ref 140–450)
PMV BLD AUTO: 8.9 FL (ref 6–12)
POTASSIUM BLD-SCNC: 4.3 MMOL/L (ref 3.5–5.2)
PROT SERPL-MCNC: 6.6 G/DL (ref 6–8.5)
PROTHROMBIN TIME: 12.3 SECONDS (ref 11.2–14.3)
RBC # BLD AUTO: 3.83 10*6/MM3 (ref 4.14–5.8)
RH BLD: NEGATIVE
SODIUM BLD-SCNC: 144 MMOL/L (ref 136–145)
T&S EXPIRATION DATE: NORMAL
WBC NRBC COR # BLD: 7.04 10*3/MM3 (ref 3.4–10.8)

## 2019-10-10 PROCEDURE — 80076 HEPATIC FUNCTION PANEL: CPT | Performed by: PHYSICIAN ASSISTANT

## 2019-10-10 PROCEDURE — 80048 BASIC METABOLIC PNL TOTAL CA: CPT | Performed by: PHYSICIAN ASSISTANT

## 2019-10-10 PROCEDURE — 85025 COMPLETE CBC W/AUTO DIFF WBC: CPT | Performed by: PHYSICIAN ASSISTANT

## 2019-10-10 PROCEDURE — 83036 HEMOGLOBIN GLYCOSYLATED A1C: CPT | Performed by: PHYSICIAN ASSISTANT

## 2019-10-10 PROCEDURE — 85610 PROTHROMBIN TIME: CPT | Performed by: PHYSICIAN ASSISTANT

## 2019-10-10 PROCEDURE — 71046 X-RAY EXAM CHEST 2 VIEWS: CPT

## 2019-10-10 PROCEDURE — 86901 BLOOD TYPING SEROLOGIC RH(D): CPT | Performed by: PHYSICIAN ASSISTANT

## 2019-10-10 PROCEDURE — 94010 BREATHING CAPACITY TEST: CPT | Performed by: INTERNAL MEDICINE

## 2019-10-10 PROCEDURE — 86850 RBC ANTIBODY SCREEN: CPT | Performed by: PHYSICIAN ASSISTANT

## 2019-10-10 PROCEDURE — 86900 BLOOD TYPING SEROLOGIC ABO: CPT | Performed by: PHYSICIAN ASSISTANT

## 2019-10-10 PROCEDURE — 86923 COMPATIBILITY TEST ELECTRIC: CPT

## 2019-10-10 PROCEDURE — 36415 COLL VENOUS BLD VENIPUNCTURE: CPT

## 2019-10-10 PROCEDURE — 94010 BREATHING CAPACITY TEST: CPT

## 2019-10-10 RX ORDER — CYANOCOBALAMIN (VITAMIN B-12) 1000 MCG
1000 TABLET ORAL DAILY
COMMUNITY

## 2019-10-10 NOTE — PAT
Lencho Zepeda notified of patient's history of MRSA, Vancomycin order placed in Epic per telephone orders.

## 2019-10-10 NOTE — PAT
Patient to apply Chlorhexadine wipes  to surgical area (as instructed) the night before procedure and the AM of procedure. Wipes provided.    ABG's cancelled, patient's Spo2 92% on room air.     Patient to radiology following PAT for CXR, instructed to return to PAT for PFT's following CXR, verbalized understanding.

## 2019-10-11 ENCOUNTER — APPOINTMENT (OUTPATIENT)
Dept: GENERAL RADIOLOGY | Facility: HOSPITAL | Age: 64
End: 2019-10-11

## 2019-10-11 ENCOUNTER — ANESTHESIA (OUTPATIENT)
Dept: PERIOP | Facility: HOSPITAL | Age: 64
End: 2019-10-11

## 2019-10-11 ENCOUNTER — HOSPITAL ENCOUNTER (INPATIENT)
Facility: HOSPITAL | Age: 64
LOS: 4 days | Discharge: HOME | End: 2019-10-15
Attending: THORACIC SURGERY (CARDIOTHORACIC VASCULAR SURGERY) | Admitting: THORACIC SURGERY (CARDIOTHORACIC VASCULAR SURGERY)

## 2019-10-11 ENCOUNTER — ANESTHESIA EVENT (OUTPATIENT)
Dept: PERIOP | Facility: HOSPITAL | Age: 64
End: 2019-10-11

## 2019-10-11 DIAGNOSIS — C34.91 SQUAMOUS CELL CARCINOMA OF RIGHT LUNG (HCC): ICD-10-CM

## 2019-10-11 DIAGNOSIS — C34.91 MALIGNANT NEOPLASM OF RIGHT LUNG, UNSPECIFIED PART OF LUNG (HCC): ICD-10-CM

## 2019-10-11 LAB
ABO GROUP BLD: NORMAL
GLUCOSE BLDC GLUCOMTR-MCNC: 106 MG/DL (ref 70–130)
GLUCOSE BLDC GLUCOMTR-MCNC: 166 MG/DL (ref 70–130)
GLUCOSE BLDC GLUCOMTR-MCNC: 191 MG/DL (ref 70–130)
RH BLD: NEGATIVE

## 2019-10-11 PROCEDURE — 25010000002 DEXAMETHASONE PER 1 MG: Performed by: NURSE ANESTHETIST, CERTIFIED REGISTERED

## 2019-10-11 PROCEDURE — 25010000002 FENTANYL CITRATE (PF) 100 MCG/2ML SOLUTION: Performed by: NURSE ANESTHETIST, CERTIFIED REGISTERED

## 2019-10-11 PROCEDURE — 25010000002 VANCOMYCIN 10 G RECONSTITUTED SOLUTION: Performed by: PHYSICIAN ASSISTANT

## 2019-10-11 PROCEDURE — 88309 TISSUE EXAM BY PATHOLOGIST: CPT | Performed by: THORACIC SURGERY (CARDIOTHORACIC VASCULAR SURGERY)

## 2019-10-11 PROCEDURE — 86901 BLOOD TYPING SEROLOGIC RH(D): CPT

## 2019-10-11 PROCEDURE — 25010000002 HYDROMORPHONE PER 4 MG: Performed by: NURSE ANESTHETIST, CERTIFIED REGISTERED

## 2019-10-11 PROCEDURE — 25010000002 ONDANSETRON PER 1 MG: Performed by: NURSE ANESTHETIST, CERTIFIED REGISTERED

## 2019-10-11 PROCEDURE — C2618 PROBE/NEEDLE, CRYO: HCPCS | Performed by: THORACIC SURGERY (CARDIOTHORACIC VASCULAR SURGERY)

## 2019-10-11 PROCEDURE — 32663 THORACOSCOPY W/LOBECTOMY: CPT | Performed by: THORACIC SURGERY (CARDIOTHORACIC VASCULAR SURGERY)

## 2019-10-11 PROCEDURE — 25010000002 MORPHINE PER 10 MG: Performed by: PHYSICIAN ASSISTANT

## 2019-10-11 PROCEDURE — 0BJ08ZZ INSPECTION OF TRACHEOBRONCHIAL TREE, VIA NATURAL OR ARTIFICIAL OPENING ENDOSCOPIC: ICD-10-PCS | Performed by: THORACIC SURGERY (CARDIOTHORACIC VASCULAR SURGERY)

## 2019-10-11 PROCEDURE — 0BTC4ZZ RESECTION OF RIGHT UPPER LUNG LOBE, PERCUTANEOUS ENDOSCOPIC APPROACH: ICD-10-PCS | Performed by: THORACIC SURGERY (CARDIOTHORACIC VASCULAR SURGERY)

## 2019-10-11 PROCEDURE — 63710000001 INSULIN LISPRO (HUMAN) PER 5 UNITS: Performed by: NURSE PRACTITIONER

## 2019-10-11 PROCEDURE — 88331 PATH CONSLTJ SURG 1 BLK 1SPC: CPT | Performed by: PATHOLOGY

## 2019-10-11 PROCEDURE — 32674 THORACOSCOPY LYMPH NODE EXC: CPT | Performed by: THORACIC SURGERY (CARDIOTHORACIC VASCULAR SURGERY)

## 2019-10-11 PROCEDURE — 25010000002 PROPOFOL 10 MG/ML EMULSION: Performed by: NURSE ANESTHETIST, CERTIFIED REGISTERED

## 2019-10-11 PROCEDURE — 86900 BLOOD TYPING SEROLOGIC ABO: CPT

## 2019-10-11 PROCEDURE — 31622 DX BRONCHOSCOPE/WASH: CPT | Performed by: THORACIC SURGERY (CARDIOTHORACIC VASCULAR SURGERY)

## 2019-10-11 PROCEDURE — 71045 X-RAY EXAM CHEST 1 VIEW: CPT

## 2019-10-11 PROCEDURE — 82962 GLUCOSE BLOOD TEST: CPT

## 2019-10-11 PROCEDURE — 25010000002 KETOROLAC TROMETHAMINE PER 15 MG: Performed by: PHYSICIAN ASSISTANT

## 2019-10-11 PROCEDURE — 25010000003 LIDOCAINE 1 % SOLUTION: Performed by: NURSE ANESTHETIST, CERTIFIED REGISTERED

## 2019-10-11 PROCEDURE — 25010000002 PHENYLEPHRINE PER 1 ML: Performed by: NURSE ANESTHETIST, CERTIFIED REGISTERED

## 2019-10-11 PROCEDURE — 88312 SPECIAL STAINS GROUP 1: CPT | Performed by: THORACIC SURGERY (CARDIOTHORACIC VASCULAR SURGERY)

## 2019-10-11 PROCEDURE — 07B74ZX EXCISION OF THORAX LYMPHATIC, PERCUTANEOUS ENDOSCOPIC APPROACH, DIAGNOSTIC: ICD-10-PCS | Performed by: THORACIC SURGERY (CARDIOTHORACIC VASCULAR SURGERY)

## 2019-10-11 PROCEDURE — 99232 SBSQ HOSP IP/OBS MODERATE 35: CPT | Performed by: INTERNAL MEDICINE

## 2019-10-11 PROCEDURE — 88305 TISSUE EXAM BY PATHOLOGIST: CPT | Performed by: THORACIC SURGERY (CARDIOTHORACIC VASCULAR SURGERY)

## 2019-10-11 DEVICE — ARTICULATING RELOAD WITH TRI-STAPLE TECHNOLOGY
Type: IMPLANTABLE DEVICE | Site: LUNG | Status: FUNCTIONAL
Brand: ENDO GIA

## 2019-10-11 DEVICE — CURVED TIP INTELLIGENT RELOAD AND INTRODUCER
Type: IMPLANTABLE DEVICE | Site: LUNG | Status: FUNCTIONAL
Brand: TRI-STAPLE 2.0

## 2019-10-11 DEVICE — BLACK INTELLIGENT RELOAD
Type: IMPLANTABLE DEVICE | Site: LUNG | Status: FUNCTIONAL
Brand: TRI-STAPLE 2.0

## 2019-10-11 DEVICE — ARTICULATION RELOAD WITH TRI-STAPLE TECHNOLOGY
Type: IMPLANTABLE DEVICE | Site: LUNG | Status: FUNCTIONAL
Brand: ENDO GIA

## 2019-10-11 RX ORDER — HYDROMORPHONE HYDROCHLORIDE 1 MG/ML
0.5 INJECTION, SOLUTION INTRAMUSCULAR; INTRAVENOUS; SUBCUTANEOUS
Status: DISCONTINUED | OUTPATIENT
Start: 2019-10-11 | End: 2019-10-11 | Stop reason: HOSPADM

## 2019-10-11 RX ORDER — PROMETHAZINE HYDROCHLORIDE 25 MG/1
25 SUPPOSITORY RECTAL ONCE AS NEEDED
Status: DISCONTINUED | OUTPATIENT
Start: 2019-10-11 | End: 2019-10-11 | Stop reason: HOSPADM

## 2019-10-11 RX ORDER — ONDANSETRON 2 MG/ML
4 INJECTION INTRAMUSCULAR; INTRAVENOUS ONCE AS NEEDED
Status: DISCONTINUED | OUTPATIENT
Start: 2019-10-11 | End: 2019-10-11 | Stop reason: HOSPADM

## 2019-10-11 RX ORDER — SODIUM CHLORIDE 9 MG/ML
30 INJECTION, SOLUTION INTRAVENOUS CONTINUOUS
Status: DISCONTINUED | OUTPATIENT
Start: 2019-10-11 | End: 2019-10-14

## 2019-10-11 RX ORDER — MORPHINE SULFATE 4 MG/ML
2 INJECTION, SOLUTION INTRAMUSCULAR; INTRAVENOUS EVERY 4 HOURS PRN
Status: DISCONTINUED | OUTPATIENT
Start: 2019-10-11 | End: 2019-10-15 | Stop reason: HOSPADM

## 2019-10-11 RX ORDER — DEXAMETHASONE SODIUM PHOSPHATE 4 MG/ML
INJECTION, SOLUTION INTRA-ARTICULAR; INTRALESIONAL; INTRAMUSCULAR; INTRAVENOUS; SOFT TISSUE AS NEEDED
Status: DISCONTINUED | OUTPATIENT
Start: 2019-10-11 | End: 2019-10-11 | Stop reason: SURG

## 2019-10-11 RX ORDER — ONDANSETRON 2 MG/ML
4 INJECTION INTRAMUSCULAR; INTRAVENOUS EVERY 6 HOURS PRN
Status: DISCONTINUED | OUTPATIENT
Start: 2019-10-11 | End: 2019-10-11 | Stop reason: HOSPADM

## 2019-10-11 RX ORDER — METOPROLOL SUCCINATE 25 MG/1
25 TABLET, EXTENDED RELEASE ORAL ONCE
Status: COMPLETED | OUTPATIENT
Start: 2019-10-11 | End: 2019-10-11

## 2019-10-11 RX ORDER — HEPARIN SODIUM 5000 [USP'U]/ML
5000 INJECTION, SOLUTION INTRAVENOUS; SUBCUTANEOUS EVERY 12 HOURS SCHEDULED
Status: DISCONTINUED | OUTPATIENT
Start: 2019-10-12 | End: 2019-10-11 | Stop reason: HOSPADM

## 2019-10-11 RX ORDER — SENNA AND DOCUSATE SODIUM 50; 8.6 MG/1; MG/1
2 TABLET, FILM COATED ORAL NIGHTLY
Status: DISCONTINUED | OUTPATIENT
Start: 2019-10-11 | End: 2019-10-11 | Stop reason: HOSPADM

## 2019-10-11 RX ORDER — LIDOCAINE HYDROCHLORIDE 10 MG/ML
INJECTION, SOLUTION INFILTRATION; PERINEURAL AS NEEDED
Status: DISCONTINUED | OUTPATIENT
Start: 2019-10-11 | End: 2019-10-11 | Stop reason: SURG

## 2019-10-11 RX ORDER — ATRACURIUM BESYLATE 10 MG/ML
INJECTION, SOLUTION INTRAVENOUS AS NEEDED
Status: DISCONTINUED | OUTPATIENT
Start: 2019-10-11 | End: 2019-10-11 | Stop reason: SURG

## 2019-10-11 RX ORDER — FAMOTIDINE 20 MG/1
20 TABLET, FILM COATED ORAL DAILY
Status: DISCONTINUED | OUTPATIENT
Start: 2019-10-12 | End: 2019-10-15 | Stop reason: HOSPADM

## 2019-10-11 RX ORDER — MAGNESIUM HYDROXIDE 1200 MG/15ML
LIQUID ORAL AS NEEDED
Status: DISCONTINUED | OUTPATIENT
Start: 2019-10-11 | End: 2019-10-11 | Stop reason: HOSPADM

## 2019-10-11 RX ORDER — NITROGLYCERIN 0.4 MG/1
0.4 TABLET SUBLINGUAL
Status: DISCONTINUED | OUTPATIENT
Start: 2019-10-11 | End: 2019-10-11 | Stop reason: HOSPADM

## 2019-10-11 RX ORDER — BISACODYL 10 MG
10 SUPPOSITORY, RECTAL RECTAL DAILY PRN
Status: DISCONTINUED | OUTPATIENT
Start: 2019-10-11 | End: 2019-10-15 | Stop reason: HOSPADM

## 2019-10-11 RX ORDER — IPRATROPIUM BROMIDE AND ALBUTEROL SULFATE 2.5; .5 MG/3ML; MG/3ML
3 SOLUTION RESPIRATORY (INHALATION) EVERY 4 HOURS PRN
Status: DISCONTINUED | OUTPATIENT
Start: 2019-10-11 | End: 2019-10-15 | Stop reason: HOSPADM

## 2019-10-11 RX ORDER — ATORVASTATIN CALCIUM 40 MG/1
40 TABLET, FILM COATED ORAL DAILY
Status: DISCONTINUED | OUTPATIENT
Start: 2019-10-11 | End: 2019-10-11 | Stop reason: HOSPADM

## 2019-10-11 RX ORDER — ALBUTEROL SULFATE 90 UG/1
2 AEROSOL, METERED RESPIRATORY (INHALATION) EVERY 4 HOURS PRN
Status: DISCONTINUED | OUTPATIENT
Start: 2019-10-11 | End: 2019-10-11 | Stop reason: HOSPADM

## 2019-10-11 RX ORDER — METOPROLOL SUCCINATE 25 MG/1
25 TABLET, EXTENDED RELEASE ORAL DAILY
Status: DISCONTINUED | OUTPATIENT
Start: 2019-10-11 | End: 2019-10-11 | Stop reason: HOSPADM

## 2019-10-11 RX ORDER — ONDANSETRON 2 MG/ML
4 INJECTION INTRAMUSCULAR; INTRAVENOUS EVERY 6 HOURS PRN
Status: DISCONTINUED | OUTPATIENT
Start: 2019-10-11 | End: 2019-10-15 | Stop reason: HOSPADM

## 2019-10-11 RX ORDER — PROMETHAZINE HYDROCHLORIDE 25 MG/1
25 TABLET ORAL ONCE AS NEEDED
Status: DISCONTINUED | OUTPATIENT
Start: 2019-10-11 | End: 2019-10-11 | Stop reason: HOSPADM

## 2019-10-11 RX ORDER — PROMETHAZINE HYDROCHLORIDE 25 MG/ML
6.25 INJECTION, SOLUTION INTRAMUSCULAR; INTRAVENOUS ONCE AS NEEDED
Status: DISCONTINUED | OUTPATIENT
Start: 2019-10-11 | End: 2019-10-11 | Stop reason: HOSPADM

## 2019-10-11 RX ORDER — FAMOTIDINE 20 MG/1
20 TABLET, FILM COATED ORAL ONCE
Status: COMPLETED | OUTPATIENT
Start: 2019-10-11 | End: 2019-10-11

## 2019-10-11 RX ORDER — KETOROLAC TROMETHAMINE 30 MG/ML
30 INJECTION, SOLUTION INTRAMUSCULAR; INTRAVENOUS EVERY 6 HOURS PRN
Status: COMPLETED | OUTPATIENT
Start: 2019-10-11 | End: 2019-10-13

## 2019-10-11 RX ORDER — LIDOCAINE HYDROCHLORIDE 10 MG/ML
0.2 INJECTION, SOLUTION INFILTRATION; PERINEURAL ONCE
Status: COMPLETED | OUTPATIENT
Start: 2019-10-11 | End: 2019-10-11

## 2019-10-11 RX ORDER — SODIUM CHLORIDE, SODIUM LACTATE, POTASSIUM CHLORIDE, CALCIUM CHLORIDE 600; 310; 30; 20 MG/100ML; MG/100ML; MG/100ML; MG/100ML
9 INJECTION, SOLUTION INTRAVENOUS CONTINUOUS
Status: DISCONTINUED | OUTPATIENT
Start: 2019-10-11 | End: 2019-10-13

## 2019-10-11 RX ORDER — AMOXICILLIN 250 MG
2 CAPSULE ORAL NIGHTLY
Status: DISCONTINUED | OUTPATIENT
Start: 2019-10-12 | End: 2019-10-15 | Stop reason: HOSPADM

## 2019-10-11 RX ORDER — ONDANSETRON 4 MG/1
4 TABLET, FILM COATED ORAL EVERY 6 HOURS PRN
Status: DISCONTINUED | OUTPATIENT
Start: 2019-10-11 | End: 2019-10-11 | Stop reason: HOSPADM

## 2019-10-11 RX ORDER — PROPOFOL 10 MG/ML
VIAL (ML) INTRAVENOUS AS NEEDED
Status: DISCONTINUED | OUTPATIENT
Start: 2019-10-11 | End: 2019-10-11 | Stop reason: SURG

## 2019-10-11 RX ORDER — SODIUM CHLORIDE 9 MG/ML
30 INJECTION, SOLUTION INTRAVENOUS CONTINUOUS
Status: DISCONTINUED | OUTPATIENT
Start: 2019-10-11 | End: 2019-10-13

## 2019-10-11 RX ORDER — ASPIRIN 81 MG/1
81 TABLET ORAL NIGHTLY
Status: DISCONTINUED | OUTPATIENT
Start: 2019-10-11 | End: 2019-10-11 | Stop reason: HOSPADM

## 2019-10-11 RX ORDER — CHLORHEXIDINE GLUCONATE 500 MG/1
1 CLOTH TOPICAL EVERY 12 HOURS PRN
Status: DISCONTINUED | OUTPATIENT
Start: 2019-10-11 | End: 2019-10-11

## 2019-10-11 RX ORDER — ONDANSETRON 2 MG/ML
INJECTION INTRAMUSCULAR; INTRAVENOUS AS NEEDED
Status: DISCONTINUED | OUTPATIENT
Start: 2019-10-11 | End: 2019-10-11 | Stop reason: SURG

## 2019-10-11 RX ORDER — HYDROCODONE BITARTRATE AND ACETAMINOPHEN 7.5; 325 MG/1; MG/1
1 TABLET ORAL EVERY 6 HOURS PRN
Status: DISCONTINUED | OUTPATIENT
Start: 2019-10-11 | End: 2019-10-15 | Stop reason: HOSPADM

## 2019-10-11 RX ORDER — ONDANSETRON 4 MG/1
4 TABLET, FILM COATED ORAL EVERY 6 HOURS PRN
Status: DISCONTINUED | OUTPATIENT
Start: 2019-10-11 | End: 2019-10-15 | Stop reason: HOSPADM

## 2019-10-11 RX ORDER — DOCUSATE SODIUM 100 MG/1
100 CAPSULE, LIQUID FILLED ORAL 2 TIMES DAILY PRN
Status: DISCONTINUED | OUTPATIENT
Start: 2019-10-11 | End: 2019-10-15 | Stop reason: HOSPADM

## 2019-10-11 RX ORDER — FENTANYL CITRATE 50 UG/ML
INJECTION, SOLUTION INTRAMUSCULAR; INTRAVENOUS AS NEEDED
Status: DISCONTINUED | OUTPATIENT
Start: 2019-10-11 | End: 2019-10-11 | Stop reason: SURG

## 2019-10-11 RX ORDER — SENNA AND DOCUSATE SODIUM 50; 8.6 MG/1; MG/1
2 TABLET, FILM COATED ORAL NIGHTLY
Status: COMPLETED | OUTPATIENT
Start: 2019-10-11 | End: 2019-10-11

## 2019-10-11 RX ORDER — BUPIVACAINE HYDROCHLORIDE AND EPINEPHRINE 5; 5 MG/ML; UG/ML
INJECTION, SOLUTION EPIDURAL; INTRACAUDAL; PERINEURAL AS NEEDED
Status: DISCONTINUED | OUTPATIENT
Start: 2019-10-11 | End: 2019-10-11 | Stop reason: HOSPADM

## 2019-10-11 RX ORDER — HYDROCODONE BITARTRATE AND ACETAMINOPHEN 7.5; 325 MG/1; MG/1
1 TABLET ORAL ONCE AS NEEDED
Status: DISCONTINUED | OUTPATIENT
Start: 2019-10-11 | End: 2019-10-11 | Stop reason: HOSPADM

## 2019-10-11 RX ORDER — HEPARIN SODIUM 5000 [USP'U]/ML
5000 INJECTION, SOLUTION INTRAVENOUS; SUBCUTANEOUS EVERY 12 HOURS SCHEDULED
Status: DISCONTINUED | OUTPATIENT
Start: 2019-10-12 | End: 2019-10-15 | Stop reason: HOSPADM

## 2019-10-11 RX ORDER — NEOSTIGMINE METHYLSULFATE 5 MG/5 ML
SYRINGE (ML) INTRAVENOUS AS NEEDED
Status: DISCONTINUED | OUTPATIENT
Start: 2019-10-11 | End: 2019-10-11 | Stop reason: SURG

## 2019-10-11 RX ORDER — FENTANYL CITRATE 50 UG/ML
50 INJECTION, SOLUTION INTRAMUSCULAR; INTRAVENOUS
Status: DISCONTINUED | OUTPATIENT
Start: 2019-10-11 | End: 2019-10-11 | Stop reason: HOSPADM

## 2019-10-11 RX ORDER — GLYCOPYRROLATE 0.2 MG/ML
INJECTION INTRAMUSCULAR; INTRAVENOUS AS NEEDED
Status: DISCONTINUED | OUTPATIENT
Start: 2019-10-11 | End: 2019-10-11 | Stop reason: SURG

## 2019-10-11 RX ORDER — BISACODYL 10 MG
10 SUPPOSITORY, RECTAL RECTAL DAILY PRN
Status: DISCONTINUED | OUTPATIENT
Start: 2019-10-11 | End: 2019-10-11 | Stop reason: HOSPADM

## 2019-10-11 RX ADMIN — EPHEDRINE SULFATE 10 MG: 50 INJECTION INTRAMUSCULAR; INTRAVENOUS; SUBCUTANEOUS at 11:31

## 2019-10-11 RX ADMIN — FENTANYL CITRATE 50 MCG: 50 INJECTION, SOLUTION INTRAMUSCULAR; INTRAVENOUS at 14:39

## 2019-10-11 RX ADMIN — GLYCOPYRROLATE 0.6 MG: 0.2 INJECTION, SOLUTION INTRAMUSCULAR; INTRAVENOUS at 14:21

## 2019-10-11 RX ADMIN — ATRACURIUM BESYLATE 10 MG: 10 INJECTION, SOLUTION INTRAVENOUS at 13:21

## 2019-10-11 RX ADMIN — EPHEDRINE SULFATE 10 MG: 50 INJECTION INTRAMUSCULAR; INTRAVENOUS; SUBCUTANEOUS at 12:06

## 2019-10-11 RX ADMIN — PHENYLEPHRINE HYDROCHLORIDE 100 MCG: 10 INJECTION INTRAVENOUS at 11:27

## 2019-10-11 RX ADMIN — ATRACURIUM BESYLATE 10 MG: 10 INJECTION, SOLUTION INTRAVENOUS at 12:40

## 2019-10-11 RX ADMIN — FAMOTIDINE 20 MG: 20 TABLET ORAL at 10:42

## 2019-10-11 RX ADMIN — ONDANSETRON 4 MG: 2 INJECTION INTRAMUSCULAR; INTRAVENOUS at 13:35

## 2019-10-11 RX ADMIN — KETOROLAC TROMETHAMINE 30 MG: 30 INJECTION, SOLUTION INTRAMUSCULAR; INTRAVENOUS at 15:15

## 2019-10-11 RX ADMIN — SODIUM CHLORIDE, POTASSIUM CHLORIDE, SODIUM LACTATE AND CALCIUM CHLORIDE 9 ML/HR: 600; 310; 30; 20 INJECTION, SOLUTION INTRAVENOUS at 10:25

## 2019-10-11 RX ADMIN — DEXAMETHASONE SODIUM PHOSPHATE 8 MG: 4 INJECTION, SOLUTION INTRAMUSCULAR; INTRAVENOUS at 11:29

## 2019-10-11 RX ADMIN — INSULIN LISPRO 2 UNITS: 100 INJECTION, SOLUTION INTRAVENOUS; SUBCUTANEOUS at 21:53

## 2019-10-11 RX ADMIN — LIDOCAINE HYDROCHLORIDE 3 ML: 20 JELLY TOPICAL at 11:22

## 2019-10-11 RX ADMIN — METOPROLOL SUCCINATE 25 MG: 25 TABLET, EXTENDED RELEASE ORAL at 10:42

## 2019-10-11 RX ADMIN — LIDOCAINE HYDROCHLORIDE 0.2 ML: 10 INJECTION, SOLUTION EPIDURAL; INFILTRATION; INTRACAUDAL; PERINEURAL at 10:25

## 2019-10-11 RX ADMIN — FENTANYL CITRATE 50 MCG: 50 INJECTION INTRAMUSCULAR; INTRAVENOUS at 15:34

## 2019-10-11 RX ADMIN — SODIUM CHLORIDE 30 ML/HR: 9 INJECTION, SOLUTION INTRAVENOUS at 16:59

## 2019-10-11 RX ADMIN — PHENYLEPHRINE HYDROCHLORIDE 100 MCG: 10 INJECTION INTRAVENOUS at 12:57

## 2019-10-11 RX ADMIN — FENTANYL CITRATE 75 MCG: 50 INJECTION, SOLUTION INTRAMUSCULAR; INTRAVENOUS at 11:18

## 2019-10-11 RX ADMIN — EPHEDRINE SULFATE 10 MG: 50 INJECTION INTRAMUSCULAR; INTRAVENOUS; SUBCUTANEOUS at 12:12

## 2019-10-11 RX ADMIN — MORPHINE SULFATE 2 MG: 4 INJECTION, SOLUTION INTRAMUSCULAR; INTRAVENOUS at 18:11

## 2019-10-11 RX ADMIN — ATRACURIUM BESYLATE 40 MG: 10 INJECTION, SOLUTION INTRAVENOUS at 11:18

## 2019-10-11 RX ADMIN — FENTANYL CITRATE 25 MCG: 50 INJECTION, SOLUTION INTRAMUSCULAR; INTRAVENOUS at 11:50

## 2019-10-11 RX ADMIN — FENTANYL CITRATE 50 MCG: 50 INJECTION INTRAMUSCULAR; INTRAVENOUS at 15:05

## 2019-10-11 RX ADMIN — MUPIROCIN 1 APPLICATION: 20 OINTMENT TOPICAL at 10:47

## 2019-10-11 RX ADMIN — Medication 4 MG: at 14:21

## 2019-10-11 RX ADMIN — HYDROMORPHONE HYDROCHLORIDE 0.5 MG: 1 INJECTION, SOLUTION INTRAMUSCULAR; INTRAVENOUS; SUBCUTANEOUS at 15:22

## 2019-10-11 RX ADMIN — VANCOMYCIN HYDROCHLORIDE 1500 MG: 10 INJECTION, POWDER, LYOPHILIZED, FOR SOLUTION INTRAVENOUS at 21:54

## 2019-10-11 RX ADMIN — SENNOSIDES AND DOCUSATE SODIUM 2 TABLET: 8.6; 5 TABLET ORAL at 21:54

## 2019-10-11 RX ADMIN — FENTANYL CITRATE 50 MCG: 50 INJECTION INTRAMUSCULAR; INTRAVENOUS at 14:59

## 2019-10-11 RX ADMIN — LIDOCAINE HYDROCHLORIDE 50 MG: 10 INJECTION, SOLUTION INFILTRATION; PERINEURAL at 11:18

## 2019-10-11 RX ADMIN — ATRACURIUM BESYLATE 10 MG: 10 INJECTION, SOLUTION INTRAVENOUS at 12:16

## 2019-10-11 RX ADMIN — EPHEDRINE SULFATE 10 MG: 50 INJECTION INTRAMUSCULAR; INTRAVENOUS; SUBCUTANEOUS at 11:34

## 2019-10-11 RX ADMIN — VANCOMYCIN HYDROCHLORIDE 1500 MG: 10 INJECTION, POWDER, LYOPHILIZED, FOR SOLUTION INTRAVENOUS at 10:56

## 2019-10-11 RX ADMIN — ATRACURIUM BESYLATE 10 MG: 10 INJECTION, SOLUTION INTRAVENOUS at 12:10

## 2019-10-11 RX ADMIN — FENTANYL CITRATE 25 MCG: 50 INJECTION, SOLUTION INTRAMUSCULAR; INTRAVENOUS at 13:35

## 2019-10-11 RX ADMIN — PHENYLEPHRINE HYDROCHLORIDE 100 MCG: 10 INJECTION INTRAVENOUS at 12:12

## 2019-10-11 RX ADMIN — PROPOFOL 170 MG: 10 INJECTION, EMULSION INTRAVENOUS at 11:18

## 2019-10-11 NOTE — PROGRESS NOTES
Critical Care Note     LOS: 0 days   Patient Care Team:  Nat Umana APRN as PCP - General (Family Medicine)  Kevin Salguero MD as Referring Physician (Hematology and Oncology)  Hill Herbert MD as Consulting Physician (Radiation Oncology)  Ronan Lacy IV, MD as Consulting Physician (Interventional Cardiology)  Jeff Rubin MD as Consulting Physician (Pulmonary Disease)  Titus Flores MD as Surgeon (Cardiothoracic Surgery)    Chief Complaint/Reason for visit: Postoperative medical management in the setting of right upper lobe lobectomy secondary to squamous cell carcinoma    Yusra Bowser is a 64 y.o. male admitted to [facility] on 10/11/19 for elective lung surgery per Dr. Flores.     The patient was referred to Dr. Flores by Dr. Salguero for surgical evaluation of his [id number] clinical stage IIIA right upper lobe squamous cell lung cancer diagnosed in July. He has undergone neoadjuvant treatment with carbotaxol and radiation completed in September with adequate response on repeat imaging. Dr. Flores felt he was a reasonable candidate for surgical intervention.     Pre-op PFTs as follows: FVC 4.05, 93% predicted; FEV1 2.75, 84% predicted; ratio 0.67.     Mr. Bowser underwent VATS right upper lobe lobectomy per Dr. Flores with lymph node dissection.  He is brought to the intensive care unit for postoperative management.  The patient is complaining of some pain and his incision sites but otherwise is breathing without difficulty.  He has 2 chest tubes and there is currently only minimal air leak on suction.    Review of Systems:    All systems were reviewed and negative except as noted in subjective.    Medical history, surgical history, social history, family history reviewed    Objective     Intake/Output:    Intake/Output Summary (Last 24 hours) at 10/11/2019 1752  Last data filed at 10/11/2019 1645  Gross per 24 hour   Intake 250 ml   Output 540 ml   Net -290  ml       Infusions:    lactated ringers 9 mL/hr Last Rate: 9 mL/hr (10/11/19 1025)   niCARdipine 5-15 mg/hr Last Rate: Stopped (10/11/19 1726)   sodium chloride 30 mL/hr    sodium chloride 30 mL/hr Last Rate: 30 mL/hr (10/11/19 1659)       Vital Signs  Blood pressure 114/76, pulse 87, temperature 97.7 °F (36.5 °C), resp. rate 16, SpO2 96 %.    In general this is a well-developed man in no acute distress.  He is alert and oriented x3.  HEENT shows pupils equal round reactive light, EOMI, oropharynx is clear and moist  Neck is supple.  No JVD.  No carotid bruits.  Lungs are generally clear to auscultation bilaterally.  Mild coarse sounds over the right and some tenderness to palpation.  No subcutaneous emphysema.  Chest tubes x2 in place on the right  Heart is with normal S1 and S2 with no murmurs, rubs, or gallops.  Abdomen is soft, nontender, nondistended.  No masses are felt.  There are normal bowel sounds.  Extremities show no cyanosis, clubbing, or edema.       Results Review:     I reviewed the patient's new clinical results.   Results from last 7 days   Lab Units 10/10/19  1335   SODIUM mmol/L 144   POTASSIUM mmol/L 4.3   CHLORIDE mmol/L 102   CO2 mmol/L 33.0*   BUN mg/dL 12   CREATININE mg/dL 0.88   CALCIUM mg/dL 9.6   BILIRUBIN mg/dL 1.5*   ALK PHOS U/L 97   ALT (SGPT) U/L 28   AST (SGOT) U/L 22   GLUCOSE mg/dL 100*     Results from last 7 days   Lab Units 10/10/19  1335   WBC 10*3/mm3 7.04   HEMOGLOBIN g/dL 13.1   HEMATOCRIT % 39.0   PLATELETS 10*3/mm3 220       I reviewed the patient's new imaging including images and reports.      All medications reviewed.     [START ON 10/12/2019] famotidine 20 mg Oral Daily   [START ON 10/12/2019] heparin (porcine) 5,000 Units Subcutaneous Q12H   insulin lispro 0-7 Units Subcutaneous 4x Daily With Meals & Nightly   polyethylene glycol 17 g Oral Daily   sennosides-docusate sodium 2 tablet Oral Nightly   vancomycin 15 mg/kg Intravenous Once         Assessment/Plan       R  Upper Lobectomy (VATS)    CAD s/p stents     Essential hypertension    Hyperlipidemia on statins     Tobacco abuse        PLAN:  1. Follow in ICU   2. Post-operative management   3. Chest tubes per CTS  4. Monitor for bleeding  5. Pain control to to ensure good pulmonary hygiene  6. Nicotine replacement   7. Correction insulin as needed for hyperglycemia   8. Bronchodilators as needed    Bella Villatoro, APRN, AGAP-BC, FNP-BC   Pulmonary and Critical Care     I have reviewed the above documentation and I discussed the case with Ms. Villatoro.  I completed my on physical examination of the patient and review of the data.  I have modified the above documentation to reflect my findings including the history of present illness, the physical examination, and the plan.  In brief, this is very nice 64-year-old smoker with a history of biopsy-proven squamous cell carcinoma of the right upper lobe who underwent neoadjuvant chemo rads and now has undergone right upper lobe lobectomy via VATS.  He has brought to the intensive care unit for postoperative management.  He does have a history of coronary artery disease as well as hypertension.  His pain is under good control currently.  He has only a minimal air leak.  Frozen pathology was negative for tumor and showed only fibrotic tissue.  Final pathology is currently pending.    Demario Mireles MD

## 2019-10-11 NOTE — ANESTHESIA PROCEDURE NOTES
Airway  Urgency: elective    Date/Time: 10/11/2019 11:22 AM  Airway not difficult    General Information and Staff    Patient location during procedure: OR  CRNA: Misti Busby CRNA    Indications and Patient Condition  Indications for airway management: airway protection    Preoxygenated: yes  MILS not maintained throughout  Mask difficulty assessment: 1 - vent by mask    Final Airway Details  Final airway type: endotracheal airway      Successful airway: EBT - double lumen left  Cuffed: yes   Successful intubation technique: direct laryngoscopy  Facilitating devices/methods: intubating stylet  Endotracheal tube insertion site: oral  Blade: Dorie  Blade size: 4  EBT DL size (fr): 37  Cormack-Lehane Classification: grade I - full view of glottis  Placement verified by: chest auscultation, bronchoscopy and capnometry   Measured from: lips  ETT/EBT  to lips (cm): 30  Number of attempts at approach: 1  Assessment: lips, teeth, and gum same as pre-op    Additional Comments  Negative epigastric sounds, Breath sound equal bilaterally with symmetric chest rise and fall

## 2019-10-11 NOTE — BRIEF OP NOTE
BRONCHOSCOPY, THORACOSCOPY VIDEO ASSISTED WITH LOBECTOMY  Progress Note    Erik Bowser  10/11/2019    Pre-op Diagnosis:   Squamous cell carcinoma of right lung (CMS/HCC) [C34.91]       Post-Op Diagnosis Codes:     * Squamous cell carcinoma of right lung (CMS/HCC) [C34.91]    Procedure/CPT® Codes:      Procedure(s):  BRONCHOSCOPY  THORACOSCOPY VIDEO ASSISTED WITH RIGHT UPPER LOBECTOMY, MEDIASTINAL LYMPH NODE DISSECTION, INTERCOSTAL NERVE BLOCKS    Surgeon(s):  Titus Flores MD    Anesthesia: General    Staff:   Circulator: Elis Graves RN  Scrub Person: Jackie Green  Nursing Assistant: Keyla Escamilla  Assistant: Carl Conklin PA    Estimated Blood Loss: 150 mL    Urine Voided: * No values recorded between 10/11/2019 11:13 AM and 10/11/2019  2:24 PM *      Drains:   Urethral Catheter Silicone 16 Fr. (Active)       Y Chest Tube 1 and 2 Right 28 Fr. 2 28 Fr. (Active)       Findings: Margins clear    Complications: None      Titus Flores MD     Date: 10/11/2019  Time: 2:24 PM

## 2019-10-11 NOTE — OP NOTE
DATE OF PROCEDURE: 10/11/2019     PREOPERATIVE DIAGNOSES:  1. Right upper lobe squamous cell cancer ([id number] Clinical stage IIIA)  2. COPD  3. Tobacco abuse     POSTOPERATIVE DIAGNOSES:    1. Right upper lobe squamous cell cancer ([id number] Clinical stage IIIA)  2. COPD  3. Tobacco abuse     PROCEDURES PERFORMED:    1. Bronchoscopy  2. Right video assisted thoracoscopic surgery  3. Right upper lobectomy  4. Mediastinal lymph node dissection  5. Intercostal nerve blocks    SURGEON: Titus Flores MD       ASSISTANTS:    1. Carl Conklin PA-C      ANESTHESIA: General endotracheal anesthesia with Misti Busby CRNA     ESTIMATED BLOOD LOSS: 150 mL.       INDICATIONS:  64-year-old  male with a history of hypertension, hyperlipidemia, coronary artery disease status post stenting, COPD and tobacco abuse who was found to have a right upper lobe lung cancer ([id number] Clinical stage IIIA) found on screening CT.  The patient has undergone neoadjuvant treatment with adequate response seen on repeat imaging.  The patient was felt to be a reasonable candidate for bronchoscopy, right VATS, right upper lobectomy, mediastinal lymph node dissection and intercostal nerve blocks with cryoablation.  The risks and benefits of surgery were discussed with the patient including pain, bleeding, infection, air leak, myocardial infarction and death.  The patient understood these risks and wished to proceed with surgery.       DESCRIPTION OF PROCEDURE:  The patient was taken to the operating room and placed under general endotracheal anesthesia.  A double-lumen endotracheal tube was placed and position verified with the pediatric bronchoscope.  Examination of the bilateral bronchial tree down to the level of the subsegmental bronchi did not reveal any evidence of endobronchial mass or blood.  There were minimal secretions.  The patient was placed in the left lateral decubitus position and all 4 extremities were padded and secured to  prevent neurologic injury.  He was prepped and draped in the usual sterile fashion and a timeout was performed including the patient's name, procedure and antibiotic administration.  An incision was made at the seventh intercostal space in the midaxillary line.  Additional incisions were made posteriorly at the seventh intercostal space and an anterior incision at the level of the superior pulmonary vein.  The inferior pulmonary ligament was divided up to the inferior pulmonary vein.  There was a lymph node identified within the ligament sent as a station 9 specimen for permanent pathology.  The superior pulmonary vein branch to the upper lobe was divided using a vascular stapler.  The arterial branches to the upper lobe were identified, encircled and divided using vascular staple loads.  The remaining upper lobe bronchus was encircled and a test clamp applied with a 45 black staple load.  Test insufflation revealed satisfactory ventilation of the middle and lower lobes.  The bronchus was divided.  The remaining fissure was divided with additional purple staple loads.  The lobe was then externalized using a 15 mm Endo Catch bag and sent for margins.  Lymph nodes were harvested from stations 2R, 4R, 7, 9, 10 and 11.  It should be noted that extensive scarring was present in the 4R distribution.  The nodes in this station were larger and firm.  All involved interspaces were anesthetized using intercostal nerve blocks within the chest under direct vision.  The chest was then irrigated with warm water and test insufflation did not reveal any evidence of air leak.  Two 28 Czech channel drains were placed anterior and posterior the hilum.  These were secured using 0 silk suture.  A 0 Ethibond suture was placed in a mattress fashion for later thoracostomy site closure.  The lung was then inflated under direct vision and found to satisfactorily occupy the chest.  The anterior incision was closed with a running 2-0 Vicryl  suture followed by 3-0 Vicryl dermal layer and 4-0 Monocryl subcuticular stitch.  Overlying skin glue was applied to this incision and gauze and tape to the chest tube sites.  The patient was returned to the supine position, extubated in the operating room and transported to recovery in stable condition.

## 2019-10-11 NOTE — ANESTHESIA PREPROCEDURE EVALUATION
Anesthesia Evaluation     Patient summary reviewed and Nursing notes reviewed   no history of anesthetic complications:  NPO Solid Status: > 8 hours  NPO Liquid Status: > 2 hours           Airway   Mallampati: II  TM distance: >3 FB  Neck ROM: full  No difficulty expected  Dental    (+) upper dentures and partials    Pulmonary - normal exam    breath sounds clear to auscultation  (+) a smoker (quit x 10 wks) Former, lung cancer,   Cardiovascular - normal exam    ECG reviewed  Rhythm: regular  Rate: normal    (+) hypertension, CAD, cardiac stents ('15)       Neuro/Psych- negative ROS  GI/Hepatic/Renal/Endo    (+) obesity,       Musculoskeletal     Abdominal    Substance History      OB/GYN          Other   (+) arthritis   history of cancer                    Anesthesia Plan    ASA 3     general     intravenous induction   Anesthetic plan, all risks, benefits, and alternatives have been provided, discussed and informed consent has been obtained with: patient.    Plan discussed with CRNA.

## 2019-10-11 NOTE — INTERVAL H&P NOTE
"Pre-Op H&P (See Recent Office Note Attached for Full H&P)    Chief complaint: RUL lung cancer    Review of Systems:  General ROS:  no fever, chills, rashes, No change since last office visit  Cardiovascular ROS: no chest pain or dyspnea on exertion.  +cardiac clearance.  History of CAD s/p stents  Respiratory ROS: no cough, shortness of breath, or wheezing    Meds:    No current facility-administered medications on file prior to encounter.      Current Outpatient Medications on File Prior to Encounter   Medication Sig Dispense Refill   • metoprolol succinate XL (TOPROL-XL) 25 MG 24 hr tablet TAKE ONE TABLET BY MOUTH DAILY 90 tablet 0   • albuterol sulfate HFA (PROVENTIL HFA) 108 (90 Base) MCG/ACT inhaler Inhale 2 puffs Every 4 (Four) Hours As Needed for Wheezing or Shortness of Air.     • aspirin 81 MG EC tablet Take 81 mg by mouth Every Night.     • atorvastatin (LIPITOR) 40 MG tablet TAKE ONE TABLET BY MOUTH EVERY NIGHT AT BEDTIME 90 tablet 0   • B Complex Vitamins (VITAMIN-B COMPLEX PO) Take 1 tablet by mouth Daily.     • Cholecalciferol (VITAMIN D3) 5000 units capsule capsule Take 5,000 Units by mouth Daily.     • fexofenadine (ALLEGRA) 180 MG tablet Take 180 mg by mouth Daily.     • naproxen sodium (ALEVE) 220 MG tablet Take 220 mg by mouth As Needed for Mild Pain .     • nitroglycerin (NITROSTAT) 0.4 MG SL tablet 1 under the tongue as needed for angina, may repeat q5mins for up three doses (Patient taking differently: Place 0.4 mg under the tongue Every 5 (Five) Minutes As Needed for Chest Pain. 1 under the tongue as needed for angina, may repeat q5mins for up three doses) 100 tablet 11   • NON FORMULARY Take 1 tablet by mouth Daily. \"derma-health pro\" OTC supplement for skin     • ondansetron (ZOFRAN) 8 MG tablet Take 1 tablet by mouth 3 (Three) Times a Day As Needed for Nausea or Vomiting. 30 tablet 5       Vital Signs:  /81 (BP Location: Right arm, Patient Position: Sitting)   Temp 97.4 °F (36.3 °C) " (Temporal)   Resp 18   SpO2 98%     Physical Exam:    CV:  S1S2 regular rate and rhythm, no murmur               Resp:  Clear to auscultation; respirations regular, even and unlabored    Results Review:     Lab Results   Component Value Date    WBC 7.04 10/10/2019    HGB 13.1 10/10/2019    HCT 39.0 10/10/2019    .8 (H) 10/10/2019     10/10/2019    NEUTROABS 3.09 10/10/2019    GLUCOSE 100 (H) 10/10/2019    BUN 12 10/10/2019    CREATININE 0.88 10/10/2019    EGFRIFNONA 87 10/10/2019    EGFRIFAFRI 119 04/13/2018     10/10/2019    K 4.3 10/10/2019     10/10/2019    CO2 33.0 (H) 10/10/2019    MG 2.1 01/21/2015    PHOS 3.7 01/21/2015    CALCIUM 9.6 10/10/2019    ALBUMIN 4.40 10/10/2019    AST 22 10/10/2019    ALT 28 10/10/2019    BILITOT 1.5 (H) 10/10/2019        I reviewed the patient's new clinical results.    Cancer Staging (if applicable)  Cancer Patient: _x_ yes __no __unknown; If yes, clinical stage T:__ N:__M:__, stage group or __N/A    Assessment/Plan:    64-year-old  male with a history of hypertension, hyperlipidemia, coronary artery disease status post stenting, COPD and tobacco abuse who presents with a right upper lobe lung cancer ([id number] Clinical stage IIIA).  The patient has undergone neoadjuvant treatment with adequate response seen on repeat imaging.  The patient is a reasonable candidate for bronchoscopy, right VATS, right upper lobectomy, mediastinal lymph node dissection and intercostal nerve blocks with cryoablation.  The risks and benefits of surgery were discussed with the patient including pain, bleeding, infection, air leak, myocardial infarction and death.  The patient understood these risks and wished to proceed with surgery.    Mahogany Mcdaniels, SOBEIDA  10/11/2019   10:54 AM

## 2019-10-12 ENCOUNTER — APPOINTMENT (OUTPATIENT)
Dept: GENERAL RADIOLOGY | Facility: HOSPITAL | Age: 64
End: 2019-10-12

## 2019-10-12 LAB
ANION GAP SERPL CALCULATED.3IONS-SCNC: 13 MMOL/L (ref 5–15)
BASOPHILS # BLD AUTO: 0.03 10*3/MM3 (ref 0–0.2)
BASOPHILS NFR BLD AUTO: 0.2 % (ref 0–1.5)
BUN BLD-MCNC: 20 MG/DL (ref 8–23)
BUN/CREAT SERPL: 24.7 (ref 7–25)
CALCIUM SPEC-SCNC: 8.8 MG/DL (ref 8.6–10.5)
CHLORIDE SERPL-SCNC: 103 MMOL/L (ref 98–107)
CO2 SERPL-SCNC: 21 MMOL/L (ref 22–29)
CREAT BLD-MCNC: 0.81 MG/DL (ref 0.76–1.27)
DEPRECATED RDW RBC AUTO: 65.3 FL (ref 37–54)
EOSINOPHIL # BLD AUTO: 0 10*3/MM3 (ref 0–0.4)
EOSINOPHIL NFR BLD AUTO: 0 % (ref 0.3–6.2)
ERYTHROCYTE [DISTWIDTH] IN BLOOD BY AUTOMATED COUNT: 17.1 % (ref 12.3–15.4)
FOLATE SERPL-MCNC: 5.28 NG/ML (ref 4.78–24.2)
GFR SERPL CREATININE-BSD FRML MDRD: 96 ML/MIN/1.73
GLUCOSE BLD-MCNC: 140 MG/DL (ref 65–99)
GLUCOSE BLDC GLUCOMTR-MCNC: 138 MG/DL (ref 70–130)
GLUCOSE BLDC GLUCOMTR-MCNC: 145 MG/DL (ref 70–130)
GLUCOSE BLDC GLUCOMTR-MCNC: 163 MG/DL (ref 70–130)
GLUCOSE BLDC GLUCOMTR-MCNC: 168 MG/DL (ref 70–130)
HCT VFR BLD AUTO: 37.8 % (ref 37.5–51)
HGB BLD-MCNC: 12.3 G/DL (ref 13–17.7)
IMM GRANULOCYTES # BLD AUTO: 0.07 10*3/MM3 (ref 0–0.05)
IMM GRANULOCYTES NFR BLD AUTO: 0.5 % (ref 0–0.5)
LYMPHOCYTES # BLD AUTO: 1.55 10*3/MM3 (ref 0.7–3.1)
LYMPHOCYTES NFR BLD AUTO: 11.2 % (ref 19.6–45.3)
MCH RBC QN AUTO: 33.9 PG (ref 26.6–33)
MCHC RBC AUTO-ENTMCNC: 32.5 G/DL (ref 31.5–35.7)
MCV RBC AUTO: 104.1 FL (ref 79–97)
MONOCYTES # BLD AUTO: 1.48 10*3/MM3 (ref 0.1–0.9)
MONOCYTES NFR BLD AUTO: 10.7 % (ref 5–12)
NEUTROPHILS # BLD AUTO: 10.68 10*3/MM3 (ref 1.7–7)
NEUTROPHILS NFR BLD AUTO: 77.4 % (ref 42.7–76)
NRBC BLD AUTO-RTO: 0 /100 WBC (ref 0–0.2)
PLATELET # BLD AUTO: 224 10*3/MM3 (ref 140–450)
PMV BLD AUTO: 9.2 FL (ref 6–12)
POTASSIUM BLD-SCNC: 4.7 MMOL/L (ref 3.5–5.2)
RBC # BLD AUTO: 3.63 10*6/MM3 (ref 4.14–5.8)
SODIUM BLD-SCNC: 137 MMOL/L (ref 136–145)
VIT B12 BLD-MCNC: 595 PG/ML (ref 211–946)
WBC NRBC COR # BLD: 13.81 10*3/MM3 (ref 3.4–10.8)

## 2019-10-12 PROCEDURE — 71045 X-RAY EXAM CHEST 1 VIEW: CPT

## 2019-10-12 PROCEDURE — 25010000002 KETOROLAC TROMETHAMINE PER 15 MG: Performed by: PHYSICIAN ASSISTANT

## 2019-10-12 PROCEDURE — 82962 GLUCOSE BLOOD TEST: CPT

## 2019-10-12 PROCEDURE — 85025 COMPLETE CBC W/AUTO DIFF WBC: CPT | Performed by: PHYSICIAN ASSISTANT

## 2019-10-12 PROCEDURE — 85014 HEMATOCRIT: CPT | Performed by: INTERNAL MEDICINE

## 2019-10-12 PROCEDURE — 99232 SBSQ HOSP IP/OBS MODERATE 35: CPT | Performed by: INTERNAL MEDICINE

## 2019-10-12 PROCEDURE — 82607 VITAMIN B-12: CPT | Performed by: INTERNAL MEDICINE

## 2019-10-12 PROCEDURE — 25010000002 HEPARIN (PORCINE) PER 1000 UNITS: Performed by: PHYSICIAN ASSISTANT

## 2019-10-12 PROCEDURE — 25010000002 MORPHINE PER 10 MG: Performed by: PHYSICIAN ASSISTANT

## 2019-10-12 PROCEDURE — 82747 ASSAY OF FOLIC ACID RBC: CPT | Performed by: INTERNAL MEDICINE

## 2019-10-12 PROCEDURE — 82746 ASSAY OF FOLIC ACID SERUM: CPT | Performed by: INTERNAL MEDICINE

## 2019-10-12 PROCEDURE — 80048 BASIC METABOLIC PNL TOTAL CA: CPT | Performed by: PHYSICIAN ASSISTANT

## 2019-10-12 RX ADMIN — MORPHINE SULFATE 2 MG: 4 INJECTION, SOLUTION INTRAMUSCULAR; INTRAVENOUS at 05:08

## 2019-10-12 RX ADMIN — MORPHINE SULFATE 2 MG: 4 INJECTION, SOLUTION INTRAMUSCULAR; INTRAVENOUS at 19:44

## 2019-10-12 RX ADMIN — HYDROCODONE BITARTRATE AND ACETAMINOPHEN 1 TABLET: 7.5; 325 TABLET ORAL at 21:37

## 2019-10-12 RX ADMIN — HYDROCODONE BITARTRATE AND ACETAMINOPHEN 1 TABLET: 7.5; 325 TABLET ORAL at 18:41

## 2019-10-12 RX ADMIN — FAMOTIDINE 20 MG: 20 TABLET ORAL at 08:04

## 2019-10-12 RX ADMIN — INSULIN LISPRO 2 UNITS: 100 INJECTION, SOLUTION INTRAVENOUS; SUBCUTANEOUS at 21:37

## 2019-10-12 RX ADMIN — SENNOSIDES AND DOCUSATE SODIUM 2 TABLET: 8.6; 5 TABLET ORAL at 21:36

## 2019-10-12 RX ADMIN — HYDROCODONE BITARTRATE AND ACETAMINOPHEN 1 TABLET: 7.5; 325 TABLET ORAL at 05:08

## 2019-10-12 RX ADMIN — POLYETHYLENE GLYCOL 3350 17 G: 17 POWDER, FOR SOLUTION ORAL at 08:04

## 2019-10-12 RX ADMIN — HYDROCODONE BITARTRATE AND ACETAMINOPHEN 1 TABLET: 7.5; 325 TABLET ORAL at 12:12

## 2019-10-12 RX ADMIN — HEPARIN SODIUM 5000 UNITS: 5000 INJECTION INTRAVENOUS; SUBCUTANEOUS at 08:04

## 2019-10-12 RX ADMIN — HEPARIN SODIUM 5000 UNITS: 5000 INJECTION INTRAVENOUS; SUBCUTANEOUS at 21:36

## 2019-10-12 RX ADMIN — INSULIN LISPRO 2 UNITS: 100 INJECTION, SOLUTION INTRAVENOUS; SUBCUTANEOUS at 08:03

## 2019-10-12 RX ADMIN — KETOROLAC TROMETHAMINE 30 MG: 30 INJECTION, SOLUTION INTRAMUSCULAR; INTRAVENOUS at 21:36

## 2019-10-12 RX ADMIN — KETOROLAC TROMETHAMINE 30 MG: 30 INJECTION, SOLUTION INTRAMUSCULAR; INTRAVENOUS at 09:55

## 2019-10-12 NOTE — PLAN OF CARE
Problem: Patient Care Overview  Goal: Plan of Care Review  Outcome: Ongoing (interventions implemented as appropriate)   10/12/19 8288   Coping/Psychosocial   Plan of Care Reviewed With patient;spouse   Plan of Care Review   Progress improving   OTHER   Outcome Summary Patient AOX4, VSS on 2Lpm of oxygen. No vasoactive drips; NS @30mL's/hr. CT output 120mL's over 12 hours, sanginous. Patient IS to 1500 well. Pain well undercontroll. Bed rest maintained over night; up to chair in AM.       Problem: Skin Injury Risk (Adult)  Goal: Identify Related Risk Factors and Signs and Symptoms  Outcome: Ongoing (interventions implemented as appropriate)    Goal: Skin Health and Integrity  Outcome: Ongoing (interventions implemented as appropriate)

## 2019-10-12 NOTE — PROGRESS NOTES
INTENSIVIST   PROGRESS NOTE     Hospital:  LOS: 1 day      S     Mr. Erik Bowser, 64 y.o. male is followed for:      R Upper Lobectomy (VATS) 10/11/19    CAD s/p stents     Essential hypertension    Perioperative medical management of comorbid conditions.     As an Intensivist, we provide an integrated approach to the ICU patient and family, medical management of comorbid conditions, lead interdisciplinary rounds and coordinate the care with all other services, including those from other specialists.     Interval History:  POD: 1 Day Post-Op    Doing well.  Uneventful night.  Pain, surgical site, well controlled.     The patient's relevant past medical, surgical and social history were reviewed and updated in Epic as appropriate.     ROS:   Constitutional: Negative for fever.   Respiratory: Negative for dyspnea.   Cardiovascular: Negative for chest pain.   Gastrointestinal: Negative for  nausea, vomiting and diarrhea.          O     Vitals:  Temp: 98.3 °F (36.8 °C) (10/12/19 0800) Temp  Min: 97 °F (36.1 °C)  Max: 98.7 °F (37.1 °C)   Temp core:      BP: 114/77 (10/12/19 0800) BP  Min: 98/71  Max: 157/86   Pulse: 90 (10/12/19 0900) Pulse  Min: 74  Max: 98   Resp: 16 (10/12/19 0800) Resp  Min: 14  Max: 22   SpO2: 95 % (10/12/19 0900) SpO2  Min: 91 %  Max: 98 %   Device: nasal cannula, humidified (10/12/19 0800)    Flow Rate: 1 (10/12/19 0800) Flow (L/min)  Min: 1  Max: 4     Intake/Ouptut 24 hrs (7:00AM - 6:59 AM)  Intake/Output       10/11/19 0700 - 10/12/19 0659 10/12/19 0700 - 10/13/19 0659    Intake (ml) 628 300    Output (ml) 1100 220    Net (ml) -472 80           Medications (drips):    lactated ringers Last Rate: 9 mL/hr (10/11/19 1025)   niCARdipine Last Rate: Stopped (10/11/19 1726)   sodium chloride    sodium chloride Last Rate: 30 mL/hr (10/11/19 1659)       Physical Examination  Telemetry:  Rhythm: normal sinus rhythm (10/12/19 0800)         Constitutional:  No acute distress.   Cardiovascular: RRR.    Normal heart sounds.  No murmurs, gallop or rub.   Respiratory: Normal breath sounds  No adventitious sounds.   Abdominal:  Soft with no tenderness.  No distension.   No HSM.   Extremities: Warm.  Dry.  No cyanosis.  No Edema   Neurological:   Alert, Oriented, Cooperative.   Lines/Drains/Airways: R CT x 2       Hematology:  Results from last 7 days   Lab Units 10/12/19  0825 10/10/19  1335   WBC 10*3/mm3 13.81* 7.04   HEMOGLOBIN g/dL 12.3* 13.1   MCV fL 104.1* 101.8*   PLATELETS 10*3/mm3 224 220       Chemistry:  Estimated Creatinine Clearance: 108 mL/min (by C-G formula based on SCr of 0.81 mg/dL).    Results from last 7 days   Lab Units 10/12/19  0825 10/10/19  1335   SODIUM mmol/L 137 144   POTASSIUM mmol/L 4.7 4.3   CHLORIDE mmol/L 103 102   CO2 mmol/L 21.0* 33.0*   ANION GAP mmol/L 13.0 9.0   GLUCOSE mg/dL 140* 100*   CALCIUM mg/dL 8.8 9.6     Results from last 7 days   Lab Units 10/12/19  0825 10/10/19  1335   BUN mg/dL 20 12   CREATININE mg/dL 0.81 0.88       Images:  Xr Chest 1 View    Result Date: 10/12/2019  Postoperative chest, with prominent right mediastinal shadow and stable left lung interstitial disease. Mediastinal prominence on the right may simply reflect rotated chest radiograph, and/or small new area of right upper lung discoid atelectasis.       Xr Chest 1 View    Result Date: 10/11/2019  Two large bore right-sided thoracotomy tubes in radiographic expected position with trace if any right apical pneumothorax. The lungs are predominantly clear.  D:  10/11/2019 E:  10/11/2019  This report was finalized on 10/11/2019 5:38 PM by Dr. Arian Manning MD.      Xr Chest Pa & Lateral    Result Date: 10/11/2019  No acute cardiopulmonary disease.  D:  10/11/2019 E:  10/11/2019  This report was finalized on 10/11/2019 3:44 PM by Dr. Elzbieta Gould MD.      Results: Reviewed.  I reviewed the patient's new laboratory and imaging results.  I independently reviewed the patient's new  images.    Medications: Reviewed.    Assessment/Plan   A / P     Assessment:    64 y.o.male, admitted on 10/11/2019 with Squamous cell carcinoma of right lung (CMS/HCC) [C34.91]  Malignant neoplasm of right lung, unspecified part of lung (CMS/HCC) [C34.91]:     1. NSCLC [id number], Stage IIIA - Squamous cell (Pre-op)  1. S/p Neoadjvant Rx and XRT  Procedure(s) (LRB):  BRONCHOSCOPY (N/A)  THORACOSCOPY VIDEO ASSISTED WITH RIGHT UPPER LOBECTOMY, MEDIASTINAL LYMPH NODE DISSECTION, INTERCOSTAL NERVE BLOCKS (Right)  Dr. Titus Flores (Cardiothoracic Surgery)  10/11/19   2. CAD, previous stents  3. HTN  4. Dyslipidemia  5. Tobacco use  6. Macrocytosis  7. Glucose: At risk for DM (pre-diabetes) based on his HbA1c. [HbA1C 5.7%-6.4%, eA-139 mg/dL].     Results from last 7 days   Lab Units 10/12/19  0727 10/11/19  2017 10/11/19  1538 10/11/19  1026   GLUCOSE mg/dL 168* 191* 166* 106     Lab Results   Lab Value Date/Time    HGBA1C 5.70 (H) 10/10/2019 1335    HGBA1C 5.4 2015 0344       Diet: Diet Regular   Advance Directives: Code Status and Medical Interventions:   Ordered at: 10/11/19 1455     Code Status:    CPR     Medical Interventions (Level of Support Prior to Arrest):    Full        Plan:    1. Await Final Path  2. Check B12, Folate  3. Continue Chest tubes } Per CT Surgery.  4. Disposition: Keep in ICU.    Plan of care and goals reviewed during interdisciplinary rounds.  I discussed the patient's findings and my recommendations with patient and nursing staff    Level of Risk is High due to:  illness with threat to life or bodily function.     Time: 25 minutes, in direct patient care, with the patient and/or on the kimbrough coordinating care with other health care providers.     I have spent > 50% percent of this time, counseling and discussing management.       Kirby Daly MD, FACP, FCCP, CNSC  Intensive Care Medicine, Nutrition Support and Pulmonary Medicine     []  Primary Attending  [x]  Consultant    Lab  Results   Lab Value Date/Time    INTRAOP  10/11/2019 1207     Frozen section: Verbal report given to Dr. Flores via speakerphone on 10/11/2019 at 1:46 PM.    FROZEN SECTION DIAGNOSIS:   Bronchial and vascular margins are negative for tumor.   One representative section of tumor shows fibrosis and inflammation but no malignancy. (BG)              FINALDX  07/11/2019 1233     See Scanned Report        FINALDX  07/11/2019 1139     See Scanned Report

## 2019-10-12 NOTE — PROGRESS NOTES
Erik Bowser  [id number]  1955     LOS: 1 day   Patient Care Team:  Nat Umana APRN as PCP - General (Family Medicine)  Kevin Salguero MD as Referring Physician (Hematology and Oncology)  Hill Herbert MD as Consulting Physician (Radiation Oncology)  Ronan Lacy IV, MD as Consulting Physician (Interventional Cardiology)  Jeff Rubin MD as Consulting Physician (Pulmonary Disease)  Titus Flores MD as Surgeon (Cardiothoracic Surgery)    Chief Complaint: Bronchogenic carcinoma      Subjective: No specific complaints    Objective:     Vital Sign Min/Max for last 24 hours  Temp  Min: 97 °F (36.1 °C)  Max: 98.7 °F (37.1 °C)   BP  Min: 98/71  Max: 157/86   Pulse  Min: 74  Max: 98   Resp  Min: 14  Max: 22   SpO2  Min: 91 %  Max: 98 %   No Data Recorded   No Data Recorded         Physical Exam:    Wound:    Pulses:     Mediastinal and Chest Tube Drainage: Small air leak       Results Review:   Results from last 7 days   Lab Units 10/10/19  1335   WBC 10*3/mm3 7.04   HEMOGLOBIN g/dL 13.1   HEMATOCRIT % 39.0   PLATELETS 10*3/mm3 220     Results from last 7 days   Lab Units 10/10/19  1335   SODIUM mmol/L 144   POTASSIUM mmol/L 4.3   CHLORIDE mmol/L 102   CO2 mmol/L 33.0*   BUN mg/dL 12   CREATININE mg/dL 0.88   GLUCOSE mg/dL 100*   CALCIUM mg/dL 9.6             Assessment      R Upper Lobectomy (VATS)    CAD s/p stents     Essential hypertension    Hyperlipidemia on statins     Tobacco abuse      Continue supportive care.        Zoran Alex MD  10/12/19  7:22 AM      Please note that portions of this note were completed with a voice recognition program. Efforts were made to edit the dictations, but words may be mistranscribed

## 2019-10-12 NOTE — PLAN OF CARE
Problem: Patient Care Overview  Goal: Plan of Care Review  Outcome: Ongoing (interventions implemented as appropriate)   10/12/19 0196   Coping/Psychosocial   Plan of Care Reviewed With patient;spouse   Plan of Care Review   Progress improving   OTHER   Outcome Summary No new issues over night. VSS, pt alert and oriented, up in chair. Great appetite, minimal pain med requirements. Ambulated twice with RN, 230 feet, min assist. Chest tube with minimal S/S drainage. O2 at 2L NC.Wilkinson dc'd, due to void anytime. . Anticipate orders to tele in AM. will continue to monitor this very pleasant patient.       Problem: Skin Injury Risk (Adult)  Goal: Identify Related Risk Factors and Signs and Symptoms  Outcome: Ongoing (interventions implemented as appropriate)   10/12/19 0079   Skin Injury Risk (Adult)   Related Risk Factors (Skin Injury Risk) critical care admission

## 2019-10-13 LAB
ABO + RH BLD: NORMAL
ABO + RH BLD: NORMAL
ANION GAP SERPL CALCULATED.3IONS-SCNC: 7 MMOL/L (ref 5–15)
BASOPHILS # BLD AUTO: 0.05 10*3/MM3 (ref 0–0.2)
BASOPHILS NFR BLD AUTO: 0.4 % (ref 0–1.5)
BH BB BLOOD EXPIRATION DATE: NORMAL
BH BB BLOOD EXPIRATION DATE: NORMAL
BH BB BLOOD TYPE BARCODE: 600
BH BB BLOOD TYPE BARCODE: 600
BH BB DISPENSE STATUS: NORMAL
BH BB DISPENSE STATUS: NORMAL
BH BB PRODUCT CODE: NORMAL
BH BB PRODUCT CODE: NORMAL
BH BB UNIT NUMBER: NORMAL
BH BB UNIT NUMBER: NORMAL
BUN BLD-MCNC: 23 MG/DL (ref 8–23)
BUN/CREAT SERPL: 25.3 (ref 7–25)
CALCIUM SPEC-SCNC: 9.1 MG/DL (ref 8.6–10.5)
CHLORIDE SERPL-SCNC: 101 MMOL/L (ref 98–107)
CO2 SERPL-SCNC: 31 MMOL/L (ref 22–29)
CREAT BLD-MCNC: 0.91 MG/DL (ref 0.76–1.27)
DEPRECATED RDW RBC AUTO: 66.4 FL (ref 37–54)
EOSINOPHIL # BLD AUTO: 0.76 10*3/MM3 (ref 0–0.4)
EOSINOPHIL NFR BLD AUTO: 6.5 % (ref 0.3–6.2)
ERYTHROCYTE [DISTWIDTH] IN BLOOD BY AUTOMATED COUNT: 17.2 % (ref 12.3–15.4)
GFR SERPL CREATININE-BSD FRML MDRD: 84 ML/MIN/1.73
GLUCOSE BLD-MCNC: 117 MG/DL (ref 65–99)
GLUCOSE BLDC GLUCOMTR-MCNC: 116 MG/DL (ref 70–130)
GLUCOSE BLDC GLUCOMTR-MCNC: 129 MG/DL (ref 70–130)
GLUCOSE BLDC GLUCOMTR-MCNC: 133 MG/DL (ref 70–130)
GLUCOSE BLDC GLUCOMTR-MCNC: 142 MG/DL (ref 70–130)
HCT VFR BLD AUTO: 36.9 % (ref 37.5–51)
HGB BLD-MCNC: 11.9 G/DL (ref 13–17.7)
IMM GRANULOCYTES # BLD AUTO: 0.04 10*3/MM3 (ref 0–0.05)
IMM GRANULOCYTES NFR BLD AUTO: 0.3 % (ref 0–0.5)
LYMPHOCYTES # BLD AUTO: 2.21 10*3/MM3 (ref 0.7–3.1)
LYMPHOCYTES NFR BLD AUTO: 18.8 % (ref 19.6–45.3)
MCH RBC QN AUTO: 33.6 PG (ref 26.6–33)
MCHC RBC AUTO-ENTMCNC: 32.2 G/DL (ref 31.5–35.7)
MCV RBC AUTO: 104.2 FL (ref 79–97)
MONOCYTES # BLD AUTO: 1.37 10*3/MM3 (ref 0.1–0.9)
MONOCYTES NFR BLD AUTO: 11.6 % (ref 5–12)
NEUTROPHILS # BLD AUTO: 7.35 10*3/MM3 (ref 1.7–7)
NEUTROPHILS NFR BLD AUTO: 62.4 % (ref 42.7–76)
NRBC BLD AUTO-RTO: 0 /100 WBC (ref 0–0.2)
PLATELET # BLD AUTO: 230 10*3/MM3 (ref 140–450)
PMV BLD AUTO: 9.3 FL (ref 6–12)
POTASSIUM BLD-SCNC: 5.2 MMOL/L (ref 3.5–5.2)
RBC # BLD AUTO: 3.54 10*6/MM3 (ref 4.14–5.8)
SODIUM BLD-SCNC: 139 MMOL/L (ref 136–145)
UNIT  ABO: NORMAL
UNIT  ABO: NORMAL
UNIT  RH: NORMAL
UNIT  RH: NORMAL
WBC NRBC COR # BLD: 11.78 10*3/MM3 (ref 3.4–10.8)

## 2019-10-13 PROCEDURE — 25010000002 MORPHINE PER 10 MG: Performed by: PHYSICIAN ASSISTANT

## 2019-10-13 PROCEDURE — 80048 BASIC METABOLIC PNL TOTAL CA: CPT | Performed by: INTERNAL MEDICINE

## 2019-10-13 PROCEDURE — 25010000002 KETOROLAC TROMETHAMINE PER 15 MG: Performed by: PHYSICIAN ASSISTANT

## 2019-10-13 PROCEDURE — 25010000002 HEPARIN (PORCINE) PER 1000 UNITS: Performed by: PHYSICIAN ASSISTANT

## 2019-10-13 PROCEDURE — 99232 SBSQ HOSP IP/OBS MODERATE 35: CPT | Performed by: INTERNAL MEDICINE

## 2019-10-13 PROCEDURE — 82962 GLUCOSE BLOOD TEST: CPT

## 2019-10-13 PROCEDURE — 85025 COMPLETE CBC W/AUTO DIFF WBC: CPT | Performed by: INTERNAL MEDICINE

## 2019-10-13 RX ADMIN — MORPHINE SULFATE 2 MG: 4 INJECTION, SOLUTION INTRAMUSCULAR; INTRAVENOUS at 00:19

## 2019-10-13 RX ADMIN — KETOROLAC TROMETHAMINE 30 MG: 30 INJECTION, SOLUTION INTRAMUSCULAR; INTRAVENOUS at 04:41

## 2019-10-13 RX ADMIN — HEPARIN SODIUM 5000 UNITS: 5000 INJECTION INTRAVENOUS; SUBCUTANEOUS at 21:23

## 2019-10-13 RX ADMIN — HYDROCODONE BITARTRATE AND ACETAMINOPHEN 1 TABLET: 7.5; 325 TABLET ORAL at 16:58

## 2019-10-13 RX ADMIN — MORPHINE SULFATE 2 MG: 4 INJECTION, SOLUTION INTRAMUSCULAR; INTRAVENOUS at 08:56

## 2019-10-13 RX ADMIN — HYDROCODONE BITARTRATE AND ACETAMINOPHEN 1 TABLET: 7.5; 325 TABLET ORAL at 11:06

## 2019-10-13 RX ADMIN — MORPHINE SULFATE 2 MG: 4 INJECTION, SOLUTION INTRAMUSCULAR; INTRAVENOUS at 14:19

## 2019-10-13 RX ADMIN — POLYETHYLENE GLYCOL 3350 17 G: 17 POWDER, FOR SOLUTION ORAL at 08:02

## 2019-10-13 RX ADMIN — HEPARIN SODIUM 5000 UNITS: 5000 INJECTION INTRAVENOUS; SUBCUTANEOUS at 08:02

## 2019-10-13 RX ADMIN — SENNOSIDES AND DOCUSATE SODIUM 2 TABLET: 8.6; 5 TABLET ORAL at 21:23

## 2019-10-13 RX ADMIN — HYDROCODONE BITARTRATE AND ACETAMINOPHEN 1 TABLET: 7.5; 325 TABLET ORAL at 04:41

## 2019-10-13 RX ADMIN — MORPHINE SULFATE 2 MG: 4 INJECTION, SOLUTION INTRAMUSCULAR; INTRAVENOUS at 04:40

## 2019-10-13 RX ADMIN — KETOROLAC TROMETHAMINE 30 MG: 30 INJECTION, SOLUTION INTRAMUSCULAR; INTRAVENOUS at 14:19

## 2019-10-13 RX ADMIN — FAMOTIDINE 20 MG: 20 TABLET ORAL at 08:02

## 2019-10-13 RX ADMIN — HYDROCODONE BITARTRATE AND ACETAMINOPHEN 1 TABLET: 7.5; 325 TABLET ORAL at 22:34

## 2019-10-13 NOTE — PLAN OF CARE
Problem: Patient Care Overview  Goal: Plan of Care Review  Outcome: Ongoing (interventions implemented as appropriate)   10/13/19 9381   Coping/Psychosocial   Plan of Care Reviewed With patient;spouse   Plan of Care Review   Progress improving   OTHER   Outcome Summary Patient up to the chair and oriented to room and policies. VSS. Chest tubes x 2 to 20 cm suction continued. O2 at 2L NC continued. Discomfort is controlled using the current pain med regimen. No acute distress noted.        Problem: Skin Injury Risk (Adult)  Goal: Skin Health and Integrity  Outcome: Ongoing (interventions implemented as appropriate)      Problem: Lung Surgery (via Thoracotomy) (Adult)  Goal: Signs and Symptoms of Listed Potential Problems Will be Absent, Minimized or Managed (Lung Surgery)  Outcome: Ongoing (interventions implemented as appropriate)

## 2019-10-13 NOTE — PROGRESS NOTES
Erik Bowser  [id number]  1955     LOS: 2 days   Patient Care Team:  Nat Umana APRN as PCP - General (Family Medicine)  Kevin Salguero MD as Referring Physician (Hematology and Oncology)  Hill Herbert MD as Consulting Physician (Radiation Oncology)  Ronan Lacy IV, MD as Consulting Physician (Interventional Cardiology)  Jeff Rubin MD as Consulting Physician (Pulmonary Disease)  Titus Flores MD as Surgeon (Cardiothoracic Surgery)    Chief Complaint: Bronchogenic carcinoma      Subjective: No specific complaints    Objective:     Vital Sign Min/Max for last 24 hours  Temp  Min: 98 °F (36.7 °C)  Max: 98.6 °F (37 °C)   BP  Min: 99/64  Max: 173/99   Pulse  Min: 76  Max: 94   Resp  Min: 16  Max: 22   SpO2  Min: 93 %  Max: 100 %   No Data Recorded   No Data Recorded         Physical Exam:    Wound:    Pulses:     Mediastinal and Chest Tube Drainage: No air leak noted       Results Review:   Results from last 7 days   Lab Units 10/13/19  0454   WBC 10*3/mm3 11.78*   HEMOGLOBIN g/dL 11.9*   HEMATOCRIT % 36.9*   PLATELETS 10*3/mm3 230     Results from last 7 days   Lab Units 10/13/19  0454   SODIUM mmol/L 139   POTASSIUM mmol/L 5.2   CHLORIDE mmol/L 101   CO2 mmol/L 31.0*   BUN mg/dL 23   CREATININE mg/dL 0.91   GLUCOSE mg/dL 117*   CALCIUM mg/dL 9.1             Assessment      R Upper Lobectomy (VATS) 10/11/19    CAD s/p stents     Essential hypertension    Hyperlipidemia on statins     Tobacco abuse      Transfer to telemetry.        Zoran Alex MD  10/13/19  7:18 AM      Please note that portions of this note were completed with a voice recognition program. Efforts were made to edit the dictations, but words may be mistranscribed

## 2019-10-13 NOTE — PROGRESS NOTES
INTENSIVIST   PROGRESS NOTE     Hospital:  LOS: 2 days      S     Mr. Erik Bowser, 64 y.o. male is followed for:      R Upper Lobectomy (VATS) 10/11/19    CAD s/p stents     Essential hypertension    Perioperative medical management of comorbid conditions.     As an Intensivist, we provide an integrated approach to the ICU patient and family, medical management of comorbid conditions, lead interdisciplinary rounds and coordinate the care with all other services, including those from other specialists.     Interval History:  POD: 2 Days Post-Op    He could not sleep well last night. Too many interruptions. And also pain from surgical site.  No distress at present.  Comfortable, cooperative.     The patient's relevant past medical, surgical and social history were reviewed and updated in Epic as appropriate.     ROS:   Constitutional: Negative for fever.   Respiratory: Negative for dyspnea.   Cardiovascular: Negative for chest pain.   Gastrointestinal: Negative for  nausea, vomiting and diarrhea.          O     Vitals:  Temp: 97.6 °F (36.4 °C) (10/13/19 0800) Temp  Min: 97.6 °F (36.4 °C)  Max: 98.6 °F (37 °C)   Temp core:      BP: 141/78 (10/13/19 0911) BP  Min: 99/64  Max: 144/68   Pulse: 96 (10/13/19 0911) Pulse  Min: 76  Max: 96   Resp: 16 (10/13/19 0800) Resp  Min: 16  Max: 22   SpO2: 97 % (10/13/19 0911) SpO2  Min: 91 %  Max: 100 %   Device: humidified, nasal cannula (10/13/19 0800)    Flow Rate: 2 (10/13/19 0800) Flow (L/min)  Min: 1  Max: 2     Intake/Ouptut 24 hrs (7:00AM - 6:59 AM)  Intake/Output       10/12/19 0700 - 10/13/19 0659 10/13/19 0700 - 10/14/19 0659    Intake (ml) 1811 300    Output (ml) 1340 290    Net (ml) 471 10           Medications (drips):    lactated ringers Last Rate: 9 mL/hr (10/11/19 1025)   niCARdipine Last Rate: Stopped (10/11/19 1726)   sodium chloride    sodium chloride Last Rate: 30 mL/hr (10/11/19 1659)       Physical Examination  Telemetry:  Rhythm: normal sinus rhythm  (10/13/19 0800)         Constitutional:  No acute distress.   Cardiovascular: RRR.   Normal heart sounds.  No murmurs, gallop or rub.   Respiratory: Normal breath sounds  No adventitious sounds.   Abdominal:  Soft with no tenderness.  No distension.   No HSM.   Extremities: Warm.  Dry.  No cyanosis.  No Edema   Neurological:   Alert, Oriented, Cooperative.       Hematology:  Results from last 7 days   Lab Units 10/13/19  0454 10/12/19  0825 10/10/19  1335   WBC 10*3/mm3 11.78* 13.81* 7.04   HEMOGLOBIN g/dL 11.9* 12.3* 13.1   MCV fL 104.2* 104.1* 101.8*   PLATELETS 10*3/mm3 230 224 220       Chemistry:  Estimated Creatinine Clearance: 96.1 mL/min (by C-G formula based on SCr of 0.91 mg/dL).    Results from last 7 days   Lab Units 10/13/19  0454 10/12/19  0825 10/10/19  1335   SODIUM mmol/L 139 137 144   POTASSIUM mmol/L 5.2 4.7 4.3   CHLORIDE mmol/L 101 103 102   CO2 mmol/L 31.0* 21.0* 33.0*   ANION GAP mmol/L 7.0 13.0 9.0   GLUCOSE mg/dL 117* 140* 100*   CALCIUM mg/dL 9.1 8.8 9.6     Results from last 7 days   Lab Units 10/13/19  0454 10/12/19  0825 10/10/19  1335   BUN mg/dL 23 20 12   CREATININE mg/dL 0.91 0.81 0.88       Images:  Xr Chest 1 View    Result Date: 10/12/2019  Post-op chest with prominent right mediastinal shadow and stable left lung interstitial disease. Mediastinal prominence on the right may simply reflect rotated chest radiograph and/or a small new area of right upper lung discoid atelectasis.  DICTATED:   10/12/2019 EDITED/ls :   10/12/2019  This report was finalized on 10/12/2019 10:41 PM by DR. Johnnie Collier MD.      Xr Chest 1 View    Result Date: 10/11/2019  Two large bore right-sided thoracotomy tubes in radiographic expected position with trace if any right apical pneumothorax. The lungs are predominantly clear.  D:  10/11/2019 E:  10/11/2019  This report was finalized on 10/11/2019 5:38 PM by Dr. Arian Manning MD.      Results: Reviewed.  I reviewed the patient's new laboratory and  imaging results.  I independently reviewed the patient's new images.    Medications: Reviewed.    Assessment/Plan   A / P     Assessment:    64 y.o.male, admitted on 10/11/2019 with Squamous cell carcinoma of right lung (CMS/HCC) [C34.91]  Malignant neoplasm of right lung, unspecified part of lung (CMS/HCC) [C34.91]:     1. NSCLC [id number], Stage IIIA - Squamous cell (Pre-op)  1. S/p Neoadjvant Rx and XRT  Procedure(s) (LRB):  BRONCHOSCOPY (N/A)  THORACOSCOPY VIDEO ASSISTED WITH RIGHT UPPER LOBECTOMY, MEDIASTINAL LYMPH NODE DISSECTION, INTERCOSTAL NERVE BLOCKS (Right)  Dr. Titus Flores (Cardiothoracic Surgery)  10/11/19   2. CAD, previous stents  3. HTN  4. Dyslipidemia  5. Tobacco use  6. Macrocytosis    Lab Results   Lab Value Date/Time    [id number] 595 10/12/2019 1337    [id number] 848 2019 1424    FOLATE 5.28 10/12/2019 1337    FOLATE 8.05 2019 1424      7. Glucose: At risk for DM (pre-diabetes) based on his HbA1c. [HbA1C 5.7%-6.4%, eA-139 mg/dL].     Results from last 7 days   Lab Units 10/13/19  0747 10/12/19  2115 10/12/19  1632 10/12/19  1147 10/12/19  0727 10/11/19  2017   GLUCOSE mg/dL 129 163* 145* 138* 168* 191*     Lab Results   Lab Value Date/Time    HGBA1C 5.70 (H) 10/10/2019 1335    HGBA1C 5.4 2015 0344       Diet: Diet Regular   Advance Directives: Code Status and Medical Interventions:   Ordered at: 10/11/19 1455     Code Status:    CPR     Medical Interventions (Level of Support Prior to Arrest):    Full        Plan:    1. Final path, still pending.  2. Continue Chest tubes } Per CT Surgery.  3. Disposition: Transfer to Telemetry Unit.   1. F/U K in AM    Plan of care and goals reviewed during interdisciplinary rounds.  I discussed the patient's findings and my recommendations with patient and nursing staff    Level of Risk is High due to:  illness with threat to life or bodily function.     OK to floor.  We will follow as needed once out of the Intensive Care  Unit.  Hospitalist Team will assist with medical management, once on the Floor.     Time: 25 minutes, in direct patient care, with the patient and/or on the kimbrough coordinating care with other health care providers.     I have spent > 50% percent of this time, counseling and discussing management.       Kirby Daly MD, FACP, FCCP, [facility]  Intensive Care Medicine, Nutrition Support and Pulmonary Medicine     []  Primary Attending  [x]  Consultant    Lab Results   Lab Value Date/Time    INTRAOP  10/11/2019 1207     Frozen section: Verbal report given to Dr. Flores via speakerphone on 10/11/2019 at 1:46 PM.    FROZEN SECTION DIAGNOSIS:   Bronchial and vascular margins are negative for tumor.   One representative section of tumor shows fibrosis and inflammation but no malignancy. (BG)              FINALDX  07/11/2019 1233     See Scanned Report        FINALDX  07/11/2019 1139     See Scanned Report

## 2019-10-13 NOTE — PROGRESS NOTES
Discharge Planning Assessment  [facility]     Patient Name: Erik Bowser  MRN: [id number]  Today's Date: 10/13/2019    Admit Date: 10/11/2019    Discharge Needs Assessment     Row Name 10/13/19 1038       Living Environment    Lives With  spouse    Current Living Arrangements  home/apartment/condo    Primary Care Provided by  self    Provides Primary Care For  no one    Family Caregiver if Needed  spouse    Quality of Family Relationships  helpful;involved;supportive    Able to Return to Prior Arrangements  yes       Resource/Environmental Concerns    Resource/Environmental Concerns  none    Transportation Concerns  car, none       Transition Planning    Patient/Family Anticipates Transition to  home with family       Discharge Needs Assessment    Equipment Currently Used at Home  none    Anticipated Changes Related to Illness  none    Equipment Needed After Discharge  none    Current Discharge Risk  chronically ill        Discharge Plan     Row Name 10/13/19 1039       Plan    Plan  Home with spouse    Patient/Family in Agreement with Plan  yes    Plan Comments  Met with patient and wife at bedside to initiate discharge planning. They live in a home in [place]. Mr. Bowser is independent with all ADLs and does not use any DME at home. Plans to return home with wife and denies any discharge planning needs or concerns at this time. CM will continue to follow. Lulú Moya RN [phone]    Final Discharge Disposition Code  01 - home or self-care        Destination      No service coordination in this encounter.      Durable Medical Equipment      No service coordination in this encounter.      Dialysis/Infusion      No service coordination in this encounter.      Home Medical Care      No service coordination in this encounter.      Therapy      No service coordination in this encounter.      Community Resources      No service coordination in this encounter.          Demographic Summary     Row Name 10/13/19  1037       General Information    Arrived From  operating room    Referral Source  admission list    Preferred Language  English        Functional Status     Row Name 10/13/19 1038       Functional Status    Usual Activity Tolerance  moderate    Current Activity Tolerance  fair       Functional Status, IADL    Medications  independent    Meal Preparation  independent    Housekeeping  independent    Laundry  independent    Shopping  independent       Employment/    Employment Status  retired    Employment/ Comments  [employer] with Rx medication coverage        Psychosocial    No documentation.       Abuse/Neglect    No documentation.       Legal    No documentation.       Substance Abuse    No documentation.       Patient Forms    No documentation.           Lulú Moya RN

## 2019-10-14 ENCOUNTER — APPOINTMENT (OUTPATIENT)
Dept: GENERAL RADIOLOGY | Facility: HOSPITAL | Age: 64
End: 2019-10-14

## 2019-10-14 LAB
ANION GAP SERPL CALCULATED.3IONS-SCNC: 7 MMOL/L (ref 5–15)
BASOPHILS # BLD AUTO: 0.02 10*3/MM3 (ref 0–0.2)
BASOPHILS NFR BLD AUTO: 0.2 % (ref 0–1.5)
BUN BLD-MCNC: 20 MG/DL (ref 8–23)
BUN/CREAT SERPL: 24.1 (ref 7–25)
CALCIUM SPEC-SCNC: 9.1 MG/DL (ref 8.6–10.5)
CHLORIDE SERPL-SCNC: 98 MMOL/L (ref 98–107)
CO2 SERPL-SCNC: 30 MMOL/L (ref 22–29)
CREAT BLD-MCNC: 0.83 MG/DL (ref 0.76–1.27)
DEPRECATED RDW RBC AUTO: 62.6 FL (ref 37–54)
EOSINOPHIL # BLD AUTO: 0.5 10*3/MM3 (ref 0–0.4)
EOSINOPHIL NFR BLD AUTO: 4.7 % (ref 0.3–6.2)
ERYTHROCYTE [DISTWIDTH] IN BLOOD BY AUTOMATED COUNT: 16.6 % (ref 12.3–15.4)
GFR SERPL CREATININE-BSD FRML MDRD: 94 ML/MIN/1.73
GLUCOSE BLD-MCNC: 126 MG/DL (ref 65–99)
GLUCOSE BLDC GLUCOMTR-MCNC: 130 MG/DL (ref 70–130)
GLUCOSE BLDC GLUCOMTR-MCNC: 135 MG/DL (ref 70–130)
GLUCOSE BLDC GLUCOMTR-MCNC: 140 MG/DL (ref 70–130)
GLUCOSE BLDC GLUCOMTR-MCNC: 166 MG/DL (ref 70–130)
HCT VFR BLD AUTO: 36.3 % (ref 37.5–51)
HGB BLD-MCNC: 12.1 G/DL (ref 13–17.7)
IMM GRANULOCYTES # BLD AUTO: 0.07 10*3/MM3 (ref 0–0.05)
IMM GRANULOCYTES NFR BLD AUTO: 0.7 % (ref 0–0.5)
LYMPHOCYTES # BLD AUTO: 1.78 10*3/MM3 (ref 0.7–3.1)
LYMPHOCYTES NFR BLD AUTO: 16.8 % (ref 19.6–45.3)
MCH RBC QN AUTO: 34.1 PG (ref 26.6–33)
MCHC RBC AUTO-ENTMCNC: 33.3 G/DL (ref 31.5–35.7)
MCV RBC AUTO: 102.3 FL (ref 79–97)
MONOCYTES # BLD AUTO: 1.29 10*3/MM3 (ref 0.1–0.9)
MONOCYTES NFR BLD AUTO: 12.1 % (ref 5–12)
NEUTROPHILS # BLD AUTO: 6.96 10*3/MM3 (ref 1.7–7)
NEUTROPHILS NFR BLD AUTO: 65.5 % (ref 42.7–76)
NRBC BLD AUTO-RTO: 0 /100 WBC (ref 0–0.2)
PLATELET # BLD AUTO: 212 10*3/MM3 (ref 140–450)
PMV BLD AUTO: 9.2 FL (ref 6–12)
POTASSIUM BLD-SCNC: 4.7 MMOL/L (ref 3.5–5.2)
RBC # BLD AUTO: 3.55 10*6/MM3 (ref 4.14–5.8)
SODIUM BLD-SCNC: 135 MMOL/L (ref 136–145)
WBC NRBC COR # BLD: 10.62 10*3/MM3 (ref 3.4–10.8)

## 2019-10-14 PROCEDURE — 71045 X-RAY EXAM CHEST 1 VIEW: CPT

## 2019-10-14 PROCEDURE — 80048 BASIC METABOLIC PNL TOTAL CA: CPT | Performed by: INTERNAL MEDICINE

## 2019-10-14 PROCEDURE — 85025 COMPLETE CBC W/AUTO DIFF WBC: CPT | Performed by: INTERNAL MEDICINE

## 2019-10-14 PROCEDURE — 82962 GLUCOSE BLOOD TEST: CPT

## 2019-10-14 PROCEDURE — 99232 SBSQ HOSP IP/OBS MODERATE 35: CPT | Performed by: HOSPITALIST

## 2019-10-14 PROCEDURE — 99024 POSTOP FOLLOW-UP VISIT: CPT | Performed by: PHYSICIAN ASSISTANT

## 2019-10-14 PROCEDURE — 25010000002 HEPARIN (PORCINE) PER 1000 UNITS: Performed by: PHYSICIAN ASSISTANT

## 2019-10-14 RX ORDER — MAGNESIUM CARB/ALUMINUM HYDROX 105-160MG
296 TABLET,CHEWABLE ORAL ONCE AS NEEDED
Status: COMPLETED | OUTPATIENT
Start: 2019-10-14 | End: 2019-10-14

## 2019-10-14 RX ORDER — BISACODYL 5 MG/1
10 TABLET, DELAYED RELEASE ORAL DAILY PRN
Status: DISCONTINUED | OUTPATIENT
Start: 2019-10-14 | End: 2019-10-15 | Stop reason: HOSPADM

## 2019-10-14 RX ORDER — LISINOPRIL 5 MG/1
5 TABLET ORAL DAILY
COMMUNITY
End: 2021-01-05

## 2019-10-14 RX ORDER — BUMETANIDE 0.25 MG/ML
2 INJECTION INTRAMUSCULAR; INTRAVENOUS ONCE
Status: COMPLETED | OUTPATIENT
Start: 2019-10-14 | End: 2019-10-14

## 2019-10-14 RX ORDER — ATORVASTATIN CALCIUM 40 MG/1
40 TABLET, FILM COATED ORAL DAILY
COMMUNITY
End: 2020-02-11

## 2019-10-14 RX ORDER — HYDROCODONE BITARTRATE AND ACETAMINOPHEN 7.5; 325 MG/1; MG/1
1 TABLET ORAL EVERY 6 HOURS PRN
Qty: 30 TABLET | Refills: 0
Start: 2019-10-14 | End: 2019-12-18

## 2019-10-14 RX ORDER — METOPROLOL SUCCINATE 25 MG/1
25 TABLET, EXTENDED RELEASE ORAL
Status: DISCONTINUED | OUTPATIENT
Start: 2019-10-14 | End: 2019-10-15 | Stop reason: HOSPADM

## 2019-10-14 RX ORDER — ROSUVASTATIN CALCIUM 20 MG/1
20 TABLET, COATED ORAL DAILY
Status: ON HOLD | COMMUNITY
End: 2019-10-14

## 2019-10-14 RX ADMIN — SENNOSIDES AND DOCUSATE SODIUM 2 TABLET: 8.6; 5 TABLET ORAL at 21:55

## 2019-10-14 RX ADMIN — HEPARIN SODIUM 5000 UNITS: 5000 INJECTION INTRAVENOUS; SUBCUTANEOUS at 08:37

## 2019-10-14 RX ADMIN — BISACODYL 10 MG: 10 SUPPOSITORY RECTAL at 14:54

## 2019-10-14 RX ADMIN — Medication 296 ML: at 21:55

## 2019-10-14 RX ADMIN — HYDROCODONE BITARTRATE AND ACETAMINOPHEN 1 TABLET: 7.5; 325 TABLET ORAL at 03:52

## 2019-10-14 RX ADMIN — BISACODYL 10 MG: 5 TABLET, COATED ORAL at 16:47

## 2019-10-14 RX ADMIN — BUMETANIDE 2 MG: 0.25 INJECTION INTRAMUSCULAR; INTRAVENOUS at 10:15

## 2019-10-14 RX ADMIN — HYDROCODONE BITARTRATE AND ACETAMINOPHEN 1 TABLET: 7.5; 325 TABLET ORAL at 21:45

## 2019-10-14 RX ADMIN — METOPROLOL SUCCINATE 25 MG: 25 TABLET, EXTENDED RELEASE ORAL at 11:11

## 2019-10-14 RX ADMIN — HEPARIN SODIUM 5000 UNITS: 5000 INJECTION INTRAVENOUS; SUBCUTANEOUS at 21:46

## 2019-10-14 RX ADMIN — POLYETHYLENE GLYCOL 3350 17 G: 17 POWDER, FOR SOLUTION ORAL at 08:37

## 2019-10-14 RX ADMIN — HYDROCODONE BITARTRATE AND ACETAMINOPHEN 1 TABLET: 7.5; 325 TABLET ORAL at 08:16

## 2019-10-14 RX ADMIN — FAMOTIDINE 20 MG: 20 TABLET ORAL at 08:37

## 2019-10-14 NOTE — PROGRESS NOTES
CTS Progress Note      POD 3 s/p Bronchoscopy, Right VATS with right upper lobectomy     LOS: 3 days     Subjective  Transferred to telemetry yesterday. No acute events overnight.  On HFNC overnight.      Objective    Vital Signs  Temp:  [97.6 °F (36.4 °C)-99.1 °F (37.3 °C)] 99.1 °F (37.3 °C)  Heart Rate:  [] 112  Resp:  [16-18] 18  BP: (108-159)/(59-94) 128/85    Physical Exam:   General Appearance: alert, appears stated age and cooperative   Lungs: Decreased lung sounds daryn bases   Heart: regular rhythm & normal rate, normal S1, S2 and no murmur, no gallop, no rub   Skin: Incision c/d/i     Results   Results from last 7 days   Lab Units 10/14/19  0538   WBC 10*3/mm3 10.62   HEMOGLOBIN g/dL 12.1*   HEMATOCRIT % 36.3*   PLATELETS 10*3/mm3 212     Results from last 7 days   Lab Units 10/14/19  0538   SODIUM mmol/L 135*   POTASSIUM mmol/L 4.7   CHLORIDE mmol/L 98   CO2 mmol/L 30.0*   BUN mg/dL 20   CREATININE mg/dL 0.83   GLUCOSE mg/dL 126*   CALCIUM mg/dL 9.1       Imaging Results (last 24 hours)     ** No results found for the last 24 hours. **          Assessment    R Upper Lobectomy (VATS) 10/11/19    CAD s/p stents     Essential hypertension    Hyperlipidemia on statins     Tobacco abuse      Plan   D/C Chest tubes with post CT removal CXR  Bumex 2 mg IV x1  Ambulate  Pulm toilet  If post pull CXR satisfactory, D/C home today if oxygen requirements down    10/14/19  7:39 AM

## 2019-10-14 NOTE — PROGRESS NOTES
[facility] Medicine Services  PROGRESS NOTE    Patient Name: Erik Bowser  : 1955  MRN: [id number]    Date of Admission: 10/11/2019  Primary Care Physician: Nat Umana APRN    Subjective   Subjective     CC:  F/U s/p RUL lobectomy, lung cancer    HPI:  Patient seen this morning. No major complaints.    Review of Systems  Gen-no fevers, no chills  CV-no chest pain, no palpitations  Resp-+cough, no dyspnea  GI-no N/V/D, no abd pain    All other systems reviewed and negative except any additional pertinent positives and negatives as discussed in HPI.    Objective   Objective     Vital Signs:   Temp:  [97.9 °F (36.6 °C)-99.1 °F (37.3 °C)] 99.1 °F (37.3 °C)  Heart Rate:  [] 106  Resp:  [16-18] 18  BP: (108-159)/(59-94) 133/83        Physical Exam:  Gen-no acute distress  HENT-NCAT, mucous membranes moist  CV-RRR, S1 S2 normal, no m/r/g  Resp-mildly diminished at the bases, no wheezes or rales  Abd-soft, NT, ND, +BS  Ext-no edema  Neuro-A&Ox3, no focal deficits  Skin-no rashes  Psych-appropriate mood      Results Reviewed:    Results from last 7 days   Lab Units 10/14/19  0538 10/13/19  0454 10/12/19  0825 10/10/19  1335   WBC 10*3/mm3 10.62 11.78* 13.81* 7.04   HEMOGLOBIN g/dL 12.1* 11.9* 12.3* 13.1   HEMATOCRIT % 36.3* 36.9* 37.8 39.0   PLATELETS 10*3/mm3 212 230 224 220   INR   --   --   --  0.96     Results from last 7 days   Lab Units 10/14/19  0538 10/13/19  0454 10/12/19  0825 10/10/19  1335   SODIUM mmol/L 135* 139 137 144   POTASSIUM mmol/L 4.7 5.2 4.7 4.3   CHLORIDE mmol/L 98 101 103 102   CO2 mmol/L 30.0* 31.0* 21.0* 33.0*   BUN mg/dL 20 23 20 12   CREATININE mg/dL 0.83 0.91 0.81 0.88   GLUCOSE mg/dL 126* 117* 140* 100*   CALCIUM mg/dL 9.1 9.1 8.8 9.6   ALT (SGPT) U/L  --   --   --  28   AST (SGOT) U/L  --   --   --  22     Estimated Creatinine Clearance: 105.9 mL/min (by C-G formula based on SCr of 0.83 mg/dL).    Microbiology Results Abnormal     None           Imaging Results (last 24 hours)     ** No results found for the last 24 hours. **              I have reviewed the medications:  Scheduled Meds:  famotidine 20 mg Oral Daily   heparin (porcine) 5,000 Units Subcutaneous Q12H   insulin lispro 0-7 Units Subcutaneous 4x Daily With Meals & Nightly   polyethylene glycol 17 g Oral Daily   senna-docusate 2 tablet Oral Nightly     Continuous Infusions:   PRN Meds:.•  bisacodyl  •  docusate sodium  •  HYDROcodone-acetaminophen  •  ipratropium-albuterol  •  Morphine  •  ondansetron **OR** ondansetron      Assessment/Plan   Assessment / Plan     Active Hospital Problems    Diagnosis  POA   • **R Upper Lobectomy (VATS) 10/11/19 [C34.91]  Yes   • CAD s/p stents  [I25.10]  Yes   • Essential hypertension [I10]  Yes   • Hyperlipidemia on statins  [E78.5]  Yes   • Tobacco abuse [Z72.0]  Yes      Resolved Hospital Problems   No resolved problems to display.        Brief Hospital Course to date:  Erik Bowser is a 64 y.o. male with hx of CAD s/p stents, HTN, HLD, COPD, and tobacco abuse is admitted for right upper lobe lobectomy for NSCLC. Dr. Flores performed VATS with RUL lobectomy and mediastinal lymph node dissection. Monitored in ICU afterward.    PLAN:  --Chest tube removed today.   --Suspect he will need home oxygen at discharge, however he is desaturating into the low to mid 80's on 2 liters with exertion. Has required anywhere from 2-4 liters. Recommend patient be monitored closely for another day to ensure stable O2 requirements prior to discharge, but will ultimately defer to CT Surgery for discharge planning.  --Will follow up with Dr. Salguero (has appointment 11/13) after discharge.    Disposition: I expect the patient to be discharged home in 1 day or per primary team    CODE STATUS:   Code Status and Medical Interventions:   Ordered at: 10/11/19 1455     Code Status:    CPR     Medical Interventions (Level of Support Prior to Arrest):    Full          Electronically signed by Mary Shea MD, 10/14/19, 10:41 AM.

## 2019-10-14 NOTE — PLAN OF CARE
Problem: Patient Care Overview  Goal: Plan of Care Review  Outcome: Ongoing (interventions implemented as appropriate)   10/14/19 0153   Coping/Psychosocial   Plan of Care Reviewed With patient   OTHER   Outcome Summary Pt resting quietly

## 2019-10-15 ENCOUNTER — APPOINTMENT (OUTPATIENT)
Dept: GENERAL RADIOLOGY | Facility: HOSPITAL | Age: 64
End: 2019-10-15

## 2019-10-15 VITALS
BODY MASS INDEX: 34.64 KG/M2 | OXYGEN SATURATION: 93 % | SYSTOLIC BLOOD PRESSURE: 113 MMHG | TEMPERATURE: 98.7 F | DIASTOLIC BLOOD PRESSURE: 75 MMHG | HEART RATE: 97 BPM | RESPIRATION RATE: 16 BRPM | WEIGHT: 227.8 LBS

## 2019-10-15 LAB
FOLATE BLD-MCNC: 507 NG/ML
FOLATE RBC-MCNC: 1317 NG/ML
GLUCOSE BLDC GLUCOMTR-MCNC: 116 MG/DL (ref 70–130)
GLUCOSE BLDC GLUCOMTR-MCNC: 126 MG/DL (ref 70–130)
HCT VFR BLD AUTO: 38.5 % (ref 37.5–51)

## 2019-10-15 PROCEDURE — 71045 X-RAY EXAM CHEST 1 VIEW: CPT

## 2019-10-15 PROCEDURE — 82962 GLUCOSE BLOOD TEST: CPT

## 2019-10-15 PROCEDURE — 99231 SBSQ HOSP IP/OBS SF/LOW 25: CPT | Performed by: NURSE PRACTITIONER

## 2019-10-15 PROCEDURE — 25010000002 HEPARIN (PORCINE) PER 1000 UNITS: Performed by: PHYSICIAN ASSISTANT

## 2019-10-15 RX ADMIN — HEPARIN SODIUM 5000 UNITS: 5000 INJECTION INTRAVENOUS; SUBCUTANEOUS at 09:18

## 2019-10-15 RX ADMIN — HYDROCODONE BITARTRATE AND ACETAMINOPHEN 1 TABLET: 7.5; 325 TABLET ORAL at 05:04

## 2019-10-15 RX ADMIN — POLYETHYLENE GLYCOL 3350 17 G: 17 POWDER, FOR SOLUTION ORAL at 09:18

## 2019-10-15 RX ADMIN — METOPROLOL SUCCINATE 25 MG: 25 TABLET, EXTENDED RELEASE ORAL at 09:18

## 2019-10-15 RX ADMIN — FAMOTIDINE 20 MG: 20 TABLET ORAL at 09:18

## 2019-10-15 NOTE — ANESTHESIA POSTPROCEDURE EVALUATION
Patient: rEik Bowser    Procedure Summary     Date:  10/11/19 Room / Location:   [facility] /  [facility]    Anesthesia Start:  1113 Anesthesia Stop:  1453    Procedures:       BRONCHOSCOPY (N/A Bronchus)      THORACOSCOPY VIDEO ASSISTED WITH RIGHT UPPER LOBECTOMY, MEDIASTINAL LYMPH NODE DISSECTION, INTERCOSTAL NERVE BLOCKS (Right Chest) Diagnosis:       Squamous cell carcinoma of right lung (CMS/HCC)      (Squamous cell carcinoma of right lung (CMS/HCC) [C34.91])    Surgeon:  Titus Flores MD Provider:  Arian Hood MD    Anesthesia Type:  general ASA Status:  3          Anesthesia Type: general  Last vitals  BP   133/77 (10/14/19 1900)   Temp   98.7 °F (37.1 °C) (10/14/19 1900)   Pulse   103 (10/14/19 1900)   Resp   16 (10/14/19 1900)     SpO2   93 % (10/14/19 1900)     Post Anesthesia Care and Evaluation    Patient location during evaluation: PACU  Patient participation: complete - patient participated  Level of consciousness: awake and alert  Pain score: 0  Pain management: adequate  Airway patency: patent  Anesthetic complications: No anesthetic complications  PONV Status: none  Cardiovascular status: hemodynamically stable and acceptable  Respiratory status: nonlabored ventilation, acceptable and nasal cannula  Hydration status: acceptable

## 2019-10-15 NOTE — PROGRESS NOTES
CTS Progress Note      POD 4 s/p Bronchoscopy, Right VATS with right upper lobectomy     LOS: 4 days     Subjective  No acute events overnight.  No complaints.  Pain controlled.      Objective    Vital Signs  Temp:  [98.7 °F (37.1 °C)] 98.7 °F (37.1 °C)  Heart Rate:  [101-123] 103  Resp:  [16] 16  BP: (133)/(77-83) 133/77    Physical Exam:   General Appearance: alert, appears stated age and cooperative   Lungs: Decreased lung sounds daryn bases   Heart: regular rhythm & normal rate, normal S1, S2 and no murmur, no gallop, no rub   Skin: Incision c/d/i     Results     Results from last 7 days   Lab Units 10/14/19  0538   WBC 10*3/mm3 10.62   HEMOGLOBIN g/dL 12.1*   HEMATOCRIT % 36.3*   PLATELETS 10*3/mm3 212     Results from last 7 days   Lab Units 10/14/19  0538   SODIUM mmol/L 135*   POTASSIUM mmol/L 4.7   CHLORIDE mmol/L 98   CO2 mmol/L 30.0*   BUN mg/dL 20   CREATININE mg/dL 0.83   GLUCOSE mg/dL 126*   CALCIUM mg/dL 9.1       Imaging Results (last 24 hours)     Procedure Component Value Units Date/Time    XR Chest 1 View [[id number]] Collected:  10/15/19 0828     Updated:  10/15/19 0830    Narrative:          EXAMINATION: XR CHEST 1 VW-      INDICATION: postop; C34.91-Malignant neoplasm of unspecified part of  right bronchus or lung; C34.91-Malignant neoplasm of unspecified part of  right bronchus or lung      COMPARISON: 10/14/2019     FINDINGS: Cardiac silhouette similar to prior with improved aeration in  the right suprahilar region may represent improving atelectasis versus  airspace disease. No new parenchymal process pneumothorax or large  effusion.           Impression:       Improved aeration of the right suprahilar region suggesting  improved atelectasis versus airspace disease.          XR Chest 1 View [[id number]] Collected:  10/14/19 1143     Updated:  10/14/19 1155    Narrative:       EXAMINATION: XR CHEST 1 VW- 10/14/2019     INDICATION: post CT pull; C34.91-Malignant neoplasm of unspecified part  of  right bronchus or lung; C34.91-Malignant neoplasm of unspecified part  of right bronchus or lung      COMPARISON: 10/12/2019     FINDINGS: Portable chest reveals chest tube to be removed on the right.  There is a tiny apical pneumothorax on the right. Elevation seen of the  right hemidiaphragm. Increased markings seen within the right suprahilar  region.           Impression:       Removal of the two chest tubes on the right with tiny apical  pneumothorax present on the right. Markings seen in the right hilar  region which are stable. The left lung is clear.     D:  10/14/2019  E:  10/14/2019                Assessment  POD 4 s/p Bronchoscopy, Right VATS with right upper lobectomy    R Upper Lobectomy (VATS) 10/11/19    CAD s/p stents     Essential hypertension    Hyperlipidemia on statins     Tobacco abuse      Plan   D/C home today  Ambulate  Pulmonary toilet  Awaiting final pathology    10/15/19  8:39 AM

## 2019-10-15 NOTE — PROGRESS NOTES
[facility] Medicine Services  PROGRESS NOTE    Patient Name: Erik Bowser  : 1955  MRN: [id number]    Date of Admission: 10/11/2019  Primary Care Physician: Nat Umana APRN    Subjective   Subjective     CC:  F/U s/p RUL lobectomy, lung cancer    HPI:  Patient resting in bed on nasal cannula.  Denies shortness of breath and pain is controlled.  Rested well last night.  States he is ready to go home    Review of Systems  Gen-no fevers, no chills  CV-no chest pain, no palpitations  Resp-+cough, no dyspnea  GI-no N/V/D, no abd pain    All other systems reviewed and negative except any additional pertinent positives and negatives as discussed in HPI.    Objective   Objective     Vital Signs:   Temp:  [98.7 °F (37.1 °C)] 98.7 °F (37.1 °C)  Heart Rate:  [101-113] 104  Resp:  [16-18] 18  BP: (129-133)/(77-82) 129/79        Physical Exam:  Gen-no acute distress  HENT-NCAT, mucous membranes moist  CV-RRR, S1 S2 normal, no m/r/g  Resp-mildly diminished at the bases, no wheezes or rales  Abd-soft, NT, ND, +BS  Ext-no edema  Neuro-A&Ox3, no focal deficits  Skin-no rashes  Psych-appropriate mood      Results Reviewed:    Results from last 7 days   Lab Units 10/14/19  0538 10/13/19  0454 10/12/19  0825 10/10/19  1335   WBC 10*3/mm3 10.62 11.78* 13.81* 7.04   HEMOGLOBIN g/dL 12.1* 11.9* 12.3* 13.1   HEMATOCRIT % 36.3* 36.9* 37.8 39.0   PLATELETS 10*3/mm3 212 230 224 220   INR   --   --   --  0.96     Results from last 7 days   Lab Units 10/14/19  0538 10/13/19  0454 10/12/19  0825 10/10/19  1335   SODIUM mmol/L 135* 139 137 144   POTASSIUM mmol/L 4.7 5.2 4.7 4.3   CHLORIDE mmol/L 98 101 103 102   CO2 mmol/L 30.0* 31.0* 21.0* 33.0*   BUN mg/dL 20 23 20 12   CREATININE mg/dL 0.83 0.91 0.81 0.88   GLUCOSE mg/dL 126* 117* 140* 100*   CALCIUM mg/dL 9.1 9.1 8.8 9.6   ALT (SGPT) U/L  --   --   --  28   AST (SGOT) U/L  --   --   --  22     Estimated Creatinine Clearance: 105.9 mL/min (by  C-G formula based on SCr of 0.83 mg/dL).    Microbiology Results Abnormal     None          Imaging Results (last 24 hours)     Procedure Component Value Units Date/Time    XR Chest 1 View [[id number]] Collected:  10/15/19 0828     Updated:  10/15/19 0956    Narrative:       EXAMINATION: XR CHEST 1 VW- 10/15/2019     INDICATION: postop; C34.91-Malignant neoplasm of unspecified part of  right bronchus or lung; C34.91-Malignant neoplasm of unspecified part of  right bronchus or lung      COMPARISON: 10/14/2019     FINDINGS: Cardiac silhouette similar to prior with improved aeration in  the right suprahilar region may represent improving atelectasis versus  airspace disease. No new parenchymal process pneumothorax or large  effusion.           Impression:       Improved aeration of the right suprahilar region suggesting  improved atelectasis versus airspace disease.     D:  10/15/2019  E:  10/15/2019          XR Chest 1 View [[id number]] Collected:  10/14/19 1143     Updated:  10/14/19 1155    Narrative:       EXAMINATION: XR CHEST 1 VW- 10/14/2019     INDICATION: post CT pull; C34.91-Malignant neoplasm of unspecified part  of right bronchus or lung; C34.91-Malignant neoplasm of unspecified part  of right bronchus or lung      COMPARISON: 10/12/2019     FINDINGS: Portable chest reveals chest tube to be removed on the right.  There is a tiny apical pneumothorax on the right. Elevation seen of the  right hemidiaphragm. Increased markings seen within the right suprahilar  region.           Impression:       Removal of the two chest tubes on the right with tiny apical  pneumothorax present on the right. Markings seen in the right hilar  region which are stable. The left lung is clear.     D:  10/14/2019  E:  10/14/2019                    I have reviewed the medications:  Scheduled Meds:    famotidine 20 mg Oral Daily   heparin (porcine) 5,000 Units Subcutaneous Q12H   insulin lispro 0-7 Units Subcutaneous 4x Daily With Meals  & Nightly   metoprolol succinate XL 25 mg Oral Q24H   polyethylene glycol 17 g Oral Daily   senna-docusate 2 tablet Oral Nightly     Continuous Infusions:   PRN Meds:.•  bisacodyl  •  bisacodyl  •  docusate sodium  •  HYDROcodone-acetaminophen  •  ipratropium-albuterol  •  Morphine  •  ondansetron **OR** ondansetron      Assessment/Plan   Assessment / Plan     Active Hospital Problems    Diagnosis  POA   • **R Upper Lobectomy (VATS) 10/11/19 [C34.91]  Yes   • CAD s/p stents  [I25.10]  Yes   • Essential hypertension [I10]  Yes   • Hyperlipidemia on statins  [E78.5]  Yes   • Tobacco abuse [Z72.0]  Yes      Resolved Hospital Problems   No resolved problems to display.        Brief Hospital Course to date:  Erik Bowser is a 64 y.o. male with hx of CAD s/p stents, HTN, HLD, COPD, and tobacco abuse is admitted for right upper lobe lobectomy for NSCLC. Dr. Flores performed VATS with RUL lobectomy and mediastinal lymph node dissection. Monitored in ICU afterward.    PLAN:  --Discharge today per CTS  --Home with home oxygen, and home health  --Will follow up with Dr. Salguero (has appointment 11/13) after discharge.    Disposition: I expect the patient to be discharged home today    CODE STATUS:   Code Status and Medical Interventions:   Ordered at: 10/11/19 1455     Code Status:    CPR     Medical Interventions (Level of Support Prior to Arrest):    Full         Electronically signed by Cris Spring, SOBEIDA, 10/15/19, 11:02 AM.

## 2019-10-15 NOTE — PLAN OF CARE
Problem: Lung Surgery (via Thoracotomy) (Adult)  Goal: Signs and Symptoms of Listed Potential Problems Will be Absent, Minimized or Managed (Lung Surgery)  Outcome: Ongoing (interventions implemented as appropriate)   10/15/19 0032   Goal/Outcome Evaluation   Problems Assessed (Lung Surgery/Thoracotomy) all

## 2019-10-15 NOTE — PROGRESS NOTES
Case Management Discharge Note    Final Note: Met with pt and wife at beside to f/u DCP.  Plan remains to return home.  Home O2 arranged per [facility] (pt had no preference from list of options).  Portable tank will be delivered to bedside today.  No other needs identified/voiced.  CM will cont to follow.    Destination      No service has been selected for the patient.      Durable Medical Equipment - Selection Complete      Service Provider Request Status Selected Services Address Phone Number Fax Number    [facility] [place] Selected Durable Medical Equipment [address] [place] [id number] [phone]      Dialysis/Infusion      No service has been selected for the patient.      Home Medical Care      No service has been selected for the patient.      Therapy      No service has been selected for the patient.      Community Resources      No service has been selected for the patient.             Final Discharge Disposition Code: 01 - home or self-care

## 2019-10-15 NOTE — DISCHARGE PLACEMENT REQUEST
"Saúl Brewer (64 y.o. Male)     Date of Birth Social Security Number Address Home Phone MRN    1955  [address]  [place] [address] [phone] [id number]    Religious Marital Status          Ep[employer]        Admission Date Admission Type Admitting Provider Attending Provider Department, Room/Bed    10/11/19 Elective Titus Flores MD Chaney, John H, MD [address], [address]    Discharge Date Discharge Disposition Discharge Destination         Home or Self Care              Attending Provider:  Titus Flores MD    Allergies:  Rosuvastatin    Isolation:  None   Infection:  None   Code Status:  CPR    Ht:  172.7 cm (68\")   Wt:  103 kg (227 lb 12.8 oz)    Admission Cmt:  None   Principal Problem:  R Upper Lobectomy (VATS) 10/11/19 [C34.91] More...                 Active Insurance as of 10/11/2019     Primary Coverage     Payor Plan Insurance Group Employer/Plan Group    [facility] BLUE [facility] EMPLOYEE [id number]     Payor Plan Address Payor Plan Phone Number Payor Plan Fax Number Effective Dates    [address] [phone]  2018 - None Entered    [place] [id number]       Subscriber Name Subscriber Birth Date Member ID       SAÚL BREWER 1955 [id number]                 Emergency Contacts      (Rel.) Home Phone Work Phone Mobile Phone    Deidra Brewer (Spouse) [phone] -- [phone]    Adalberto Brewer (Son) -- -- [phone]                [address] [address]  Dept. Phone:  [phone]  Dept. Fax:   Date Ordered: Oct 14, 2019         Patient:  Saúl Brewer MRN:  [id number]   [address]  [place] [address] :  1955  SSN:    Phone: [phone] Sex:  M     Weight: 103 kg (227 lb 12.8 oz)         Ht Readings from Last 1 Encounters:   10/10/19 172.7 cm (68\")         Oxygen Therapy         (Order ID: [id number])    Diagnosis:  Squamous cell carcinoma of right " lung (CMS/HCC) (C34.91 [ICD-10-CM] 162.9 [ICD-9-CM])   Quantity:  1     Delivery Modality: Nasal Cannula  Liters Per Minute: 4  Duration: Continuous  Equipment:  Oxygen Concentrator &  &  Portable Gaseous Oxygen System & Portable Oxygen Contents Gaseous &  Conserving Regulator  Length of Need (99 Months = Lifetime): 6 Months        Authorizing Provider's Phone: [phone]   Verbal Order Mode: Per protocol: cosign required  Authorizing Provider: Titus Flores MD  Authorizing Provider's NPI: [id number]     Order Entered By: Demetrice Ramires RN 10/14/2019 10:43 AM     Electronically signed by: Titus Flores MD 10/14/2019 10:55 AM                         History & Physical      Mahogany Mcdaniels APRN at 10/11/19 1054     Attestation signed by Titus Flores MD at 10/11/19 1058    Agree with above.  No interval change.  Plan for bronchoscopy, right VATS, right upper lobectomy, mediastinal lymph node dissection and intercostal nerve blocks with cryoablation.  This represents a right upper lobe lung cancer ([id number] Clinical stage IIIA).                    Pre-Op H&P (See Recent Office Note Attached for Full H&P)    Chief complaint: RUL lung cancer    Review of Systems:  General ROS:  no fever, chills, rashes, No change since last office visit  Cardiovascular ROS: no chest pain or dyspnea on exertion.  +cardiac clearance.  History of CAD s/p stents  Respiratory ROS: no cough, shortness of breath, or wheezing    Meds:    No current facility-administered medications on file prior to encounter.      Current Outpatient Medications on File Prior to Encounter   Medication Sig Dispense Refill   • metoprolol succinate XL (TOPROL-XL) 25 MG 24 hr tablet TAKE ONE TABLET BY MOUTH DAILY 90 tablet 0   • albuterol sulfate HFA (PROVENTIL HFA) 108 (90 Base) MCG/ACT inhaler Inhale 2 puffs Every 4 (Four) Hours As Needed for Wheezing or Shortness of Air.     • aspirin 81 MG EC tablet Take 81 mg by mouth Every  "Night.     • atorvastatin (LIPITOR) 40 MG tablet TAKE ONE TABLET BY MOUTH EVERY NIGHT AT BEDTIME 90 tablet 0   • B Complex Vitamins (VITAMIN-B COMPLEX PO) Take 1 tablet by mouth Daily.     • Cholecalciferol (VITAMIN D3) 5000 units capsule capsule Take 5,000 Units by mouth Daily.     • fexofenadine (ALLEGRA) 180 MG tablet Take 180 mg by mouth Daily.     • naproxen sodium (ALEVE) 220 MG tablet Take 220 mg by mouth As Needed for Mild Pain .     • nitroglycerin (NITROSTAT) 0.4 MG SL tablet 1 under the tongue as needed for angina, may repeat q5mins for up three doses (Patient taking differently: Place 0.4 mg under the tongue Every 5 (Five) Minutes As Needed for Chest Pain. 1 under the tongue as needed for angina, may repeat q5mins for up three doses) 100 tablet 11   • NON FORMULARY Take 1 tablet by mouth Daily. \"derma-health pro\" OTC supplement for skin     • ondansetron (ZOFRAN) 8 MG tablet Take 1 tablet by mouth 3 (Three) Times a Day As Needed for Nausea or Vomiting. 30 tablet 5       Vital Signs:  /81 (BP Location: Right arm, Patient Position: Sitting)   Temp 97.4 °F (36.3 °C) (Temporal)   Resp 18   SpO2 98%     Physical Exam:    CV:  S1S2 regular rate and rhythm, no murmur               Resp:  Clear to auscultation; respirations regular, even and unlabored    Results Review:     Lab Results   Component Value Date    WBC 7.04 10/10/2019    HGB 13.1 10/10/2019    HCT 39.0 10/10/2019    .8 (H) 10/10/2019     10/10/2019    NEUTROABS 3.09 10/10/2019    GLUCOSE 100 (H) 10/10/2019    BUN 12 10/10/2019    CREATININE 0.88 10/10/2019    EGFRIFNONA 87 10/10/2019    EGFRIFAFRI 119 04/13/2018     10/10/2019    K 4.3 10/10/2019     10/10/2019    CO2 33.0 (H) 10/10/2019    MG 2.1 01/21/2015    PHOS 3.7 01/21/2015    CALCIUM 9.6 10/10/2019    ALBUMIN 4.40 10/10/2019    AST 22 10/10/2019    ALT 28 10/10/2019    BILITOT 1.5 (H) 10/10/2019        I reviewed the patient's new clinical " results.    Cancer Staging (if applicable)  Cancer Patient: _x_ yes __no __unknown; If yes, clinical stage T:__ N:__M:__, stage group or __N/A    Assessment/Plan:    64-year-old  male with a history of hypertension, hyperlipidemia, coronary artery disease status post stenting, COPD and tobacco abuse who presents with a right upper lobe lung cancer (D1hW1S6 Clinical stage IIIA).  The patient has undergone neoadjuvant treatment with adequate response seen on repeat imaging.  The patient is a reasonable candidate for bronchoscopy, right VATS, right upper lobectomy, mediastinal lymph node dissection and intercostal nerve blocks with cryoablation.  The risks and benefits of surgery were discussed with the patient including pain, bleeding, infection, air leak, myocardial infarction and death.  The patient understood these risks and wished to proceed with surgery.    Mahogany Mcdaniels, SOBEIDA  10/11/2019   10:54 AM      Electronically signed by Titus Flores MD at 10/11/19 1058   Source Note             10/01/2019  Patient Information  Erik Bowser                                                                                           [address]  [facility] [address]   1955  'PCP/Referring Physician'  Nat Umana, SOBEIDA  [phone]  Kevin Salguero MD  [phone]  Chief Complaint   Patient presents with   • Lung Cancer     Referred by Dr. Salguero for right upper lobe lung cancer       History of Present Illness: 64-year-old  male with a history of hypertension, hyperlipidemia, coronary artery disease status post stenting, COPD and tobacco abuse who presents with a right upper lobe lung cancer.  The patient underwent lung cancer screening CT scan that demonstrated a right upper lobe mass.  He subsequently had a biopsy that was consistent with squamous cell carcinoma in the Station 4 and 7 distribution.  He denies cough, hemoptysis, weight loss, lymphadenopathy, fevers or chills.  The patient  finished his neoadjuvant chemotherapy and radiation 2 weeks ago.      Patient Active Problem List   Diagnosis   • Coronary artery disease involving native coronary artery of native heart without angina pectoris   • Essential hypertension   • Hyperlipidemia LDL goal <70   • Tobacco abuse   • Lymphocytosis   • Mediastinal lymphadenopathy   • Squamous cell lung cancer (CMS/[facility])     Past Medical History:   Diagnosis Date   • Arthritis    • At risk for obstructive sleep apnea     Spouse reports patient snores and that he has questionable sleep apnea but has never had a sleep study done   • Borderline diabetes     Has never taken medication    • COPD (chronic obstructive pulmonary disease) (CMS/[facility])    • Coronary artery disease     1 stent   • Dyslipidemia    • Elevated cholesterol    • Hearing loss     Does not use hearing devices   • History of bronchitis    • History of pneumonia    • Hyperlipidemia    • Hypertension    • MI (myocardial infarction) (CMS/[facility]) 01/20/2015    placement of stent x1   • MRSA (methicillin resistant Staphylococcus aureus) 01/2015    left hand - treated   • Seasonal allergies    • Skin cancer     skin, lung   • Squamous cell lung cancer (CMS/[facility]) 7/24/2019   • Tobacco abuse    • Wears glasses     OTC glasses PRN for reading   • Wears partial dentures     Upper mouth     Past Surgical History:   Procedure Laterality Date   • BRONCHOSCOPY N/A 7/11/2019    Procedure: BRONCHOSCOPY WITH ENDOBRONCHIAL ULTRASOUND with General Anesthesia.  ;  Surgeon: Jeff Rubin MD;  Location: [facility];  Service: Pulmonary   • CARDIAC CATHETERIZATION  01/20/2015    Placement of stent x1   • CATARACT EXTRACTION Right    • CHOLECYSTECTOMY      Laparoscopic   • COLONOSCOPY     • CORONARY ANGIOPLASTY WITH STENT PLACEMENT     • HEMORRHOIDECTOMY     • KNEE MENISCAL REPAIR Bilateral    • MOUTH SURGERY      Post for oral implants in upper mouth x3   • ROTATOR CUFF REPAIR Bilateral    • SKIN CANCER EXCISION       "melanoma removed from right neck and back.  Squamous cell removed multiple places.    • WISDOM TOOTH EXTRACTION         Current Outpatient Medications:   •  albuterol sulfate HFA (PROVENTIL HFA) 108 (90 Base) MCG/ACT inhaler, Inhale 2 puffs Every 4 (Four) Hours As Needed for Wheezing or Shortness of Air., Disp: , Rfl:   •  aspirin 81 MG EC tablet, Take 81 mg by mouth Daily., Disp: , Rfl:   •  atorvastatin (LIPITOR) 40 MG tablet, TAKE ONE TABLET BY MOUTH EVERY NIGHT AT BEDTIME, Disp: 90 tablet, Rfl: 0  •  B Complex Vitamins (VITAMIN-B COMPLEX PO), Take 1 tablet by mouth Daily., Disp: , Rfl:   •  Cholecalciferol (VITAMIN D3) 5000 units capsule capsule, Take 5,000 Units by mouth Daily., Disp: , Rfl:   •  docusate sodium (COLACE) 250 MG capsule, Take 250 mg by mouth 2 (Two) Times a Day., Disp: , Rfl:   •  fexofenadine (ALLEGRA) 180 MG tablet, Take 180 mg by mouth Daily., Disp: , Rfl:   •  lactulose (CHRONULAC) 10 GM/15ML solution, Take 30 mL by mouth 2 (Two) Times a Day As Needed (constipation)., Disp: 946 mL, Rfl: 1  •  lisinopril (PRINIVIL,ZESTRIL) 5 MG tablet, TAKE ONE TABLET BY MOUTH DAILY, Disp: 90 tablet, Rfl: 2  •  metoprolol succinate XL (TOPROL-XL) 25 MG 24 hr tablet, TAKE ONE TABLET BY MOUTH DAILY, Disp: 90 tablet, Rfl: 0  •  naproxen sodium (ALEVE) 220 MG tablet, Take 220 mg by mouth As Needed for Mild Pain ., Disp: , Rfl:   •  nitroglycerin (NITROSTAT) 0.4 MG SL tablet, 1 under the tongue as needed for angina, may repeat q5mins for up three doses (Patient taking differently: Place 0.4 mg under the tongue Every 5 (Five) Minutes As Needed for Chest Pain. 1 under the tongue as needed for angina, may repeat q5mins for up three doses), Disp: 100 tablet, Rfl: 11  •  NON FORMULARY, Take 1 tablet by mouth Daily. \"derma-health pro\" OTC supplement for skin, Disp: , Rfl:   •  ondansetron (ZOFRAN) 8 MG tablet, Take 1 tablet by mouth 3 (Three) Times a Day As Needed for Nausea or Vomiting., Disp: 30 tablet, Rfl: 5  •  " tiotropium bromide-olodaterol (STIOLTO RESPIMAT) 2.5-2.5 MCG/ACT aerosol solution inhaler, Inhale 2 puffs Daily. Could not tolerate Anoro., Disp: 1 inhaler, Rfl: 5  Allergies   Allergen Reactions   • Rosuvastatin Myalgia     Social History     Socioeconomic History   • Marital status:      Spouse name: Not on file   • Number of children: 2   • Years of education: Not on file   • Highest education level: Not on file   Occupational History   • Occupation: [employer]     Employer: RETIRED   Tobacco Use   • Smoking status: Former Smoker     Packs/day: 2.00     Years: 46.00     Pack years: 92.00     Types: Cigarettes     Last attempt to quit: 2019     Years since quittin.1   • Smokeless tobacco: Never Used   Substance and Sexual Activity   • Alcohol use: Yes     Comment: Social use, no history of abuse   • Drug use: No   • Sexual activity: Defer   Social History Narrative    Lives in [place], [place] with spouse     Family History   Problem Relation Age of Onset   • Heart attack Mother 60   • Coronary artery disease Mother    • Hyperlipidemia Mother    • Lung cancer Mother    • Emphysema Father    • Hypertension Sister    • Heart attack Sister 45   • Hypertension Brother    • Heart attack Brother 50   • Hypertension Sister    • Hypertension Brother    • Diabetes Brother      Review of Systems   Constitution: Negative for chills, fever, malaise/fatigue, night sweats and weight loss.   HENT: Negative for hearing loss, odynophagia and sore throat.    Cardiovascular: Positive for leg swelling. Negative for chest pain, dyspnea on exertion, orthopnea and palpitations.   Respiratory: Positive for cough. Negative for hemoptysis.    Endocrine: Negative for cold intolerance, heat intolerance, polydipsia, polyphagia and polyuria.   Hematologic/Lymphatic: Bruises/bleeds easily.   Skin: Negative for itching and rash.   Musculoskeletal: Positive for arthritis, gout and joint pain. Negative for joint swelling and  "myalgias.   Gastrointestinal: Positive for constipation. Negative for abdominal pain, diarrhea, hematemesis, hematochezia, melena, nausea and vomiting.   Genitourinary: Negative for dysuria, frequency and hematuria.   Neurological: Positive for numbness (tingling in hands). Negative for focal weakness, headaches and seizures.   Psychiatric/Behavioral: Negative for suicidal ideas.   All other systems reviewed and are negative.    Vitals:    10/01/19 1031   BP: 130/69   BP Location: Right arm   Patient Position: Sitting   Pulse: 72   Temp: 97.4 °F (36.3 °C)   TempSrc: Temporal   SpO2: 98%   Weight: 98.2 kg (216 lb 9.6 oz)   Height: 175.3 cm (69\")      Physical Exam   Constitutional: He is oriented to person, place, and time. He appears well-developed and well-nourished. No distress.    male who appears his stated age and is present with his wife.   HENT:   Head: Normocephalic and atraumatic.   Eyes: Conjunctivae and EOM are normal. No scleral icterus.   Neck: Normal range of motion. Neck supple. No JVD present. No tracheal deviation present.   Cardiovascular: Normal rate, regular rhythm and normal heart sounds. Exam reveals no gallop and no friction rub.   No murmur heard.  Pulmonary/Chest: Effort normal and breath sounds normal. No stridor. No respiratory distress. He has no wheezes. He has no rales.   Abdominal: Soft. He exhibits no distension and no mass. There is no tenderness. There is no rebound and no guarding.   Musculoskeletal: Normal range of motion. He exhibits no deformity.   Lymphadenopathy:     He has no cervical adenopathy.     He has no axillary adenopathy.        Right: No supraclavicular adenopathy present.        Left: No supraclavicular adenopathy present.   Neurological: He is alert and oriented to person, place, and time.   Skin: No rash noted. No erythema.   Psychiatric: He has a normal mood and affect. His behavior is normal. Judgment and thought content normal. "       Labs/Imaging:  -MRI of the brain performed 7/22/2019, demonstrates no evidence of intracranial metastasis.  There is chronic microangiopathy.  -PET/CT performed 7/22/2019, personally reviewed, demonstrates right hilar and paratracheal adenopathy with an SUV of 9.9 and 13.4.  No evidence of metastatic disease.  -Bronchoscopy/EBUS performed 7/11/2019, right middle lobe lavage negative for malignancy, FNA of 4R and 7 malignant  -CT of the chest performed 6/28/2019, personally reviewed, demonstrates a 1.8 x 1.3 cm nodule present in the azygos lobe of the lung.  2 paratracheal lymph nodes are present measuring 2.4 cm.  -Pulmonary function test performed 7/17/2019, FEV1 2.44 (72% predicted, DLCO 55% predicted  -CT of the chest performed 9/23/2019, personally reviewed, demonstrates interval improvement of the right upper lobe nodule and paratracheal lymphadenopathy.    Assessment/Plan:  64-year-old  male with a history of hypertension, hyperlipidemia, coronary artery disease status post stenting, COPD and tobacco abuse who presents with a right upper lobe lung cancer (N4uR2H2 Clinical stage IIIA).  The patient has undergone neoadjuvant treatment with adequate response seen on repeat imaging.  The patient is a reasonable candidate for bronchoscopy, right VATS, right upper lobectomy, mediastinal lymph node dissection and intercostal nerve blocks with cryoablation.  The risks and benefits of surgery were discussed with the patient including pain, bleeding, infection, air leak, myocardial infarction and death.  The patient understood these risks and wished to proceed with surgery.  He will need cardiac clearance from his cardiologist Dr. Lacy prior to surgery.    Patient Active Problem List   Diagnosis   • Coronary artery disease involving native coronary artery of native heart without angina pectoris   • Essential hypertension   • Hyperlipidemia LDL goal <70   • Tobacco abuse   • Lymphocytosis   •  Mediastinal lymphadenopathy   • Squamous cell lung cancer (CMS/HCC)                           Electronically signed by Titus Flores MD at 10/08/19 0629             Titus Flores MD at 10/01/19 1045          10/01/2019  Patient Information  Erik Bowser                                                                                          [address]  [place] [address]   1955  'PCP/Referring Physician'  Nat Umana, APRN  [phone]  Kevin Salguero MD  [phone]  Chief Complaint   Patient presents with   • Lung Cancer     Referred by Dr. Salguero for right upper lobe lung cancer       History of Present Illness: 64-year-old  male with a history of hypertension, hyperlipidemia, coronary artery disease status post stenting, COPD and tobacco abuse who presents with a right upper lobe lung cancer.  The patient underwent lung cancer screening CT scan that demonstrated a right upper lobe mass.  He subsequently had a biopsy that was consistent with squamous cell carcinoma in the Station 4 and 7 distribution.  He denies cough, hemoptysis, weight loss, lymphadenopathy, fevers or chills.  The patient finished his neoadjuvant chemotherapy and radiation 2 weeks ago.      Patient Active Problem List   Diagnosis   • Coronary artery disease involving native coronary artery of native heart without angina pectoris   • Essential hypertension   • Hyperlipidemia LDL goal <70   • Tobacco abuse   • Lymphocytosis   • Mediastinal lymphadenopathy   • Squamous cell lung cancer (CMS/HCC)     Past Medical History:   Diagnosis Date   • Arthritis    • At risk for obstructive sleep apnea     Spouse reports patient snores and that he has questionable sleep apnea but has never had a sleep study done   • Borderline diabetes     Has never taken medication    • COPD (chronic obstructive pulmonary disease) (CMS/HCC)    • Coronary artery disease     1 stent   • Dyslipidemia    • Elevated cholesterol    • Hearing loss      Does not use hearing devices   • History of bronchitis    • History of pneumonia    • Hyperlipidemia    • Hypertension    • MI (myocardial infarction) (CMS/[facility]) 01/20/2015    placement of stent x1   • MRSA (methicillin resistant Staphylococcus aureus) 01/2015    left hand - treated   • Seasonal allergies    • Skin cancer     skin, lung   • Squamous cell lung cancer (CMS/[facility]) 7/24/2019   • Tobacco abuse    • Wears glasses     OTC glasses PRN for reading   • Wears partial dentures     Upper mouth     Past Surgical History:   Procedure Laterality Date   • BRONCHOSCOPY N/A 7/11/2019    Procedure: BRONCHOSCOPY WITH ENDOBRONCHIAL ULTRASOUND with General Anesthesia.  ;  Surgeon: Jeff Rubin MD;  Location: [facility];  Service: Pulmonary   • CARDIAC CATHETERIZATION  01/20/2015    Placement of stent x1   • CATARACT EXTRACTION Right    • CHOLECYSTECTOMY      Laparoscopic   • COLONOSCOPY     • CORONARY ANGIOPLASTY WITH STENT PLACEMENT     • HEMORRHOIDECTOMY     • KNEE MENISCAL REPAIR Bilateral    • MOUTH SURGERY      Post for oral implants in upper mouth x3   • ROTATOR CUFF REPAIR Bilateral    • SKIN CANCER EXCISION      melanoma removed from right neck and back.  Squamous cell removed multiple places.    • WISDOM TOOTH EXTRACTION         Current Outpatient Medications:   •  albuterol sulfate HFA (PROVENTIL HFA) 108 (90 Base) MCG/ACT inhaler, Inhale 2 puffs Every 4 (Four) Hours As Needed for Wheezing or Shortness of Air., Disp: , Rfl:   •  aspirin 81 MG EC tablet, Take 81 mg by mouth Daily., Disp: , Rfl:   •  atorvastatin (LIPITOR) 40 MG tablet, TAKE ONE TABLET BY MOUTH EVERY NIGHT AT BEDTIME, Disp: 90 tablet, Rfl: 0  •  B Complex Vitamins (VITAMIN-B COMPLEX PO), Take 1 tablet by mouth Daily., Disp: , Rfl:   •  Cholecalciferol (VITAMIN D3) 5000 units capsule capsule, Take 5,000 Units by mouth Daily., Disp: , Rfl:   •  docusate sodium (COLACE) 250 MG capsule, Take 250 mg by mouth 2 (Two) Times a Day., Disp: , Rfl:   •   "fexofenadine (ALLEGRA) 180 MG tablet, Take 180 mg by mouth Daily., Disp: , Rfl:   •  lactulose (CHRONULAC) 10 GM/15ML solution, Take 30 mL by mouth 2 (Two) Times a Day As Needed (constipation)., Disp: 946 mL, Rfl: 1  •  lisinopril (PRINIVIL,ZESTRIL) 5 MG tablet, TAKE ONE TABLET BY MOUTH DAILY, Disp: 90 tablet, Rfl: 2  •  metoprolol succinate XL (TOPROL-XL) 25 MG 24 hr tablet, TAKE ONE TABLET BY MOUTH DAILY, Disp: 90 tablet, Rfl: 0  •  naproxen sodium (ALEVE) 220 MG tablet, Take 220 mg by mouth As Needed for Mild Pain ., Disp: , Rfl:   •  nitroglycerin (NITROSTAT) 0.4 MG SL tablet, 1 under the tongue as needed for angina, may repeat q5mins for up three doses (Patient taking differently: Place 0.4 mg under the tongue Every 5 (Five) Minutes As Needed for Chest Pain. 1 under the tongue as needed for angina, may repeat q5mins for up three doses), Disp: 100 tablet, Rfl: 11  •  NON FORMULARY, Take 1 tablet by mouth Daily. \"derma-health pro\" OTC supplement for skin, Disp: , Rfl:   •  ondansetron (ZOFRAN) 8 MG tablet, Take 1 tablet by mouth 3 (Three) Times a Day As Needed for Nausea or Vomiting., Disp: 30 tablet, Rfl: 5  •  tiotropium bromide-olodaterol (STIOLTO RESPIMAT) 2.5-2.5 MCG/ACT aerosol solution inhaler, Inhale 2 puffs Daily. Could not tolerate Anoro., Disp: 1 inhaler, Rfl: 5  Allergies   Allergen Reactions   • Rosuvastatin Myalgia     Social History     Socioeconomic History   • Marital status:      Spouse name: Not on file   • Number of children: 2   • Years of education: Not on file   • Highest education level: Not on file   Occupational History   • Occupation: [employer]     Employer: RETIRED   Tobacco Use   • Smoking status: Former Smoker     Packs/day: 2.00     Years: 46.00     Pack years: 92.00     Types: Cigarettes     Last attempt to quit: 2019     Years since quittin.1   • Smokeless tobacco: Never Used   Substance and Sexual Activity   • Alcohol use: Yes     Comment: Social " "use, no history of abuse   • Drug use: No   • Sexual activity: Defer   Social History Narrative    Lives in [place], [place] with spouse     Family History   Problem Relation Age of Onset   • Heart attack Mother 60   • Coronary artery disease Mother    • Hyperlipidemia Mother    • Lung cancer Mother    • Emphysema Father    • Hypertension Sister    • Heart attack Sister 45   • Hypertension Brother    • Heart attack Brother 50   • Hypertension Sister    • Hypertension Brother    • Diabetes Brother      Review of Systems   Constitution: Negative for chills, fever, malaise/fatigue, night sweats and weight loss.   HENT: Negative for hearing loss, odynophagia and sore throat.    Cardiovascular: Positive for leg swelling. Negative for chest pain, dyspnea on exertion, orthopnea and palpitations.   Respiratory: Positive for cough. Negative for hemoptysis.    Endocrine: Negative for cold intolerance, heat intolerance, polydipsia, polyphagia and polyuria.   Hematologic/Lymphatic: Bruises/bleeds easily.   Skin: Negative for itching and rash.   Musculoskeletal: Positive for arthritis, gout and joint pain. Negative for joint swelling and myalgias.   Gastrointestinal: Positive for constipation. Negative for abdominal pain, diarrhea, hematemesis, hematochezia, melena, nausea and vomiting.   Genitourinary: Negative for dysuria, frequency and hematuria.   Neurological: Positive for numbness (tingling in hands). Negative for focal weakness, headaches and seizures.   Psychiatric/Behavioral: Negative for suicidal ideas.   All other systems reviewed and are negative.    Vitals:    10/01/19 1031   BP: 130/69   BP Location: Right arm   Patient Position: Sitting   Pulse: 72   Temp: 97.4 °F (36.3 °C)   TempSrc: Temporal   SpO2: 98%   Weight: 98.2 kg (216 lb 9.6 oz)   Height: 175.3 cm (69\")      Physical Exam   Constitutional: He is oriented to person, place, and time. He appears well-developed and well-nourished. No distress.    male who " appears his stated age and is present with his wife.   HENT:   Head: Normocephalic and atraumatic.   Eyes: Conjunctivae and EOM are normal. No scleral icterus.   Neck: Normal range of motion. Neck supple. No JVD present. No tracheal deviation present.   Cardiovascular: Normal rate, regular rhythm and normal heart sounds. Exam reveals no gallop and no friction rub.   No murmur heard.  Pulmonary/Chest: Effort normal and breath sounds normal. No stridor. No respiratory distress. He has no wheezes. He has no rales.   Abdominal: Soft. He exhibits no distension and no mass. There is no tenderness. There is no rebound and no guarding.   Musculoskeletal: Normal range of motion. He exhibits no deformity.   Lymphadenopathy:     He has no cervical adenopathy.     He has no axillary adenopathy.        Right: No supraclavicular adenopathy present.        Left: No supraclavicular adenopathy present.   Neurological: He is alert and oriented to person, place, and time.   Skin: No rash noted. No erythema.   Psychiatric: He has a normal mood and affect. His behavior is normal. Judgment and thought content normal.       Labs/Imaging:  -MRI of the brain performed 7/22/2019, demonstrates no evidence of intracranial metastasis.  There is chronic microangiopathy.  -PET/CT performed 7/22/2019, personally reviewed, demonstrates right hilar and paratracheal adenopathy with an SUV of 9.9 and 13.4.  No evidence of metastatic disease.  -Bronchoscopy/EBUS performed 7/11/2019, right middle lobe lavage negative for malignancy, FNA of 4R and 7 malignant  -CT of the chest performed 6/28/2019, personally reviewed, demonstrates a 1.8 x 1.3 cm nodule present in the azygos lobe of the lung.  2 paratracheal lymph nodes are present measuring 2.4 cm.  -Pulmonary function test performed 7/17/2019, FEV1 2.44 (72% predicted, DLCO 55% predicted  -CT of the chest performed 9/23/2019, personally reviewed, demonstrates interval improvement of the right upper lobe  nodule and paratracheal lymphadenopathy.    Assessment/Plan:  64-year-old  male with a history of hypertension, hyperlipidemia, coronary artery disease status post stenting, COPD and tobacco abuse who presents with a right upper lobe lung cancer ([id number] Clinical stage IIIA).  The patient has undergone neoadjuvant treatment with adequate response seen on repeat imaging.  The patient is a reasonable candidate for bronchoscopy, right VATS, right upper lobectomy, mediastinal lymph node dissection and intercostal nerve blocks with cryoablation.  The risks and benefits of surgery were discussed with the patient including pain, bleeding, infection, air leak, myocardial infarction and death.  The patient understood these risks and wished to proceed with surgery.  He will need cardiac clearance from his cardiologist Dr. Lacy prior to surgery.    Patient Active Problem List   Diagnosis   • Coronary artery disease involving native coronary artery of native heart without angina pectoris   • Essential hypertension   • Hyperlipidemia LDL goal <70   • Tobacco abuse   • Lymphocytosis   • Mediastinal lymphadenopathy   • Squamous cell lung cancer (CMS/HCC)                          Electronically signed by Titus Flores MD at 10/08/19 0629                 10/14/19 0820  --  110  --  --  room air-at rest  85 Abnormal

## 2019-10-16 ENCOUNTER — READMISSION MANAGEMENT (OUTPATIENT)
Dept: CALL CENTER | Facility: HOSPITAL | Age: 64
End: 2019-10-16

## 2019-10-16 LAB
CYTO UR: NORMAL
LAB AP CASE REPORT: NORMAL
LAB AP CLINICAL INFORMATION: NORMAL
Lab: NORMAL
PATH REPORT.FINAL DX SPEC: NORMAL
PATH REPORT.GROSS SPEC: NORMAL

## 2019-10-17 NOTE — OUTREACH NOTE
Prep Survey      Responses   Facility patient discharged from?  [facility]   Is patient eligible?  Yes   Discharge diagnosis  Right upper lobectomy this visit (VATS)   Does the patient have one of the following disease processes/diagnoses(primary or secondary)?  Cardiothoracic surgery   Does the patient have Home health ordered?  No   Is there a DME ordered?  Yes   What DME was ordered?  Home O2 via [employer]   Prep survey completed?  Yes          Ngozi Mccray RN

## 2019-10-18 ENCOUNTER — TELEPHONE (OUTPATIENT)
Dept: PRIMARY CARE CLINIC | Age: 64
End: 2019-10-18

## 2019-10-21 ENCOUNTER — OFFICE VISIT (OUTPATIENT)
Dept: PRIMARY CARE CLINIC | Age: 64
End: 2019-10-21
Payer: COMMERCIAL

## 2019-10-21 VITALS
OXYGEN SATURATION: 96 % | DIASTOLIC BLOOD PRESSURE: 64 MMHG | TEMPERATURE: 98.2 F | SYSTOLIC BLOOD PRESSURE: 100 MMHG | HEART RATE: 56 BPM | BODY MASS INDEX: 31.9 KG/M2 | RESPIRATION RATE: 16 BRPM | WEIGHT: 215.4 LBS | HEIGHT: 69 IN

## 2019-10-21 DIAGNOSIS — Z90.2 STATUS POST LOBECTOMY OF LUNG: ICD-10-CM

## 2019-10-21 DIAGNOSIS — R09.02 HYPOXIA: ICD-10-CM

## 2019-10-21 DIAGNOSIS — C34.11 MALIGNANT NEOPLASM OF UPPER LOBE OF RIGHT LUNG (HCC): Primary | ICD-10-CM

## 2019-10-21 DIAGNOSIS — K59.03 DRUG-INDUCED CONSTIPATION: ICD-10-CM

## 2019-10-21 PROCEDURE — 1111F DSCHRG MED/CURRENT MED MERGE: CPT | Performed by: NURSE PRACTITIONER

## 2019-10-21 PROCEDURE — 99495 TRANSJ CARE MGMT MOD F2F 14D: CPT | Performed by: NURSE PRACTITIONER

## 2019-10-21 ASSESSMENT — ENCOUNTER SYMPTOMS
EYES NEGATIVE: 1
COUGH: 1
CONSTIPATION: 1
SHORTNESS OF BREATH: 1

## 2019-10-24 ENCOUNTER — READMISSION MANAGEMENT (OUTPATIENT)
Dept: CALL CENTER | Facility: HOSPITAL | Age: 64
End: 2019-10-24

## 2019-10-24 NOTE — OUTREACH NOTE
CT Surgery Week 2 Survey      Responses   Facility patient discharged from?  [facility]   Does the patient have one of the following disease processes/diagnoses(primary or secondary)?  Cardiothoracic surgery   Week 2 attempt successful?  Yes   Call start time  1232   Call end time  1237   Discharge diagnosis  Right upper lobectomy this visit (VATS)   Is patient permission given to speak with other caregiver?  Yes   Person spoke with today (if not patient) and relationship  Deidra Spouse [phone]    Meds reviewed with patient/caregiver?  Yes   Is the patient having any side effects they believe may be caused by any medication additions or changes?  No   Does the patient have all medications related to this admission filled (includes all antibiotics, pain medications, cardiac medications, etc.)  Yes   Is the patient taking all medications as directed (includes completed medication regime)?  Yes   Does the patient have a primary care provider?   Yes   Does the patient have an appointment scheduled with their C/T surgeon?  Yes   Has the patient kept scheduled appointments due by today?  Yes   Has home health visited the patient within 72 hours of discharge?  N/A   What DME was ordered?  Home O2 via [employer]   Has all DME been delivered?  Yes   Psychosocial issues?  No   Did the patient receive a copy of their discharge instructions?  Yes   Nursing interventions  Reviewed instructions with patient   What is the patient's perception of their health status since discharge?  Improving   Nursing interventions  Nurse provided patient education   Is the patient/caregiver able to teach back normal signs of recovery?  Pain or discomfort at incisional site   Nursing interventions  Reassured on normal signs of recovery   Is the patient /caregiver able to teach back basic post-op care?  Take showers only when approved by MD-sponge bathe until then, No tub bath, swimming, or hot tub until instructed by MD, Lifting as instructed  by MD in discharge instructions, Keep incision areas clean, dry and protected, Drive as instructed by MD in discharge instructions   Is the patient/caregiver able to teach back signs and symptoms of incisional infection?  Pus or odor from incision, Incisional warmth, Increased drainage or bleeding, Increased redness, swelling or pain at the incisonal site, Fever   Is the patient/caregiver able to teach back steps to recovery at home?  Rest and rebuild strength, gradually increase activity, Set small, achievable goals for return to baseline health   Is the patient/caregiver able to teach back the hierarchy of who to call/visit for symptoms/problems? PCP, Specialist, Home health nurse, Urgent Care, ED, 911  Yes   Week 2 call completed?  Yes          Chad Da Silva RN

## 2019-10-30 NOTE — DISCHARGE SUMMARY
CTS Discharge Summary    Patient Care Team:  Nat Umana APRN as PCP - General (Family Medicine)  Kevin Salguero MD as Referring Physician (Hematology and Oncology)  Hill Herbert MD as Consulting Physician (Radiation Oncology)  Ronan Lacy IV, MD as Consulting Physician (Interventional Cardiology)  Jeff Rubin MD as Consulting Physician (Pulmonary Disease)  Titus Flores MD as Surgeon (Cardiothoracic Surgery)  Consults:   Consults     No orders found from 9/12/2019 to 10/12/2019.          Date of Admission: 10/11/2019  9:42 AM  Date of Discharge:  10/15/19     Discharge Diagnosis  Past Medical History:   Diagnosis Date   • Arthritis    • At risk for obstructive sleep apnea     Spouse reports patient snores and that he has questionable sleep apnea but has never had a sleep study done   • Borderline diabetes     Has never taken medication    • COPD (chronic obstructive pulmonary disease) (CMS/[facility])    • Coronary artery disease     1 stent   • Elevated cholesterol    • Hearing loss     Does not use hearing devices   • History of bronchitis 2017   • History of chemotherapy     around 9/20/2019   • History of radiation therapy     around 9/20/2019   • Hyperlipidemia    • Hypertension    • Melanoma (CMS/[facility])    • MI (myocardial infarction) (CMS/[facility]) 01/20/2015    placement of stent x1   • MRSA (methicillin resistant Staphylococcus aureus) 01/2015    left hand - treated   • Seasonal allergies    • Skin cancer     skin, lung   • Squamous cell lung cancer (CMS/[facility]) 7/24/2019   • Wears glasses     OTC glasses PRN for reading   • Wears partial dentures     Upper mouth     Patient Active Problem List   Diagnosis   • CAD s/p stents    • Essential hypertension   • Hyperlipidemia on statins    • Tobacco abuse   • Lymphocytosis   • Mediastinal lymphadenopathy   • Squamous cell lung cancer (CMS/[facility])   • R Upper Lobectomy (VATS) 10/11/19   • Malignant neoplasm of right lung (CMS/[facility])        Squamous cell carcinoma of right lung (CMS/HCC) [C34.91]  Malignant neoplasm of right lung, unspecified part of lung (CMS/HCC) [C34.91]     Procedures Performed  Procedure(s):  BRONCHOSCOPY  THORACOSCOPY VIDEO ASSISTED WITH RIGHT UPPER LOBECTOMY, MEDIASTINAL LYMPH NODE DISSECTION, INTERCOSTAL NERVE BLOCKS       Operative Pathology:  Final Diagnosis    1. STATION 9 LYMPH NODE:  Lymph node x1, sinus histiocytosis and anthracosis, no tumor or granulomas identified.   2. STATION 11 LYMPH NODE:  Lymph node fragments, sinus histiocytosis and anthracosis, no tumor or granulomas identified.   3. STATION 10 LYMPH NODE, EXCISION:  Lymph nodes x4, sinus histiocytosis and anthracosis, no tumor or granulomas identified.   4. RIGHT UPPER LOBE LOBECTOMY:  Granulomatous inflammation and scarring, no tumor identified.  Central lobular emphysematous change.   5. STATION 4R LYMPH NODES:  Multiple fragments of lymph node, sinus histiocytosis and anthracosis with focal hyalinized granulomas.  GMS and Domingo stains negative for organisms   6. STATION 2R LYMPH NODES:  Lymph nodes x7, sinus histiocytosis and anthracosis, no tumor or granulomas identified.   7. STATION 7 LYMPH NODES:  Multiple lymph node fragments, sinus histiocytosis and anthracosis, no tumor seen.     DGD/dlb         Electronically signed by Elliott Ruffin MD on 10/16/2019 at 1827   Intraoperative Consultation    Frozen section: Verbal report given to Dr. Flores via speakerphone on 10/11/2019 at 1:46 PM.     FROZEN SECTION DIAGNOSIS:   Bronchial and vascular margins are negative for tumor.   One representative section of tumor shows fibrosis and inflammation but no malignancy. (BG)               Gross Description    Specimen 1 received in formalin labeled as station 9 lymph node is a 0.7 x 0.4 x 0.2 cm anthracotic lymph node, submitted entirely intact in block 1A.      Specimen 2 received in formalin labeled as station 11 lymph node consists of five anthracotic lymph  node fragments, 1.1 x 0.9 x 0.1 cm in aggregate. The specimen is wrapped and submitted entirely in block 2A.      Specimen 3 received in formalin labeled as station 10 lymph node are four anthracotic lymph node fragments, 1.5 x 1.0 x 0.3 cm in aggregate. The specimen is submitted entirely in block 3A.       Specimen 4 received fresh for frozen section labeled as right upper lobe is a 16.5 x 14.0 x 2.5 cm, 217.5 gram lung lobectomy. The pleura is red/purple smooth and glistening. There is a firm white area of apparent fibrosis, approximately 1.4 cm in greatest dimension. This area is approximately 5.0 cm from the bronchial and vascular margin, and 0.1 cm from the pleura. No other gross lesions are identified. The remaining lung parenchyma is red spongy and has a mild rubbery consistency. The bronchial lining is tan, glistening, and striated. No hilar lymph nodes are identified. Representative sections are submitted as follows: 4A - frozen remnant #1 (bronchial and vascular margins, en face); 4B - frozen remnant #2 (representative fibrous area); 4C - remainder of fibrous area; 4D-E - grossly uninvolved lung parenchyma.       Specimen 5 received in formalin labeled as station 4R lymph nodes are twelve fragments of fatty soft tissue and possible lymph node ranging from 0.1 x 0.1 x 0.1 cm to 3.3 x 2.2 x 1.0 cm. The smallest fragments are filtered and the specimen is submitted entirely in blocks 5A-E (one bisected lymph node in block 5C and one sectioned lymph node in blocks 5D-F).      Specimen 6 received in formalin labeled as station 2R lymph nodes are seven possible lymph nodes ranging from 0.5 x 0.4 x 0.2 cm to 1.8 x 0.9 x 0.6 cm. The lymph nodes are submitted entirely as follows: 6A - five intact nodes, 6B - one bisected lymph node; 6C - one bisected lymph node.      Specimen 7 received in formalin labeled as station 7 lymph nodes are fifteen possible anthracotic lymph node fragments, submitted entirely intact in  blocks 7A-C. LED/klb    Microscopic Description    Sections of the station 9 lymph node show a single small node that appears to have been bisected and shows sinus histiocytosis and anthracosis without evidence of tumor or granulomas.     Sections of the station 11 tissue show multiple fragments of a lymph node.  The node fragments show extensive sinus histiocytosis and anthracosis with no evidence of granulomas or tumor.     Sections of specimen No. 3, station 10 lymph nodes, show a total of 4 anthracotic lymph nodes with extensive sinus histiocytosis.  No tumor or granulomas are encountered.       Sections of the right upper lobe show the mass to consist of an area of scarring with non-caseating granulomatous inflammation.  No evidence of neoplasia is identified.  The alveolated tissue also shows central lobular emphysematous changes and some mild fibrosis.       Sections of the station 4R crispin tissue display multiple fragments and intact lymph nodes with extensive sinus histiocytosis and anthracosis without evidence of tumor.  Several of the lymph nodes, particularly block 5E, show granulomatous inflammation.  GMS and Domingo stains show no organisms     Sections of specimen 6, station 2R with seven small nodes show sinus histiocytosis and anthracosis without evidence of neoplasia.       Sections of specimen 7 stated to be from level 7 nodes show multiple fragments of nodes and no complete lymph nodes.  The fragments show extensive sinus histiocytosis and anthracosis.  No tumor or granulomas are encountered.             History of Present Illness  Patient is a 64 y.o. male with a history of hypertension, hyperlipidemia, coronary artery disease status post stenting, COPD and tobacco abuse who presents with a right upper lobe lung cancer.  The patient underwent lung cancer screening CT scan that demonstrated a right upper lobe mass.  He subsequently had a biopsy that was consistent with squamous cell carcinoma in the  "Station 4 and 7 distribution.  He denies cough, hemoptysis, weight loss, lymphadenopathy, fevers or chills.  The patient finished his neoadjuvant chemotherapy and radiation 2 weeks ago.    Hospital Course   Patient was taken to the operating room on 10/11 for bronchoscopy, right VATS with right upper lobectomy and intercostal nerve blocks.  Patient was extubated in the operating room and transported to recovery in stable condition.  POD 1:  Pain well controlled. Ambulating well. Wilkinson removed.  POD 2:  Transferred to telemetry  POD 3:  Chest tubes removed. Diuresed.  On HFNC intermittently.  POD 4:  Oxygen weaned.  Patient met discharge criteria and was discharged to home        Discharge Medications     Discharge Medications      New Medications      Instructions Start Date   HYDROcodone-acetaminophen 7.5-325 MG per tablet  Commonly known as:  NORCO   1 tablet, Oral, Every 6 Hours PRN         Changes to Medications      Instructions Start Date   nitroglycerin 0.4 MG SL tablet  Commonly known as:  NITROSTAT  What changed:    · how much to take  · how to take this  · when to take this  · reasons to take this  · additional instructions   1 under the tongue as needed for angina, may repeat q5mins for up three doses         Continue These Medications      Instructions Start Date   aspirin 81 MG EC tablet   81 mg, Oral, Nightly      atorvastatin 40 MG tablet  Commonly known as:  LIPITOR   40 mg, Oral, Daily      B-12 1000 MCG tablet   1,000 mcg, Oral, Daily      fexofenadine 180 MG tablet  Commonly known as:  ALLEGRA   180 mg, Oral, Daily      lisinopril 5 MG tablet  Commonly known as:  PRINIVIL,ZESTRIL   5 mg, Oral, Daily      metoprolol succinate XL 25 MG 24 hr tablet  Commonly known as:  TOPROL-XL   TAKE ONE TABLET BY MOUTH DAILY      naproxen sodium 220 MG tablet  Commonly known as:  ALEVE   220 mg, Oral, As Needed      NON FORMULARY   1 tablet, Oral, Daily, \"derma-health pro\" OTC supplement for skin     "   ondansetron 8 MG tablet  Commonly known as:  ZOFRAN   8 mg, Oral, 3 Times Daily PRN      PROVENTIL  (90 Base) MCG/ACT inhaler  Generic drug:  albuterol sulfate HFA   2 puffs, Inhalation, Every 4 Hours PRN      vitamin D3 125 MCG (5000 UT) capsule capsule   5,000 Units, Oral, Daily      VITAMIN-B COMPLEX PO   1 tablet, Oral, Daily             Discharge Diet:   Diet Instructions     Diet: Cardiac      Discharge Diet:  Cardiac          Activity at Discharge:   Activity Instructions     Bathing Restrictions      Type of Restriction:  Bathing    Bathing Restrictions:  No Tub Bath    Driving Restrictions      Type of Restriction:  Driving    Driving Restrictions:  No Driving Until Next Appointment    Lifting Restrictions      Type of Restriction:  Lifting    Lifting Restrictions:  Lifting Restriction (Indicate Limit)    Weight Limit (Pounds):  15    Length of Lifting Restriction:  until next appointment          Follow-up Appointments  Future Appointments   Date Time Provider Department Center   11/12/2019  8:30 AM Ray Cobian PA-C [facility] None   11/13/2019 11:30 AM Kevin Salguero MD [facility]   1/27/2020  9:30 AM Jeff Rubin MD [facility] None   4/8/2020 11:45 AM Ronan Lacy IV, MD [facility] None           Simran Ford PA-C  10/30/19  4:35 PM

## 2019-10-31 ENCOUNTER — HOSPITAL ENCOUNTER (OUTPATIENT)
Dept: GENERAL RADIOLOGY | Facility: HOSPITAL | Age: 64
Discharge: HOME | End: 2019-10-31
Payer: COMMERCIAL

## 2019-10-31 ENCOUNTER — HOSPITAL ENCOUNTER (OUTPATIENT)
Facility: HOSPITAL | Age: 64
Discharge: HOME | End: 2019-10-31
Payer: COMMERCIAL

## 2019-10-31 ENCOUNTER — READMISSION MANAGEMENT (OUTPATIENT)
Dept: CALL CENTER | Facility: HOSPITAL | Age: 64
End: 2019-10-31

## 2019-10-31 ENCOUNTER — OFFICE VISIT (OUTPATIENT)
Dept: PRIMARY CARE CLINIC | Age: 64
End: 2019-10-31
Payer: COMMERCIAL

## 2019-10-31 VITALS
OXYGEN SATURATION: 92 % | SYSTOLIC BLOOD PRESSURE: 140 MMHG | DIASTOLIC BLOOD PRESSURE: 83 MMHG | HEIGHT: 69 IN | WEIGHT: 214.4 LBS | HEART RATE: 97 BPM | RESPIRATION RATE: 18 BRPM | BODY MASS INDEX: 31.76 KG/M2 | TEMPERATURE: 98.8 F

## 2019-10-31 DIAGNOSIS — C34.91 MALIGNANT NEOPLASM OF RIGHT LUNG, UNSPECIFIED PART OF LUNG (HCC): ICD-10-CM

## 2019-10-31 DIAGNOSIS — R09.02 HYPOXIA: ICD-10-CM

## 2019-10-31 DIAGNOSIS — R09.02 HYPOXIA: Primary | ICD-10-CM

## 2019-10-31 PROCEDURE — 99213 OFFICE O/P EST LOW 20 MIN: CPT | Performed by: NURSE PRACTITIONER

## 2019-10-31 PROCEDURE — 71046 X-RAY EXAM CHEST 2 VIEWS: CPT

## 2019-10-31 ASSESSMENT — ENCOUNTER SYMPTOMS
EYES NEGATIVE: 1
GASTROINTESTINAL NEGATIVE: 1

## 2019-10-31 NOTE — OUTREACH NOTE
CT Surgery Week 3 Survey      Responses   Facility patient discharged from?  [place] [facility]   Does the patient have one of the following disease processes/diagnoses(primary or secondary)?  Cardiothoracic surgery   Week 3 attempt successful?  Yes   Call start time  1508   Call end time  1509   Discharge diagnosis  Right upper lobectomy this visit (VATS)   Is patient permission given to speak with other caregiver?  Yes   List who call center can speak with  Deidra- Wife   Person spoke with today (if not patient) and relationship  Deidra- Wife    Meds reviewed with patient/caregiver?  Yes   Is the patient having any side effects they believe may be caused by any medication additions or changes?  No   Does the patient have all medications related to this admission filled (includes all antibiotics, pain medications, cardiac medications, etc.)  Yes   Is the patient taking all medications as directed (includes completed medication regime)?  Yes   Does the patient have a primary care provider?   Yes   Does the patient have an appointment scheduled with their C/T surgeon?  Yes   Has the patient kept scheduled appointments due by today?  Yes   Psychosocial issues?  No   Did the patient receive a copy of their discharge instructions?  Yes   Nursing interventions  Reviewed instructions with patient, Educated on MyChart   What is the patient's perception of their health status since discharge?  Improving   Nursing interventions  Nurse provided patient education   Is the patient/caregiver able to teach back the hierarchy of who to call/visit for symptoms/problems? PCP, Specialist, Home health nurse, Urgent Care, ED, 911  Yes   Additional teach back comments  Having trouble keeping oxygen level up. He is using O2 all the time. He saw MD today and xray was clear.    Week 3 call completed?  Yes          Danelle Smart RN

## 2019-11-07 ENCOUNTER — READMISSION MANAGEMENT (OUTPATIENT)
Dept: CALL CENTER | Facility: HOSPITAL | Age: 64
End: 2019-11-07

## 2019-11-07 NOTE — OUTREACH NOTE
CT Surgery Week 4 Survey      Responses   Facility patient discharged from?  [place]   Does the patient have one of the following disease processes/diagnoses(primary or secondary)?  Cardiothoracic surgery   Week 4 attempt successful?  No          Ashley Carranza RN

## 2019-11-12 ENCOUNTER — OFFICE VISIT (OUTPATIENT)
Dept: CARDIAC SURGERY | Facility: CLINIC | Age: 64
End: 2019-11-12

## 2019-11-12 VITALS
TEMPERATURE: 98 F | SYSTOLIC BLOOD PRESSURE: 117 MMHG | WEIGHT: 220.6 LBS | HEART RATE: 81 BPM | DIASTOLIC BLOOD PRESSURE: 79 MMHG | OXYGEN SATURATION: 98 % | HEIGHT: 69 IN | BODY MASS INDEX: 32.67 KG/M2

## 2019-11-12 DIAGNOSIS — Z90.2 S/P LOBECTOMY OF LUNG: ICD-10-CM

## 2019-11-12 PROBLEM — C34.11 MALIGNANT NEOPLASM OF UPPER LOBE OF RIGHT LUNG (HCC): Status: RESOLVED | Noted: 2019-10-11 | Resolved: 2019-11-12

## 2019-11-12 PROBLEM — C34.11 MALIGNANT NEOPLASM OF UPPER LOBE OF RIGHT LUNG: Status: ACTIVE | Noted: 2019-10-11

## 2019-11-12 PROBLEM — C34.91 MALIGNANT NEOPLASM OF RIGHT LUNG (HCC): Status: RESOLVED | Noted: 2019-10-11 | Resolved: 2019-11-12

## 2019-11-12 PROCEDURE — 71046 X-RAY EXAM CHEST 2 VIEWS: CPT | Performed by: THORACIC SURGERY (CARDIOTHORACIC VASCULAR SURGERY)

## 2019-11-12 PROCEDURE — 99024 POSTOP FOLLOW-UP VISIT: CPT | Performed by: NURSE PRACTITIONER

## 2019-11-12 NOTE — PROGRESS NOTES
[facility] Cardiothoracic Surgery Follow-Up Note    Name:  Erik Bowser  MRN Number:  [id number]  Date of Encounter:  11/12/2019    Referred By:  No ref. provider found  PCP:  Nat Umana APRN    Chief Complaint:    Chief Complaint   Patient presents with   • Post-op     Hospital follow up s/p Right upper lobectomy 10/11/19       History of Present Illness:    Mr. Erik Bowser is a pleasant 64 y.o. 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  Patient returns the office today for postoperative exam.  Patient is doing well.  He denies incisional pain and difficulty with ambulation.  He has slight shortness of air particularly when walking.  Patient is being followed per Dr. Salguero for oncology and is due to return to his office tomorrow.  Patient is also being followed per Dr. Rubin and will return their office in January.    Review of Systems:  Review of Systems   Constitution: Positive for malaise/fatigue. Negative for chills, fever, night sweats and weight loss.   HENT: Negative for hearing loss, odynophagia and sore throat.    Cardiovascular: Negative for chest pain, dyspnea on exertion, leg swelling, orthopnea and palpitations.   Respiratory: Positive for shortness of breath. Negative for cough and hemoptysis.    Endocrine: Negative for cold intolerance, heat intolerance, polydipsia, polyphagia and polyuria.   Hematologic/Lymphatic: Does not bruise/bleed easily.   Skin: Negative for itching and rash.   Musculoskeletal: Negative for joint pain, joint swelling and myalgias.   Gastrointestinal: Negative for abdominal pain, constipation, diarrhea, hematemesis, hematochezia, melena, nausea and vomiting.   Genitourinary: Negative for dysuria, frequency and hematuria.   Neurological: Negative for  focal weakness, headaches, numbness and seizures.   Psychiatric/Behavioral: Negative for suicidal ideas.   All other systems reviewed and are negative.      Past Medical History:    Past Medical History:   Diagnosis Date   • Arthritis    • At risk for obstructive sleep apnea     Spouse reports patient snores and that he has questionable sleep apnea but has never had a sleep study done   • Borderline diabetes     Has never taken medication    • COPD (chronic obstructive pulmonary disease) (CMS/[facility])    • Coronary artery disease     1 stent   • Elevated cholesterol    • Hearing loss     Does not use hearing devices   • History of bronchitis 2017   • History of chemotherapy     around 9/20/2019   • History of radiation therapy     around 9/20/2019   • Hyperlipidemia    • Hypertension    • Melanoma (CMS/[facility])    • MI (myocardial infarction) (CMS/[facility]) 01/20/2015    placement of stent x1   • MRSA (methicillin resistant Staphylococcus aureus) 01/2015    left hand - treated   • Seasonal allergies    • Skin cancer     skin, lung   • Squamous cell lung cancer (CMS/[facility]) 7/24/2019   • Wears glasses     OTC glasses PRN for reading   • Wears partial dentures     Upper mouth       Past Surgical History:    Past Surgical History:   Procedure Laterality Date   • BRONCHOSCOPY N/A 7/11/2019    Procedure: BRONCHOSCOPY WITH ENDOBRONCHIAL ULTRASOUND with General Anesthesia.  ;  Surgeon: Jeff Rubin MD;  Location: [facility] OR;  Service: Pulmonary   • BRONCHOSCOPY N/A 10/11/2019    Procedure: BRONCHOSCOPY;  Surgeon: Titus Flores MD;  Location: [facility] OR;  Service: Cardiothoracic   • CARDIAC CATHETERIZATION  01/20/2015    Placement of stent x1   • CATARACT EXTRACTION Right    • CHOLECYSTECTOMY      Laparoscopic   • COLONOSCOPY  2018   • CORONARY ANGIOPLASTY WITH STENT PLACEMENT     • HEMORRHOIDECTOMY     • KNEE MENISCAL REPAIR Bilateral    • MOUTH SURGERY      Post for oral implants in upper mouth x3   • ROTATOR CUFF REPAIR Bilateral     • SKIN BIOPSY     • SKIN CANCER EXCISION      melanoma removed from right neck and back.  Squamous cell removed multiple places.    • THORACOSCOPY VIDEO ASSISTED WITH LOBECTOMY Right 10/11/2019    Procedure: THORACOSCOPY VIDEO ASSISTED WITH RIGHT UPPER LOBECTOMY, MEDIASTINAL LYMPH NODE DISSECTION, INTERCOSTAL NERVE BLOCKS;  Surgeon: Titus Flores MD;  Location: [facility];  Service: Cardiothoracic   • WISDOM TOOTH EXTRACTION         Patient Active Problem List   Diagnosis   • CAD s/p stents    • Essential hypertension   • Hyperlipidemia on statins    • Tobacco abuse   • Lymphocytosis   • Mediastinal lymphadenopathy   • Squamous cell lung cancer (CMS/HCC)   • R Upper Lobectomy (VATS) 10/11/19   • Malignant neoplasm of right lung (CMS/HCC)     Social History     Tobacco Use   • Smoking status: Former Smoker     Packs/day: 2.00     Years: 46.00     Pack years: 92.00     Types: Cigarettes     Last attempt to quit: 2019     Years since quittin.2   • Smokeless tobacco: Former User     Types: Chew   Substance Use Topics   • Alcohol use: Yes     Comment: Social use, no history of abuse   • Drug use: No     Family History   Problem Relation Age of Onset   • Heart attack Mother 60   • Coronary artery disease Mother    • Hyperlipidemia Mother    • Lung cancer Mother    • Emphysema Father    • Hypertension Sister    • Heart attack Sister 45   • Hypertension Brother    • Heart attack Brother 50   • Hypertension Sister    • Hypertension Brother    • Diabetes Brother        Medications:      Current Outpatient Medications:   •  albuterol sulfate HFA (PROVENTIL HFA) 108 (90 Base) MCG/ACT inhaler, Inhale 2 puffs Every 4 (Four) Hours As Needed for Wheezing or Shortness of Air., Disp: , Rfl:   •  aspirin 81 MG EC tablet, Take 81 mg by mouth Every Night., Disp: , Rfl:   •  atorvastatin (LIPITOR) 40 MG tablet, Take 40 mg by mouth Daily., Disp: , Rfl:   •  B Complex Vitamins (VITAMIN-B COMPLEX PO), Take 1 tablet by mouth  "Daily., Disp: , Rfl:   •  Cholecalciferol (VITAMIN D3) 5000 units capsule capsule, Take 5,000 Units by mouth Daily., Disp: , Rfl:   •  Cyanocobalamin (B-12) 1000 MCG tablet, Take 1,000 mcg by mouth Daily., Disp: , Rfl:   •  fexofenadine (ALLEGRA) 180 MG tablet, Take 180 mg by mouth Daily., Disp: , Rfl:   •  lisinopril (PRINIVIL,ZESTRIL) 5 MG tablet, Take 5 mg by mouth Daily., Disp: , Rfl:   •  metoprolol succinate XL (TOPROL-XL) 25 MG 24 hr tablet, TAKE ONE TABLET BY MOUTH DAILY, Disp: 90 tablet, Rfl: 0  •  naproxen sodium (ALEVE) 220 MG tablet, Take 220 mg by mouth As Needed for Mild Pain ., Disp: , Rfl:   •  nitroglycerin (NITROSTAT) 0.4 MG SL tablet, 1 under the tongue as needed for angina, may repeat q5mins for up three doses (Patient taking differently: Place 0.4 mg under the tongue Every 5 (Five) Minutes As Needed for Chest Pain. 1 under the tongue as needed for angina, may repeat q5mins for up three doses), Disp: 100 tablet, Rfl: 11  •  NON FORMULARY, Take 1 tablet by mouth Daily. \"derma-health pro\" OTC supplement for skin, Disp: , Rfl:   •  HYDROcodone-acetaminophen (NORCO) 7.5-325 MG per tablet, Take 1 tablet by mouth Every 6 (Six) Hours As Needed for Moderate Pain ., Disp: 30 tablet, Rfl: 0  •  ondansetron (ZOFRAN) 8 MG tablet, Take 1 tablet by mouth 3 (Three) Times a Day As Needed for Nausea or Vomiting., Disp: 30 tablet, Rfl: 5    Allergies:  Allergies   Allergen Reactions   • Rosuvastatin Myalgia       Physical Exam:  Vital Signs:    Vitals:    11/12/19 1116   BP: 117/79   Pulse: 81   Temp: 98 °F (36.7 °C)   TempSrc: Temporal   SpO2: 98%   Weight: 100 kg (220 lb 9.6 oz)   Height: 175.3 cm (69\")       Physical Exam   Gen- NAD, pleasant, cooperative  CV- Regular rate and rhythm, no murmur, gallop or rub  Pulm- Clear to auscultation bilateral without wheeze or rhonchi  GI- Soft, normoactive bowel sounds, non-tender  Ext- Without edema,   Incision- Well approximated midsternal, MTH and bilateral EVH sites, " no evidence of incisional dehiscence or cellulitis  Neuro- CN II- XII grossly intact, tongue midline, voice normal.      Labs/Imaging:  Chest x-ray, [facility], 11/12/2019  Chronic changes seen within the right lung base with no  evidence of acute parenchymal disease  I personally reviewed these images in the office today.    Assessment / Plan:  Mr. Erik Bowser is a pleasant 64 y.o. 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  Patient has had a stable postoperative course.  Patient's incision sites are healing well with no erythema, edema, drainage or dehiscence.  Pathology results were discussed with the patient in the office today.  No malignancy was identified in the biopsy of his lymph nodes.  Patient does have some mild residual shortness of air and continues to follow with pulmonology.  Patient is scheduled to see Dr. Salguero tomorrow.  Pathology results were discussed with the patient today.  We will plan to have the patient follow-up with us in 6 months with repeat CT of the chest.  Patient will call our office with issues or concerns in the interval.    Patient Education:  Tobacco Cessation:  N/A  Post-Op Education:  N/A  Wound Care:  Patient educated regarding care of post-operative wounds.  Patient is to wash with plan soap and water.  Patient is not to use salves on the incision sites. Patient instructed regarding the signs and symptoms of infection and when to call the office.    Follow Up:  Patient will return the office in 6 months with a CT of the chest.    Please note, this document was produced using voice recognition software.    SOBEIDA Godfrey  [facility] Cardiothoracic Surgery

## 2019-11-12 NOTE — PROGRESS NOTES
DATE OF VISIT: 11/13/2019    REASON FOR VISIT: Followup for right lung cancer     HISTORY OF PRESENT ILLNESS: The patient is a very pleasant 64 y.o. male  with past medical history significant for cell carcinoma of the right upper lobe of the lung diagnosed July 2019.  The patient had staging work-up that confirmed stage IIIa disease.  He will be started on neoadjuvant concurrent chemotherapy radiation July 30, 2019.  He completed his treatment course September 2019.  Patient status post right upper lobe resection with lymph node sampling done by Dr. Flores October 11, 2019.  Final pathology did not reveal any evidence of residual disease.   The patient is here today for scheduled follow-up visit    SUBJECTIVE: The patient is here today with his wife.  He is recovering from his last month surgery.  He is not on any oxygen replacement.    PAST MEDICAL HISTORY/SOCIAL HISTORY/FAMILY HISTORY: Reviewed by me and unchanged from my documentation done on 11/13/19.    Review of Systems   Constitutional: Negative for activity change, appetite change, chills, fatigue, fever and unexpected weight change.   HENT: Negative for hearing loss, mouth sores, nosebleeds, sore throat and trouble swallowing.    Eyes: Negative for visual disturbance.   Respiratory: Negative for cough, chest tightness, shortness of breath and wheezing.    Cardiovascular: Negative for chest pain, palpitations and leg swelling.   Gastrointestinal: Positive for constipation. Negative for abdominal distention, abdominal pain, blood in stool, diarrhea, nausea, rectal pain and vomiting.   Endocrine: Negative for cold intolerance and heat intolerance.   Genitourinary: Negative for difficulty urinating, dysuria, frequency and urgency.   Musculoskeletal: Negative for arthralgias, back pain, gait problem, joint swelling and myalgias.   Skin: Negative for rash.   Neurological: Negative for dizziness, tremors, syncope, weakness, light-headedness, numbness and  "headaches.   Hematological: Negative for adenopathy. Does not bruise/bleed easily.   Psychiatric/Behavioral: Negative for confusion, sleep disturbance and suicidal ideas. The patient is not nervous/anxious.          Current Outpatient Medications:   •  albuterol sulfate HFA (PROVENTIL HFA) 108 (90 Base) MCG/ACT inhaler, Inhale 2 puffs Every 4 (Four) Hours As Needed for Wheezing or Shortness of Air., Disp: , Rfl:   •  aspirin 81 MG EC tablet, Take 81 mg by mouth Every Night., Disp: , Rfl:   •  atorvastatin (LIPITOR) 40 MG tablet, Take 40 mg by mouth Daily., Disp: , Rfl:   •  B Complex Vitamins (VITAMIN-B COMPLEX PO), Take 1 tablet by mouth Daily., Disp: , Rfl:   •  Cholecalciferol (VITAMIN D3) 5000 units capsule capsule, Take 5,000 Units by mouth Daily., Disp: , Rfl:   •  Cyanocobalamin (B-12) 1000 MCG tablet, Take 1,000 mcg by mouth Daily., Disp: , Rfl:   •  fexofenadine (ALLEGRA) 180 MG tablet, Take 180 mg by mouth Daily., Disp: , Rfl:   •  HYDROcodone-acetaminophen (NORCO) 7.5-325 MG per tablet, Take 1 tablet by mouth Every 6 (Six) Hours As Needed for Moderate Pain ., Disp: 30 tablet, Rfl: 0  •  lisinopril (PRINIVIL,ZESTRIL) 5 MG tablet, Take 5 mg by mouth Daily., Disp: , Rfl:   •  metoprolol succinate XL (TOPROL-XL) 25 MG 24 hr tablet, TAKE ONE TABLET BY MOUTH DAILY, Disp: 90 tablet, Rfl: 0  •  naproxen sodium (ALEVE) 220 MG tablet, Take 220 mg by mouth As Needed for Mild Pain ., Disp: , Rfl:   •  nitroglycerin (NITROSTAT) 0.4 MG SL tablet, 1 under the tongue as needed for angina, may repeat q5mins for up three doses (Patient taking differently: Place 0.4 mg under the tongue Every 5 (Five) Minutes As Needed for Chest Pain. 1 under the tongue as needed for angina, may repeat q5mins for up three doses), Disp: 100 tablet, Rfl: 11  •  NON FORMULARY, Take 1 tablet by mouth Daily. \"derma-health pro\" OTC supplement for skin, Disp: , Rfl:   •  ondansetron (ZOFRAN) 8 MG tablet, Take 1 tablet by mouth 3 (Three) Times a Day " "As Needed for Nausea or Vomiting., Disp: 30 tablet, Rfl: 5    PHYSICAL EXAMINATION:   /73   Pulse 87   Temp 98 °F (36.7 °C)   Ht 175.3 cm (69\")   Wt 98.9 kg (218 lb)   BMI 32.19 kg/m²    ECOG Performance Status: 1 - Symptomatic but completely ambulatory  General Appearance:  alert, cooperative, no apparent distress and appears stated age   Neurologic/Psychiatric: A&O x 3, gait steady, appropriate affect, strength 5/5 in all muscle groups   HEENT:  Normocephalic, without obvious abnormality, mucous membranes moist   Neck: Supple, symmetrical, trachea midline, no adenopathy;  No thyromegaly, masses, or tenderness   Lungs:   Clear to auscultation bilaterally; respirations regular, even, and unlabored bilaterally   Heart:  Regular rate and rhythm, no murmurs appreciated   Abdomen:   Soft, non-tender, non-distended and no organomegaly   Lymph nodes: No cervical, supraclavicular, inguinal or axillary adenopathy noted   Extremities: Normal, atraumatic; no clubbing, cyanosis, or edema    Skin: No rashes, ulcers, or suspicious lesions noted     No visits with results within 2 Week(s) from this visit.   Latest known visit with results is:   Admission on 10/11/2019, Discharged on 10/15/2019   Component Date Value Ref Range Status   • Product Code 10/13/2019 S0014S97   Final   • Unit Number 10/13/2019 [id number]   Final   • UNIT  ABO 10/13/2019 A   Final   • UNIT  RH 10/13/2019 NEG   Final   • Dispense Status 10/13/2019 RE   Final   • Blood Type 10/13/2019 ANEG   Final   • Blood Expiration Date 10/13/2019 [id number]   Final   • Blood Type Barcode 10/13/2019 0600   Final   • Product Code 10/13/2019 [id number]   Final   • Unit Number 10/13/2019 [id number]   Final   • UNIT  ABO 10/13/2019 A   Final   • UNIT  RH 10/13/2019 NEG   Final   • Dispense Status 10/13/2019 RE   Final   • Blood Type 10/13/2019 ANEG   Final   • Blood Expiration Date 10/13/2019 [id number]   Final   • Blood Type Barcode 10/13/2019 0600   " Final   • ABO Type 10/11/2019 A   Final   • RH type 10/11/2019 Negative   Final   • Glucose 10/11/2019 106  70 - 130 mg/dL Final   • Case Report 10/11/2019    Final                    Value:Surgical Pathology Report                         Case: [id number]                                  Authorizing Provider:  Titus Flores MD         Collected:           10/11/2019 12:07 PM          Ordering Location:     [facility]   Received:            10/11/2019 01:44 PM                                 OR                                                                           Pathologist:           Elliott Ruffin MD                                                           Intraop:               Antoni Pizarro MD                                                          Specimens:   1) - Lymph Node, STATION 9                                                                          2) - Lymph Node, STATION 11                                                                         3) - Lymph Node, STATION 10                                                                         4) - Lung, Right Upper Lobe, RIGHT UPPER LOBE                                                                                 5) - Lymph Node, STATION 4R                                                                         6) - Lymph Node, STATION 2R                                                                         7) - Lymph Node, STATION 7                                                                • Clinical Information 10/11/2019    Final                    Value:This result contains rich text formatting which cannot be displayed here.   • Final Diagnosis 10/11/2019    Final                    Value:This result contains rich text formatting which cannot be displayed here.   • Intraoperative Consultation 10/11/2019    Final                    Value:This result contains rich text formatting which cannot be displayed  here.   • Gross Description 10/11/2019    Final                    Value:This result contains rich text formatting which cannot be displayed here.   • Microscopic Description 10/11/2019    Final                    Value:This result contains rich text formatting which cannot be displayed here.   • Glucose 10/11/2019 166* 70 - 130 mg/dL Final   • Glucose 10/11/2019 191* 70 - 130 mg/dL Final   • Glucose 10/12/2019 140* 65 - 99 mg/dL Final   • BUN 10/12/2019 20  8 - 23 mg/dL Final   • Creatinine 10/12/2019 0.81  0.76 - 1.27 mg/dL Final   • Sodium 10/12/2019 137  136 - 145 mmol/L Final   • Potassium 10/12/2019 4.7  3.5 - 5.2 mmol/L Final   • Chloride 10/12/2019 103  98 - 107 mmol/L Final   • CO2 10/12/2019 21.0* 22.0 - 29.0 mmol/L Final   • Calcium 10/12/2019 8.8  8.6 - 10.5 mg/dL Final   • eGFR Non African Amer 10/12/2019 96  >60 mL/min/1.73 Final   • BUN/Creatinine Ratio 10/12/2019 24.7  7.0 - 25.0 Final   • Anion Gap 10/12/2019 13.0  5.0 - 15.0 mmol/L Final   • WBC 10/12/2019 13.81* 3.40 - 10.80 10*3/mm3 Final   • RBC 10/12/2019 3.63* 4.14 - 5.80 10*6/mm3 Final   • Hemoglobin 10/12/2019 12.3* 13.0 - 17.7 g/dL Final   • Hematocrit 10/12/2019 37.8  37.5 - 51.0 % Final   • MCV 10/12/2019 104.1* 79.0 - 97.0 fL Final   • MCH 10/12/2019 33.9* 26.6 - 33.0 pg Final   • MCHC 10/12/2019 32.5  31.5 - 35.7 g/dL Final   • RDW 10/12/2019 17.1* 12.3 - 15.4 % Final   • RDW-SD 10/12/2019 65.3* 37.0 - 54.0 fl Final   • MPV 10/12/2019 9.2  6.0 - 12.0 fL Final   • Platelets 10/12/2019 224  140 - 450 10*3/mm3 Final   • Neutrophil % 10/12/2019 77.4* 42.7 - 76.0 % Final   • Lymphocyte % 10/12/2019 11.2* 19.6 - 45.3 % Final   • Monocyte % 10/12/2019 10.7  5.0 - 12.0 % Final   • Eosinophil % 10/12/2019 0.0* 0.3 - 6.2 % Final   • Basophil % 10/12/2019 0.2  0.0 - 1.5 % Final   • Immature Grans % 10/12/2019 0.5  0.0 - 0.5 % Final   • Neutrophils, Absolute 10/12/2019 10.68* 1.70 - 7.00 10*3/mm3 Final   • Lymphocytes, Absolute 10/12/2019 1.55   0.70 - 3.10 10*3/mm3 Final   • Monocytes, Absolute 10/12/2019 1.48* 0.10 - 0.90 10*3/mm3 Final   • Eosinophils, Absolute 10/12/2019 0.00  0.00 - 0.40 10*3/mm3 Final   • Basophils, Absolute 10/12/2019 0.03  0.00 - 0.20 10*3/mm3 Final   • Immature Grans, Absolute 10/12/2019 0.07* 0.00 - 0.05 10*3/mm3 Final   • nRBC 10/12/2019 0.0  0.0 - 0.2 /100 WBC Final   • Glucose 10/12/2019 168* 70 - 130 mg/dL Final   • Vitamin B-12 10/12/2019 595  211 - 946 pg/mL Final   • Folate 10/12/2019 5.28  4.78 - 24.20 ng/mL Final   • Folate, Hemolysate 10/12/2019 507.0  Not Estab. ng/mL Final   • Hematocrit 10/12/2019 38.5  37.5 - 51.0 % Final   • RBC Folate 10/12/2019 1317  >498 ng/mL Final   • Glucose 10/12/2019 138* 70 - 130 mg/dL Final   • Glucose 10/12/2019 145* 70 - 130 mg/dL Final   • Glucose 10/12/2019 163* 70 - 130 mg/dL Final   • Glucose 10/13/2019 117* 65 - 99 mg/dL Final   • BUN 10/13/2019 23  8 - 23 mg/dL Final   • Creatinine 10/13/2019 0.91  0.76 - 1.27 mg/dL Final   • Sodium 10/13/2019 139  136 - 145 mmol/L Final   • Potassium 10/13/2019 5.2  3.5 - 5.2 mmol/L Final   • Chloride 10/13/2019 101  98 - 107 mmol/L Final   • CO2 10/13/2019 31.0* 22.0 - 29.0 mmol/L Final   • Calcium 10/13/2019 9.1  8.6 - 10.5 mg/dL Final   • eGFR Non African Amer 10/13/2019 84  >60 mL/min/1.73 Final   • BUN/Creatinine Ratio 10/13/2019 25.3* 7.0 - 25.0 Final   • Anion Gap 10/13/2019 7.0  5.0 - 15.0 mmol/L Final   • WBC 10/13/2019 11.78* 3.40 - 10.80 10*3/mm3 Final   • RBC 10/13/2019 3.54* 4.14 - 5.80 10*6/mm3 Final   • Hemoglobin 10/13/2019 11.9* 13.0 - 17.7 g/dL Final   • Hematocrit 10/13/2019 36.9* 37.5 - 51.0 % Final   • MCV 10/13/2019 104.2* 79.0 - 97.0 fL Final   • MCH 10/13/2019 33.6* 26.6 - 33.0 pg Final   • MCHC 10/13/2019 32.2  31.5 - 35.7 g/dL Final   • RDW 10/13/2019 17.2* 12.3 - 15.4 % Final   • RDW-SD 10/13/2019 66.4* 37.0 - 54.0 fl Final   • MPV 10/13/2019 9.3  6.0 - 12.0 fL Final   • Platelets 10/13/2019 230  140 - 450 10*3/mm3 Final    • Neutrophil % 10/13/2019 62.4  42.7 - 76.0 % Final   • Lymphocyte % 10/13/2019 18.8* 19.6 - 45.3 % Final   • Monocyte % 10/13/2019 11.6  5.0 - 12.0 % Final   • Eosinophil % 10/13/2019 6.5* 0.3 - 6.2 % Final   • Basophil % 10/13/2019 0.4  0.0 - 1.5 % Final   • Immature Grans % 10/13/2019 0.3  0.0 - 0.5 % Final   • Neutrophils, Absolute 10/13/2019 7.35* 1.70 - 7.00 10*3/mm3 Final   • Lymphocytes, Absolute 10/13/2019 2.21  0.70 - 3.10 10*3/mm3 Final   • Monocytes, Absolute 10/13/2019 1.37* 0.10 - 0.90 10*3/mm3 Final   • Eosinophils, Absolute 10/13/2019 0.76* 0.00 - 0.40 10*3/mm3 Final   • Basophils, Absolute 10/13/2019 0.05  0.00 - 0.20 10*3/mm3 Final   • Immature Grans, Absolute 10/13/2019 0.04  0.00 - 0.05 10*3/mm3 Final   • nRBC 10/13/2019 0.0  0.0 - 0.2 /100 WBC Final   • Glucose 10/13/2019 129  70 - 130 mg/dL Final   • Glucose 10/13/2019 116  70 - 130 mg/dL Final   • Glucose 10/13/2019 142* 70 - 130 mg/dL Final   • Glucose 10/13/2019 133* 70 - 130 mg/dL Final   • Glucose 10/14/2019 126* 65 - 99 mg/dL Final   • BUN 10/14/2019 20  8 - 23 mg/dL Final   • Creatinine 10/14/2019 0.83  0.76 - 1.27 mg/dL Final   • Sodium 10/14/2019 135* 136 - 145 mmol/L Final   • Potassium 10/14/2019 4.7  3.5 - 5.2 mmol/L Final   • Chloride 10/14/2019 98  98 - 107 mmol/L Final   • CO2 10/14/2019 30.0* 22.0 - 29.0 mmol/L Final   • Calcium 10/14/2019 9.1  8.6 - 10.5 mg/dL Final   • eGFR Non African Amer 10/14/2019 94  >60 mL/min/1.73 Final   • BUN/Creatinine Ratio 10/14/2019 24.1  7.0 - 25.0 Final   • Anion Gap 10/14/2019 7.0  5.0 - 15.0 mmol/L Final   • WBC 10/14/2019 10.62  3.40 - 10.80 10*3/mm3 Final   • RBC 10/14/2019 3.55* 4.14 - 5.80 10*6/mm3 Final   • Hemoglobin 10/14/2019 12.1* 13.0 - 17.7 g/dL Final   • Hematocrit 10/14/2019 36.3* 37.5 - 51.0 % Final   • MCV 10/14/2019 102.3* 79.0 - 97.0 fL Final   • MCH 10/14/2019 34.1* 26.6 - 33.0 pg Final   • MCHC 10/14/2019 33.3  31.5 - 35.7 g/dL Final   • RDW 10/14/2019 16.6* 12.3 - 15.4 %  Final   • RDW-SD 10/14/2019 62.6* 37.0 - 54.0 fl Final   • MPV 10/14/2019 9.2  6.0 - 12.0 fL Final   • Platelets 10/14/2019 212  140 - 450 10*3/mm3 Final   • Neutrophil % 10/14/2019 65.5  42.7 - 76.0 % Final   • Lymphocyte % 10/14/2019 16.8* 19.6 - 45.3 % Final   • Monocyte % 10/14/2019 12.1* 5.0 - 12.0 % Final   • Eosinophil % 10/14/2019 4.7  0.3 - 6.2 % Final   • Basophil % 10/14/2019 0.2  0.0 - 1.5 % Final   • Immature Grans % 10/14/2019 0.7* 0.0 - 0.5 % Final   • Neutrophils, Absolute 10/14/2019 6.96  1.70 - 7.00 10*3/mm3 Final   • Lymphocytes, Absolute 10/14/2019 1.78  0.70 - 3.10 10*3/mm3 Final   • Monocytes, Absolute 10/14/2019 1.29* 0.10 - 0.90 10*3/mm3 Final   • Eosinophils, Absolute 10/14/2019 0.50* 0.00 - 0.40 10*3/mm3 Final   • Basophils, Absolute 10/14/2019 0.02  0.00 - 0.20 10*3/mm3 Final   • Immature Grans, Absolute 10/14/2019 0.07* 0.00 - 0.05 10*3/mm3 Final   • nRBC 10/14/2019 0.0  0.0 - 0.2 /100 WBC Final   • Glucose 10/14/2019 135* 70 - 130 mg/dL Final   • Glucose 10/14/2019 166* 70 - 130 mg/dL Final   • Glucose 10/14/2019 130  70 - 130 mg/dL Final   • Glucose 10/14/2019 140* 70 - 130 mg/dL Final   • Glucose 10/15/2019 126  70 - 130 mg/dL Final   • Glucose 10/15/2019 116  70 - 130 mg/dL Final        Xr Chest 1 View    Result Date: 10/15/2019  Narrative: EXAMINATION: XR CHEST 1 VW- 10/15/2019  INDICATION: postop; C34.91-Malignant neoplasm of unspecified part of right bronchus or lung; C34.91-Malignant neoplasm of unspecified part of right bronchus or lung  COMPARISON: 10/14/2019  FINDINGS: Cardiac silhouette similar to prior with improved aeration in the right suprahilar region may represent improving atelectasis versus airspace disease. No new parenchymal process pneumothorax or large effusion.         Impression: Improved aeration of the right suprahilar region suggesting improved atelectasis versus airspace disease.  D:  10/15/2019 E:  10/15/2019  This report was finalized on 10/15/2019 4:24 PM  by Dr. Toni Jhaveri.        ASSESSMENT: The patient is a very pleasant 64 y.o. male  with right upper lobe squamous cell carcinoma of the lung     PROBLEM LIST:   1.  Squamous cell carcinoma of the lung T1b N2 M0 stage T3a:  A.  Presented with shortness of breath and cough  B.  CT chest with contrast done June 28, 2019 revealed right upper lobe 1.8 cm lung nodule with right hilar and mediastinal lymphadenopathy.  MRI brain whole-body PET scan done on July 22, 2019 felt show any other sites of disease.  C.  Diagnosed after bronchoscopy with biopsy done July 11, 2019  D.  Pathology revealed squamous cell carcinoma from level 4R and level 7.  E.  Started neoadjuvant concurrent chemotherapy with radiation using weekly carbotaxol July 30, 2019.  Radiation was added August 6, 2019. This was completed 09/11/2019.  F.  Status post right upper lobe resection with lymph node sampling done by Dr. Flores October 11, 2019.  G.  Final pathology revealed complete pathologic response with no evidence of residual disease in the lung or in the hilar and mediastinal nodes.  2.  Chronic tobacco abuse  3.  Hypertension  4.  COPD:   A. Not oxygen dependent  B.  PFTs done on July 17, 2019 revealed FEV1 2.44L, 72% of predicted and DLCO 55% of predicted.  5.  History of superficial melanoma status post surgical resection  6.  Hypercholesterolemia    PLAN:  1.  I did go over the final pathology report in details with the patient reassured there was no evidence of residual disease at the time of surgery.  2.  The patient follow-up with us in 6 weeks with repeat scans.  3.  I will continue to monitor patient blood work including blood counts kidney function liver function and electrolytes.  4.  I discussed the case with Dr. Flores to coordinate patient's care.  5.  We will continue lisinopril for hypertension  6.  We will continue Lipitor 40 mg daily for hypercholesterolemia.    Kevin Salguero MD  11/13/2019

## 2019-11-13 ENCOUNTER — OFFICE VISIT (OUTPATIENT)
Dept: ONCOLOGY | Facility: CLINIC | Age: 64
End: 2019-11-13

## 2019-11-13 VITALS
HEIGHT: 69 IN | SYSTOLIC BLOOD PRESSURE: 141 MMHG | BODY MASS INDEX: 32.29 KG/M2 | HEART RATE: 87 BPM | TEMPERATURE: 98 F | WEIGHT: 218 LBS | DIASTOLIC BLOOD PRESSURE: 73 MMHG

## 2019-11-13 DIAGNOSIS — C34.11 MALIGNANT NEOPLASM OF UPPER LOBE OF RIGHT LUNG (HCC): Primary | ICD-10-CM

## 2019-11-13 PROCEDURE — 99214 OFFICE O/P EST MOD 30 MIN: CPT | Performed by: INTERNAL MEDICINE

## 2019-11-14 ENCOUNTER — OFFICE VISIT (OUTPATIENT)
Dept: PRIMARY CARE CLINIC | Age: 64
End: 2019-11-14
Payer: COMMERCIAL

## 2019-11-14 VITALS
TEMPERATURE: 98.1 F | HEIGHT: 69 IN | WEIGHT: 219.8 LBS | RESPIRATION RATE: 16 BRPM | BODY MASS INDEX: 32.56 KG/M2 | DIASTOLIC BLOOD PRESSURE: 62 MMHG | OXYGEN SATURATION: 96 % | HEART RATE: 83 BPM | SYSTOLIC BLOOD PRESSURE: 110 MMHG

## 2019-11-14 DIAGNOSIS — E78.00 PURE HYPERCHOLESTEROLEMIA: ICD-10-CM

## 2019-11-14 DIAGNOSIS — I10 ESSENTIAL HYPERTENSION: ICD-10-CM

## 2019-11-14 DIAGNOSIS — C34.91 MALIGNANT NEOPLASM OF RIGHT LUNG, UNSPECIFIED PART OF LUNG (HCC): Primary | ICD-10-CM

## 2019-11-14 PROCEDURE — 99213 OFFICE O/P EST LOW 20 MIN: CPT | Performed by: NURSE PRACTITIONER

## 2019-11-16 ASSESSMENT — ENCOUNTER SYMPTOMS
SHORTNESS OF BREATH: 1
COUGH: 1

## 2019-12-16 ENCOUNTER — HOSPITAL ENCOUNTER (OUTPATIENT)
Dept: CT IMAGING | Facility: HOSPITAL | Age: 64
End: 2019-12-16

## 2019-12-18 ENCOUNTER — OFFICE VISIT (OUTPATIENT)
Dept: ONCOLOGY | Facility: CLINIC | Age: 64
End: 2019-12-18

## 2019-12-18 ENCOUNTER — HOSPITAL ENCOUNTER (OUTPATIENT)
Dept: CT IMAGING | Facility: HOSPITAL | Age: 64
Discharge: HOME | End: 2019-12-18
Admitting: INTERNAL MEDICINE

## 2019-12-18 VITALS
HEART RATE: 88 BPM | OXYGEN SATURATION: 93 % | HEIGHT: 69 IN | SYSTOLIC BLOOD PRESSURE: 129 MMHG | BODY MASS INDEX: 33.92 KG/M2 | DIASTOLIC BLOOD PRESSURE: 65 MMHG | RESPIRATION RATE: 16 BRPM | TEMPERATURE: 97 F | WEIGHT: 229 LBS

## 2019-12-18 DIAGNOSIS — C34.11 MALIGNANT NEOPLASM OF UPPER LOBE OF RIGHT LUNG (HCC): ICD-10-CM

## 2019-12-18 DIAGNOSIS — C34.91 SQUAMOUS CELL CARCINOMA OF RIGHT LUNG (HCC): Primary | ICD-10-CM

## 2019-12-18 LAB — CREAT BLDA-MCNC: 1 MG/DL (ref 0.6–1.3)

## 2019-12-18 PROCEDURE — 71260 CT THORAX DX C+: CPT

## 2019-12-18 PROCEDURE — 99214 OFFICE O/P EST MOD 30 MIN: CPT | Performed by: INTERNAL MEDICINE

## 2019-12-18 PROCEDURE — 25010000002 IOPAMIDOL 61 % SOLUTION: Performed by: INTERNAL MEDICINE

## 2019-12-18 PROCEDURE — 82565 ASSAY OF CREATININE: CPT

## 2019-12-18 PROCEDURE — 74177 CT ABD & PELVIS W/CONTRAST: CPT

## 2019-12-18 RX ADMIN — IOPAMIDOL 95 ML: 612 INJECTION, SOLUTION INTRAVENOUS at 09:25

## 2019-12-18 NOTE — PROGRESS NOTES
DATE OF VISIT: 12/18/2019    REASON FOR VISIT: Followup for right lung cancer     HISTORY OF PRESENT ILLNESS: The patient is a very pleasant 64 y.o. male  with past medical history significant for cell carcinoma of the right upper lobe of the lung diagnosed July 2019.  The patient had staging work-up that confirmed stage IIIa disease.  He will be started on neoadjuvant concurrent chemotherapy radiation July 30, 2019.  He completed his treatment course September 2019.  Patient status post right upper lobe resection with lymph node sampling done by Dr. Flores October 11, 2019.  Final pathology did not reveal any evidence of residual disease.   The patient is here today for scheduled follow-up visit.    SUBJECTIVE: The patient is here today with his wife.  He is doing fairly well.  Is gaining weight since he quit smoking.  Is been in active.  His complain of mild pain at surgical site.    PAST MEDICAL HISTORY/SOCIAL HISTORY/FAMILY HISTORY: Reviewed by me and unchanged from my documentation done on 12/18/19.    Review of Systems   Constitutional: Negative for activity change, appetite change, chills, fatigue, fever and unexpected weight change.   HENT: Negative for hearing loss, mouth sores, nosebleeds, sore throat and trouble swallowing.    Eyes: Negative for visual disturbance.   Respiratory: Negative for cough, chest tightness, shortness of breath and wheezing.    Cardiovascular: Negative for chest pain, palpitations and leg swelling.   Gastrointestinal: Positive for constipation. Negative for abdominal distention, abdominal pain, blood in stool, diarrhea, nausea, rectal pain and vomiting.   Endocrine: Negative for cold intolerance and heat intolerance.   Genitourinary: Negative for difficulty urinating, dysuria, frequency and urgency.   Musculoskeletal: Negative for arthralgias, back pain, gait problem, joint swelling and myalgias.   Skin: Negative for rash.   Neurological: Negative for dizziness, tremors, syncope,  "weakness, light-headedness, numbness and headaches.   Hematological: Negative for adenopathy. Does not bruise/bleed easily.   Psychiatric/Behavioral: Negative for confusion, sleep disturbance and suicidal ideas. The patient is not nervous/anxious.          Current Outpatient Medications:   •  aspirin 81 MG EC tablet, Take 81 mg by mouth Every Night., Disp: , Rfl:   •  atorvastatin (LIPITOR) 40 MG tablet, Take 40 mg by mouth Daily., Disp: , Rfl:   •  B Complex Vitamins (VITAMIN-B COMPLEX PO), Take 1 tablet by mouth Daily., Disp: , Rfl:   •  Cholecalciferol (VITAMIN D3) 5000 units capsule capsule, Take 5,000 Units by mouth Daily., Disp: , Rfl:   •  Cyanocobalamin (B-12) 1000 MCG tablet, Take 1,000 mcg by mouth Daily., Disp: , Rfl:   •  fexofenadine (ALLEGRA) 180 MG tablet, Take 180 mg by mouth Daily., Disp: , Rfl:   •  HYDROcodone-acetaminophen (NORCO) 7.5-325 MG per tablet, Take 1 tablet by mouth Every 6 (Six) Hours As Needed for Moderate Pain ., Disp: 30 tablet, Rfl: 0  •  lisinopril (PRINIVIL,ZESTRIL) 5 MG tablet, Take 5 mg by mouth Daily., Disp: , Rfl:   •  metoprolol succinate XL (TOPROL-XL) 25 MG 24 hr tablet, TAKE ONE TABLET BY MOUTH DAILY, Disp: 90 tablet, Rfl: 0  •  naproxen sodium (ALEVE) 220 MG tablet, Take 220 mg by mouth As Needed for Mild Pain ., Disp: , Rfl:   •  nitroglycerin (NITROSTAT) 0.4 MG SL tablet, 1 under the tongue as needed for angina, may repeat q5mins for up three doses (Patient taking differently: Place 0.4 mg under the tongue Every 5 (Five) Minutes As Needed for Chest Pain. 1 under the tongue as needed for angina, may repeat q5mins for up three doses), Disp: 100 tablet, Rfl: 11  •  NON FORMULARY, Take 1 tablet by mouth Daily. \"derma-health pro\" OTC supplement for skin, Disp: , Rfl:   •  ondansetron (ZOFRAN) 8 MG tablet, Take 1 tablet by mouth 3 (Three) Times a Day As Needed for Nausea or Vomiting., Disp: 30 tablet, Rfl: 5  No current facility-administered medications for this visit. "     PHYSICAL EXAMINATION:   There were no vitals taken for this visit.   ECOG Performance Status: 1 - Symptomatic but completely ambulatory  General Appearance:  alert, cooperative, no apparent distress and appears stated age   Neurologic/Psychiatric: A&O x 3, gait steady, appropriate affect, strength 5/5 in all muscle groups   HEENT:  Normocephalic, without obvious abnormality, mucous membranes moist   Neck: Supple, symmetrical, trachea midline, no adenopathy;  No thyromegaly, masses, or tenderness   Lungs:   Clear to auscultation bilaterally; respirations regular, even, and unlabored bilaterally   Heart:  Regular rate and rhythm, no murmurs appreciated   Abdomen:   Soft, non-tender, non-distended and no organomegaly   Lymph nodes: No cervical, supraclavicular, inguinal or axillary adenopathy noted   Extremities: Normal, atraumatic; no clubbing, cyanosis, or edema    Skin: No rashes, ulcers, or suspicious lesions noted     Hospital Outpatient Visit on 12/18/2019   Component Date Value Ref Range Status   • Creatinine 12/18/2019 1.00  0.60 - 1.30 mg/dL Final    Serial Number: [id number]:  [id number]        Ct Chest With Contrast    Result Date: 12/18/2019  Narrative: EXAMINATION: CT CHEST W CONTRAST-, CT ABDOMEN AND PELVIS W CONTRAST-  INDICATION: Followup scan; C34.11-Malignant neoplasm of upper lobe, right bronchus or lung.  TECHNIQUE: Contrast enhanced CT imaging of the chest, abdomen, and pelvis was performed with additional coronal and sagittal reformatted imaging provided for review.  The radiation dose reduction device was turned on for each scan per the ALARA (As Low as Reasonably Achievable) protocol.  COMPARISON: CT chest, abdomen and pelvis 09/23/2019.  FINDINGS: CHEST: Thyroid gland is unremarkable. No axillary or supraclavicular lymphadenopathy. No mediastinal lymphadenopathy identified. The heart is not enlarged. Moderate coronary arterial calcifications are identified. No filling defects are identified  within the main pulmonary artery. The aorta demonstrates atherosclerotic calcification is otherwise unremarkable. Trace nonspecific pericardial fluid is identified. Postsurgical changes within the right hilum/suprahilar region identified, new from 09/23/2019. There is a 1.2 cm nodular opacity within the surgical resection site which may be postsurgical scarring, however, continued evaluation for recurrent disease is advised. No suspicious pulmonary nodules or masses are identified elsewhere within the lung parenchyma. Moderate centrilobular emphysema is identified. No pleural effusion or pneumothorax.  ABDOMEN/PELVIS: The liver demonstrates hypoattenuation consistent with hepatic steatosis. Postcholecystectomy changes are identified. The common bile duct is not particularly enlarged. Pancreas is unremarkable. The spleen and adrenal glands are free of mass. The kidneys demonstrate no evidence for metastatic disease or solid renal mass. Small cystic process involving the left kidney is identified which is too small to accurately characterize although statistically represents a small simple renal cyst. No evidence of hydronephrosis or obstructive uropathy. The urinary bladder is unrevealing. The prostate gland demonstrates coarse calcifications is otherwise unremarkable. No free fluid or pelvic mass identified. The retroperitoneum demonstrates atherosclerotic calcified and noncalcified disease of the aorta without evidence of aneurysm. No free air or free fluid identified. The stomach is unremarkable. The small bowel is nondilated. No evidence of bowel obstruction identified. Swirling of mesenteric vessels is noted, similar to 09/23/2019 which is likely anatomic without evidence for bowel obstruction or convincing evidence of internal hernia at this time. The colon is unremarkable. There is no evidence of abnormal colonic wall thickening or mass appreciated. Small inguinal hernia is identified. The surrounding soft  tissues are unrevealing. No retroperitoneal hemorrhage. Visualized bony pelvis appears intact.  Thoracolumbar degenerative changes are identified without aggressive features.      Impression: 1. Interval postsurgical changes from right upper lobectomy with a 1.2 cm nodular opacity adjacent to the surgical margin which may be postsurgical scarring, although followup per oncologic protocol advised to exclude disease recurrence within this region. 2. There is no evidence of metastatic disease within the chest, abdomen, or pelvis identified at this time. 3. Additional chronic findings as discussed above.  D:  12/18/2019 E:  12/18/2019      Ct Abdomen Pelvis With Contrast    Result Date: 12/18/2019  Narrative: EXAMINATION: CT CHEST W CONTRAST-, CT ABDOMEN AND PELVIS W CONTRAST-  INDICATION: Followup scan; C34.11-Malignant neoplasm of upper lobe, right bronchus or lung.  TECHNIQUE: Contrast enhanced CT imaging of the chest, abdomen, and pelvis was performed with additional coronal and sagittal reformatted imaging provided for review.  The radiation dose reduction device was turned on for each scan per the ALARA (As Low as Reasonably Achievable) protocol.  COMPARISON: CT chest, abdomen and pelvis 09/23/2019.  FINDINGS: CHEST: Thyroid gland is unremarkable. No axillary or supraclavicular lymphadenopathy. No mediastinal lymphadenopathy identified. The heart is not enlarged. Moderate coronary arterial calcifications are identified. No filling defects are identified within the main pulmonary artery. The aorta demonstrates atherosclerotic calcification is otherwise unremarkable. Trace nonspecific pericardial fluid is identified. Postsurgical changes within the right hilum/suprahilar region identified, new from 09/23/2019. There is a 1.2 cm nodular opacity within the surgical resection site which may be postsurgical scarring, however, continued evaluation for recurrent disease is advised. No suspicious pulmonary nodules or  masses are identified elsewhere within the lung parenchyma. Moderate centrilobular emphysema is identified. No pleural effusion or pneumothorax.  ABDOMEN/PELVIS: The liver demonstrates hypoattenuation consistent with hepatic steatosis. Postcholecystectomy changes are identified. The common bile duct is not particularly enlarged. Pancreas is unremarkable. The spleen and adrenal glands are free of mass. The kidneys demonstrate no evidence for metastatic disease or solid renal mass. Small cystic process involving the left kidney is identified which is too small to accurately characterize although statistically represents a small simple renal cyst. No evidence of hydronephrosis or obstructive uropathy. The urinary bladder is unrevealing. The prostate gland demonstrates coarse calcifications is otherwise unremarkable. No free fluid or pelvic mass identified. The retroperitoneum demonstrates atherosclerotic calcified and noncalcified disease of the aorta without evidence of aneurysm. No free air or free fluid identified. The stomach is unremarkable. The small bowel is nondilated. No evidence of bowel obstruction identified. Swirling of mesenteric vessels is noted, similar to 09/23/2019 which is likely anatomic without evidence for bowel obstruction or convincing evidence of internal hernia at this time. The colon is unremarkable. There is no evidence of abnormal colonic wall thickening or mass appreciated. Small inguinal hernia is identified. The surrounding soft tissues are unrevealing. No retroperitoneal hemorrhage. Visualized bony pelvis appears intact.  Thoracolumbar degenerative changes are identified without aggressive features.      Impression: 1. Interval postsurgical changes from right upper lobectomy with a 1.2 cm nodular opacity adjacent to the surgical margin which may be postsurgical scarring, although followup per oncologic protocol advised to exclude disease recurrence within this region. 2. There is no  evidence of metastatic disease within the chest, abdomen, or pelvis identified at this time. 3. Additional chronic findings as discussed above.  D:  12/18/2019 E:  12/18/2019        ASSESSMENT: The patient is a very pleasant 64 y.o. male  with right upper lobe squamous cell carcinoma of the lung     PROBLEM LIST:   1.  Squamous cell carcinoma of the lung T1b N2 M0 stage T3a:  A.  Presented with shortness of breath and cough  B.  CT chest with contrast done June 28, 2019 revealed right upper lobe 1.8 cm lung nodule with right hilar and mediastinal lymphadenopathy.  MRI brain whole-body PET scan done on July 22, 2019 felt show any other sites of disease.  C.  Diagnosed after bronchoscopy with biopsy done July 11, 2019  D.  Pathology revealed squamous cell carcinoma from level 4R and level 7.  E.  Started neoadjuvant concurrent chemotherapy with radiation using weekly carbotaxol July 30, 2019.  Radiation was added August 6, 2019. This was completed 09/11/2019.  F.  Status post right upper lobe resection with lymph node sampling done by Dr. Flores October 11, 2019.  G.  Final pathology revealed complete pathologic response with no evidence of residual disease in the lung or in the hilar and mediastinal nodes.  2.  Chronic tobacco abuse  3.  Hypertension  4.  COPD:   A. Not oxygen dependent  B.  PFTs done on July 17, 2019 revealed FEV1 2.44L, 72% of predicted and DLCO 55% of predicted.  5.  History of superficial melanoma status post surgical resection  6.  Hypercholesterolemia    PLAN:  1.  I did go over the CT scan results with the patient and his wife reassured them there is no evidence of recurrent cancer.  2.  I will repeat scans prior to return.  3.  I will continue to monitor patient blood work including blood counts kidney function liver function and electrolytes.  4.  The patient will follow-up with me in 4 months if everything remained stable I will switch him to a very 6-month visits at that point.  5.  We  will continue lisinopril for hypertension  6.  We will continue Lipitor 40 mg daily for hypercholesterolemia.    Kevin Salguero MD  12/18/2019

## 2020-01-09 ENCOUNTER — OFFICE VISIT (OUTPATIENT)
Dept: PRIMARY CARE CLINIC | Age: 65
End: 2020-01-09
Payer: COMMERCIAL

## 2020-01-09 ENCOUNTER — HOSPITAL ENCOUNTER (OUTPATIENT)
Facility: HOSPITAL | Age: 65
Discharge: HOME | End: 2020-01-09
Payer: COMMERCIAL

## 2020-01-09 VITALS
HEART RATE: 96 BPM | HEIGHT: 69 IN | WEIGHT: 233 LBS | RESPIRATION RATE: 16 BRPM | BODY MASS INDEX: 34.51 KG/M2 | TEMPERATURE: 97.9 F | SYSTOLIC BLOOD PRESSURE: 135 MMHG | OXYGEN SATURATION: 98 % | DIASTOLIC BLOOD PRESSURE: 69 MMHG

## 2020-01-09 LAB
A/G RATIO: 1.6 (ref 0.8–2)
ALBUMIN SERPL-MCNC: 4.5 G/DL (ref 3.4–4.8)
ALP BLD-CCNC: 110 U/L (ref 25–100)
ALT SERPL-CCNC: 38 U/L (ref 4–36)
ANION GAP SERPL CALCULATED.3IONS-SCNC: 10 MMOL/L (ref 3–16)
AST SERPL-CCNC: 32 U/L (ref 8–33)
BILIRUB SERPL-MCNC: 1.2 MG/DL (ref 0.3–1.2)
BUN BLDV-MCNC: 12 MG/DL (ref 6–20)
CALCIUM SERPL-MCNC: 10.2 MG/DL (ref 8.5–10.5)
CHLORIDE BLD-SCNC: 100 MMOL/L (ref 98–107)
CO2: 30 MMOL/L (ref 20–30)
CREAT SERPL-MCNC: 1 MG/DL (ref 0.4–1.2)
FOLATE: 7.24 NG/ML
GFR AFRICAN AMERICAN: >59
GFR NON-AFRICAN AMERICAN: >59
GLOBULIN: 2.8 G/DL
GLUCOSE BLD-MCNC: 109 MG/DL (ref 74–106)
HCT VFR BLD CALC: 44.1 % (ref 40–54)
HEMOGLOBIN: 14.6 G/DL (ref 13–18)
MAGNESIUM: 2.1 MG/DL (ref 1.7–2.4)
MCH RBC QN AUTO: 33.3 PG (ref 27–32)
MCHC RBC AUTO-ENTMCNC: 33.1 G/DL (ref 31–35)
MCV RBC AUTO: 100.7 FL (ref 80–100)
PDW BLD-RTO: 11.9 % (ref 11–16)
PLATELET # BLD: 228 K/UL (ref 150–400)
PMV BLD AUTO: 9.7 FL (ref 6–10)
POTASSIUM SERPL-SCNC: 4.6 MMOL/L (ref 3.4–5.1)
RBC # BLD: 4.38 M/UL (ref 4.5–6)
SODIUM BLD-SCNC: 140 MMOL/L (ref 136–145)
TOTAL PROTEIN: 7.3 G/DL (ref 6.4–8.3)
TSH SERPL DL<=0.05 MIU/L-ACNC: 2.04 UIU/ML (ref 0.35–5.5)
VITAMIN B-12: 683 PG/ML (ref 211–911)
WBC # BLD: 9 K/UL (ref 4–11)

## 2020-01-09 PROCEDURE — 99213 OFFICE O/P EST LOW 20 MIN: CPT | Performed by: NURSE PRACTITIONER

## 2020-01-09 PROCEDURE — 82746 ASSAY OF FOLIC ACID SERUM: CPT

## 2020-01-09 PROCEDURE — 83735 ASSAY OF MAGNESIUM: CPT

## 2020-01-09 PROCEDURE — 85027 COMPLETE CBC AUTOMATED: CPT

## 2020-01-09 PROCEDURE — 84443 ASSAY THYROID STIM HORMONE: CPT

## 2020-01-09 PROCEDURE — 82607 VITAMIN B-12: CPT

## 2020-01-09 PROCEDURE — 80053 COMPREHEN METABOLIC PANEL: CPT

## 2020-01-09 RX ORDER — GABAPENTIN 100 MG/1
100 CAPSULE ORAL 2 TIMES DAILY
Qty: 60 CAPSULE | Refills: 0 | Status: SHIPPED | OUTPATIENT
Start: 2020-01-09 | End: 2022-06-07 | Stop reason: ALTCHOICE

## 2020-01-09 ASSESSMENT — PATIENT HEALTH QUESTIONNAIRE - PHQ9
SUM OF ALL RESPONSES TO PHQ QUESTIONS 1-9: 0
2. FEELING DOWN, DEPRESSED OR HOPELESS: 0
SUM OF ALL RESPONSES TO PHQ9 QUESTIONS 1 & 2: 0
SUM OF ALL RESPONSES TO PHQ QUESTIONS 1-9: 0
1. LITTLE INTEREST OR PLEASURE IN DOING THINGS: 0

## 2020-01-09 ASSESSMENT — ENCOUNTER SYMPTOMS
EYES NEGATIVE: 1
RESPIRATORY NEGATIVE: 1
GASTROINTESTINAL NEGATIVE: 1

## 2020-01-09 NOTE — PROGRESS NOTES
SUBJECTIVE:    Patient ID: Shahid Swartz is a 62 y.o. male. Chief Complaint   Patient presents with    Numbness     bilateral hands         HPI:  He recently went through chemotherapy for lung cancer. He had surgery and radiation of the chest. For the past month he has been having a lot of pain in his hands, his feet burn some as well. He say he is getting up at night trying to shake his hands. He has taken Aleve but no help. He has tried hot water. He works as a  and it is hard with his work. He says the pain is sometimes unbearable. He contacted his Oncologist who said it could go on for 25 years. He was advised to see his PCP. Patient's medications,allergies, past medical, surgical, social and family histories were reviewed and updated as appropriate. .  Current Outpatient Medications on File Prior to Visit   Medication Sig Dispense Refill    levocetirizine (XYZAL) 5 MG tablet Take 1 tablet by mouth nightly 30 tablet 0    aspirin 81 MG EC tablet Take 1 tablet by mouth daily 90 tablet 3    fexofenadine (ALLEGRA) 180 MG tablet Take 180 mg by mouth daily      metoprolol (LOPRESSOR) 25 MG tablet Take 25 mg by mouth daily.  lisinopril (PRINIVIL;ZESTRIL) 5 MG tablet Take 5 mg by mouth daily.  atorvastatin (LIPITOR) 40 MG tablet Take 40 mg by mouth daily.  albuterol sulfate  (90 Base) MCG/ACT inhaler Inhale 2 puffs into the lungs 4 times daily as needed for Wheezing (Patient not taking: Reported on 11/14/2019) 3 Inhaler 1     No current facility-administered medications on file prior to visit. Review of Systems   Constitutional: Negative. HENT: Negative. Eyes: Negative. Respiratory: Negative. Cardiovascular: Negative. Gastrointestinal: Negative. Genitourinary: Negative. Musculoskeletal: Positive for myalgias (burning pain in hands and feet. ). Skin: Negative. Neurological: Positive for numbness (hands).    Psychiatric/Behavioral: Negative. OBJECTIVE:  /69 (Site: Right Upper Arm, Position: Sitting, Cuff Size: Medium Adult)   Pulse 96   Temp 97.9 °F (36.6 °C) (Oral)   Resp 16   Ht 5' 9\" (1.753 m)   Wt 233 lb (105.7 kg)   SpO2 98% Comment: room air  BMI 34.41 kg/m²    Physical Exam  Vitals signs and nursing note reviewed. Constitutional:       Appearance: He is well-developed. HENT:      Head: Normocephalic and atraumatic. Eyes:      Conjunctiva/sclera: Conjunctivae normal.      Pupils: Pupils are equal, round, and reactive to light. Neck:      Musculoskeletal: Normal range of motion and neck supple. Thyroid: No thyromegaly. Vascular: No JVD. Cardiovascular:      Rate and Rhythm: Normal rate and regular rhythm. Heart sounds: No murmur. No friction rub. No gallop. Pulmonary:      Effort: Pulmonary effort is normal. No respiratory distress. Breath sounds: Normal breath sounds. No wheezing or rales. Abdominal:      General: Bowel sounds are normal. There is no distension. Palpations: Abdomen is soft. Tenderness: There is no tenderness. There is no guarding. Musculoskeletal: Normal range of motion. Right hand: He exhibits tenderness. Left hand: He exhibits tenderness. Skin:     General: Skin is warm and dry. Findings: No rash. Neurological:      Mental Status: He is alert and oriented to person, place, and time. Psychiatric:         Behavior: Behavior normal.         Thought Content: Thought content normal.         Judgment: Judgment normal.         No results found for requested labs within last 30 days. Hemoglobin A1C (%)   Date Value   05/10/2017 5.6     LDL Calculated (mg/dL)   Date Value   12/19/2018 72           Lab Results   Component Value Date    TSH 2.55 12/19/2018         ASSESSMENT/PLAN:     Shirlene Braswell was seen today for numbness.     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 -     Comprehensive Metabolic Panel; Future  -     CBC; Future  -     TSH without Reflex; Future  -     Vitamin B12 & Folate; Future  -     MAGNESIUM; Future    long discussion with him about neuropathy following Chemotherapy. Will start on Neurontin and increase dose if he tolerates medication. Advised of therapy techniques including desensitization, and also prescribed Neuropathy cream to apply bid. will check labs to see if anything is low. Have him follow up in a month. Advised him to wear gloves as much as possible in the cold. There are no discontinued medications.

## 2020-01-27 ENCOUNTER — OFFICE VISIT (OUTPATIENT)
Dept: PULMONOLOGY | Facility: CLINIC | Age: 65
End: 2020-01-27

## 2020-01-27 VITALS
BODY MASS INDEX: 33.92 KG/M2 | OXYGEN SATURATION: 95 % | RESPIRATION RATE: 18 BRPM | WEIGHT: 229 LBS | SYSTOLIC BLOOD PRESSURE: 122 MMHG | HEIGHT: 69 IN | DIASTOLIC BLOOD PRESSURE: 70 MMHG | HEART RATE: 87 BPM

## 2020-01-27 DIAGNOSIS — J43.9 PULMONARY EMPHYSEMA, UNSPECIFIED EMPHYSEMA TYPE (HCC): Primary | ICD-10-CM

## 2020-01-27 DIAGNOSIS — C34.91 SQUAMOUS CELL LUNG CANCER, RIGHT (HCC): ICD-10-CM

## 2020-01-27 DIAGNOSIS — Z87.891 PERSONAL HISTORY OF TOBACCO USE, PRESENTING HAZARDS TO HEALTH: ICD-10-CM

## 2020-01-27 DIAGNOSIS — G25.81 RESTLESS LEG SYNDROME: ICD-10-CM

## 2020-01-27 PROCEDURE — 99214 OFFICE O/P EST MOD 30 MIN: CPT | Performed by: INTERNAL MEDICINE

## 2020-01-27 RX ORDER — ROPINIROLE 1 MG/1
1 TABLET, FILM COATED ORAL NIGHTLY
Qty: 30 TABLET | Refills: 5 | Status: SHIPPED | OUTPATIENT
Start: 2020-01-27 | End: 2020-09-30

## 2020-01-27 RX ORDER — ALBUTEROL SULFATE 90 UG/1
2 AEROSOL, METERED RESPIRATORY (INHALATION) EVERY 4 HOURS PRN
Qty: 1 INHALER | Refills: 5 | Status: SHIPPED | OUTPATIENT
Start: 2020-01-27 | End: 2020-09-30 | Stop reason: SDUPTHER

## 2020-01-27 NOTE — PROGRESS NOTES
"Chief Complaint   Patient presents with   • Follow-up   • Breathing Problem       Subjective   Erik Bowser is a 65 y.o. male.     History of Present Illness   Patient comes today for follow up of shortness of breath and COPD.     Patient says that his symptoms have been stable since the last clinic visit. he reports no recent exacerbations. He has not ever needed long term medications.     Quit smoking 6-7 months ago.     He finished chemotherapy.     Patient also complains of an urge to move his upper extremities along with occasional tingling sensation. Patient also reports occasions when he gets \"cramps\" and has to rub them off and sometimes walk them off.      The following portions of the patient's history were reviewed and updated as appropriate: allergies, current medications, past family history, past medical history, past social history and past surgical history.    Review of Systems   Constitutional: Negative for chills and fever.   HENT: Negative for rhinorrhea, sinus pressure and sore throat.    Respiratory: Positive for shortness of breath. Negative for cough, chest tightness and wheezing.    Psychiatric/Behavioral: Positive for sleep disturbance.       Objective   Visit Vitals  /70   Pulse 87   Resp 18   Ht 175.3 cm (69\")   Wt 104 kg (229 lb)   SpO2 95%   BMI 33.82 kg/m²       Physical Exam   Constitutional: He is oriented to person, place, and time. He appears well-developed and well-nourished.   HENT:   Dentures noted.    Eyes: EOM are normal.   Neck: Neck supple.   Cardiovascular: Normal rate and regular rhythm.   Pulmonary/Chest: Effort normal. No respiratory distress.   Right sided scars noted.  Minimal decreased A/E with out wheezing noted.   Musculoskeletal: He exhibits no edema.   Neurological: He is alert and oriented to person, place, and time.   Skin: Skin is warm and dry.   Psychiatric: He has a normal mood and affect.   Vitals reviewed.      Assessment/Plan   Erik was seen " today for follow-up and breathing problem.    Diagnoses and all orders for this visit:    Pulmonary emphysema, unspecified emphysema type (CMS/HCC)  -     Pulmonary Function Test; Future    Squamous cell lung cancer, right (CMS/HCC)    Personal history of tobacco use, presenting hazards to health    Restless leg syndrome    Other orders  -     rOPINIRole (REQUIP) 1 MG tablet; Take 1 tablet by mouth Every Night. Take 1 hour before bedtime.  -     albuterol sulfate HFA (VENTOLIN HFA) 108 (90 Base) MCG/ACT inhaler; Inhale 2 puffs Every 4 (Four) Hours As Needed for Wheezing or Shortness of Air.         Return in about 5 months (around 6/27/2020) for Recheck, PFT F/U.    DISCUSSION (if any):  Last CT scan was reviewed in great detail with the patient. Images reviewed personally.   Results for orders placed during the hospital encounter of 12/18/19   CT Chest With Contrast    Narrative EXAMINATION: CT CHEST W CONTRAST-, CT ABDOMEN AND PELVIS W CONTRAST-      INDICATION: Followup scan; C34.11-Malignant neoplasm of upper lobe,  right bronchus or lung.     TECHNIQUE: Contrast enhanced CT imaging of the chest, abdomen, and  pelvis was performed with additional coronal and sagittal reformatted  imaging provided for review.     The radiation dose reduction device was turned on for each scan per the  ALARA (As Low as Reasonably Achievable) protocol.     COMPARISON: CT chest, abdomen and pelvis 09/23/2019.     FINDINGS: CHEST: Thyroid gland is unremarkable. No axillary or  supraclavicular lymphadenopathy. No mediastinal lymphadenopathy  identified. The heart is not enlarged. Moderate coronary arterial  calcifications are identified. No filling defects are identified within  the main pulmonary artery. The aorta demonstrates atherosclerotic  calcification is otherwise unremarkable. Trace nonspecific pericardial  fluid is identified. Postsurgical changes within the right  hilum/suprahilar region identified, new from 09/23/2019.  There is a 1.2  cm nodular opacity within the surgical resection site which may be  postsurgical scarring, however, continued evaluation for recurrent  disease is advised. No suspicious pulmonary nodules or masses are  identified elsewhere within the lung parenchyma. Moderate centrilobular  emphysema is identified. No pleural effusion or pneumothorax.     ABDOMEN/PELVIS: The liver demonstrates hypoattenuation consistent with  hepatic steatosis. Postcholecystectomy changes are identified. The  common bile duct is not particularly enlarged. Pancreas is unremarkable.  The spleen and adrenal glands are free of mass. The kidneys demonstrate  no evidence for metastatic disease or solid renal mass. Small cystic  process involving the left kidney is identified which is too small to  accurately characterize although statistically represents a small simple  renal cyst. No evidence of hydronephrosis or obstructive uropathy. The  urinary bladder is unrevealing. The prostate gland demonstrates coarse  calcifications is otherwise unremarkable. No free fluid or pelvic mass  identified. The retroperitoneum demonstrates atherosclerotic calcified  and noncalcified disease of the aorta without evidence of aneurysm. No  free air or free fluid identified. The stomach is unremarkable. The  small bowel is nondilated. No evidence of bowel obstruction identified.  Swirling of mesenteric vessels is noted, similar to 09/23/2019 which is  likely anatomic without evidence for bowel obstruction or convincing  evidence of internal hernia at this time. The colon is unremarkable.  There is no evidence of abnormal colonic wall thickening or mass  appreciated. Small inguinal hernia is identified. The surrounding soft  tissues are unrevealing. No retroperitoneal hemorrhage. Visualized bony  pelvis appears intact.  Thoracolumbar degenerative changes are  identified without aggressive features.       Impression 1. Interval postsurgical changes from  right upper lobectomy with a 1.2  cm nodular opacity adjacent to the surgical margin which may be  postsurgical scarring, although followup per oncologic protocol advised  to exclude disease recurrence within this region.  2. There is no evidence of metastatic disease within the chest, abdomen,  or pelvis identified at this time.  3. Additional chronic findings as discussed above.     D:  12/18/2019  E:  12/18/2019     This report was finalized on 12/19/2019 8:11 AM by Dr. Arian Manning MD.        I reviewed Dr. Salguero's last office note that mentions Squamous cell carcinoma of the lung T1b N2 M0 stage T3a. Started neoadjuvant concurrent chemotherapy with radiation using weekly carbotaxol July 30, 2019.  Radiation was added August 6, 2019. This was completed 09/11/2019. Status post right upper lobe resection with lymph node sampling done by Dr. Flores October 11, 2019.    We will start the patient on Requip and adjust the dose according to symptomatic relief.    he was informed about the side effects in great detail.    Patient was advised to start using rescue inhaler for when necessary purposes    Patient was also advised to keep a log of the use of rescue inhaler.      Last PFTs showed moderate COPD.  These were performed in 2019.    PFTs will be ordered to be done upon follow up.    We have reviewed his pulmonary medications in great detail.    The patient says that he is up-to-date with pneumovax.     Will administer PrevNar next visit.         Dictated utilizing Dragon dictation.    This document was electronically signed by Jeff Rubin MD on 01/27/20 at 10:06 AM

## 2020-02-11 RX ORDER — ATORVASTATIN CALCIUM 40 MG/1
TABLET, FILM COATED ORAL
Qty: 90 TABLET | Refills: 0 | Status: SHIPPED | OUTPATIENT
Start: 2020-02-11 | End: 2020-08-27

## 2020-03-04 ENCOUNTER — OFFICE VISIT (OUTPATIENT)
Dept: PRIMARY CARE CLINIC | Age: 65
End: 2020-03-04
Payer: COMMERCIAL

## 2020-03-04 VITALS
HEIGHT: 69 IN | DIASTOLIC BLOOD PRESSURE: 70 MMHG | HEART RATE: 71 BPM | SYSTOLIC BLOOD PRESSURE: 108 MMHG | RESPIRATION RATE: 16 BRPM | BODY MASS INDEX: 34.6 KG/M2 | OXYGEN SATURATION: 96 % | TEMPERATURE: 97.6 F | WEIGHT: 233.6 LBS

## 2020-03-04 PROCEDURE — 99213 OFFICE O/P EST LOW 20 MIN: CPT | Performed by: NURSE PRACTITIONER

## 2020-03-04 RX ORDER — METHYLPREDNISOLONE 4 MG/1
TABLET ORAL
Qty: 21 TABLET | Refills: 0 | Status: SHIPPED | OUTPATIENT
Start: 2020-03-04 | End: 2020-03-10

## 2020-03-04 RX ORDER — ROPINIROLE 1 MG/1
TABLET, FILM COATED ORAL
COMMUNITY
Start: 2020-01-27 | End: 2020-03-04

## 2020-03-04 ASSESSMENT — ENCOUNTER SYMPTOMS
RESPIRATORY NEGATIVE: 1
EYES NEGATIVE: 1
GASTROINTESTINAL NEGATIVE: 1

## 2020-03-04 NOTE — PROGRESS NOTES
Chief Complaint   Patient presents with    3 Month Follow-Up    Discuss Labs    Hand Problem     bilateral with right worse. Have you seen any other physician or provider since your last visit yes - oncologist Dr Arsen Valerio    Have you had any other diagnostic tests since your last visit? yes - labs    Have you changed or stopped any medications since your last visit? yes - stopped compound crea,CBD oil,and stopped Requip that Dr Arsen Valerio gave him. I have recommended that this patient have a sigmoidoscopy but he declines at this time. I have discussed the risks and benefits of this examination with him. The patient verbalizes understanding. He had one 2 years ago in [place]. Diabetic retinal exam completed this year? Yes                       * If yes please have patient sign a records release to obtain record to [facility] [address] [facility] [facility] * If no, please order referral for patient to be scheduled    Patient is here for a 3 month follow up on labs. He is still having problems with his bilateral hands tingling and numb but worse on the right. He states it get worse at night. He was given Requip by Dr Arsen Valerio but stopped due to failure to help.  He also stopped the compound cream.
Constitutional:       Appearance: He is well-developed. HENT:      Head: Normocephalic and atraumatic. Eyes:      Conjunctiva/sclera: Conjunctivae normal.      Pupils: Pupils are equal, round, and reactive to light. Neck:      Musculoskeletal: Normal range of motion and neck supple. Thyroid: No thyromegaly. Vascular: No JVD. Cardiovascular:      Rate and Rhythm: Normal rate and regular rhythm. Heart sounds: No murmur. No friction rub. No gallop. Pulmonary:      Effort: Pulmonary effort is normal. No respiratory distress. Breath sounds: Normal breath sounds. No wheezing or rales. Abdominal:      General: Bowel sounds are normal. There is no distension. Palpations: Abdomen is soft. Tenderness: There is no abdominal tenderness. There is no guarding. Musculoskeletal:         General: No tenderness. Right hand: Decreased strength noted. Left hand: Decreased strength noted. Skin:     General: Skin is warm and dry. Findings: No rash. Neurological:      Mental Status: He is alert and oriented to person, place, and time. Psychiatric:         Judgment: Judgment normal.         No results found for requested labs within last 30 days. Hemoglobin A1C (%)   Date Value   05/10/2017 5.6     LDL Calculated (mg/dL)   Date Value   12/19/2018 72           Lab Results   Component Value Date    TSH 2.04 01/09/2020         ASSESSMENT/PLAN:     Lilly Ramsay was seen today for 3 month follow-up, discuss labs and hand problem. Diagnoses and all orders for this visit:    Numbness and tingling in left hand  -     Cancel: AR NEEDLE EMG EA EXTREMTY W/PARASPINL AREA COMPLETE  -     EMG    Numbness and tingling in right hand  -     Cancel: AR NEEDLE EMG EA EXTREMTY W/PARASPINL AREA COMPLETE  -     Cancel: XR HAND RIGHT (2 VIEWS); Future  -     EMG  -     XR HAND RIGHT (MIN 3 VIEWS); Future    Pain in both hands  -     Cancel: XR HAND LEFT (2 VIEWS);  Future  -     EMG  -     XR

## 2020-03-05 ENCOUNTER — HOSPITAL ENCOUNTER (OUTPATIENT)
Facility: HOSPITAL | Age: 65
Discharge: HOME | End: 2020-03-05
Payer: COMMERCIAL

## 2020-03-05 ENCOUNTER — HOSPITAL ENCOUNTER (OUTPATIENT)
Dept: GENERAL RADIOLOGY | Facility: HOSPITAL | Age: 65
Discharge: HOME | End: 2020-03-05
Payer: COMMERCIAL

## 2020-03-05 PROCEDURE — 73130 X-RAY EXAM OF HAND: CPT

## 2020-03-15 DIAGNOSIS — I25.10 CORONARY ARTERY DISEASE INVOLVING NATIVE CORONARY ARTERY OF NATIVE HEART WITHOUT ANGINA PECTORIS: ICD-10-CM

## 2020-03-16 RX ORDER — METOPROLOL SUCCINATE 25 MG/1
TABLET, EXTENDED RELEASE ORAL
Qty: 90 TABLET | Refills: 0 | Status: SHIPPED | OUTPATIENT
Start: 2020-03-16 | End: 2020-08-27

## 2020-04-13 ENCOUNTER — APPOINTMENT (OUTPATIENT)
Dept: CT IMAGING | Facility: HOSPITAL | Age: 65
End: 2020-04-13

## 2020-05-26 ENCOUNTER — HOSPITAL ENCOUNTER (OUTPATIENT)
Dept: CT IMAGING | Facility: HOSPITAL | Age: 65
Discharge: HOME | End: 2020-05-26

## 2020-05-26 ENCOUNTER — HOSPITAL ENCOUNTER (OUTPATIENT)
Dept: CT IMAGING | Facility: HOSPITAL | Age: 65
Discharge: HOME | End: 2020-05-26
Admitting: INTERNAL MEDICINE

## 2020-05-26 DIAGNOSIS — C34.91 SQUAMOUS CELL CARCINOMA OF RIGHT LUNG (HCC): ICD-10-CM

## 2020-05-26 PROCEDURE — 25010000002 IOPAMIDOL 61 % SOLUTION: Performed by: INTERNAL MEDICINE

## 2020-05-26 PROCEDURE — 74177 CT ABD & PELVIS W/CONTRAST: CPT

## 2020-05-26 PROCEDURE — 71260 CT THORAX DX C+: CPT

## 2020-05-26 RX ADMIN — IOPAMIDOL 100 ML: 612 INJECTION, SOLUTION INTRAVENOUS at 13:44

## 2020-05-27 ENCOUNTER — OFFICE VISIT (OUTPATIENT)
Dept: ONCOLOGY | Facility: CLINIC | Age: 65
End: 2020-05-27

## 2020-05-27 DIAGNOSIS — C34.91 SQUAMOUS CELL CARCINOMA OF RIGHT LUNG (HCC): Primary | ICD-10-CM

## 2020-05-27 PROCEDURE — 99214 OFFICE O/P EST MOD 30 MIN: CPT | Performed by: INTERNAL MEDICINE

## 2020-05-27 NOTE — PROGRESS NOTES
DATE OF VISIT: 5/27/2020    REASON FOR VISIT: Followup for right lung cancer     HISTORY OF PRESENT ILLNESS: The patient is a very pleasant 65 y.o. male  with past medical history significant for cell carcinoma of the right upper lobe of the lung diagnosed July 2019.  The patient had staging work-up that confirmed stage IIIa disease.  He will be started on neoadjuvant concurrent chemotherapy radiation July 30, 2019.  He completed his treatment course September 2019.  Patient status post right upper lobe resection with lymph node sampling done by Dr. Flores October 11, 2019.  Final pathology did not reveal any evidence of residual disease.   The patient is here today for scheduled follow-up visit.    SUBJECTIVE: The patient was interviewed today using telemedicine given the current pandemic.  All in all he has been doing fairly well.  He denies any fever or chills.  He continues to complain of pain at the surgical site.  Is been holding more fluid lately.    PAST MEDICAL HISTORY/SOCIAL HISTORY/FAMILY HISTORY: Reviewed by me and unchanged from my documentation done on 05/27/20.    Review of Systems   Constitutional: Negative for activity change, appetite change, chills, fatigue, fever and unexpected weight change.   HENT: Negative for hearing loss, mouth sores, nosebleeds, sore throat and trouble swallowing.    Eyes: Negative for visual disturbance.   Respiratory: Negative for cough, chest tightness, shortness of breath and wheezing.    Cardiovascular: Negative for chest pain, palpitations and leg swelling.   Gastrointestinal: Positive for constipation. Negative for abdominal distention, abdominal pain, blood in stool, diarrhea, nausea, rectal pain and vomiting.   Endocrine: Negative for cold intolerance and heat intolerance.   Genitourinary: Negative for difficulty urinating, dysuria, frequency and urgency.   Musculoskeletal: Negative for arthralgias, back pain, gait problem, joint swelling and myalgias.   Skin:  Negative for rash.   Neurological: Negative for dizziness, tremors, syncope, weakness, light-headedness, numbness and headaches.   Hematological: Negative for adenopathy. Does not bruise/bleed easily.   Psychiatric/Behavioral: Negative for confusion, sleep disturbance and suicidal ideas. The patient is not nervous/anxious.          Current Outpatient Medications:   •  albuterol sulfate HFA (VENTOLIN HFA) 108 (90 Base) MCG/ACT inhaler, Inhale 2 puffs Every 4 (Four) Hours As Needed for Wheezing or Shortness of Air., Disp: 1 inhaler, Rfl: 5  •  aspirin 81 MG EC tablet, Take 81 mg by mouth Every Night., Disp: , Rfl:   •  atorvastatin (LIPITOR) 40 MG tablet, TAKE ONE TABLET BY MOUTH EVERY NIGHT AT BEDTIME, Disp: 90 tablet, Rfl: 0  •  B Complex Vitamins (VITAMIN-B COMPLEX PO), Take 1 tablet by mouth Daily., Disp: , Rfl:   •  CBD oil (cannabidiol) capsule, Take 1 capsule by mouth Every Night., Disp: , Rfl:   •  Cholecalciferol (VITAMIN D3) 5000 units capsule capsule, Take 5,000 Units by mouth Daily., Disp: , Rfl:   •  Cyanocobalamin (B-12) 1000 MCG tablet, Take 1,000 mcg by mouth Daily., Disp: , Rfl:   •  fexofenadine (ALLEGRA) 180 MG tablet, Take 180 mg by mouth Daily., Disp: , Rfl:   •  lisinopril (PRINIVIL,ZESTRIL) 5 MG tablet, Take 5 mg by mouth Daily., Disp: , Rfl:   •  metoprolol succinate XL (TOPROL-XL) 25 MG 24 hr tablet, TAKE ONE TABLET BY MOUTH DAILY **MUST CALL MD FOR APPOINTMENT FOR LAB WORK, Disp: 90 tablet, Rfl: 0  •  naproxen sodium (ALEVE) 220 MG tablet, Take 220 mg by mouth As Needed for Mild Pain ., Disp: , Rfl:   •  nitroglycerin (NITROSTAT) 0.4 MG SL tablet, 1 under the tongue as needed for angina, may repeat q5mins for up three doses (Patient taking differently: Place 0.4 mg under the tongue Every 5 (Five) Minutes As Needed for Chest Pain. 1 under the tongue as needed for angina, may repeat q5mins for up three doses), Disp: 100 tablet, Rfl: 11  •  NON FORMULARY, Take 1 tablet by mouth Daily.  "\"derma-health pro\" OTC supplement for skin, Disp: , Rfl:   •  rOPINIRole (REQUIP) 1 MG tablet, Take 1 tablet by mouth Every Night. Take 1 hour before bedtime., Disp: 30 tablet, Rfl: 5  No current facility-administered medications for this visit.     PHYSICAL EXAMINATION:   There were no vitals taken for this visit.   ECOG Performance Status: 1 - Symptomatic but completely ambulatory  General Appearance:  alert, cooperative, no apparent distress and appears stated age   Neurologic/Psychiatric: A&O x 3, gait steady, appropriate affect, strength 5/5 in all muscle groups   HEENT:     Neck:    Lungs:      Heart:     Abdomen:      Lymph nodes:    Extremities:    Skin:      No visits with results within 2 Week(s) from this visit.   Latest known visit with results is:   Hospital Outpatient Visit on 12/18/2019   Component Date Value Ref Range Status   • Creatinine 12/18/2019 1.00  0.60 - 1.30 mg/dL Final    Serial Number: [id number]:  [id number]        Ct Chest With Contrast    Result Date: 5/26/2020  Narrative: PROCEDURE: CT CHEST W CONTRAST-, CT ABDOMEN PELVIS W CONTRAST-  HISTORY: f/u scan; C34.91-Malignant neoplasm of unspecified part of right bronchus or lung  COMPARISON: 06/28/2019.  PROCEDURE: Axial images were obtained from the thoracic inlet through the pubic symphysis following the administration of Isovue 300 contrast.   FINDINGS:  CHEST: Soft tissue windows reveals interval resolution of the previously noted mediastinal adenopathy. There is a borderline enlarged right retrocrural lymph node in the lower mediastinum seen on axial image 61 and coronal image 48. No other new or enlarging mediastinal lymph nodes are identified. Heart size is normal. There are no pleural or pericardial effusions. Lung windows reveal right upper lobectomy. There are no focal opacities or suspicious pulmonary nodules. Note is made of emphysematous changes. Bone windows reveal no lytic or destructive lesions.  ABDOMEN: The liver is " diffusely hypodense consistent with fatty infiltration. No focal liver lesions are identified. The spleen is unremarkable. No adrenal mass is present.  The pancreas is normal. The kidneys enhance appropriately. There are hypodense left renal lesions consistent with cysts. The right kidney is unremarkable. There is no hydronephrosis. The aorta is normal in caliber. There is no free fluid or adenopathy. The abdominal portions of the GI tract are unremarkable.  PELVIS: The appendix is normal. The urinary bladder is unremarkable. There is no significant free fluid or adenopathy. Bone windows reveal no lytic or destructive lesions.      Impression: Interval resolution of the previously noted mediastinal adenopathy and surgical resection of the patient's right upper lobe pulmonary nodule. There is a borderline enlarged lower mediastinal and right retrocrural lymph node which may be reactive and can be followed up on subsequent exams. There is no evidence of metastatic disease within the abdomen or pelvis.  This study was performed with techniques to keep radiation doses as low as reasonably achievable (ALARA). Individualized dose reduction techniques using automated exposure control or adjustment of mA and/or kV according to the patient size were employed.  This report was finalized on 5/26/2020 2:57 PM by Sumaya Jose M.D..    Ct Abdomen Pelvis With Contrast    Result Date: 5/26/2020  Narrative: PROCEDURE: CT CHEST W CONTRAST-, CT ABDOMEN PELVIS W CONTRAST-  HISTORY: f/u scan; C34.91-Malignant neoplasm of unspecified part of right bronchus or lung  COMPARISON: 06/28/2019.  PROCEDURE: Axial images were obtained from the thoracic inlet through the pubic symphysis following the administration of Isovue 300 contrast.   FINDINGS:  CHEST: Soft tissue windows reveals interval resolution of the previously noted mediastinal adenopathy. There is a borderline enlarged right retrocrural lymph node in the lower mediastinum seen  on axial image 61 and coronal image 48. No other new or enlarging mediastinal lymph nodes are identified. Heart size is normal. There are no pleural or pericardial effusions. Lung windows reveal right upper lobectomy. There are no focal opacities or suspicious pulmonary nodules. Note is made of emphysematous changes. Bone windows reveal no lytic or destructive lesions.  ABDOMEN: The liver is diffusely hypodense consistent with fatty infiltration. No focal liver lesions are identified. The spleen is unremarkable. No adrenal mass is present.  The pancreas is normal. The kidneys enhance appropriately. There are hypodense left renal lesions consistent with cysts. The right kidney is unremarkable. There is no hydronephrosis. The aorta is normal in caliber. There is no free fluid or adenopathy. The abdominal portions of the GI tract are unremarkable.  PELVIS: The appendix is normal. The urinary bladder is unremarkable. There is no significant free fluid or adenopathy. Bone windows reveal no lytic or destructive lesions.      Impression: Interval resolution of the previously noted mediastinal adenopathy and surgical resection of the patient's right upper lobe pulmonary nodule. There is a borderline enlarged lower mediastinal and right retrocrural lymph node which may be reactive and can be followed up on subsequent exams. There is no evidence of metastatic disease within the abdomen or pelvis.  This study was performed with techniques to keep radiation doses as low as reasonably achievable (ALARA). Individualized dose reduction techniques using automated exposure control or adjustment of mA and/or kV according to the patient size were employed.  This report was finalized on 5/26/2020 2:57 PM by Sumaya Jose M.D..      ASSESSMENT: The patient is a very pleasant 65 y.o. male  with right upper lobe squamous cell carcinoma of the lung     PROBLEM LIST:   1.  Squamous cell carcinoma of the lung T1b N2 M0 stage T3a:  A.   Presented with shortness of breath and cough  B.  CT chest with contrast done June 28, 2019 revealed right upper lobe 1.8 cm lung nodule with right hilar and mediastinal lymphadenopathy.  MRI brain whole-body PET scan done on July 22, 2019 felt show any other sites of disease.  C.  Diagnosed after bronchoscopy with biopsy done July 11, 2019  D.  Pathology revealed squamous cell carcinoma from level 4R and level 7.  E.  Started neoadjuvant concurrent chemotherapy with radiation using weekly carbotaxol July 30, 2019.  Radiation was added August 6, 2019. This was completed 09/11/2019.  F.  Status post right upper lobe resection with lymph node sampling done by Dr. Flores October 11, 2019.  G.  Final pathology revealed complete pathologic response with no evidence of residual disease in the lung or in the hilar and mediastinal nodes.  2.  Chronic tobacco abuse  3.  Hypertension  4.  COPD:   A. Not oxygen dependent  B.  PFTs done on July 17, 2019 revealed FEV1 2.44L, 72% of predicted and DLCO 55% of predicted.  5.  History of superficial melanoma status post surgical resection  6.  Hypercholesterolemia    PLAN:  1.  I did go over the CT scan results with the patient and his wife reassured them there is no evidence of recurrent cancer.  He did have borderline lymph nodes in the mediastinum which we will watch carefully.  2.  I will repeat scans prior to return.  3.  I will continue to monitor patient blood work including blood counts kidney function liver function and electrolytes.  4.  The patient will follow-up with me in 4 months if everything remained stable I will switch him to a very 6-month visits at that point.  5.  We will continue lisinopril for hypertension  6.  We will continue Lipitor 40 mg daily for hypercholesterolemia.  7.  Regarding his fluid retention patient was advised to exercise be more active and follow-up with his primary care provider.  This visit has been rescheduled as a phone visit to comply  with patient safety concerns in accordance with CDC recommendations. Total time of discussion was 16 minutes.    You have chosen to receive care through a telephone visit. Do you consent to use a telephone visit for your medical care today? Yes        Kevin Salguero MD  5/27/2020

## 2020-06-11 ENCOUNTER — TELEPHONE (OUTPATIENT)
Dept: CARDIAC SURGERY | Facility: CLINIC | Age: 65
End: 2020-06-11

## 2020-06-24 NOTE — PROGRESS NOTES
[facility] at [facility]  Office Visit Note    DATE: 06/26/2020    IDENTIFICATION: Erik Bowser is a 65 y.o. male who resides in [place], [place]    REASON FOR VISIT:  • Coronary artery disease            Erik Bowser returns today for follow-up of his coronary artery disease, hypertension hyperlipidemia.  At his last visit the patient reported being diagnosed with lung carcinoma in July and was requesting cardiac clearance to undergo surgery for mediastinotomy lymphadenopathy.  He has since underwent a right upper lobectomy.  He is currently following Dr. Salguero with oncology and final pathology is not revealed any recurrence of carcinoma.  Since his last visit he is becoming a little more short of breath than normal.  He is also been retaining fluid and has noticed swelling in his legs and feet.  He also feels abdominal bloating.  He admits he uses salt quite frequently and does not watch his sodium intake.  In the past his anginal symptoms have been upper epigastric symptoms including heartburn/indigestion resulting in chest tightness.  He has not experienced any of those symptoms.  He is on daily statin therapy and tolerating without myalgias.  He has not had a recent lipid panel.  He reports his blood pressures have stayed well controlled.  He was a longtime smoker but ever since his heart attack in 2015 he has remained compliant with smoking cessation.    Review of Systems   Constitution: Negative for malaise/fatigue.   Eyes: Negative for vision loss in left eye and vision loss in right eye.   Cardiovascular: Positive for leg swelling. Negative for chest pain, dyspnea on exertion, near-syncope, orthopnea, palpitations, paroxysmal nocturnal dyspnea and syncope.   Respiratory: Positive for shortness of breath.    Musculoskeletal: Negative for myalgias.   Gastrointestinal: Positive for bloating.   Neurological: Negative for brief paralysis, excessive daytime sleepiness, focal  "weakness, numbness, paresthesias and weakness.   All other systems reviewed and are negative.      The patient's past medical, social, family history and ROS reviewed in the patient's electronic medical record.    Allergies   Allergen Reactions   • Rosuvastatin Myalgia         Current Outpatient Medications:   •  albuterol sulfate HFA (VENTOLIN HFA) 108 (90 Base) MCG/ACT inhaler, Inhale 2 puffs Every 4 (Four) Hours As Needed for Wheezing or Shortness of Air., Disp: 1 inhaler, Rfl: 5  •  atorvastatin (LIPITOR) 40 MG tablet, TAKE ONE TABLET BY MOUTH EVERY NIGHT AT BEDTIME, Disp: 90 tablet, Rfl: 0  •  B Complex Vitamins (VITAMIN-B COMPLEX PO), Take 1 tablet by mouth Daily., Disp: , Rfl:   •  CBD oil (cannabidiol) capsule, Take 1 capsule by mouth Every Other Day., Disp: , Rfl:   •  Cholecalciferol (VITAMIN D3) 5000 units capsule capsule, Take 5,000 Units by mouth Daily., Disp: , Rfl:   •  Cyanocobalamin (B-12) 1000 MCG tablet, Take 1,000 mcg by mouth Daily., Disp: , Rfl:   •  fexofenadine (ALLEGRA) 180 MG tablet, Take 180 mg by mouth Daily., Disp: , Rfl:   •  lisinopril (PRINIVIL,ZESTRIL) 5 MG tablet, Take 5 mg by mouth Daily., Disp: , Rfl:   •  metoprolol succinate XL (TOPROL-XL) 25 MG 24 hr tablet, TAKE ONE TABLET BY MOUTH DAILY **MUST CALL MD FOR APPOINTMENT FOR LAB WORK, Disp: 90 tablet, Rfl: 0  •  naproxen sodium (ALEVE) 220 MG tablet, Take 220 mg by mouth As Needed for Mild Pain ., Disp: , Rfl:   •  nitroglycerin (NITROSTAT) 0.4 MG SL tablet, 1 under the tongue as needed for angina, may repeat q5mins for up three doses (Patient taking differently: Place 0.4 mg under the tongue Every 5 (Five) Minutes As Needed for Chest Pain. 1 under the tongue as needed for angina, may repeat q5mins for up three doses), Disp: 100 tablet, Rfl: 11  •  NON FORMULARY, Take 1 tablet by mouth Daily. \"derma-health pro\" OTC supplement for skin, Disp: , Rfl:   •  rOPINIRole (REQUIP) 1 MG tablet, Take 1 tablet by mouth Every Night. Take 1 " hour before bedtime., Disp: 30 tablet, Rfl: 5  •  aspirin 81 MG EC tablet, Take 1 tablet by mouth Every Night., Disp: 90 tablet, Rfl: 3  •  furosemide (LASIX) 20 MG tablet, Take 1 tablet by mouth Daily., Disp: 90 tablet, Rfl: 0    Past Medical History:   Diagnosis Date   • Arthritis    • At risk for obstructive sleep apnea     Spouse reports patient snores and that he has questionable sleep apnea but has never had a sleep study done   • Borderline diabetes     Has never taken medication    • COPD (chronic obstructive pulmonary disease) (CMS/[facility])    • Coronary artery disease     1 stent   • Elevated cholesterol    • Hearing loss     Does not use hearing devices   • History of bronchitis 2017   • History of chemotherapy     around 9/20/2019   • History of radiation therapy     around 9/20/2019   • Hyperlipidemia    • Hypertension    • Malignant neoplasm of right lung (CMS/[facility]) 10/11/2019   • Melanoma (CMS/[facility])    • MI (myocardial infarction) (CMS/[facility]) 01/20/2015    placement of stent x1   • MRSA (methicillin resistant Staphylococcus aureus) 01/2015    left hand - treated   • Seasonal allergies    • Skin cancer     skin, lung   • Squamous cell lung cancer (CMS/[facility]) 7/24/2019   • Wears glasses     OTC glasses PRN for reading   • Wears partial dentures     Upper mouth       Past Surgical History:   Procedure Laterality Date   • BRONCHOSCOPY N/A 7/11/2019    Procedure: BRONCHOSCOPY WITH ENDOBRONCHIAL ULTRASOUND with General Anesthesia.  ;  Surgeon: Jeff Rubin MD;  Location: [facility] OR;  Service: Pulmonary   • BRONCHOSCOPY N/A 10/11/2019    Procedure: BRONCHOSCOPY;  Surgeon: Titus Flores MD;  Location: [facility] OR;  Service: Cardiothoracic   • CARDIAC CATHETERIZATION  01/20/2015    Placement of stent x1   • CATARACT EXTRACTION Right    • CHOLECYSTECTOMY      Laparoscopic   • COLONOSCOPY  2018   • CORONARY ANGIOPLASTY WITH STENT PLACEMENT     • HEMORRHOIDECTOMY     • KNEE MENISCAL REPAIR Bilateral    • MOUTH SURGERY  "     Post for oral implants in upper mouth x3   • ROTATOR CUFF REPAIR Bilateral    • SKIN BIOPSY     • SKIN CANCER EXCISION      melanoma removed from right neck and back.  Squamous cell removed multiple places.    • THORACOSCOPY VIDEO ASSISTED WITH LOBECTOMY Right 10/11/2019    Procedure: THORACOSCOPY VIDEO ASSISTED WITH RIGHT UPPER LOBECTOMY, MEDIASTINAL LYMPH NODE DISSECTION, INTERCOSTAL NERVE BLOCKS;  Surgeon: Titus Flores MD;  Location[facility];  Service: Cardiothoracic   • WISDOM TOOTH EXTRACTION         Family History   Problem Relation Age of Onset   • Heart attack Mother 60   • Coronary artery disease Mother    • Hyperlipidemia Mother    • Lung cancer Mother    • Emphysema Father    • Hypertension Sister    • Heart attack Sister 45   • Hypertension Brother    • Heart attack Brother 50   • Hypertension Sister    • Hypertension Brother    • Diabetes Brother        Social History     Tobacco Use   • Smoking status: Former Smoker     Packs/day: 2.00     Years: 46.00     Pack years: 92.00     Types: Cigarettes     Last attempt to quit: 2019     Years since quittin.8   • Smokeless tobacco: Former User     Types: Chew   Substance Use Topics   • Alcohol use: Yes     Comment: Social use, no history of abuse           Blood pressure 120/66, pulse 70, temperature 97.1 °F (36.2 °C), height 172.7 cm (68\"), weight 107 kg (236 lb), SpO2 97 %.  Body mass index is 35.88 kg/m².  Vitals:    20 1055   Patient Position: Sitting       Physical Exam   Constitutional: He is oriented to person, place, and time. He appears well-developed and well-nourished.   HENT:   Head: Normocephalic and atraumatic.   Eyes: Conjunctivae are normal. No scleral icterus.   Neck: Normal range of motion. No JVD present. Carotid bruit is not present. No thyromegaly present.   Cardiovascular: Normal rate and regular rhythm. Exam reveals no gallop.   No murmur heard.  Pulmonary/Chest: Effort normal and breath sounds normal. "   Abdominal: Soft. He exhibits no distension and no mass. There is no hepatosplenomegaly.   Musculoskeletal: He exhibits no edema.   Neurological: He is alert and oriented to person, place, and time.   Skin: Skin is warm and dry. No rash noted.   Psychiatric: He has a normal mood and affect. His behavior is normal.       Data Review (reviewed with patient):     Procedures    Lab Results   Component Value Date    GLUCOSE 126 (H) 10/14/2019    BUN 20 10/14/2019    CREATININE 1.00 12/18/2019    EGFRIFNONA 94 10/14/2019    EGFRIFAFRI 119 04/13/2018    BCR 24.1 10/14/2019    K 4.7 10/14/2019    CO2 30.0 (H) 10/14/2019    CALCIUM 9.1 10/14/2019    ALBUMIN 4.40 10/10/2019    ALKPHOS 97 10/10/2019    AST 22 10/10/2019    ALT 28 10/10/2019      Lab Results   Component Value Date    CHLPL 166 12/19/2018    TRIG 296 (H) 12/19/2018    HDL 35 (L) 12/19/2018    LDL 72 12/19/2018     Lab Results   Component Value Date    HGBA1C 5.70 (H) 10/10/2019     Lab Results   Component Value Date    WBC 9.0 01/09/2020    HGB 14.6 01/09/2020    HCT 44.1 01/09/2020    .7 (H) 01/09/2020     01/09/2020     Lab Results   Component Value Date    TSH 2.04 01/09/2020            Problem List Items Addressed This Visit        Cardiovascular and Mediastinum    Coronary artery disease involving native coronary artery of native heart with angina pectoris (CMS/HCC) - Primary    Overview     · Cardiac catheterization for inferior STEMI by Dr. Mahendra Lacy (1/20/2015):  RCA occlusion status post ISH (Xience 3.5 x 23 mm).  LVEF 45%.  · Echocardiogram (1/21/2015):  LVEF 55%, no valvular abnormalities, 01/21/2015.         Current Assessment & Plan     · If patient develops exertional chest pain or upper epigastric discomfort will obtain myocardial perfusion study  · Aspirin 81 mg daily  · Continue metoprolol succinate 25 mg daily         Essential hypertension    Overview     • Target blood pressure <130/80 mmHg         Current Assessment &  Plan     · Hypertension is controlled  · Continue metoprolol succinate 25 mg daily  · Continue lisinopril 5 mg daily         Relevant Medications    furosemide (LASIX) 20 MG tablet    Hyperlipidemia LDL goal <70    Overview     • High intensity statin therapy indicated given the presence of CAD         Current Assessment & Plan     · Continue Lipitor 40 mg daily  · Obtain CMP and lipid profile in 1 week         Relevant Orders    Lipid Panel    Comprehensive Metabolic Panel       Respiratory    Dyspnea on exertion    Current Assessment & Plan     · Patient reports NYHA class II symptoms  · Obtain echocardiogram  · Lasix 20 mg daily  · BMP in 1 week         Relevant Orders    Adult Transthoracic Echo Complete W/ Cont if Necessary Per Protocol       Other    Bilateral leg edema    Current Assessment & Plan     · Start Lasix 20 mg daily         Relevant Orders    Comprehensive Metabolic Panel    Tobacco abuse    Current Assessment & Plan     · Patient has been compliant with smoking cessation.             Patient is doing well from a cardiovascular standpoint and has no signs or symptoms that were his anginal equivalent in the past.  He is experience shortness of breath therefore I will obtain an echocardiogram.  I have also instructed him to limit his sodium intake to no more than 3 g/day.  I will prescribe him Lasix 20 mg daily to use.  He will need a CMP and lipid profile in 1 week.  I congratulated him on his smoking cessation.  If he were to develop anginal symptoms reminiscent of his past symptoms we will consider stress test.       · Obtain echocardiogram for NYHA class II symptoms and lower extremity edema  · Will need CMP and lipid profile in 1 week  · Start Lasix 20 mg daily  · Congratulated patient on compliance with smoking cessation.  Return in about 6 months (around 12/26/2020), or if symptoms worsen or fail to improve, for Follow-up with Dr. Lacy next visit.            Sil Holliday,  APRN  6/26/2020

## 2020-06-26 ENCOUNTER — OFFICE VISIT (OUTPATIENT)
Dept: CARDIOLOGY | Facility: CLINIC | Age: 65
End: 2020-06-26

## 2020-06-26 VITALS
TEMPERATURE: 97.1 F | SYSTOLIC BLOOD PRESSURE: 120 MMHG | OXYGEN SATURATION: 97 % | DIASTOLIC BLOOD PRESSURE: 66 MMHG | HEIGHT: 68 IN | WEIGHT: 236 LBS | BODY MASS INDEX: 35.77 KG/M2 | HEART RATE: 70 BPM

## 2020-06-26 DIAGNOSIS — Z72.0 TOBACCO ABUSE: ICD-10-CM

## 2020-06-26 DIAGNOSIS — R60.0 BILATERAL LEG EDEMA: ICD-10-CM

## 2020-06-26 DIAGNOSIS — I10 ESSENTIAL HYPERTENSION: ICD-10-CM

## 2020-06-26 DIAGNOSIS — E78.5 HYPERLIPIDEMIA LDL GOAL <70: ICD-10-CM

## 2020-06-26 DIAGNOSIS — R06.09 DYSPNEA ON EXERTION: ICD-10-CM

## 2020-06-26 DIAGNOSIS — I25.119 CORONARY ARTERY DISEASE INVOLVING NATIVE CORONARY ARTERY OF NATIVE HEART WITH ANGINA PECTORIS (HCC): Primary | ICD-10-CM

## 2020-06-26 PROCEDURE — 99214 OFFICE O/P EST MOD 30 MIN: CPT | Performed by: NURSE PRACTITIONER

## 2020-06-26 RX ORDER — ASPIRIN 81 MG/1
81 TABLET ORAL NIGHTLY
Qty: 90 TABLET | Refills: 3 | Status: SHIPPED | OUTPATIENT
Start: 2020-06-26 | End: 2021-01-05 | Stop reason: SDUPTHER

## 2020-06-26 RX ORDER — FUROSEMIDE 20 MG/1
20 TABLET ORAL DAILY
Qty: 90 TABLET | Refills: 0 | Status: SHIPPED | OUTPATIENT
Start: 2020-06-26 | End: 2021-01-05

## 2020-06-26 NOTE — ASSESSMENT & PLAN NOTE
· If patient develops exertional chest pain or upper epigastric discomfort will obtain myocardial perfusion study  · Aspirin 81 mg daily  · Continue metoprolol succinate 25 mg daily

## 2020-06-26 NOTE — ASSESSMENT & PLAN NOTE
· Patient reports NYHA class II symptoms  · Obtain echocardiogram  · Lasix 20 mg daily  · BMP in 1 week

## 2020-06-29 ENCOUNTER — OFFICE VISIT (OUTPATIENT)
Dept: PULMONOLOGY | Facility: CLINIC | Age: 65
End: 2020-06-29

## 2020-06-29 VITALS
DIASTOLIC BLOOD PRESSURE: 70 MMHG | HEART RATE: 68 BPM | SYSTOLIC BLOOD PRESSURE: 124 MMHG | RESPIRATION RATE: 16 BRPM | TEMPERATURE: 97.1 F | WEIGHT: 233 LBS | HEIGHT: 68 IN | OXYGEN SATURATION: 98 % | BODY MASS INDEX: 35.31 KG/M2

## 2020-06-29 DIAGNOSIS — J44.9 CHRONIC OBSTRUCTIVE PULMONARY DISEASE, UNSPECIFIED COPD TYPE (HCC): ICD-10-CM

## 2020-06-29 DIAGNOSIS — Z87.891 PERSONAL HISTORY OF TOBACCO USE, PRESENTING HAZARDS TO HEALTH: ICD-10-CM

## 2020-06-29 DIAGNOSIS — C34.91 SQUAMOUS CELL LUNG CANCER, RIGHT (HCC): ICD-10-CM

## 2020-06-29 DIAGNOSIS — J43.9 PULMONARY EMPHYSEMA, UNSPECIFIED EMPHYSEMA TYPE (HCC): ICD-10-CM

## 2020-06-29 DIAGNOSIS — R06.02 SHORTNESS OF BREATH: Primary | ICD-10-CM

## 2020-06-29 PROCEDURE — 99214 OFFICE O/P EST MOD 30 MIN: CPT | Performed by: INTERNAL MEDICINE

## 2020-06-29 PROCEDURE — 94726 PLETHYSMOGRAPHY LUNG VOLUMES: CPT | Performed by: INTERNAL MEDICINE

## 2020-06-29 PROCEDURE — 94060 EVALUATION OF WHEEZING: CPT | Performed by: INTERNAL MEDICINE

## 2020-06-29 PROCEDURE — 94729 DIFFUSING CAPACITY: CPT | Performed by: INTERNAL MEDICINE

## 2020-06-29 RX ORDER — ALBUTEROL SULFATE 90 UG/1
2 AEROSOL, METERED RESPIRATORY (INHALATION) EVERY 4 HOURS PRN
Qty: 1 INHALER | Refills: 5 | Status: SHIPPED | OUTPATIENT
Start: 2020-06-29 | End: 2022-06-01

## 2020-06-29 NOTE — PROGRESS NOTES
"Chief Complaint   Patient presents with   • Follow-up   • Breathing Problem       Subjective   Erik Bowser is a 65 y.o. male.     History of Present Illness   Patient was evaluated today for follow up of shortness of breath. Patient says that his symptoms have been slightly worsening since the last clinic visit.     Patient does not report any emergency room visits or recent exacerbations.  The patient says that he occasionally feels chest fullness.    Patient is not using the inhalers and nebulizers, as prescribed. Exercise tolerance has also progressively worsened.     Quit smoking 8 months ago.     He is still following with Dr. Salguero for NSCLC.    The following portions of the patient's history were reviewed and updated as appropriate: allergies, current medications, past family history, past medical history, past social history and past surgical history.    Review of Systems   HENT: Negative for sinus pressure, sneezing and sore throat.    Respiratory: Positive for shortness of breath and wheezing. Negative for cough.        Objective   Visit Vitals  /70   Pulse 68   Temp 97.1 °F (36.2 °C)   Resp 16   Ht 172.7 cm (67.99\")   Wt 106 kg (233 lb)   SpO2 98%   BMI 35.44 kg/m²       Physical Exam   Constitutional: He is oriented to person, place, and time. He appears well-developed and well-nourished.   HENT:   Dentures noted.    Eyes: EOM are normal.   Neck: Neck supple.   Cardiovascular: Normal rate and regular rhythm.   Pulmonary/Chest: Effort normal. No respiratory distress.   Right sided scars noted.  Minimal decreased A/E with out wheezing noted.   Musculoskeletal: He exhibits no edema.   Neurological: He is alert and oriented to person, place, and time.   Skin: Skin is warm and dry.   Psychiatric: He has a normal mood and affect.   Vitals reviewed.      Assessment/Plan   Erik was seen today for follow-up and breathing problem.    Diagnoses and all orders for this visit:    Shortness of " breath    Pulmonary emphysema, unspecified emphysema type (CMS/HCC)    Chronic obstructive pulmonary disease, unspecified COPD type (CMS/HCC)    Personal history of tobacco use, presenting hazards to health    Squamous cell lung cancer, right (CMS/HCC)    Other orders  -     tiotropium bromide-olodaterol (STIOLTO RESPIMAT) 2.5-2.5 MCG/ACT aerosol solution inhaler; Inhale 2 puffs Daily.  -     albuterol sulfate HFA (Ventolin HFA) 108 (90 Base) MCG/ACT inhaler; Inhale 2 puffs Every 4 (Four) Hours As Needed for Wheezing or Shortness of Air.           Return in about 6 months (around 12/29/2020) for Recheck.    DISCUSSION (if any):  Last CT scan was reviewed in great detail with the patient. Images reviewed personally.   Results for orders placed during the hospital encounter of 05/26/20   CT Chest With Contrast    Narrative PROCEDURE: CT CHEST W CONTRAST-, CT ABDOMEN PELVIS W CONTRAST-     HISTORY: f/u scan; C34.91-Malignant neoplasm of unspecified part of  right bronchus or lung     COMPARISON: 06/28/2019.     PROCEDURE: Axial images were obtained from the thoracic inlet through  the pubic symphysis following the administration of Isovue 300   contrast.       FINDINGS:      CHEST: Soft tissue windows reveals interval resolution of the previously  noted mediastinal adenopathy. There is a borderline enlarged right  retrocrural lymph node in the lower mediastinum seen on axial image 61  and coronal image 48. No other new or enlarging mediastinal lymph nodes  are identified. Heart size is normal. There are no pleural or  pericardial effusions. Lung windows reveal right upper lobectomy. There  are no focal opacities or suspicious pulmonary nodules. Note is made of  emphysematous changes. Bone windows reveal no lytic or destructive  lesions.     ABDOMEN: The liver is diffusely hypodense consistent with fatty  infiltration. No focal liver lesions are identified. The spleen is  unremarkable. No adrenal mass is present.  The  pancreas is normal. The  kidneys enhance appropriately. There are hypodense left renal lesions  consistent with cysts. The right kidney is unremarkable. There is no  hydronephrosis. The aorta is normal in caliber. There is no free fluid  or adenopathy. The abdominal portions of the GI tract are unremarkable.     PELVIS: The appendix is normal. The urinary bladder is unremarkable.  There is no significant free fluid or adenopathy. Bone windows reveal no  lytic or destructive lesions.       Impression Interval resolution of the previously noted mediastinal  adenopathy and surgical resection of the patient's right upper lobe  pulmonary nodule. There is a borderline enlarged lower mediastinal and  right retrocrural lymph node which may be reactive and can be followed  up on subsequent exams. There is no evidence of metastatic disease  within the abdomen or pelvis.     This study was performed with techniques to keep radiation doses as low  as reasonably achievable (ALARA). Individualized dose reduction  techniques using automated exposure control or adjustment of mA and/or  kV according to the patient size were employed.      This report was finalized on 5/26/2020 2:57 PM by Sumaya Jose M.D..     Last PFTs showed moderate COPD.  These were performed in June 2020.    Due to symptoms which are suggestive of poorly controlled obstructive lung disease, I have decided to add Stiolto.    He actually has been given Anoro and Breo by his PCP and could not tolerate the powdered inhalers at all.     Other medications will remain the same.    Compliance with medications stressed.     Side effects of prescribed medications were discussed with the patient    Patient was advised to start using rescue inhaler for when necessary purposes    Patient was also advised to keep a log of the use of rescue inhaler.      He will need to continue to follow with Dr. Salguero for his s/p NSCLC.       Dictated utilizing Dragon  dictation.    This document was electronically signed by Jeff Rubin MD on 06/29/20 at 14:29

## 2020-07-10 ENCOUNTER — HOSPITAL ENCOUNTER (OUTPATIENT)
Dept: CARDIOLOGY | Facility: HOSPITAL | Age: 65
Discharge: HOME | End: 2020-07-10
Admitting: NURSE PRACTITIONER

## 2020-07-10 DIAGNOSIS — R06.09 DYSPNEA ON EXERTION: ICD-10-CM

## 2020-07-10 PROCEDURE — 93306 TTE W/DOPPLER COMPLETE: CPT | Performed by: INTERNAL MEDICINE

## 2020-07-10 PROCEDURE — 93306 TTE W/DOPPLER COMPLETE: CPT

## 2020-07-14 LAB
BH CV ECHO MEAS - ACS: 1.9 CM
BH CV ECHO MEAS - AO MAX PG (FULL): 2.1 MMHG
BH CV ECHO MEAS - AO MAX PG: 7 MMHG
BH CV ECHO MEAS - AO MEAN PG (FULL): 1 MMHG
BH CV ECHO MEAS - AO MEAN PG: 3 MMHG
BH CV ECHO MEAS - AO V2 MAX: 131 CM/SEC
BH CV ECHO MEAS - AO V2 MEAN: 82.6 CM/SEC
BH CV ECHO MEAS - AO V2 VTI: 26.4 CM
BH CV ECHO MEAS - AVA(I,A): 2.8 CM^2
BH CV ECHO MEAS - AVA(I,D): 2.8 CM^2
BH CV ECHO MEAS - AVA(V,A): 2.7 CM^2
BH CV ECHO MEAS - AVA(V,D): 2.7 CM^2
BH CV ECHO MEAS - BSA(HAYCOCK): 2.3 M^2
BH CV ECHO MEAS - BSA: 2.2 M^2
BH CV ECHO MEAS - BZI_BMI: 34.7 KILOGRAMS/M^2
BH CV ECHO MEAS - BZI_METRIC_HEIGHT: 175.3 CM
BH CV ECHO MEAS - BZI_METRIC_WEIGHT: 106.6 KG
BH CV ECHO MEAS - EDV(CUBED): 116.2 ML
BH CV ECHO MEAS - EDV(MOD-SP2): 57 ML
BH CV ECHO MEAS - EDV(MOD-SP4): 63 ML
BH CV ECHO MEAS - EDV(TEICH): 111.7 ML
BH CV ECHO MEAS - EF(CUBED): 58.8 %
BH CV ECHO MEAS - EF(MOD-BP): 60 %
BH CV ECHO MEAS - EF(MOD-SP2): 57.9 %
BH CV ECHO MEAS - EF(MOD-SP4): 58.7 %
BH CV ECHO MEAS - EF(TEICH): 50.3 %
BH CV ECHO MEAS - EF{MOD-BP}: 59 %
BH CV ECHO MEAS - ESV(CUBED): 47.8 ML
BH CV ECHO MEAS - ESV(MOD-SP2): 24 ML
BH CV ECHO MEAS - ESV(MOD-SP4): 26 ML
BH CV ECHO MEAS - ESV(TEICH): 55.5 ML
BH CV ECHO MEAS - FS: 25.6 %
BH CV ECHO MEAS - IVS/LVPW: 0.87
BH CV ECHO MEAS - IVSD: 0.83 CM
BH CV ECHO MEAS - LA DIMENSION: 4 CM
BH CV ECHO MEAS - LAD MAJOR: 4.9 CM
BH CV ECHO MEAS - LAT PEAK E' VEL: 9.5 CM/SEC
BH CV ECHO MEAS - LATERAL E/E' RATIO: 8
BH CV ECHO MEAS - LV DIASTOLIC VOL/BSA (35-75): 28.5 ML/M^2
BH CV ECHO MEAS - LV MASS(C)D: 150.1 GRAMS
BH CV ECHO MEAS - LV MASS(C)DI: 67.9 GRAMS/M^2
BH CV ECHO MEAS - LV MAX PG: 4.9 MMHG
BH CV ECHO MEAS - LV MEAN PG: 2 MMHG
BH CV ECHO MEAS - LV SYSTOLIC VOL/BSA (12-30): 11.8 ML/M^2
BH CV ECHO MEAS - LV V1 MAX: 111 CM/SEC
BH CV ECHO MEAS - LV V1 MEAN: 72.4 CM/SEC
BH CV ECHO MEAS - LV V1 VTI: 23.5 CM
BH CV ECHO MEAS - LVIDD: 4.9 CM
BH CV ECHO MEAS - LVIDS: 3.6 CM
BH CV ECHO MEAS - LVLD AP2: 7 CM
BH CV ECHO MEAS - LVLD AP4: 6.8 CM
BH CV ECHO MEAS - LVLS AP2: 5.6 CM
BH CV ECHO MEAS - LVLS AP4: 5.5 CM
BH CV ECHO MEAS - LVOT AREA (M): 3.1 CM^2
BH CV ECHO MEAS - LVOT AREA: 3.1 CM^2
BH CV ECHO MEAS - LVOT DIAM: 2 CM
BH CV ECHO MEAS - LVPWD: 0.95 CM
BH CV ECHO MEAS - MED PEAK E' VEL: 4.8 CM/SEC
BH CV ECHO MEAS - MEDIAL E/E' RATIO: 15.8
BH CV ECHO MEAS - MV A MAX VEL: 95.3 CM/SEC
BH CV ECHO MEAS - MV DEC TIME: 0.21 SEC
BH CV ECHO MEAS - MV E MAX VEL: 76 CM/SEC
BH CV ECHO MEAS - MV E/A: 0.8
BH CV ECHO MEAS - PA ACC SLOPE: 482 CM/SEC^2
BH CV ECHO MEAS - PA ACC TIME: 0.14 SEC
BH CV ECHO MEAS - PA PR(ACCEL): 17.4 MMHG
BH CV ECHO MEAS - SI(CUBED): 30.9 ML/M^2
BH CV ECHO MEAS - SI(LVOT): 33.4 ML/M^2
BH CV ECHO MEAS - SI(MOD-SP2): 14.9 ML/M^2
BH CV ECHO MEAS - SI(MOD-SP4): 16.7 ML/M^2
BH CV ECHO MEAS - SI(TEICH): 25.4 ML/M^2
BH CV ECHO MEAS - SV(CUBED): 68.4 ML
BH CV ECHO MEAS - SV(LVOT): 73.8 ML
BH CV ECHO MEAS - SV(MOD-SP2): 33 ML
BH CV ECHO MEAS - SV(MOD-SP4): 37 ML
BH CV ECHO MEAS - SV(TEICH): 56.2 ML
BH CV ECHO MEAS - TAPSE (>1.6): 2.2 CM2
BH CV ECHO MEASUREMENTS AVERAGE E/E' RATIO: 10.63
LEFT ATRIUM VOLUME INDEX: 15.8 ML/M^2
LEFT ATRIUM VOLUME: 35 ML
LV EF 2D ECHO EST: 55 %

## 2020-07-29 ENCOUNTER — OFFICE VISIT (OUTPATIENT)
Dept: PRIMARY CARE CLINIC | Age: 65
End: 2020-07-29
Payer: COMMERCIAL

## 2020-07-29 ENCOUNTER — TELEPHONE (OUTPATIENT)
Dept: PULMONOLOGY | Facility: CLINIC | Age: 65
End: 2020-07-29

## 2020-07-29 VITALS
OXYGEN SATURATION: 99 % | SYSTOLIC BLOOD PRESSURE: 118 MMHG | DIASTOLIC BLOOD PRESSURE: 62 MMHG | HEIGHT: 69 IN | WEIGHT: 237 LBS | HEART RATE: 79 BPM | BODY MASS INDEX: 35.1 KG/M2 | RESPIRATION RATE: 16 BRPM | TEMPERATURE: 98.3 F

## 2020-07-29 PROCEDURE — 99214 OFFICE O/P EST MOD 30 MIN: CPT | Performed by: NURSE PRACTITIONER

## 2020-07-29 RX ORDER — TIOTROPIUM BROMIDE AND OLODATEROL 3.124; 2.736 UG/1; UG/1
2 SPRAY, METERED RESPIRATORY (INHALATION) EVERY MORNING
COMMUNITY
Start: 2020-07-14 | End: 2020-07-29 | Stop reason: SDUPTHER

## 2020-07-29 RX ORDER — GLYCOPYRROLATE AND FORMOTEROL FUMARATE 9; 4.8 UG/1; UG/1
AEROSOL, METERED RESPIRATORY (INHALATION)
COMMUNITY
Start: 2020-07-08 | End: 2022-06-07 | Stop reason: ALTCHOICE

## 2020-07-29 RX ORDER — FUROSEMIDE 20 MG/1
TABLET ORAL
COMMUNITY
Start: 2020-06-26 | End: 2022-06-07 | Stop reason: ALTCHOICE

## 2020-07-29 RX ORDER — TIOTROPIUM BROMIDE AND OLODATEROL 3.124; 2.736 UG/1; UG/1
2 SPRAY, METERED RESPIRATORY (INHALATION) EVERY MORNING
Qty: 3 INHALER | Refills: 3 | Status: SHIPPED | OUTPATIENT
Start: 2020-07-29 | End: 2020-10-27

## 2020-07-29 RX ORDER — ALBUTEROL SULFATE 90 UG/1
2 AEROSOL, METERED RESPIRATORY (INHALATION) EVERY 4 HOURS PRN
COMMUNITY
Start: 2020-06-29 | End: 2022-06-07 | Stop reason: ALTCHOICE

## 2020-07-29 ASSESSMENT — ENCOUNTER SYMPTOMS
EYES NEGATIVE: 1
GASTROINTESTINAL NEGATIVE: 1

## 2020-07-29 NOTE — TELEPHONE ENCOUNTER
Pt's wife called and said her  is not doing very well on the Bevespi inhaler and would like to go back on the Stiolto for now if we have a sample. So I advised them to come get the sample since Dr. Rubin was out of the office the rest of the week and then I would discuss with him on Monday what he would like to do since the Stiolto is too expensive for the patient and the Bevespi is not working.

## 2020-07-29 NOTE — PROGRESS NOTES
SUBJECTIVE:    Patient ID: Lula Chi is a 60 y.o. male. Chief Complaint   Patient presents with    Follow-up     4 months    Numbness     left hand         HPI:  He returns about his numbness and swelling of his hands. He is only doing smaller jobs now. He is using stiloto inhaler that is helping. He goes back to 6 month lung doctor and goes to cardiology in 1 year. Patient's medications,allergies, past medical, surgical, social and family histories were reviewed and updated as appropriate. .  Current Outpatient Medications on File Prior to Visit   Medication Sig Dispense Refill    albuterol sulfate  (90 Base) MCG/ACT inhaler Inhale 2 puffs into the lungs every 4 hours as needed      furosemide (LASIX) 20 MG tablet       gabapentin (NEURONTIN) 100 MG capsule Take 1 capsule by mouth 2 times daily for 30 days. 60 capsule 0    aspirin 81 MG EC tablet Take 1 tablet by mouth daily 90 tablet 3    fexofenadine (ALLEGRA) 180 MG tablet Take 180 mg by mouth daily      metoprolol (LOPRESSOR) 25 MG tablet Take 25 mg by mouth daily.  lisinopril (PRINIVIL;ZESTRIL) 5 MG tablet Take 5 mg by mouth daily.  atorvastatin (LIPITOR) 40 MG tablet Take 40 mg by mouth daily.  BEVESPI AEROSPHERE 9-4.8 MCG/ACT AERO       levocetirizine (XYZAL) 5 MG tablet Take 1 tablet by mouth nightly 30 tablet 0     No current facility-administered medications on file prior to visit. Review of Systems   Constitutional: Negative. HENT: Negative. Eyes: Negative. Respiratory: Positive for chest tightness and shortness of breath. Cardiovascular: Negative. Gastrointestinal: Negative. Genitourinary: Negative. Musculoskeletal: Negative. Skin: Negative. Neurological: Negative. Psychiatric/Behavioral: Negative.         OBJECTIVE:  /62 (Site: Right Upper Arm, Position: Sitting, Cuff Size: Medium Adult)   Pulse 79   Temp 98.3 °F (36.8 °C) (Temporal)   Resp 16   Ht 5' 9\" (1.753 m)   Wt 237 lb (107.5 kg)   SpO2 99% Comment: room air  BMI 35.00 kg/m²    Physical Exam  Vitals signs and nursing note reviewed. Constitutional:       Appearance: He is well-developed. HENT:      Head: Normocephalic and atraumatic. Eyes:      Conjunctiva/sclera: Conjunctivae normal.      Pupils: Pupils are equal, round, and reactive to light. Neck:      Musculoskeletal: Normal range of motion and neck supple. Thyroid: No thyromegaly. Vascular: No JVD. Cardiovascular:      Rate and Rhythm: Normal rate and regular rhythm. Heart sounds: No murmur. No friction rub. No gallop. Pulmonary:      Effort: Pulmonary effort is normal. No respiratory distress. Breath sounds: Normal breath sounds. Decreased air movement present. No wheezing or rales. Abdominal:      General: Bowel sounds are normal. There is no distension. Palpations: Abdomen is soft. Tenderness: There is no abdominal tenderness. There is no guarding. Musculoskeletal:         General: No tenderness. Skin:     General: Skin is warm and dry. Findings: No rash. Neurological:      Mental Status: He is alert and oriented to person, place, and time. Psychiatric:         Judgment: Judgment normal.         No results found for requested labs within last 30 days. Hemoglobin A1C (%)   Date Value   05/10/2017 5.6     LDL Calculated (mg/dL)   Date Value   12/19/2018 72           Lab Results   Component Value Date    TSH 2.04 01/09/2020         ASSESSMENT/PLAN:     Vinod Jimenez was seen today for follow-up and numbness. Diagnoses and all orders for this visit:    Malignant neoplasm of upper lobe of right lung ([address]  -     STIOLTO RESPIMAT 2.5-2.5 MCG/ACT AERS; Inhale 2 puffs into the lungs every morning    Numbness and tingling in left hand    Numbness and tingling in right hand    Essential hypertension  -     CBC; Future  -     Comprehensive Metabolic Panel; Future  -     Lipid Panel;  Future    Vitamin D

## 2020-07-29 NOTE — PROGRESS NOTES
Chief Complaint   Patient presents with    Follow-up     4 months    Numbness     left hand       Have you seen any other physician or provider since your last visit yes - cardiology and pulmonologist    Have you had any other diagnostic tests since your last visit? no    Have you changed or stopped any medications since your last visit? yes - started Stiloto      I have recommended that this patient have a sigmoidoscopy but he declines at this time. I have discussed the risks and benefits of this examination with him. The patient verbalizes understanding. Diabetic retinal exam completed this year? No                       * If yes please have patient sign a records release to obtain record to update Health Maintenance                       * If no, please order referral for patient to be scheduled    Patient is here to follow up on left hand numbness. He states they have been swelling unless he takes Lasix. They ache unless he doesn't use them. He was given a sample of Stiloto by his lung doctor but he cannot afford this.

## 2020-07-31 ENCOUNTER — TELEPHONE (OUTPATIENT)
Dept: FAMILY MEDICINE CLINIC | Age: 65
End: 2020-07-31

## 2020-08-13 ASSESSMENT — ENCOUNTER SYMPTOMS
SHORTNESS OF BREATH: 1
CHEST TIGHTNESS: 1

## 2020-08-27 DIAGNOSIS — I25.10 CORONARY ARTERY DISEASE INVOLVING NATIVE CORONARY ARTERY OF NATIVE HEART WITHOUT ANGINA PECTORIS: ICD-10-CM

## 2020-08-27 RX ORDER — METOPROLOL SUCCINATE 25 MG/1
TABLET, EXTENDED RELEASE ORAL
Qty: 90 TABLET | Refills: 0 | Status: SHIPPED | OUTPATIENT
Start: 2020-08-27 | End: 2020-12-16

## 2020-08-27 RX ORDER — ATORVASTATIN CALCIUM 40 MG/1
TABLET, FILM COATED ORAL
Qty: 90 TABLET | Refills: 0 | Status: SHIPPED | OUTPATIENT
Start: 2020-08-27 | End: 2021-01-05 | Stop reason: SDUPTHER

## 2020-09-24 ENCOUNTER — HOSPITAL ENCOUNTER (OUTPATIENT)
Dept: CT IMAGING | Facility: HOSPITAL | Age: 65
Discharge: HOME | End: 2020-09-24

## 2020-09-24 DIAGNOSIS — C34.91 SQUAMOUS CELL CARCINOMA OF RIGHT LUNG (HCC): ICD-10-CM

## 2020-09-24 PROCEDURE — 74177 CT ABD & PELVIS W/CONTRAST: CPT

## 2020-09-24 PROCEDURE — 25010000002 IOPAMIDOL 61 % SOLUTION: Performed by: INTERNAL MEDICINE

## 2020-09-24 PROCEDURE — 71260 CT THORAX DX C+: CPT

## 2020-09-24 RX ADMIN — IOPAMIDOL 100 ML: 612 INJECTION, SOLUTION INTRAVENOUS at 08:43

## 2020-09-29 ENCOUNTER — TELEPHONE (OUTPATIENT)
Dept: ONCOLOGY | Facility: CLINIC | Age: 65
End: 2020-09-29

## 2020-09-29 NOTE — TELEPHONE ENCOUNTER
PT'S WIFE, CHOLO BREWER, CALLED TO ASK IF SHE COULD ATTEND PT'S 9- APPT WITH DR. LEUNG FOR CT RESULTS. SHE SAID HER  IS FORGETFUL AND MAY FORGET IMPORTANT INFORMATION TO TELL HIS WIFE.    CHOLO MAY BE REACHED AT:  [phone]

## 2020-09-30 ENCOUNTER — OFFICE VISIT (OUTPATIENT)
Dept: ONCOLOGY | Facility: CLINIC | Age: 65
End: 2020-09-30

## 2020-09-30 VITALS
DIASTOLIC BLOOD PRESSURE: 73 MMHG | RESPIRATION RATE: 16 BRPM | HEART RATE: 75 BPM | BODY MASS INDEX: 36.07 KG/M2 | SYSTOLIC BLOOD PRESSURE: 147 MMHG | WEIGHT: 238 LBS | HEIGHT: 68 IN | OXYGEN SATURATION: 98 % | TEMPERATURE: 97.7 F

## 2020-09-30 DIAGNOSIS — C34.11 MALIGNANT NEOPLASM OF UPPER LOBE OF RIGHT LUNG (HCC): Primary | ICD-10-CM

## 2020-09-30 DIAGNOSIS — R59.0 MEDIASTINAL LYMPHADENOPATHY: ICD-10-CM

## 2020-09-30 DIAGNOSIS — C34.91 SQUAMOUS CELL CARCINOMA OF RIGHT LUNG (HCC): ICD-10-CM

## 2020-09-30 PROCEDURE — 99214 OFFICE O/P EST MOD 30 MIN: CPT | Performed by: NURSE PRACTITIONER

## 2020-09-30 NOTE — PROGRESS NOTES
DATE OF VISIT: 9/30/2020    REASON FOR VISIT: Followup for right lung cancer     HISTORY OF PRESENT ILLNESS: The patient is a very pleasant 65 y.o. male  with past medical history significant for cell carcinoma of the right upper lobe of the lung diagnosed July 2019.  The patient had staging work-up that confirmed stage IIIa disease.  He will be started on neoadjuvant concurrent chemotherapy radiation July 30, 2019.  He completed his treatment course September 2019.  Patient status post right upper lobe resection with lymph node sampling done by Dr. Flores October 11, 2019.  Final pathology did not reveal any evidence of residual disease.   The patient is here today for scheduled follow-up visit.    SUBJECTIVE: The patient is here today with his wife.  All in all he has been doing fairly well.  He is nervous about scan results. He denies any fever or chills.  He continues to complain of pain at the surgical site. He was prescribed a fluid pill for water rentention. 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       PAST MEDICAL HISTORY/SOCIAL HISTORY/FAMILY HISTORY: Reviewed by me and unchanged from my documentation done on 09/30/20.    Review of Systems   Constitutional: Negative for activity change, appetite change, chills, fatigue, fever and unexpected weight change.   HENT: Negative for hearing loss, mouth sores, nosebleeds, sore throat and trouble swallowing.    Eyes: Negative for visual disturbance.   Respiratory: Negative for cough, chest tightness, shortness of breath and wheezing.    Cardiovascular: Negative for chest pain, palpitations and leg swelling.   Gastrointestinal: Positive for constipation. Negative for abdominal distention, abdominal pain, blood in stool, diarrhea, nausea, rectal pain and vomiting.   Endocrine: Negative for cold intolerance and heat intolerance.   Genitourinary: Negative for difficulty urinating, dysuria, frequency and urgency.   Musculoskeletal: Negative for  arthralgias, back pain, gait problem, joint swelling and myalgias.   Skin: Negative for rash.   Neurological: Negative for dizziness, tremors, syncope, weakness, light-headedness, numbness and headaches.   Hematological: Negative for adenopathy. Does not bruise/bleed easily.   Psychiatric/Behavioral: Negative for confusion, sleep disturbance and suicidal ideas. The patient is not nervous/anxious.          Current Outpatient Medications:   •  albuterol sulfate HFA (Ventolin HFA) 108 (90 Base) MCG/ACT inhaler, Inhale 2 puffs Every 4 (Four) Hours As Needed for Wheezing or Shortness of Air., Disp: 1 inhaler, Rfl: 5  •  aspirin 81 MG EC tablet, Take 1 tablet by mouth Every Night., Disp: 90 tablet, Rfl: 3  •  atorvastatin (LIPITOR) 40 MG tablet, TAKE ONE TABLET BY MOUTH EVERY NIGHT AT BEDTIME, Disp: 90 tablet, Rfl: 0  •  B Complex Vitamins (VITAMIN-B COMPLEX PO), Take 1 tablet by mouth Daily., Disp: , Rfl:   •  Cholecalciferol (VITAMIN D3) 5000 units capsule capsule, Take 5,000 Units by mouth Daily., Disp: , Rfl:   •  Cyanocobalamin (B-12) 1000 MCG tablet, Take 1,000 mcg by mouth Daily., Disp: , Rfl:   •  fexofenadine (ALLEGRA) 180 MG tablet, Take 180 mg by mouth Daily., Disp: , Rfl:   •  furosemide (LASIX) 20 MG tablet, Take 1 tablet by mouth Daily., Disp: 90 tablet, Rfl: 0  •  lisinopril (PRINIVIL,ZESTRIL) 5 MG tablet, Take 5 mg by mouth Daily., Disp: , Rfl:   •  metoprolol succinate XL (TOPROL-XL) 25 MG 24 hr tablet, TAKE ONE TABLET BY MOUTH DAILY **NEED TO MAKE AN APPOINTMENT**, Disp: 90 tablet, Rfl: 0  •  naproxen sodium (ALEVE) 220 MG tablet, Take 220 mg by mouth As Needed for Mild Pain ., Disp: , Rfl:   •  nitroglycerin (NITROSTAT) 0.4 MG SL tablet, 1 under the tongue as needed for angina, may repeat q5mins for up three doses (Patient taking differently: Place 0.4 mg under the tongue Every 5 (Five) Minutes As Needed for Chest Pain. 1 under the tongue as needed for angina, may repeat q5mins for up three doses),  "Disp: 100 tablet, Rfl: 11  •  NON FORMULARY, Take 1 tablet by mouth Daily. \"derma-health pro\" OTC supplement for skin, Disp: , Rfl:   •  tiotropium bromide-olodaterol (Stiolto Respimat) 2.5-2.5 MCG/ACT aerosol solution inhaler, Inhale 2 puffs Daily., Disp: 1 inhaler, Rfl: 2    PHYSICAL EXAMINATION:   /73   Pulse 75   Temp 97.7 °F (36.5 °C) (Temporal)   Resp 16   Ht 172.7 cm (68\")   Wt 108 kg (238 lb)   SpO2 98%   BMI 36.19 kg/m²    ECOG Performance Status: 1 - Symptomatic but completely ambulatory  General Appearance:  alert, cooperative, no apparent distress and appears stated age   Neurologic/Psychiatric: A&O x 3, gait steady, appropriate affect, strength 5/5 in all muscle groups   HEENT:  Normocephalic, without obvious abnormality, mucous membranes moist   Neck: Supple, symmetrical, trachea midline, no adenopathy;  No thyromegaly, masses, or tenderness   Lungs:   Clear to auscultation bilaterally; respirations regular, even, and unlabored bilaterally   Heart:  Regular rate and rhythm, no murmurs appreciated   Abdomen:   Soft, non-tender, non-distended and no organomegaly   Lymph nodes: No cervical, supraclavicular, inguinal or axillary adenopathy noted   Extremities: Normal, atraumatic; no clubbing, cyanosis, or edema    Skin: No rashes, ulcers, or suspicious lesions noted          Ct Chest With Contrast    Result Date: 9/24/2020  Narrative: CT CHEST, ABDOMEN AND PELVIS  INDICATION: Follow-up scan. History of lung cancer.  FINDINGS: Axial imaging through the chest, abdomen and pelvis with intravenous contrast compared with 05/26/2020. This study was performed with techniques to keep radiation dose as low as reasonably achievable, (ALARA).  Individualized dose reduction techniques using automated exposure control or adjustment of mA and/or kV according to the patient size were employed.  CT CHEST: Heart size is normal. No pleural or pericardial effusion. Scattered vascular calcifications. Previously " described borderline enlarged right retrocrural lymph node has improved and measures a few millimeters in size. No new areas of adenopathy identified. Mild gynecomastia. No pneumothorax. Posttreatment changes within the right chest with some mild volume loss and areas of scarring appear unchanged. There are emphysematous changes. No new mass or acute consolidation.  CT ABDOMEN AND PELVIS: Mild hepatic fatty infiltration. Cholecystectomy clips. Spleen is normal size. Pancreas is unremarkable. No adrenal mass. Some tiny low-attenuation foci in both kidneys are predominantly too small for definitive characterization but demonstrate no appreciable interval change and probably represent cysts. No hydronephrosis. Scattered vascular calcifications. No bowel obstruction. Appendix is normal. Prostate gland is normal in size. The prostate is however heterogeneous. Mild urinary bladder thickening may be related to incomplete distention. Tiny fat-containing inguinal hernias. Scattered normal size lymph nodes without adenopathy by size criteria. No free fluid, free air or abscess. Scattered degenerative changes in the spine and pelvis.      Impression: 1. A previously described borderline right retrocrural lymph node is smaller and now normal in size. 2. No other significant change.  This report was finalized on 9/24/2020 10:12 AM by Tyler Avalos MD.    Ct Abdomen Pelvis With Contrast    Result Date: 9/24/2020  Narrative: CT CHEST, ABDOMEN AND PELVIS  INDICATION: Follow-up scan. History of lung cancer.  FINDINGS: Axial imaging through the chest, abdomen and pelvis with intravenous contrast compared with 05/26/2020. This study was performed with techniques to keep radiation dose as low as reasonably achievable, (ALARA).  Individualized dose reduction techniques using automated exposure control or adjustment of mA and/or kV according to the patient size were employed.  CT CHEST: Heart size is normal. No pleural or pericardial  effusion. Scattered vascular calcifications. Previously described borderline enlarged right retrocrural lymph node has improved and measures a few millimeters in size. No new areas of adenopathy identified. Mild gynecomastia. No pneumothorax. Posttreatment changes within the right chest with some mild volume loss and areas of scarring appear unchanged. There are emphysematous changes. No new mass or acute consolidation.  CT ABDOMEN AND PELVIS: Mild hepatic fatty infiltration. Cholecystectomy clips. Spleen is normal size. Pancreas is unremarkable. No adrenal mass. Some tiny low-attenuation foci in both kidneys are predominantly too small for definitive characterization but demonstrate no appreciable interval change and probably represent cysts. No hydronephrosis. Scattered vascular calcifications. No bowel obstruction. Appendix is normal. Prostate gland is normal in size. The prostate is however heterogeneous. Mild urinary bladder thickening may be related to incomplete distention. Tiny fat-containing inguinal hernias. Scattered normal size lymph nodes without adenopathy by size criteria. No free fluid, free air or abscess. Scattered degenerative changes in the spine and pelvis.      Impression: 1. A previously described borderline right retrocrural lymph node is smaller and now normal in size. 2. No other significant change.  This report was finalized on 9/24/2020 10:12 AM by Tyler Avalos MD.      ASSESSMENT: The patient is a very pleasant 65 y.o. male  with right upper lobe squamous cell carcinoma of the lung     PROBLEM LIST:   1.  Squamous cell carcinoma of the lung T1b N2 M0 stage T3a:  A.  Presented with shortness of breath and cough  B.  CT chest with contrast done June 28, 2019 revealed right upper lobe 1.8 cm lung nodule with right hilar and mediastinal lymphadenopathy.  MRI brain whole-body PET scan done on July 22, 2019 felt show any other sites of disease.  C.  Diagnosed after bronchoscopy with biopsy done  July 11, 2019  D.  Pathology revealed squamous cell carcinoma from level 4R and level 7.  E.  Started neoadjuvant concurrent chemotherapy with radiation using weekly carbotaxol July 30, 2019.  Radiation was added August 6, 2019. This was completed 09/11/2019.  F.  Status post right upper lobe resection with lymph node sampling done by Dr. Flores October 11, 2019.  G.  Final pathology revealed complete pathologic response with no evidence of residual disease in the lung or in the hilar and mediastinal nodes.  2.  Chronic tobacco abuse  3.  Hypertension  4.  COPD:   A. Not oxygen dependent  B.  PFTs done on July 17, 2019 revealed FEV1 2.44L, 72% of predicted and DLCO 55% of predicted.  5.  History of superficial melanoma status post surgical resection  6.  Hypercholesterolemia    PLAN:  1.  I did go over the CT scan results with the patient and his wife reassured them there is no evidence of recurrent cancer.  Borderline lymph nodes in the mediastinum which was previously enlarged has returned to normal size.    2.  I will repeat scans prior to return.  3.  I will continue to monitor patient blood work including blood counts kidney function liver function and electrolytes. Patient will plan to have labs drawn in [place].   4.  The patient will follow-up with me in 6 months since scans today are stable.   5.  We will continue lisinopril for hypertension  6.  We will continue Lipitor 40 mg daily for hypercholesterolemia.  7.  Patient will continue to follow up with PCP for fluid retention.         Kelley Bearden, APRN  9/30/2020

## 2020-10-29 ENCOUNTER — HOSPITAL ENCOUNTER (OUTPATIENT)
Facility: HOSPITAL | Age: 65
Discharge: HOME | End: 2020-10-29
Payer: COMMERCIAL

## 2020-10-29 LAB
A/G RATIO: 1.9 (ref 0.8–2)
ALBUMIN SERPL-MCNC: 4.4 G/DL (ref 3.4–4.8)
ALP BLD-CCNC: 102 U/L (ref 25–100)
ALT SERPL-CCNC: 53 U/L (ref 4–36)
ANION GAP SERPL CALCULATED.3IONS-SCNC: 9 MMOL/L (ref 3–16)
AST SERPL-CCNC: 44 U/L (ref 8–33)
BILIRUB SERPL-MCNC: 1.7 MG/DL (ref 0.3–1.2)
BUN BLDV-MCNC: 16 MG/DL (ref 6–20)
CALCIUM SERPL-MCNC: 9.8 MG/DL (ref 8.5–10.5)
CHLORIDE BLD-SCNC: 101 MMOL/L (ref 98–107)
CHOLESTEROL, TOTAL: 179 MG/DL (ref 0–200)
CO2: 29 MMOL/L (ref 20–30)
CREAT SERPL-MCNC: 1 MG/DL (ref 0.4–1.2)
FOLATE: 11.16 NG/ML
GFR AFRICAN AMERICAN: >59
GFR NON-AFRICAN AMERICAN: >59
GLOBULIN: 2.3 G/DL
GLUCOSE BLD-MCNC: 130 MG/DL (ref 74–106)
HCT VFR BLD CALC: 46.1 % (ref 40–54)
HDLC SERPL-MCNC: 36 MG/DL (ref 40–60)
HEMOGLOBIN: 15.1 G/DL (ref 13–18)
LDL CHOLESTEROL CALCULATED: 94 MG/DL
MCH RBC QN AUTO: 31.9 PG (ref 27–32)
MCHC RBC AUTO-ENTMCNC: 32.8 G/DL (ref 31–35)
MCV RBC AUTO: 97.5 FL (ref 80–100)
PDW BLD-RTO: 12.4 % (ref 11–16)
PLATELET # BLD: 266 K/UL (ref 150–400)
PMV BLD AUTO: 9.8 FL (ref 6–10)
POTASSIUM SERPL-SCNC: 4.8 MMOL/L (ref 3.4–5.1)
RBC # BLD: 4.73 M/UL (ref 4.5–6)
SODIUM BLD-SCNC: 139 MMOL/L (ref 136–145)
TOTAL PROTEIN: 6.7 G/DL (ref 6.4–8.3)
TRIGL SERPL-MCNC: 245 MG/DL (ref 0–249)
TSH SERPL DL<=0.05 MIU/L-ACNC: 3.98 UIU/ML (ref 0.35–5.5)
VITAMIN B-12: 1348 PG/ML (ref 211–911)
VITAMIN D 25-HYDROXY: 31 (ref 32–100)
VLDLC SERPL CALC-MCNC: 49 MG/DL
WBC # BLD: 9.4 K/UL (ref 4–11)

## 2020-10-29 PROCEDURE — 36415 COLL VENOUS BLD VENIPUNCTURE: CPT

## 2020-10-29 PROCEDURE — 82306 VITAMIN D 25 HYDROXY: CPT

## 2020-10-29 PROCEDURE — 85027 COMPLETE CBC AUTOMATED: CPT

## 2020-10-29 PROCEDURE — 84443 ASSAY THYROID STIM HORMONE: CPT

## 2020-10-29 PROCEDURE — 82607 VITAMIN B-12: CPT

## 2020-10-29 PROCEDURE — 82746 ASSAY OF FOLIC ACID SERUM: CPT

## 2020-10-29 PROCEDURE — 80061 LIPID PANEL: CPT

## 2020-10-29 PROCEDURE — 80053 COMPREHEN METABOLIC PANEL: CPT

## 2020-12-16 DIAGNOSIS — I25.10 CORONARY ARTERY DISEASE INVOLVING NATIVE CORONARY ARTERY OF NATIVE HEART WITHOUT ANGINA PECTORIS: ICD-10-CM

## 2020-12-16 RX ORDER — METOPROLOL SUCCINATE 25 MG/1
TABLET, EXTENDED RELEASE ORAL
Qty: 90 TABLET | Refills: 0 | Status: SHIPPED | OUTPATIENT
Start: 2020-12-16 | End: 2021-01-05 | Stop reason: SDUPTHER

## 2021-01-04 NOTE — PROGRESS NOTES
[facility]  Office Visit Note    DATE: 01/05/2021    IDENTIFICATION: Erik Bowser is a 65 y.o. male who resides in [place], [place].    REASON FOR VISIT:  • Coronary artery disease  • Hypertension  • Hyperlipidemia            Erik Bowser returns to the office today in follow-up of his coronary artery disease and risk factors.  He is doing well with no recurrent anginal symptoms.  He does have shortness of breath which seems to get better when he uses his inhaler.  He is status post right upper lobe lobectomy for lung cancer.  He has not had his labs checked recently.  He request prescription refills today for his cardiac meds.    Review of Systems   Constitution: Negative for malaise/fatigue.   Eyes: Negative for vision loss in left eye and vision loss in right eye.   Cardiovascular: Positive for palpitations. Negative for chest pain, dyspnea on exertion, near-syncope, orthopnea, paroxysmal nocturnal dyspnea and syncope.   Respiratory: Positive for shortness of breath.    Musculoskeletal: Negative for myalgias.   Neurological: Negative for brief paralysis, excessive daytime sleepiness, focal weakness, numbness, paresthesias and weakness.   All other systems reviewed and are negative.      The patient's past medical, social, family history and ROS reviewed in the patient's electronic medical record.    Allergies   Allergen Reactions   • Rosuvastatin Myalgia         Current Outpatient Medications:   •  albuterol sulfate HFA (Ventolin HFA) 108 (90 Base) MCG/ACT inhaler, Inhale 2 puffs Every 4 (Four) Hours As Needed for Wheezing or Shortness of Air., Disp: 1 inhaler, Rfl: 5  •  aspirin 81 MG EC tablet, Take 1 tablet by mouth Every Night., Disp: 90 tablet, Rfl: 3  •  atorvastatin (LIPITOR) 40 MG tablet, Take 1 tablet by mouth Every Night., Disp: 90 tablet, Rfl: 3  •  B Complex Vitamins (VITAMIN-B COMPLEX PO), Take 1 tablet by mouth Daily., Disp: , Rfl:   •  Cholecalciferol  "(VITAMIN D3) 5000 units capsule capsule, Take 5,000 Units by mouth Daily., Disp: , Rfl:   •  Cyanocobalamin (B-12) 1000 MCG tablet, Take 1,000 mcg by mouth Daily., Disp: , Rfl:   •  fexofenadine (ALLEGRA) 180 MG tablet, Take 180 mg by mouth Daily., Disp: , Rfl:   •  metoprolol succinate XL (TOPROL-XL) 25 MG 24 hr tablet, Take 1 tablet by mouth Daily., Disp: 90 tablet, Rfl: 3  •  naproxen sodium (ALEVE) 220 MG tablet, Take 220 mg by mouth As Needed for Mild Pain ., Disp: , Rfl:   •  nitroglycerin (NITROSTAT) 0.4 MG SL tablet, Place 1 tablet under the tongue Every 5 (Five) Minutes As Needed for Chest Pain., Disp: 25 tablet, Rfl: 5  •  NON FORMULARY, Take 1 tablet by mouth Daily. \"derma-health pro\" OTC supplement for skin, Disp: , Rfl:   •  tiotropium bromide-olodaterol (Stiolto Respimat) 2.5-2.5 MCG/ACT aerosol solution inhaler, Inhale 2 puffs Daily., Disp: 1 inhaler, Rfl: 2  •  Zinc 50 MG capsule, Take  by mouth Every Morning., Disp: , Rfl:     Past Medical History:   Diagnosis Date   • Arthritis    • At risk for obstructive sleep apnea     Spouse reports patient snores and that he has questionable sleep apnea but has never had a sleep study done   • Borderline diabetes     Has never taken medication    • COPD (chronic obstructive pulmonary disease) (CMS/[facility])    • Coronary artery disease     1 stent   • Elevated cholesterol    • Hearing loss     Does not use hearing devices   • History of bronchitis 2017   • History of chemotherapy     around 9/20/2019   • History of radiation therapy     around 9/20/2019   • Hyperlipidemia    • Hypertension    • Malignant neoplasm of right lung (CMS/[facility]) 10/11/2019   • Melanoma (CMS/[facility])    • MI (myocardial infarction) (CMS/[facility]ly [facility]) 01/20/2015    placement of stent x1   • MRSA (methicillin resistant Staphylococcus aureus) 01/2015    left hand - treated   • Seasonal allergies    • Skin cancer     skin, lung   • Squamous cell lung cancer (CMS/[facility]ly [facility]) 7/24/2019   • Wears glasses     OTC " glasses PRN for reading   • Wears partial dentures     Upper mouth       Past Surgical History:   Procedure Laterality Date   • BRONCHOSCOPY N/A 2019    Procedure: BRONCHOSCOPY WITH ENDOBRONCHIAL ULTRASOUND with General Anesthesia.  ;  Surgeon: Jeff Rubin MD;  Location:  [facility];  Service: Pulmonary   • BRONCHOSCOPY N/A 10/11/2019    Procedure: BRONCHOSCOPY;  Surgeon: Titus Flores MD;  Location:  [facility];  Service: Cardiothoracic   • CARDIAC CATHETERIZATION  2015    Placement of stent x1   • CATARACT EXTRACTION Right    • CHOLECYSTECTOMY      Laparoscopic   • COLONOSCOPY  2018   • CORONARY ANGIOPLASTY WITH STENT PLACEMENT     • HEMORRHOIDECTOMY     • KNEE MENISCAL REPAIR Bilateral    • MOUTH SURGERY      Post for oral implants in upper mouth x3   • ROTATOR CUFF REPAIR Bilateral    • SKIN BIOPSY     • SKIN CANCER EXCISION      melanoma removed from right neck and back.  Squamous cell removed multiple places.    • THORACOSCOPY VIDEO ASSISTED WITH LOBECTOMY Right 10/11/2019    Procedure: THORACOSCOPY VIDEO ASSISTED WITH RIGHT UPPER LOBECTOMY, MEDIASTINAL LYMPH NODE DISSECTION, INTERCOSTAL NERVE BLOCKS;  Surgeon: Titus Flores MD;  Location:  [facility];  Service: Cardiothoracic   • WISDOM TOOTH EXTRACTION         Family History   Problem Relation Age of Onset   • Heart attack Mother 60   • Coronary artery disease Mother    • Hyperlipidemia Mother    • Lung cancer Mother    • Emphysema Father    • Hypertension Sister    • Heart attack Sister 45   • Hypertension Brother    • Heart attack Brother 50   • Hypertension Sister    • Hypertension Brother    • Diabetes Brother        Social History     Tobacco Use   • Smoking status: Former Smoker     Packs/day: 2.00     Years: 46.00     Pack years: 92.00     Types: Cigarettes     Quit date: 2019     Years since quittin.4   • Smokeless tobacco: Former User     Types: Chew   Substance Use Topics   • Alcohol use: Yes     Comment: Social use, no  "history of abuse           Blood pressure 130/82, pulse 75, temperature 97 °F (36.1 °C), height 172.7 cm (67.99\"), weight 110 kg (243 lb 6.4 oz), SpO2 95 %.  Body mass index is 37.02 kg/m².  Vitals:    01/05/21 1112   Patient Position: Sitting       Constitutional:       Appearance: Well-developed.   Eyes:      General: No scleral icterus.     Pupils: Pupils are equal, round, and reactive to light.   HENT:      Head: Normocephalic and atraumatic.   Neck:      Thyroid: No thyromegaly.      Vascular: No carotid bruit or JVD.   Pulmonary:      Effort: Pulmonary effort is normal.      Breath sounds: Normal breath sounds.   Cardiovascular:      Normal rate. Regular rhythm.      Murmurs: There is no murmur.      No gallop.   Abdominal:      Palpations: Abdomen is soft. There is no abdominal mass.      Tenderness: There is no abdominal tenderness.   Musculoskeletal:      Extremities: No clubbing present.  Skin:     General: Skin is warm and dry. There is no cyanosis.   Neurological:      Mental Status: Alert and oriented to person, place, and time.   Psychiatric:         Behavior: Behavior normal.         Data Review (reviewed with patient):     Procedures    Lab Results   Component Value Date    GLUCOSE 126 (H) 10/14/2019    BUN 20 10/14/2019    CREATININE 1.00 12/18/2019    EGFRIFNONA 94 10/14/2019    EGFRIFAFRI 119 04/13/2018    BCR 24.1 10/14/2019    K 4.7 10/14/2019    CO2 30.0 (H) 10/14/2019    CALCIUM 9.1 10/14/2019    ALBUMIN 4.40 10/10/2019    ALKPHOS 97 10/10/2019    AST 22 10/10/2019    ALT 28 10/10/2019      Lab Results   Component Value Date    CHLPL 179 10/29/2020    TRIG 245 10/29/2020    HDL 36 (L) 10/29/2020    LDL 94 10/29/2020     Lab Results   Component Value Date    HGBA1C 5.70 (H) 10/10/2019     Lab Results   Component Value Date    WBC 9.4 10/29/2020    HGB 15.1 10/29/2020    HCT 46.1 10/29/2020    MCV 97.5 10/29/2020     10/29/2020     Lab Results   Component Value Date    TSH 3.98 10/29/2020 "            Problem List Items Addressed This Visit        Cardiology Problems    Coronary artery disease involving native coronary artery of native heart with angina pectoris (CMS/HCC) - Primary    Overview     · Cardiac catheterization for inferior STEMI by Dr. Mahendra Lacy (1/20/2015):  RCA occlusion status post ISH (Xience 3.5 x 23 mm).  LVEF 45%.  · Echocardiogram (1/21/2015):  LVEF 55%, no valvular abnormalities, 01/21/2015.  · Echocardiogram (7/14/2020): EF 55%. Mild inferior hypokinesis. Trace MR.         Current Assessment & Plan     · No angina  · Continue low-dose aspirin, beta-blocker, and high intensity statin.         Relevant Medications    aspirin 81 MG EC tablet    metoprolol succinate XL (TOPROL-XL) 25 MG 24 hr tablet    nitroglycerin (NITROSTAT) 0.4 MG SL tablet    Other Relevant Orders    CBC (No Diff)    Essential hypertension    Overview     • Target blood pressure <130/80 mmHg         Current Assessment & Plan     · Controlled  · Continue present medical therapy         Relevant Medications    metoprolol succinate XL (TOPROL-XL) 25 MG 24 hr tablet    Hyperlipidemia LDL goal <70    Overview     • High intensity statin therapy indicated given the presence of CAD         Current Assessment & Plan     · Obtain CMP and lipids today         Relevant Medications    atorvastatin (LIPITOR) 40 MG tablet    Other Relevant Orders    Comprehensive Metabolic Panel    Lipid Panel       Other    Prediabetes    Relevant Orders    Hemoglobin A1c               · Obtain CMP, CBC, lipids, hemoglobin A1c today  Return in about 1 year (around 1/5/2022) for Follow-up with [facility] only.      Ronan Lacy IV MD, FAC, [facility]  1/5/2021

## 2021-01-05 ENCOUNTER — OFFICE VISIT (OUTPATIENT)
Dept: CARDIOLOGY | Facility: CLINIC | Age: 66
End: 2021-01-05

## 2021-01-05 ENCOUNTER — LAB (OUTPATIENT)
Dept: LAB | Facility: HOSPITAL | Age: 66
End: 2021-01-05

## 2021-01-05 VITALS
SYSTOLIC BLOOD PRESSURE: 130 MMHG | HEIGHT: 68 IN | BODY MASS INDEX: 36.89 KG/M2 | OXYGEN SATURATION: 95 % | TEMPERATURE: 97 F | WEIGHT: 243.4 LBS | DIASTOLIC BLOOD PRESSURE: 82 MMHG | HEART RATE: 75 BPM

## 2021-01-05 DIAGNOSIS — E78.5 HYPERLIPIDEMIA LDL GOAL <70: ICD-10-CM

## 2021-01-05 DIAGNOSIS — I25.119 CORONARY ARTERY DISEASE INVOLVING NATIVE CORONARY ARTERY OF NATIVE HEART WITH ANGINA PECTORIS (HCC): Primary | ICD-10-CM

## 2021-01-05 DIAGNOSIS — I10 ESSENTIAL HYPERTENSION: ICD-10-CM

## 2021-01-05 DIAGNOSIS — I25.119 CORONARY ARTERY DISEASE INVOLVING NATIVE CORONARY ARTERY OF NATIVE HEART WITH ANGINA PECTORIS (HCC): ICD-10-CM

## 2021-01-05 DIAGNOSIS — R73.03 PREDIABETES: ICD-10-CM

## 2021-01-05 PROBLEM — R06.09 DYSPNEA ON EXERTION: Status: RESOLVED | Noted: 2020-06-26 | Resolved: 2021-01-05

## 2021-01-05 PROBLEM — R60.0 BILATERAL LEG EDEMA: Status: RESOLVED | Noted: 2020-06-26 | Resolved: 2021-01-05

## 2021-01-05 LAB
ALBUMIN SERPL-MCNC: 4.2 G/DL (ref 3.5–5.2)
ALBUMIN/GLOB SERPL: 1.7 G/DL
ALP SERPL-CCNC: 94 U/L (ref 39–117)
ALT SERPL W P-5'-P-CCNC: 37 U/L (ref 1–41)
ANION GAP SERPL CALCULATED.3IONS-SCNC: 10.4 MMOL/L (ref 5–15)
AST SERPL-CCNC: 31 U/L (ref 1–40)
BILIRUB SERPL-MCNC: 1.7 MG/DL (ref 0–1.2)
BUN SERPL-MCNC: 13 MG/DL (ref 8–23)
BUN/CREAT SERPL: 16.9 (ref 7–25)
CALCIUM SPEC-SCNC: 9.3 MG/DL (ref 8.6–10.5)
CHLORIDE SERPL-SCNC: 102 MMOL/L (ref 98–107)
CHOLEST SERPL-MCNC: 162 MG/DL (ref 0–200)
CO2 SERPL-SCNC: 25.6 MMOL/L (ref 22–29)
CREAT SERPL-MCNC: 0.77 MG/DL (ref 0.76–1.27)
DEPRECATED RDW RBC AUTO: 42.4 FL (ref 37–54)
ERYTHROCYTE [DISTWIDTH] IN BLOOD BY AUTOMATED COUNT: 12.5 % (ref 12.3–15.4)
GFR SERPL CREATININE-BSD FRML MDRD: 102 ML/MIN/1.73
GLOBULIN UR ELPH-MCNC: 2.5 GM/DL
GLUCOSE SERPL-MCNC: 102 MG/DL (ref 65–99)
HBA1C MFR BLD: 6.11 % (ref 4.8–5.6)
HCT VFR BLD AUTO: 42.7 % (ref 37.5–51)
HDLC SERPL-MCNC: 40 MG/DL (ref 40–60)
HGB BLD-MCNC: 14.9 G/DL (ref 13–17.7)
LDLC SERPL CALC-MCNC: 82 MG/DL (ref 0–100)
LDLC/HDLC SERPL: 1.84 {RATIO}
MCH RBC QN AUTO: 32.7 PG (ref 26.6–33)
MCHC RBC AUTO-ENTMCNC: 34.9 G/DL (ref 31.5–35.7)
MCV RBC AUTO: 93.6 FL (ref 79–97)
PLATELET # BLD AUTO: 276 10*3/MM3 (ref 140–450)
PMV BLD AUTO: 9.9 FL (ref 6–12)
POTASSIUM SERPL-SCNC: 4.3 MMOL/L (ref 3.5–5.2)
PROT SERPL-MCNC: 6.7 G/DL (ref 6–8.5)
RBC # BLD AUTO: 4.56 10*6/MM3 (ref 4.14–5.8)
SODIUM SERPL-SCNC: 138 MMOL/L (ref 136–145)
TRIGL SERPL-MCNC: 243 MG/DL (ref 0–150)
VLDLC SERPL-MCNC: 40 MG/DL (ref 5–40)
WBC # BLD AUTO: 8.9 10*3/MM3 (ref 3.4–10.8)

## 2021-01-05 PROCEDURE — 80061 LIPID PANEL: CPT

## 2021-01-05 PROCEDURE — 36415 COLL VENOUS BLD VENIPUNCTURE: CPT

## 2021-01-05 PROCEDURE — 85027 COMPLETE CBC AUTOMATED: CPT

## 2021-01-05 PROCEDURE — 83036 HEMOGLOBIN GLYCOSYLATED A1C: CPT

## 2021-01-05 PROCEDURE — 80053 COMPREHEN METABOLIC PANEL: CPT

## 2021-01-05 PROCEDURE — 99213 OFFICE O/P EST LOW 20 MIN: CPT | Performed by: INTERNAL MEDICINE

## 2021-01-05 RX ORDER — METOPROLOL SUCCINATE 25 MG/1
25 TABLET, EXTENDED RELEASE ORAL DAILY
Qty: 90 TABLET | Refills: 3 | Status: SHIPPED | OUTPATIENT
Start: 2021-01-05 | End: 2022-02-23

## 2021-01-05 RX ORDER — ASPIRIN 81 MG/1
81 TABLET ORAL NIGHTLY
Qty: 90 TABLET | Refills: 3 | Status: SHIPPED | OUTPATIENT
Start: 2021-01-05 | End: 2022-06-10 | Stop reason: SDUPTHER

## 2021-01-05 RX ORDER — NITROGLYCERIN 0.4 MG/1
0.4 TABLET SUBLINGUAL
Qty: 25 TABLET | Refills: 5 | Status: SHIPPED | OUTPATIENT
Start: 2021-01-05 | End: 2022-06-10 | Stop reason: SDUPTHER

## 2021-01-05 RX ORDER — ATORVASTATIN CALCIUM 40 MG/1
40 TABLET, FILM COATED ORAL NIGHTLY
Qty: 90 TABLET | Refills: 3 | Status: SHIPPED | OUTPATIENT
Start: 2021-01-05 | End: 2022-06-10 | Stop reason: SDUPTHER

## 2021-01-13 ENCOUNTER — TELEPHONE (OUTPATIENT)
Dept: CARDIOLOGY | Facility: CLINIC | Age: 66
End: 2021-01-13

## 2021-01-13 NOTE — TELEPHONE ENCOUNTER
Called patient to let them know results and recommendations per HTR (see HTR note).      No answer/no voicemail

## 2021-01-13 NOTE — TELEPHONE ENCOUNTER
----- Message from Ronan Lacy IV, MD sent at 1/12/2021 11:16 AM EST -----  Your labs look fine.  I did recommend no changes to your medicines at present

## 2021-03-22 ENCOUNTER — HOSPITAL ENCOUNTER (OUTPATIENT)
Dept: CT IMAGING | Facility: HOSPITAL | Age: 66
Discharge: HOME | End: 2021-03-22

## 2021-03-22 DIAGNOSIS — R59.0 MEDIASTINAL LYMPHADENOPATHY: ICD-10-CM

## 2021-03-22 DIAGNOSIS — C34.91 SQUAMOUS CELL CARCINOMA OF RIGHT LUNG (HCC): ICD-10-CM

## 2021-03-22 DIAGNOSIS — C34.11 MALIGNANT NEOPLASM OF UPPER LOBE OF RIGHT LUNG (HCC): ICD-10-CM

## 2021-03-22 PROCEDURE — 71260 CT THORAX DX C+: CPT

## 2021-03-22 PROCEDURE — 25010000002 IOPAMIDOL 61 % SOLUTION: Performed by: NURSE PRACTITIONER

## 2021-03-22 PROCEDURE — 74177 CT ABD & PELVIS W/CONTRAST: CPT

## 2021-03-22 RX ADMIN — IOPAMIDOL 100 ML: 612 INJECTION, SOLUTION INTRAVENOUS at 16:55

## 2021-03-31 ENCOUNTER — OFFICE VISIT (OUTPATIENT)
Dept: ONCOLOGY | Facility: CLINIC | Age: 66
End: 2021-03-31

## 2021-03-31 VITALS
OXYGEN SATURATION: 99 % | BODY MASS INDEX: 36.22 KG/M2 | WEIGHT: 239 LBS | DIASTOLIC BLOOD PRESSURE: 74 MMHG | RESPIRATION RATE: 16 BRPM | SYSTOLIC BLOOD PRESSURE: 136 MMHG | HEIGHT: 68 IN | HEART RATE: 75 BPM | TEMPERATURE: 97.5 F

## 2021-03-31 DIAGNOSIS — C34.11 MALIGNANT NEOPLASM OF UPPER LOBE OF RIGHT LUNG (HCC): Primary | ICD-10-CM

## 2021-03-31 PROCEDURE — 99214 OFFICE O/P EST MOD 30 MIN: CPT | Performed by: INTERNAL MEDICINE

## 2021-03-31 NOTE — PROGRESS NOTES
DATE OF VISIT: 3/31/2021    REASON FOR VISIT: Followup for right lung cancer     HISTORY OF PRESENT ILLNESS: The patient is a very pleasant 66 y.o. male  with past medical history significant for cell carcinoma of the right upper lobe of the lung diagnosed July 2019.  The patient had staging work-up that confirmed stage IIIa disease.  He will be started on neoadjuvant concurrent chemotherapy radiation July 30, 2019.  He completed his treatment course September 2019.  Patient status post right upper lobe resection with lymph node sampling done by Dr. Flores October 11, 2019.  Final pathology did not reveal any evidence of residual disease.   The patient is here today for scheduled follow-up visit.    SUBJECTIVE: The patient is here today with his wife.  All in all he is doing fairly well.  He denies any fever chills night sweats.  He is anxious about the scan results.    PAST MEDICAL HISTORY/SOCIAL HISTORY/FAMILY HISTORY: Reviewed by me and unchanged from my documentation done on 03/31/21.    Review of Systems   Constitutional: Negative for activity change, appetite change, chills, fatigue, fever and unexpected weight change.   HENT: Negative for hearing loss, mouth sores, nosebleeds, sore throat and trouble swallowing.    Eyes: Negative for visual disturbance.   Respiratory: Negative for cough, chest tightness, shortness of breath and wheezing.    Cardiovascular: Negative for chest pain, palpitations and leg swelling.   Gastrointestinal: Positive for constipation. Negative for abdominal distention, abdominal pain, blood in stool, diarrhea, nausea, rectal pain and vomiting.   Endocrine: Negative for cold intolerance and heat intolerance.   Genitourinary: Negative for difficulty urinating, dysuria, frequency and urgency.   Musculoskeletal: Negative for arthralgias, back pain, gait problem, joint swelling and myalgias.   Skin: Negative for rash.   Neurological: Negative for dizziness, tremors, syncope, weakness,  "light-headedness, numbness and headaches.   Hematological: Negative for adenopathy. Does not bruise/bleed easily.   Psychiatric/Behavioral: Negative for confusion, sleep disturbance and suicidal ideas. The patient is not nervous/anxious.          Current Outpatient Medications:   •  albuterol sulfate HFA (Ventolin HFA) 108 (90 Base) MCG/ACT inhaler, Inhale 2 puffs Every 4 (Four) Hours As Needed for Wheezing or Shortness of Air., Disp: 1 inhaler, Rfl: 5  •  aspirin 81 MG EC tablet, Take 1 tablet by mouth Every Night., Disp: 90 tablet, Rfl: 3  •  atorvastatin (LIPITOR) 40 MG tablet, Take 1 tablet by mouth Every Night., Disp: 90 tablet, Rfl: 3  •  B Complex Vitamins (VITAMIN-B COMPLEX PO), Take 1 tablet by mouth Daily., Disp: , Rfl:   •  Cholecalciferol (VITAMIN D3) 5000 units capsule capsule, Take 5,000 Units by mouth Daily., Disp: , Rfl:   •  Cyanocobalamin (B-12) 1000 MCG tablet, Take 1,000 mcg by mouth Daily., Disp: , Rfl:   •  fexofenadine (ALLEGRA) 180 MG tablet, Take 180 mg by mouth Daily., Disp: , Rfl:   •  metoprolol succinate XL (TOPROL-XL) 25 MG 24 hr tablet, Take 1 tablet by mouth Daily., Disp: 90 tablet, Rfl: 3  •  naproxen sodium (ALEVE) 220 MG tablet, Take 220 mg by mouth As Needed for Mild Pain ., Disp: , Rfl:   •  nitroglycerin (NITROSTAT) 0.4 MG SL tablet, Place 1 tablet under the tongue Every 5 (Five) Minutes As Needed for Chest Pain., Disp: 25 tablet, Rfl: 5  •  NON FORMULARY, Take 1 tablet by mouth Daily. \"derma-health pro\" OTC supplement for skin, Disp: , Rfl:   •  tiotropium bromide-olodaterol (Stiolto Respimat) 2.5-2.5 MCG/ACT aerosol solution inhaler, Inhale 2 puffs Daily., Disp: 1 inhaler, Rfl: 2  •  Zinc 50 MG capsule, Take  by mouth Every Morning., Disp: , Rfl:     PHYSICAL EXAMINATION:   /74   Pulse 75   Temp 97.5 °F (36.4 °C) (Temporal)   Resp 16   Ht 172.7 cm (68\")   Wt 108 kg (239 lb)   SpO2 99%   BMI 36.34 kg/m²    ECOG Performance Status: 1 - Symptomatic but completely " ambulatory  General Appearance:  alert, cooperative, no apparent distress and appears stated age   Neurologic/Psychiatric: A&O x 3, gait steady, appropriate affect, strength 5/5 in all muscle groups   HEENT:  Normocephalic, without obvious abnormality, mucous membranes moist   Neck: Supple, symmetrical, trachea midline, no adenopathy;  No thyromegaly, masses, or tenderness   Lungs:   Clear to auscultation bilaterally; respirations regular, even, and unlabored bilaterally   Heart:  Regular rate and rhythm, no murmurs appreciated   Abdomen:   Soft, non-tender, non-distended and no organomegaly   Lymph nodes: No cervical, supraclavicular, inguinal or axillary adenopathy noted   Extremities: Normal, atraumatic; no clubbing, cyanosis, or edema    Skin: No rashes, ulcers, or suspicious lesions noted          CT Chest With Contrast Diagnostic    Result Date: 3/23/2021  Narrative: PROCEDURE: CT CHEST W CONTRAST DIAGNOSTIC-  HISTORY: Follow up Stage IIIa Squamous cell carcinoma of the lung; C34.11-Malignant neoplasm of upper lobe, right bronchus or lung; R59.0-Localized enlarged lymph nodes; C34.91-Malignant neoplasm of unspecified part of right bronchus or lung  COMPARISON: 09/24/2020.  TECHNIQUE: Axial CT with IV contrast administration.  FINDINGS:  No acute lung disease is present.  Mild scarring is seen in the right upper lobe, similar from prior exam. Underlying emphysematous disease is noted.  No pleural or pericardial effusion is seen. No mediastinal or hilar adenopathy or mass lesion is present. There is a right retrocrural node measuring 6 mm, previously 4.5 mm. Significance of this borderline enlarged lymph node is uncertain. Mild gynecomastia is seen.      Impression: 1. Minimal interval enlargement of right retrocrural lymph node of questionable significance. 2. Otherwise unremarkable.   This study was performed with techniques to keep radiation doses as low as reasonably achievable (ALARA). Individualized dose  reduction techniques using automated exposure control or adjustment of vA and/or kV according to the patient size were employed.  This report was finalized on 3/23/2021 11:54 AM by Moe Pascal MD.    CT Abdomen Pelvis With Contrast    Result Date: 3/23/2021  Narrative: CT ABDOMEN PELVIS W CONTRAST-  TECHNIQUE: Oral and IV contrast enhanced exam  HISTORY: Follow up Stage IIIa Squamous cell carcinoma of the lung; C34.11-Malignant neoplasm of upper lobe, right bronchus or lung; R59.0-Localized enlarged lymph nodes; C34.91-Malignant neoplasm of unspecified part of right bronchus or lung  COMPARISON: None.  FINDINGS:  ABDOMEN: Fatty infiltration of the liver is present. Small exophytic nodule of the left kidney is seen, probably a cyst. Tiny parenchymal cyst is seen of the posterior left midkidney as well measuring less than 1 cm. Remaining solid organs are normal.  Patient is status post cholecystectomy. Bowel is unremarkable. No mesenteric or retroperitoneal adenopathy is seen.  PELVIS: Pelvic bowel loops are unremarkable. Prostate is normal. Bladder has a normal appearance. No pelvic mass is seen.      Impression: Stable exam without evidence of metastatic disease to the abdomen or pelvis.   This study was performed with techniques to keep radiation doses as low as reasonably achievable (ALARA). Individualized dose reduction techniques using automated exposure control or adjustment of vA and/or kV according to the patient size were employed.  This report was finalized on 3/23/2021 11:54 AM by Moe Pascal MD.      ASSESSMENT: The patient is a very pleasant 66 y.o. male  with right upper lobe squamous cell carcinoma of the lung     PROBLEM LIST:   1.  Squamous cell carcinoma of the lung T1b N2 M0 stage T3a:  A.  Presented with shortness of breath and cough  B.  CT chest with contrast done June 28, 2019 revealed right upper lobe 1.8 cm lung nodule with right hilar and mediastinal lymphadenopathy.  MRI brain  whole-body PET scan done on July 22, 2019 felt show any other sites of disease.  C.  Diagnosed after bronchoscopy with biopsy done July 11, 2019  D.  Pathology revealed squamous cell carcinoma from level 4R and level 7.  E.  Started neoadjuvant concurrent chemotherapy with radiation using weekly carbotaxol July 30, 2019.  Radiation was added August 6, 2019. This was completed 09/11/2019.  F.  Status post right upper lobe resection with lymph node sampling done by Dr. Flores October 11, 2019.  G.  Final pathology revealed complete pathologic response with no evidence of residual disease in the lung or in the hilar and mediastinal nodes.  2.  Chronic tobacco abuse  3.  Hypertension  4.  COPD:   A. Not oxygen dependent  B.  PFTs done on July 17, 2019 revealed FEV1 2.44L, 72% of predicted and DLCO 55% of predicted.  5.  History of superficial melanoma status post surgical resection  6.  Hypercholesterolemia    PLAN:  1.  I did go over the CT scan results with the patient and his wife reassured them there is no evidence of recurrent cancer.  6 mm right retrocrural lymph node which we will watch carefully.    2.  I will repeat scans prior to return.  3.  I will continue to monitor patient blood work including blood counts kidney function liver function and electrolytes. Patient will plan to have labs drawn in [place].   4.  The patient will follow-up with me in 4 months.   5.  We will continue lisinopril for hypertension  6.  We will continue Lipitor 40 mg daily for hypercholesterolemia.  7.  Patient will continue to follow up with PCP for fluid retention.         Kevin Salguero MD  3/31/2021

## 2021-04-08 ENCOUNTER — TELEPHONE (OUTPATIENT)
Dept: ONCOLOGY | Facility: CLINIC | Age: 66
End: 2021-04-08

## 2021-04-08 NOTE — TELEPHONE ENCOUNTER
Provider: XOCHITL  Caller: CHOLO BREWER   Relationship to Patient: [facility]  Phone Number: [phone]  Reason for Call: HAS QUESTIONS ABOUT PT CT SCAN THAT WAS DONE ON 03/23/2021.

## 2021-04-08 NOTE — TELEPHONE ENCOUNTER
Called to see what her questions were.  She was asking what the codes and names meant on the history section of the scan.  I advised her that is what we have to put in for his diagnosis for insurance to approve scans.  She verbalized understanding.

## 2021-07-26 ENCOUNTER — HOSPITAL ENCOUNTER (OUTPATIENT)
Dept: CT IMAGING | Facility: HOSPITAL | Age: 66
Discharge: HOME | End: 2021-07-26

## 2021-07-26 DIAGNOSIS — C34.11 MALIGNANT NEOPLASM OF UPPER LOBE OF RIGHT LUNG (HCC): ICD-10-CM

## 2021-07-26 LAB — CREAT BLDA-MCNC: 0.9 MG/DL (ref 0.6–1.3)

## 2021-07-26 PROCEDURE — 82565 ASSAY OF CREATININE: CPT

## 2021-07-26 PROCEDURE — 71260 CT THORAX DX C+: CPT

## 2021-07-26 PROCEDURE — 74177 CT ABD & PELVIS W/CONTRAST: CPT

## 2021-07-26 PROCEDURE — 25010000002 IOPAMIDOL 61 % SOLUTION: Performed by: INTERNAL MEDICINE

## 2021-07-26 RX ADMIN — IOPAMIDOL 100 ML: 612 INJECTION, SOLUTION INTRAVENOUS at 10:01

## 2021-07-28 ENCOUNTER — LAB (OUTPATIENT)
Dept: LAB | Facility: HOSPITAL | Age: 66
End: 2021-07-28

## 2021-07-28 ENCOUNTER — OFFICE VISIT (OUTPATIENT)
Dept: ONCOLOGY | Facility: CLINIC | Age: 66
End: 2021-07-28

## 2021-07-28 VITALS
DIASTOLIC BLOOD PRESSURE: 69 MMHG | OXYGEN SATURATION: 98 % | WEIGHT: 233 LBS | TEMPERATURE: 97.5 F | RESPIRATION RATE: 16 BRPM | SYSTOLIC BLOOD PRESSURE: 137 MMHG | HEIGHT: 68 IN | HEART RATE: 81 BPM | BODY MASS INDEX: 35.31 KG/M2

## 2021-07-28 DIAGNOSIS — C34.91 SQUAMOUS CELL CARCINOMA OF RIGHT LUNG (HCC): Primary | ICD-10-CM

## 2021-07-28 DIAGNOSIS — G62.9 NEUROPATHY: ICD-10-CM

## 2021-07-28 DIAGNOSIS — C34.91 SQUAMOUS CELL CARCINOMA OF RIGHT LUNG (HCC): ICD-10-CM

## 2021-07-28 LAB
ALBUMIN SERPL-MCNC: 4.4 G/DL (ref 3.5–5.2)
ALBUMIN/GLOB SERPL: 1.5 G/DL
ALP SERPL-CCNC: 94 U/L (ref 39–117)
ALT SERPL W P-5'-P-CCNC: 34 U/L (ref 1–41)
ANION GAP SERPL CALCULATED.3IONS-SCNC: 7.9 MMOL/L (ref 5–15)
AST SERPL-CCNC: 33 U/L (ref 1–40)
BASOPHILS # BLD AUTO: 0.11 10*3/MM3 (ref 0–0.2)
BASOPHILS NFR BLD AUTO: 1.1 % (ref 0–1.5)
BILIRUB SERPL-MCNC: 1.5 MG/DL (ref 0–1.2)
BUN SERPL-MCNC: 12 MG/DL (ref 8–23)
BUN/CREAT SERPL: 13.5 (ref 7–25)
CALCIUM SPEC-SCNC: 10 MG/DL (ref 8.6–10.5)
CHLORIDE SERPL-SCNC: 102 MMOL/L (ref 98–107)
CO2 SERPL-SCNC: 29.1 MMOL/L (ref 22–29)
CREAT SERPL-MCNC: 0.89 MG/DL (ref 0.76–1.27)
DEPRECATED RDW RBC AUTO: 43.5 FL (ref 37–54)
EOSINOPHIL # BLD AUTO: 1.29 10*3/MM3 (ref 0–0.4)
EOSINOPHIL NFR BLD AUTO: 12.4 % (ref 0.3–6.2)
ERYTHROCYTE [DISTWIDTH] IN BLOOD BY AUTOMATED COUNT: 12.4 % (ref 12.3–15.4)
GFR SERPL CREATININE-BSD FRML MDRD: 86 ML/MIN/1.73
GLOBULIN UR ELPH-MCNC: 3 GM/DL
GLUCOSE SERPL-MCNC: 101 MG/DL (ref 65–99)
HCT VFR BLD AUTO: 46 % (ref 37.5–51)
HGB BLD-MCNC: 15.3 G/DL (ref 13–17.7)
IMM GRANULOCYTES # BLD AUTO: 0.03 10*3/MM3 (ref 0–0.05)
IMM GRANULOCYTES NFR BLD AUTO: 0.3 % (ref 0–0.5)
LYMPHOCYTES # BLD AUTO: 3.37 10*3/MM3 (ref 0.7–3.1)
LYMPHOCYTES NFR BLD AUTO: 32.4 % (ref 19.6–45.3)
MCH RBC QN AUTO: 31.6 PG (ref 26.6–33)
MCHC RBC AUTO-ENTMCNC: 33.3 G/DL (ref 31.5–35.7)
MCV RBC AUTO: 95 FL (ref 79–97)
MONOCYTES # BLD AUTO: 0.78 10*3/MM3 (ref 0.1–0.9)
MONOCYTES NFR BLD AUTO: 7.5 % (ref 5–12)
NEUTROPHILS NFR BLD AUTO: 4.83 10*3/MM3 (ref 1.7–7)
NEUTROPHILS NFR BLD AUTO: 46.3 % (ref 42.7–76)
NRBC BLD AUTO-RTO: 0 /100 WBC (ref 0–0.2)
PLATELET # BLD AUTO: 275 10*3/MM3 (ref 140–450)
PMV BLD AUTO: 9.9 FL (ref 6–12)
POTASSIUM SERPL-SCNC: 4.3 MMOL/L (ref 3.5–5.2)
PROT SERPL-MCNC: 7.4 G/DL (ref 6–8.5)
RBC # BLD AUTO: 4.84 10*6/MM3 (ref 4.14–5.8)
SODIUM SERPL-SCNC: 139 MMOL/L (ref 136–145)
WBC # BLD AUTO: 10.41 10*3/MM3 (ref 3.4–10.8)

## 2021-07-28 PROCEDURE — 99214 OFFICE O/P EST MOD 30 MIN: CPT | Performed by: NURSE PRACTITIONER

## 2021-07-28 PROCEDURE — 36415 COLL VENOUS BLD VENIPUNCTURE: CPT | Performed by: NURSE PRACTITIONER

## 2021-07-28 PROCEDURE — 85025 COMPLETE CBC W/AUTO DIFF WBC: CPT

## 2021-07-28 PROCEDURE — 80053 COMPREHEN METABOLIC PANEL: CPT | Performed by: NURSE PRACTITIONER

## 2021-07-28 RX ORDER — GABAPENTIN 300 MG/1
300 CAPSULE ORAL NIGHTLY
Qty: 30 CAPSULE | Refills: 1 | Status: SHIPPED | OUTPATIENT
Start: 2021-07-28 | End: 2021-12-01

## 2021-07-28 NOTE — PROGRESS NOTES
DATE OF VISIT: 7/28/2021    REASON FOR VISIT: Followup for right lung cancer     HISTORY OF PRESENT ILLNESS: The patient is a very pleasant 66 y.o. male with past medical history significant for cell carcinoma of the right upper lobe of the lung diagnosed July 2019.  The patient had staging work-up that confirmed stage IIIa disease.  He will be started on neoadjuvant concurrent chemotherapy radiation July 30, 2019.  He completed his treatment course September 2019.  Patient status post right upper lobe resection with lymph node sampling done by Dr. Flores October 11, 2019.  Final pathology did not reveal any evidence of residual disease.   The patient is here today for scheduled follow-up visit.    SUBJECTIVE: The patient is here today with his wife.  Overall, he is doing fairly well.  He denies any fever chills night sweats.  He is having ongoing issues with neuropathy.  This is been ongoing since completion of chemotherapy.  It wakes him up at night.  He is unable to do some of his normal activities like weed eating.  He is anxious about the scan results.    PAST MEDICAL HISTORY/SOCIAL HISTORY/FAMILY HISTORY: Reviewed by me and unchanged from my documentation done on 07/28/21.    Review of Systems   Constitutional: Negative for activity change, appetite change, chills, fatigue, fever and unexpected weight change.   HENT: Negative for hearing loss, mouth sores, nosebleeds, sore throat and trouble swallowing.    Eyes: Negative for visual disturbance.   Respiratory: Negative for cough, chest tightness, shortness of breath and wheezing.    Cardiovascular: Negative for chest pain, palpitations and leg swelling.   Gastrointestinal: Positive for constipation. Negative for abdominal distention, abdominal pain, blood in stool, diarrhea, nausea, rectal pain and vomiting.   Endocrine: Negative for cold intolerance and heat intolerance.   Genitourinary: Negative for difficulty urinating, dysuria, frequency and urgency.  "  Musculoskeletal: Negative for arthralgias, back pain, gait problem, joint swelling and myalgias.   Skin: Negative for rash.   Neurological: Positive for numbness. Negative for dizziness, tremors, syncope, weakness, light-headedness and headaches.   Hematological: Negative for adenopathy. Does not bruise/bleed easily.   Psychiatric/Behavioral: Negative for confusion, sleep disturbance and suicidal ideas. The patient is not nervous/anxious.          Current Outpatient Medications:   •  albuterol sulfate HFA (Ventolin HFA) 108 (90 Base) MCG/ACT inhaler, Inhale 2 puffs Every 4 (Four) Hours As Needed for Wheezing or Shortness of Air., Disp: 1 inhaler, Rfl: 5  •  aspirin 81 MG EC tablet, Take 1 tablet by mouth Every Night., Disp: 90 tablet, Rfl: 3  •  atorvastatin (LIPITOR) 40 MG tablet, Take 1 tablet by mouth Every Night., Disp: 90 tablet, Rfl: 3  •  B Complex Vitamins (VITAMIN-B COMPLEX PO), Take 1 tablet by mouth Daily., Disp: , Rfl:   •  Cholecalciferol (VITAMIN D3) 5000 units capsule capsule, Take 5,000 Units by mouth Daily., Disp: , Rfl:   •  Cyanocobalamin (B-12) 1000 MCG tablet, Take 1,000 mcg by mouth Daily., Disp: , Rfl:   •  fexofenadine (ALLEGRA) 180 MG tablet, Take 180 mg by mouth Daily., Disp: , Rfl:   •  gabapentin (NEURONTIN) 300 MG capsule, Take 1 capsule by mouth Every Night., Disp: 30 capsule, Rfl: 1  •  metoprolol succinate XL (TOPROL-XL) 25 MG 24 hr tablet, Take 1 tablet by mouth Daily., Disp: 90 tablet, Rfl: 3  •  naproxen sodium (ALEVE) 220 MG tablet, Take 220 mg by mouth As Needed for Mild Pain ., Disp: , Rfl:   •  nitroglycerin (NITROSTAT) 0.4 MG SL tablet, Place 1 tablet under the tongue Every 5 (Five) Minutes As Needed for Chest Pain., Disp: 25 tablet, Rfl: 5  •  NON FORMULARY, Take 1 tablet by mouth Daily. \"derma-health pro\" OTC supplement for skin, Disp: , Rfl:   •  tiotropium bromide-olodaterol (Stiolto Respimat) 2.5-2.5 MCG/ACT aerosol solution inhaler, Inhale 2 puffs Daily., Disp: 1 " "inhaler, Rfl: 2  •  Zinc 50 MG capsule, Take  by mouth Every Morning., Disp: , Rfl:     PHYSICAL EXAMINATION:   /69   Pulse 81   Temp 97.5 °F (36.4 °C) (Temporal)   Resp 16   Ht 172.7 cm (68\")   Wt 106 kg (233 lb)   SpO2 98%   BMI 35.43 kg/m²    ECOG Performance Status: 1 - Symptomatic but completely ambulatory  General Appearance:  alert, cooperative, no apparent distress and appears stated age   Neurologic/Psychiatric: A&O x 3, gait steady, appropriate affect, strength 5/5 in all muscle groups   HEENT:  Normocephalic, without obvious abnormality, mucous membranes moist   Neck: Supple, symmetrical, trachea midline, no adenopathy;  No thyromegaly, masses, or tenderness   Lungs:   Clear to auscultation bilaterally; respirations regular, even, and unlabored bilaterally   Heart:  Regular rate and rhythm, no murmurs appreciated   Abdomen:   Soft, non-tender, non-distended and no organomegaly   Lymph nodes: No cervical, supraclavicular, inguinal or axillary adenopathy noted   Extremities: Normal, atraumatic; no clubbing, cyanosis, or edema    Skin: No rashes, ulcers, or suspicious lesions noted          CT Abdomen Pelvis With Contrast, CT Chest With Contrast Diagnostic    Result Date: 7/26/2021  Narrative: PROCEDURE: CT CHEST W CONTRAST DIAGNOSTIC-, CT ABDOMEN PELVIS W CONTRAST-  HISTORY: f/u scans; C34.11-Malignant neoplasm of upper lobe, right bronchus or lung  COMPARISON: None .  PROCEDURE: Axial images were obtained from the thoracic inlet through the pubic symphysis following the administration of Isovue 300 contrast.   FINDINGS:  CHEST: There is mild gynecomastia. Heart size is normal. There are no pleural or pericardial effusions. There are postoperative changes of right upper lobectomy. No lung mass is identified. There is a borderline enlarged right retrocrural lymph node which has slightly decreased in size. This is likely of no significance. Bone windows reveal no lytic or destructive lesions.  " ABDOMEN: There are mild fatty changes to the liver. There is a left renal cyst. Remaining solid abdominal organs are unremarkable. The gallbladder is surgically absent. Abdominal bowel loops appear unremarkable. There is a small left periumbilical hernia containing fat. The aorta is normal in caliber.   PELVIS: The appendix is normal. The prostate is mildly enlarged. The urinary bladder is unremarkable. There is no significant pelvic free fluid or adenopathy. Bone windows reveal no lytic or destructive lesions. There is a small left inguinal hernia containing fat.      Impression: No evidence of metastatic disease to the chest, abdomen, or pelvis.  This study was performed with techniques to keep radiation doses as low as reasonably achievable (ALARA). Individualized dose reduction techniques using automated exposure control or adjustment of mA and/or kV according to the patient size were employed.     Images were reviewed, interpreted, and dictated by Dr. Moe Pascal MD Transcribed by Leidy Caba PA-C.  This report was finalized on 7/26/2021 12:43 PM by Moe Pascal MD.      ASSESSMENT: The patient is a very pleasant 66 y.o. male  with right upper lobe squamous cell carcinoma of the lung     PROBLEM LIST:   1.  Squamous cell carcinoma of the lung T1b N2 M0 stage T3a:  A.  Presented with shortness of breath and cough  B.  CT chest with contrast done June 28, 2019 revealed right upper lobe 1.8 cm lung nodule with right hilar and mediastinal lymphadenopathy.  MRI brain whole-body PET scan done on July 22, 2019 felt show any other sites of disease.  C.  Diagnosed after bronchoscopy with biopsy done July 11, 2019  D.  Pathology revealed squamous cell carcinoma from level 4R and level 7.  E.  Started neoadjuvant concurrent chemotherapy with radiation using weekly carbo/taxol July 30, 2019.  Radiation was added August 6, 2019. This was completed 09/11/2019.  F.  Status post right upper lobe resection with lymph  node sampling done by Dr. Flores October 11, 2019.  G.  Final pathology revealed complete pathologic response with no evidence of residual disease in the lung or in the hilar and mediastinal nodes.  2.  Chronic tobacco abuse  3.  Hypertension  4.  COPD:   A. Not oxygen dependent  B.  PFTs done on July 17, 2019 revealed FEV1 2.44L, 72% of predicted and DLCO 55% of predicted.  5.  History of superficial melanoma status post surgical resection  6.  Hypercholesterolemia  7.  Peripheral neuropathy    PLAN:  1.  I did go over the CT scan results with the patient and his wife reassured them there is no evidence of recurrent cancer.  6 mm right retrocrural lymph node noted on last scan has resolved.     2.  I will repeat scans prior to return.  3.  I will continue to monitor patient blood work including blood counts kidney function liver function and electrolytes. Patient will plan to have labs drawn in [place].   4.  The patient will follow-up  in 4 months with scans prior to return.   5.  We will continue lisinopril for hypertension  6.  We will continue Lipitor 40 mg daily for hypercholesterolemia.  7.  Patient will continue to follow up with PCP for fluid retention.   8.  I will start the patient on gabapentin 300 mg at night to help with chemotherapy induced neuropathy.        Kelley Bearden, APRN  7/28/2021

## 2021-07-30 ENCOUNTER — TELEPHONE (OUTPATIENT)
Dept: PRIMARY CARE CLINIC | Age: 66
End: 2021-07-30

## 2021-07-30 DIAGNOSIS — R31.9 HEMATURIA, UNSPECIFIED TYPE: Primary | ICD-10-CM

## 2021-07-30 NOTE — TELEPHONE ENCOUNTER
Patient went for a scan due to his cancer and he has a enlarged prostate and maybe a tumor they are concerned about. Is it ok to put in a referral to Dr. Wendy Remy Pending if Ok?

## 2021-11-11 ENCOUNTER — OFFICE VISIT (OUTPATIENT)
Dept: UROLOGY | Facility: CLINIC | Age: 66
End: 2021-11-11

## 2021-11-11 ENCOUNTER — LAB (OUTPATIENT)
Dept: LAB | Facility: HOSPITAL | Age: 66
End: 2021-11-11

## 2021-11-11 VITALS
HEIGHT: 68 IN | SYSTOLIC BLOOD PRESSURE: 128 MMHG | WEIGHT: 225 LBS | HEART RATE: 73 BPM | DIASTOLIC BLOOD PRESSURE: 86 MMHG | TEMPERATURE: 97.7 F | OXYGEN SATURATION: 97 % | BODY MASS INDEX: 34.1 KG/M2

## 2021-11-11 DIAGNOSIS — R39.9 LOWER URINARY TRACT SYMPTOMS (LUTS): ICD-10-CM

## 2021-11-11 DIAGNOSIS — R31.9 HEMATURIA, UNSPECIFIED TYPE: ICD-10-CM

## 2021-11-11 DIAGNOSIS — R31.9 HEMATURIA, UNSPECIFIED TYPE: Primary | ICD-10-CM

## 2021-11-11 DIAGNOSIS — C34.91 SQUAMOUS CELL CARCINOMA OF RIGHT LUNG (HCC): ICD-10-CM

## 2021-11-11 LAB
ALBUMIN SERPL-MCNC: 4.4 G/DL (ref 3.5–5.2)
ALBUMIN/GLOB SERPL: 1.7 G/DL
ALP SERPL-CCNC: 88 U/L (ref 39–117)
ALT SERPL W P-5'-P-CCNC: 44 U/L (ref 1–41)
ANION GAP SERPL CALCULATED.3IONS-SCNC: 5 MMOL/L (ref 5–15)
AST SERPL-CCNC: 35 U/L (ref 1–40)
BILIRUB BLD-MCNC: NEGATIVE MG/DL
BILIRUB SERPL-MCNC: 1.5 MG/DL (ref 0–1.2)
BUN SERPL-MCNC: 10 MG/DL (ref 8–23)
BUN/CREAT SERPL: 12.3 (ref 7–25)
CALCIUM SPEC-SCNC: 9.7 MG/DL (ref 8.6–10.5)
CHLORIDE SERPL-SCNC: 103 MMOL/L (ref 98–107)
CLARITY, POC: CLEAR
CO2 SERPL-SCNC: 30 MMOL/L (ref 22–29)
COLOR UR: YELLOW
CREAT SERPL-MCNC: 0.81 MG/DL (ref 0.76–1.27)
EXPIRATION DATE: NORMAL
GFR SERPL CREATININE-BSD FRML MDRD: 96 ML/MIN/1.73
GLOBULIN UR ELPH-MCNC: 2.6 GM/DL
GLUCOSE SERPL-MCNC: 97 MG/DL (ref 65–99)
GLUCOSE UR STRIP-MCNC: NEGATIVE MG/DL
KETONES UR QL: NEGATIVE
LEUKOCYTE EST, POC: NEGATIVE
Lab: NORMAL
NITRITE UR-MCNC: NEGATIVE MG/ML
PH UR: 6 [PH] (ref 5–8)
POTASSIUM SERPL-SCNC: 4.5 MMOL/L (ref 3.5–5.2)
PROT SERPL-MCNC: 7 G/DL (ref 6–8.5)
PROT UR STRIP-MCNC: NEGATIVE MG/DL
PSA SERPL-MCNC: 1.49 NG/ML (ref 0–4)
RBC # UR STRIP: NEGATIVE /UL
SODIUM SERPL-SCNC: 138 MMOL/L (ref 136–145)
SP GR UR: 1.02 (ref 1–1.03)
UROBILINOGEN UR QL: NORMAL

## 2021-11-11 PROCEDURE — 81003 URINALYSIS AUTO W/O SCOPE: CPT | Performed by: UROLOGY

## 2021-11-11 PROCEDURE — 36415 COLL VENOUS BLD VENIPUNCTURE: CPT

## 2021-11-11 PROCEDURE — 99204 OFFICE O/P NEW MOD 45 MIN: CPT | Performed by: UROLOGY

## 2021-11-11 PROCEDURE — 80053 COMPREHEN METABOLIC PANEL: CPT

## 2021-11-11 PROCEDURE — 84153 ASSAY OF PSA TOTAL: CPT

## 2021-11-11 RX ORDER — TAMSULOSIN HYDROCHLORIDE 0.4 MG/1
1 CAPSULE ORAL NIGHTLY
Qty: 30 CAPSULE | Refills: 0 | Status: SHIPPED | OUTPATIENT
Start: 2021-11-11 | End: 2022-02-03

## 2021-11-11 NOTE — PROGRESS NOTES
Chief Complaint  Chief Complaint   Patient presents with   • Blood in Urine     New patient. Patient had CT done prior with Dr. Salguero. CT showed left renal cyst and that the prostate is mildly enlarged.      Referring Provider:  Nat Umana AP*    HPI  Mr. Bowser is a 66 y.o. male with below past medical history who presents with recent episode of gross hematuria, chronic lower urinary tract symptoms, and chronic erectile dysfunction.    Hx of LUTS, mainly weak stream, nocturia, and hesitancy.  He had one episode of gross hematuria.  He denies a history of kidney stones.  He has taken chemotherapy in the past for history of lung cancer.  He quit smoking    +ED for greater than 2 years.  Has worsened with recent chemo.  He has not tried any oral medicines.  He does have a prescription for nitroglycerin, but says he does not take it.    Past Medical History  Past Medical History:   Diagnosis Date   • Arthritis    • At risk for obstructive sleep apnea     Spouse reports patient snores and that he has questionable sleep apnea but has never had a sleep study done   • Borderline diabetes     Has never taken medication    • COPD (chronic obstructive pulmonary disease) ([facility])    • Coronary artery disease     1 stent   • Elevated cholesterol    • Hearing loss     Does not use hearing devices   • History of bronchitis 2017   • History of chemotherapy     around 9/20/2019   • History of radiation therapy     around 9/20/2019   • Hyperlipidemia    • Hypertension    • Malignant neoplasm of right lung ([facility]) 10/11/2019   • Melanoma ([facility])    • MI (myocardial infarction) ([facility]) 01/20/2015    placement of stent x1   • MRSA (methicillin resistant Staphylococcus aureus) 01/2015    left hand - treated   • Seasonal allergies    • Skin cancer     skin, lung   • Squamous cell lung cancer ([facility]) 7/24/2019   • Wears glasses     OTC glasses PRN for reading   • Wears partial dentures     Upper mouth       Past Surgical History  Past  Surgical History:   Procedure Laterality Date   • BRONCHOSCOPY N/A 7/11/2019    Procedure: BRONCHOSCOPY WITH ENDOBRONCHIAL ULTRASOUND with General Anesthesia.  ;  Surgeon: Jeff Rubin MD;  Location:  [facility];  Service: Pulmonary   • BRONCHOSCOPY N/A 10/11/2019    Procedure: BRONCHOSCOPY;  Surgeon: Titus Flores MD;  Location:  [facility];  Service: Cardiothoracic   • CARDIAC CATHETERIZATION  01/20/2015    Placement of stent x1   • CATARACT EXTRACTION Right    • CHOLECYSTECTOMY      Laparoscopic   • COLONOSCOPY  2018   • CORONARY ANGIOPLASTY WITH STENT PLACEMENT     • HEMORRHOIDECTOMY     • KNEE MENISCAL REPAIR Bilateral    • MOUTH SURGERY      Post for oral implants in upper mouth x3   • ROTATOR CUFF REPAIR Bilateral    • SKIN BIOPSY     • SKIN CANCER EXCISION      melanoma removed from right neck and back.  Squamous cell removed multiple places.    • THORACOSCOPY VIDEO ASSISTED WITH LOBECTOMY Right 10/11/2019    Procedure: THORACOSCOPY VIDEO ASSISTED WITH RIGHT UPPER LOBECTOMY, MEDIASTINAL LYMPH NODE DISSECTION, INTERCOSTAL NERVE BLOCKS;  Surgeon: Titus Flores MD;  Location:  [facility];  Service: Cardiothoracic   • WISDOM TOOTH EXTRACTION         Medications    Current Outpatient Medications:   •  albuterol sulfate HFA (Ventolin HFA) 108 (90 Base) MCG/ACT inhaler, Inhale 2 puffs Every 4 (Four) Hours As Needed for Wheezing or Shortness of Air., Disp: 1 inhaler, Rfl: 5  •  aspirin 81 MG EC tablet, Take 1 tablet by mouth Every Night., Disp: 90 tablet, Rfl: 3  •  atorvastatin (LIPITOR) 40 MG tablet, Take 1 tablet by mouth Every Night., Disp: 90 tablet, Rfl: 3  •  B Complex Vitamins (VITAMIN-B COMPLEX PO), Take 1 tablet by mouth Daily., Disp: , Rfl:   •  Cholecalciferol (VITAMIN D3) 5000 units capsule capsule, Take 5,000 Units by mouth Daily., Disp: , Rfl:   •  Cyanocobalamin (B-12) 1000 MCG tablet, Take 1,000 mcg by mouth Daily., Disp: , Rfl:   •  fexofenadine (ALLEGRA) 180 MG tablet, Take 180 mg by mouth  "Daily., Disp: , Rfl:   •  metoprolol succinate XL (TOPROL-XL) 25 MG 24 hr tablet, Take 1 tablet by mouth Daily., Disp: 90 tablet, Rfl: 3  •  naproxen sodium (ALEVE) 220 MG tablet, Take 220 mg by mouth As Needed for Mild Pain ., Disp: , Rfl:   •  nitroglycerin (NITROSTAT) 0.4 MG SL tablet, Place 1 tablet under the tongue Every 5 (Five) Minutes As Needed for Chest Pain., Disp: 25 tablet, Rfl: 5  •  tiotropium bromide-olodaterol (Stiolto Respimat) 2.5-2.5 MCG/ACT aerosol solution inhaler, Inhale 2 puffs Daily., Disp: 1 inhaler, Rfl: 2  •  Zinc 50 MG capsule, Take  by mouth Every Morning., Disp: , Rfl:   •  gabapentin (NEURONTIN) 300 MG capsule, Take 1 capsule by mouth Every Night., Disp: 30 capsule, Rfl: 1  •  NON FORMULARY, Take 1 tablet by mouth Daily. \"derma-health pro\" OTC supplement for skin, Disp: , Rfl:     Allergies  Allergies   Allergen Reactions   • Rosuvastatin Myalgia       Social History  Social History     Socioeconomic History   • Marital status:    • Number of children: 2   Tobacco Use   • Smoking status: Former Smoker     Packs/day: 2.00     Years: 46.00     Pack years: 92.00     Types: Cigarettes     Quit date: 2019     Years since quittin.2   • Smokeless tobacco: Former User     Types: Chew   Vaping Use   • Vaping Use: Never used   Substance and Sexual Activity   • Alcohol use: Yes     Comment: Social use, no history of abuse   • Drug use: No   • Sexual activity: Not Currently     Partners: Female       Family History  Family History   Problem Relation Age of Onset   • Heart attack Mother 60   • Coronary artery disease Mother    • Hyperlipidemia Mother    • Lung cancer Mother    • Emphysema Father    • Hypertension Sister    • Heart attack Sister 45   • Hypertension Brother    • Heart attack Brother 50   • Hypertension Sister    • Hypertension Brother    • Diabetes Brother        Review of Systems  Review of systems was notable for history of lung cancer and coronary artery " "disease.    Physical Exam  Visit Vitals  /86   Pulse 73   Temp 97.7 °F (36.5 °C)   Ht 172.7 cm (67.99\")   Wt 102 kg (225 lb)   SpO2 97%   BMI 34.22 kg/m²     Physical exam was performed and was notable for mild obesity    Genitourinary  Deferred to next visit    Labs  No results found for: PSA    Lab Results   Component Value Date    GLUCOSE 101 (H) 07/28/2021    CALCIUM 10.0 07/28/2021     07/28/2021    K 4.3 07/28/2021    CO2 29.1 (H) 07/28/2021     07/28/2021    BUN 12 07/28/2021    CREATININE 0.89 07/28/2021    EGFRIFAFRI 119 04/13/2018    EGFRIFNONA 86 07/28/2021    BCR 13.5 07/28/2021    ANIONGAP 7.9 07/28/2021       Lab Results   Component Value Date    WBC 10.41 07/28/2021    HGB 15.3 07/28/2021    HCT 46.0 07/28/2021    MCV 95.0 07/28/2021     07/28/2021       Brief Urine Lab Results     None           Radiologic Studies  CT Chest With Contrast Diagnostic    Result Date: 7/26/2021  No evidence of metastatic disease to the chest, abdomen, or pelvis.  This study was performed with techniques to keep radiation doses as low as reasonably achievable (ALARA). Individualized dose reduction techniques using automated exposure control or adjustment of mA and/or kV according to the patient size were employed.     Images were reviewed, interpreted, and dictated by Dr. Moe Pascal MD Transcribed by Leidy Caba PA-C.  This report was finalized on 7/26/2021 12:43 PM by Moe Pascal MD.    CT Abdomen Pelvis With Contrast    Result Date: 7/26/2021  No evidence of metastatic disease to the chest, abdomen, or pelvis.  This study was performed with techniques to keep radiation doses as low as reasonably achievable (ALARA). Individualized dose reduction techniques using automated exposure control or adjustment of mA and/or kV according to the patient size were employed.     Images were reviewed, interpreted, and dictated by Dr. Moe Pascal MD Transcribed by Leidy Caba PA-C.  This report " was finalized on 7/26/2021 12:43 PM by Moe Pascal MD.    I have reviewed his CT imaging.  No renal masses.  Large prostate.  No clear bladder masses.      Assessment  Mr. Bowser is a 66 y.o. male  with history of lung cancer and coronary artery disease, who presents with gross hematuria.  There is a new problem of uncertain prognosis.  He also has chronic lower urinary tract symptoms that have been worsening recently of weak stream, nocturia.  He also has chronic erectile dysfunction.  Risk factors for this are smoking, chemotherapy, obesity, coronary artery disease.     Plan  1.  FU for cystoscopy uroflow, transrectal ultrasound, prostate exam and GE; we will get IPSS at that visit  2. Infor given about ED options; I recommended against oral phosphodiesterase inhibitors given his active prescription for nitroglycerin  3. Trial flomax for urinary symptoms.  I have prescribed this  4. PSA       Noel Hameed MD

## 2021-11-15 ENCOUNTER — TELEPHONE (OUTPATIENT)
Dept: UROLOGY | Facility: CLINIC | Age: 66
End: 2021-11-15

## 2021-11-15 NOTE — TELEPHONE ENCOUNTER
Caller: CHOLO BREWER    Relationship: [employer]    Best call back number: [phone]    What is the best time to reach you: ANY    Who are you requesting to speak with (clinical staff, provider,  specific staff member): WHOMEVER    Do you know the name of the person who called: NO    What was the call regarding: MISSED A CALL. NOTHING IN NOTES ABOUT WHAT IT WAS ABOUT, BUT CHOLO SAYS THAT ON Friday, DR HASSAN SAID HE WOULD CALL THEM. THINKS IT MIGHT BE ABOUT TEST RESULTS.    Do you require a callback: YES

## 2021-11-29 ENCOUNTER — HOSPITAL ENCOUNTER (OUTPATIENT)
Dept: CT IMAGING | Facility: HOSPITAL | Age: 66
Discharge: HOME | End: 2021-11-29

## 2021-11-29 DIAGNOSIS — C34.91 SQUAMOUS CELL CARCINOMA OF RIGHT LUNG (HCC): ICD-10-CM

## 2021-11-29 PROCEDURE — 74177 CT ABD & PELVIS W/CONTRAST: CPT

## 2021-11-29 PROCEDURE — 25010000002 IOPAMIDOL 61 % SOLUTION: Performed by: NURSE PRACTITIONER

## 2021-11-29 PROCEDURE — 71260 CT THORAX DX C+: CPT

## 2021-11-29 RX ADMIN — IOPAMIDOL 100 ML: 612 INJECTION, SOLUTION INTRAVENOUS at 09:58

## 2021-12-01 ENCOUNTER — OFFICE VISIT (OUTPATIENT)
Dept: ONCOLOGY | Facility: CLINIC | Age: 66
End: 2021-12-01

## 2021-12-01 VITALS
RESPIRATION RATE: 16 BRPM | SYSTOLIC BLOOD PRESSURE: 171 MMHG | WEIGHT: 239 LBS | DIASTOLIC BLOOD PRESSURE: 78 MMHG | HEART RATE: 72 BPM | HEIGHT: 68 IN | TEMPERATURE: 97.7 F | OXYGEN SATURATION: 98 % | BODY MASS INDEX: 36.22 KG/M2

## 2021-12-01 DIAGNOSIS — C34.91 SQUAMOUS CELL CARCINOMA OF RIGHT LUNG (HCC): Primary | ICD-10-CM

## 2021-12-01 PROCEDURE — 99214 OFFICE O/P EST MOD 30 MIN: CPT | Performed by: INTERNAL MEDICINE

## 2021-12-20 ENCOUNTER — OFFICE VISIT (OUTPATIENT)
Dept: UROLOGY | Facility: CLINIC | Age: 66
End: 2021-12-20

## 2021-12-20 ENCOUNTER — OFFICE VISIT (OUTPATIENT)
Dept: PRIMARY CARE CLINIC | Age: 66
End: 2021-12-20
Payer: MEDICARE

## 2021-12-20 ENCOUNTER — HOSPITAL ENCOUNTER (OUTPATIENT)
Facility: HOSPITAL | Age: 66
Discharge: HOME | End: 2021-12-20
Payer: MEDICARE

## 2021-12-20 VITALS
HEART RATE: 90 BPM | SYSTOLIC BLOOD PRESSURE: 120 MMHG | DIASTOLIC BLOOD PRESSURE: 60 MMHG | WEIGHT: 243 LBS | BODY MASS INDEX: 35.88 KG/M2

## 2021-12-20 VITALS — WEIGHT: 239 LBS | RESPIRATION RATE: 18 BRPM | HEIGHT: 68 IN | BODY MASS INDEX: 36.22 KG/M2

## 2021-12-20 DIAGNOSIS — R39.9 LOWER URINARY TRACT SYMPTOMS (LUTS): Primary | ICD-10-CM

## 2021-12-20 DIAGNOSIS — I10 PRIMARY HYPERTENSION: Primary | ICD-10-CM

## 2021-12-20 DIAGNOSIS — N52.9 ERECTILE DYSFUNCTION, UNSPECIFIED ERECTILE DYSFUNCTION TYPE: ICD-10-CM

## 2021-12-20 DIAGNOSIS — I10 PRIMARY HYPERTENSION: ICD-10-CM

## 2021-12-20 DIAGNOSIS — E11.9 TYPE 2 DIABETES MELLITUS WITHOUT COMPLICATION, WITHOUT LONG-TERM CURRENT USE OF INSULIN (HCC): ICD-10-CM

## 2021-12-20 DIAGNOSIS — N40.0 ENLARGED PROSTATE: ICD-10-CM

## 2021-12-20 LAB
A/G RATIO: 2 (ref 0.8–2)
ALBUMIN SERPL-MCNC: 4.3 G/DL (ref 3.4–4.8)
ALP BLD-CCNC: 98 U/L (ref 25–100)
ALT SERPL-CCNC: 40 U/L (ref 4–36)
ANION GAP SERPL CALCULATED.3IONS-SCNC: 10 MMOL/L (ref 3–16)
AST SERPL-CCNC: 35 U/L (ref 8–33)
BILIRUB SERPL-MCNC: 1.5 MG/DL (ref 0.3–1.2)
BUN BLDV-MCNC: 17 MG/DL (ref 6–20)
CALCIUM SERPL-MCNC: 9.6 MG/DL (ref 8.5–10.5)
CHLORIDE BLD-SCNC: 99 MMOL/L (ref 98–107)
CHOLESTEROL, TOTAL: 177 MG/DL (ref 0–200)
CO2: 28 MMOL/L (ref 20–30)
CREAT SERPL-MCNC: 1 MG/DL (ref 0.4–1.2)
GFR AFRICAN AMERICAN: >59
GFR NON-AFRICAN AMERICAN: >59
GLOBULIN: 2.2 G/DL
GLUCOSE BLD-MCNC: 106 MG/DL (ref 74–106)
HBA1C MFR BLD: 5.8 %
HCT VFR BLD CALC: 42.8 % (ref 40–54)
HDLC SERPL-MCNC: 35 MG/DL (ref 40–60)
HEMOGLOBIN: 14.1 G/DL (ref 13–18)
LDL CHOLESTEROL CALCULATED: ABNORMAL MG/DL
LDL CHOLESTEROL DIRECT: 92 MG/DL
MCH RBC QN AUTO: 32.1 PG (ref 27–32)
MCHC RBC AUTO-ENTMCNC: 32.9 G/DL (ref 31–35)
MCV RBC AUTO: 97.5 FL (ref 80–100)
PDW BLD-RTO: 12.6 % (ref 11–16)
PLATELET # BLD: 300 K/UL (ref 150–400)
PMV BLD AUTO: 9.7 FL (ref 6–10)
POTASSIUM SERPL-SCNC: 4.2 MMOL/L (ref 3.4–5.1)
RBC # BLD: 4.39 M/UL (ref 4.5–6)
SODIUM BLD-SCNC: 137 MMOL/L (ref 136–145)
TOTAL PROTEIN: 6.5 G/DL (ref 6.4–8.3)
TRIGL SERPL-MCNC: 460 MG/DL (ref 0–249)
VLDLC SERPL CALC-MCNC: ABNORMAL MG/DL
WBC # BLD: 10.6 K/UL (ref 4–11)

## 2021-12-20 PROCEDURE — G8417 CALC BMI ABV UP PARAM F/U: HCPCS | Performed by: NURSE PRACTITIONER

## 2021-12-20 PROCEDURE — 4040F PNEUMOC VAC/ADMIN/RCVD: CPT | Performed by: NURSE PRACTITIONER

## 2021-12-20 PROCEDURE — 76872 US TRANSRECTAL: CPT | Performed by: UROLOGY

## 2021-12-20 PROCEDURE — G8427 DOCREV CUR MEDS BY ELIG CLIN: HCPCS | Performed by: NURSE PRACTITIONER

## 2021-12-20 PROCEDURE — 3017F COLORECTAL CA SCREEN DOC REV: CPT | Performed by: NURSE PRACTITIONER

## 2021-12-20 PROCEDURE — 52000 CYSTOURETHROSCOPY: CPT | Performed by: UROLOGY

## 2021-12-20 PROCEDURE — 51741 ELECTRO-UROFLOWMETRY FIRST: CPT | Performed by: UROLOGY

## 2021-12-20 PROCEDURE — 80061 LIPID PANEL: CPT

## 2021-12-20 PROCEDURE — G8484 FLU IMMUNIZE NO ADMIN: HCPCS | Performed by: NURSE PRACTITIONER

## 2021-12-20 PROCEDURE — 80053 COMPREHEN METABOLIC PANEL: CPT

## 2021-12-20 PROCEDURE — 83036 HEMOGLOBIN GLYCOSYLATED A1C: CPT

## 2021-12-20 PROCEDURE — 85027 COMPLETE CBC AUTOMATED: CPT

## 2021-12-20 PROCEDURE — 2022F DILAT RTA XM EVC RTNOPTHY: CPT | Performed by: NURSE PRACTITIONER

## 2021-12-20 PROCEDURE — 99214 OFFICE O/P EST MOD 30 MIN: CPT | Performed by: NURSE PRACTITIONER

## 2021-12-20 PROCEDURE — 1123F ACP DISCUSS/DSCN MKR DOCD: CPT | Performed by: NURSE PRACTITIONER

## 2021-12-20 PROCEDURE — 1036F TOBACCO NON-USER: CPT | Performed by: NURSE PRACTITIONER

## 2021-12-20 RX ORDER — ACETAMINOPHEN 325 MG/1
650 TABLET ORAL EVERY 6 HOURS
Qty: 32 TABLET | Refills: 0 | Status: SHIPPED | OUTPATIENT
Start: 2021-12-20 | End: 2021-12-24

## 2021-12-20 RX ORDER — TAMSULOSIN HYDROCHLORIDE 0.4 MG/1
CAPSULE ORAL
COMMUNITY
Start: 2021-11-11

## 2021-12-20 RX ORDER — SULFAMETHOXAZOLE AND TRIMETHOPRIM 800; 160 MG/1; MG/1
1 TABLET ORAL 2 TIMES DAILY
Qty: 6 TABLET | Refills: 0 | Status: SHIPPED | OUTPATIENT
Start: 2021-12-20 | End: 2022-02-03

## 2021-12-20 RX ORDER — PHENAZOPYRIDINE HYDROCHLORIDE 100 MG/1
100 TABLET, FILM COATED ORAL 3 TIMES DAILY PRN
Qty: 30 TABLET | Refills: 0 | Status: SHIPPED | OUTPATIENT
Start: 2021-12-20 | End: 2021-12-23

## 2021-12-20 ASSESSMENT — PATIENT HEALTH QUESTIONNAIRE - PHQ9
2. FEELING DOWN, DEPRESSED OR HOPELESS: 0
SUM OF ALL RESPONSES TO PHQ QUESTIONS 1-9: 0
1. LITTLE INTEREST OR PLEASURE IN DOING THINGS: 0
SUM OF ALL RESPONSES TO PHQ QUESTIONS 1-9: 0
SUM OF ALL RESPONSES TO PHQ9 QUESTIONS 1 & 2: 0
SUM OF ALL RESPONSES TO PHQ QUESTIONS 1-9: 0

## 2021-12-20 ASSESSMENT — ENCOUNTER SYMPTOMS
EYES NEGATIVE: 1
RESPIRATORY NEGATIVE: 1
GASTROINTESTINAL NEGATIVE: 1

## 2021-12-20 NOTE — PROGRESS NOTES
No chief complaint on file.       HPI  Ms. Bowser is a 66 y.o. male with history below in assessment, who presents for follow up.     At this visit     IPSS Questionnaire (AUA-7):  Incomplete emptying  Over the past month, how often have you had a sensation of not emptying your bladder completely after you finish?: About half the time (12/20/21 0938)  Frequency  Over the past month, how often have you had to urinate again less than two hours after you finishing urinating ?: Almost always (12/20/21 0938)  Intermittency  Over the past month, how often have you found you stopped and started again several time when you urinated ?: More than half the time (12/20/21 0938)  Urgency  Over the last month, how difficult  have you found it to postpone urination ?: more than half the time (12/20/21 0938)  Weak Stream  Over the past month, how often have you had a weak urinary stream ?: More than half the time (12/20/21 0938)  Straining  Over the past month, how often have you had to push or strain to begin urination ?: More than half the time (12/20/21 0938)  Nocturia  Over the past month, how many times did you most typically get up to urinate from the time you went to bed until the time you got up in the morning ?: About half the time (12/20/21 0938)  Quality of life due to urinary symptoms  If you were to spend the rest of your life with your urinary condition the way it is now, how would feel about that?: Mixed - about equally satisfied (12/20/21 0938)    Scores  Total IPSS Score: 28 (12/20/21 0938)  Total Score = Symtomatic Level: severely symptomatic: 20-35 (12/20/21 0938)     Past Medical History:   Diagnosis Date   • Arthritis    • At risk for obstructive sleep apnea     Spouse reports patient snores and that he has questionable sleep apnea but has never had a sleep study done   • Borderline diabetes     Has never taken medication    • COPD (chronic obstructive pulmonary disease) ([facility])    • Coronary artery disease     1  stent   • Elevated cholesterol    • Hearing loss     Does not use hearing devices   • History of bronchitis 2017   • History of chemotherapy     around 9/20/2019   • History of radiation therapy     around 9/20/2019   • Hyperlipidemia    • Hypertension    • Malignant neoplasm of right lung ([facility]) 10/11/2019   • Melanoma ([facility])    • MI (myocardial infarction) ([facility]) 01/20/2015    placement of stent x1   • MRSA (methicillin resistant Staphylococcus aureus) 01/2015    left hand - treated   • Seasonal allergies    • Skin cancer     skin, lung   • Squamous cell lung cancer ([facility]) 7/24/2019   • Wears glasses     OTC glasses PRN for reading   • Wears partial dentures     Upper mouth       Past Surgical History:   Procedure Laterality Date   • BRONCHOSCOPY N/A 7/11/2019    Procedure: BRONCHOSCOPY WITH ENDOBRONCHIAL ULTRASOUND with General Anesthesia.  ;  Surgeon: Jeff Rubin MD;  Location:  [facility];  Service: Pulmonary   • BRONCHOSCOPY N/A 10/11/2019    Procedure: BRONCHOSCOPY;  Surgeon: Titus Flores MD;  Location:  [facility];  Service: Cardiothoracic   • CARDIAC CATHETERIZATION  01/20/2015    Placement of stent x1   • CATARACT EXTRACTION Right    • CHOLECYSTECTOMY      Laparoscopic   • COLONOSCOPY  2018   • CORONARY ANGIOPLASTY WITH STENT PLACEMENT     • HEMORRHOIDECTOMY     • KNEE MENISCAL REPAIR Bilateral    • MOUTH SURGERY      Post for oral implants in upper mouth x3   • ROTATOR CUFF REPAIR Bilateral    • SKIN BIOPSY     • SKIN CANCER EXCISION      melanoma removed from right neck and back.  Squamous cell removed multiple places.    • THORACOSCOPY VIDEO ASSISTED WITH LOBECTOMY Right 10/11/2019    Procedure: THORACOSCOPY VIDEO ASSISTED WITH RIGHT UPPER LOBECTOMY, MEDIASTINAL LYMPH NODE DISSECTION, INTERCOSTAL NERVE BLOCKS;  Surgeon: Titus Flores MD;  Location:  [facility];  Service: Cardiothoracic   • WISDOM TOOTH EXTRACTION           Current Outpatient Medications:   •  albuterol sulfate HFA (Ventolin HFA) 108  (90 Base) MCG/ACT inhaler, Inhale 2 puffs Every 4 (Four) Hours As Needed for Wheezing or Shortness of Air., Disp: 1 inhaler, Rfl: 5  •  aspirin 81 MG EC tablet, Take 1 tablet by mouth Every Night., Disp: 90 tablet, Rfl: 3  •  atorvastatin (LIPITOR) 40 MG tablet, Take 1 tablet by mouth Every Night., Disp: 90 tablet, Rfl: 3  •  B Complex Vitamins (VITAMIN-B COMPLEX PO), Take 1 tablet by mouth Daily., Disp: , Rfl:   •  Cholecalciferol (VITAMIN D3) 5000 units capsule capsule, Take 5,000 Units by mouth Daily., Disp: , Rfl:   •  Cyanocobalamin (B-12) 1000 MCG tablet, Take 1,000 mcg by mouth Daily., Disp: , Rfl:   •  fexofenadine (ALLEGRA) 180 MG tablet, Take 180 mg by mouth Daily., Disp: , Rfl:   •  metoprolol succinate XL (TOPROL-XL) 25 MG 24 hr tablet, Take 1 tablet by mouth Daily., Disp: 90 tablet, Rfl: 3  •  naproxen sodium (ALEVE) 220 MG tablet, Take 220 mg by mouth As Needed for Mild Pain ., Disp: , Rfl:   •  nitroglycerin (NITROSTAT) 0.4 MG SL tablet, Place 1 tablet under the tongue Every 5 (Five) Minutes As Needed for Chest Pain., Disp: 25 tablet, Rfl: 5  •  tamsulosin (FLOMAX) 0.4 MG capsule 24 hr capsule, Take 1 capsule by mouth Every Night., Disp: 30 capsule, Rfl: 0  •  tiotropium bromide-olodaterol (Stiolto Respimat) 2.5-2.5 MCG/ACT aerosol solution inhaler, Inhale 2 puffs Daily., Disp: 1 inhaler, Rfl: 2  •  Zinc 50 MG capsule, Take  by mouth Every Morning., Disp: , Rfl:      Physical Exam  There were no vitals taken for this visit.    Labs  Brief Urine Lab Results  (Last result in the past 365 days)      Color   Clarity   Blood   Leuk Est   Nitrite   Protein   CREAT   Urine HCG        11/11/21 1117 Yellow   Clear   Negative   Negative   Negative   Negative                 Lab Results   Component Value Date    GLUCOSE 97 11/11/2021    CALCIUM 9.7 11/11/2021     11/11/2021    K 4.5 11/11/2021    CO2 30.0 (H) 11/11/2021     11/11/2021    BUN 10 11/11/2021    CREATININE 0.81 11/11/2021    EGFRIFAFRI  119 04/13/2018    EGFRIFNONA 96 11/11/2021    BCR 12.3 11/11/2021    ANIONGAP 5.0 11/11/2021       Lab Results   Component Value Date    WBC 10.41 07/28/2021    HGB 15.3 07/28/2021    HCT 46.0 07/28/2021    MCV 95.0 07/28/2021     07/28/2021            Lab Results   Component Value Date    PSA 1.490 11/11/2021             Radiographic Studies  CT Chest With Contrast Diagnostic    Result Date: 11/29/2021  No evidence of metastatic disease involving the chest, abdomen or pelvis.  This study was performed with techniques to keep radiation doses as low as reasonably achievable (ALARA). Individualized dose reduction techniques using automated exposure control or adjustment of mA and/or kV according to the patient size were employed.  This report was finalized on 11/29/2021 10:14 AM by Sumaya Jose M.D..    CT Abdomen Pelvis With Contrast    Result Date: 11/29/2021  No evidence of metastatic disease involving the chest, abdomen or pelvis.  This study was performed with techniques to keep radiation doses as low as reasonably achievable (ALARA). Individualized dose reduction techniques using automated exposure control or adjustment of mA and/or kV according to the patient size were employed.  This report was finalized on 11/29/2021 10:14 AM by Sumaya Jose M.D..    Preprocedure diagnosis  Lower urinary tract symptoms    Postprocedure diagnosis  Same    Procedure  Flexible Cystourethroscopy    Attending surgeon  Noel Hameed MD    Anesthesia  2% lidocaine jelly intraurethrally    Complications  None    Indications  66 y.o. male undergoing a flexible cystoscopy for the above mentioned indications.  Informed consent was obtained.      Findings  Cystoscopic findings included one right and left ureteral orifice in the normal anatomic position with normal bladder mucosa and no tumors, masses or stones. The urethral urothelium was within normal limits with no strictures.  There was not a prominent median  lobe.  The lateral lobes were medium length and quite obstructive in appearance.      Procedure  The patient was placed in supine position and prepped and draped in sterile fashion with lidocaine jelly per urethra for anesthesia.  A timeout was performed.  The 14F flexible cystoscope was lubricated and gently placed through the penile urethra and into the bladder.  The bladder was completely visualized.  The cystoscope was retroflexed and the bladder neck and prostate visualized.  The cystoscope was slowly withdrawn while visualizing the urethra and the procedure terminated.  The patient tolerated the procedure well.      Uroflow:  Peak flow rate -11.6 mL/sec  Average flow rate -4.2 mL/sec  Flow curve -jagged and staccato  Voided volume -219 mL    I personally reviewed  and interpreted this study.   TRUS OF PROSTATE    Preoperative diagnosis  LUTS    Postoperative diagnosis  Same    Procedure  1.  Transrectal ultrasound of the prostate    Attending Surgeon  Noel Hameed MD    Anesthesia  2% lidocaine jelly, intrarectal instillation, 10mL    Complications  None    Specimen  None    Indications  Mr. Bowser is a 66 y.o. male with LUTS.  He presents for prostate ultrasound to evaluate prostate size.    Procedure  The patient was positioned and prepped in a left lateral position with lower extremities flexed.  Lidocaine jelly, 2%, was injected per rectum. A digital rectal exam was performed which demonstrated a smooth prostate without nodules or induration. The [employer] E8CS rectal ultrasound probe was slowly introduced into the rectum without difficulty.  The prostate and seminal vesicles were inspected systematically using cross and sagittal views with the ultrasound. A median lobe was not seen.  The dimensions of the prostate were measured, for a calculated volume of 37.16 mL.  The seminal vesicles appeared normal.  The rectal ultrasound probe was removed.  The patient tolerated the procedure well.        I have reviewed  above labs and imaging.     Assessment  66 y.o. male with significant lower urinary tract symptoms, most notably weak stream, straining, nocturia.  He clearly has an occlusive prostate on cystoscopic evaluation today.  Prostate size is 37 cc.  He has received some benefit from Flomax, but wants to take as few Rx as possible.     In a separate room in separate encounter we discussed different options to treat his lower urinary tract symptoms.  Of the options presented, he elected UroLift.  We discussed the risks and benefits.    Plan  1. Schedule for urolift at [place]. Rx sent in.  Risks associated with procedure are prior cardiac history.  He also has a history of MRSA in his hand.

## 2021-12-20 NOTE — PROGRESS NOTES
SUBJECTIVE:    Patient ID: Moni Onofre is a 73 y.o. male. Chief Complaint   Patient presents with    Hypertension    COPD    Hypothyroidism         HPI:  He went to Urology today. He is needing to have a procedure due to prostate enlargement. He is doing ok on his breathing. He does get sob with exertion but he is working as a  doing well. He hasn't been to cardiology recently. He is taking Neurontin for his neuropathy. He is having some swelling in his hands. He says his hands are getting some better. He is taking a new vitamin. Patient's medications,allergies, past medical, surgical, social and family histories were reviewed and updated as appropriate. .  Current Outpatient Medications on File Prior to Visit   Medication Sig Dispense Refill    tamsulosin (FLOMAX) 0.4 MG capsule       zinc 50 MG CAPS Take by mouth every morning      albuterol sulfate  (90 Base) MCG/ACT inhaler Inhale 2 puffs into the lungs every 4 hours as needed      furosemide (LASIX) 20 MG tablet       BEVESPI AEROSPHERE 9-4.8 MCG/ACT AERO       levocetirizine (XYZAL) 5 MG tablet Take 1 tablet by mouth nightly 30 tablet 0    aspirin 81 MG EC tablet Take 1 tablet by mouth daily 90 tablet 3    fexofenadine (ALLEGRA) 180 MG tablet Take 180 mg by mouth daily      metoprolol (LOPRESSOR) 25 MG tablet Take 25 mg by mouth daily.  lisinopril (PRINIVIL;ZESTRIL) 5 MG tablet Take 5 mg by mouth daily.  atorvastatin (LIPITOR) 40 MG tablet Take 40 mg by mouth daily.  gabapentin (NEURONTIN) 100 MG capsule Take 1 capsule by mouth 2 times daily for 30 days. 60 capsule 0     No current facility-administered medications on file prior to visit. Review of Systems   Constitutional: Negative. HENT: Negative. Eyes: Negative. Respiratory: Negative. Cardiovascular: Negative. Gastrointestinal: Negative. Genitourinary: Negative. Musculoskeletal: Negative. Skin: Negative. 1.2 mg/dL Not in Time Range    GFR  >59 (12/20/2021) >59 Not in Time Range    GFR Non- >59 (12/20/2021) >59 Not in Time Range    Globulin 2.2 (12/20/2021) Not Established g/dL Not in Time Range    Glucose 106 (12/20/2021) 74 - 106 mg/dL Not in Time Range    Potassium 4.2 (12/20/2021) 3.4 - 5.1 mmol/L Not in Time Range    Sodium 137 (12/20/2021) 136 - 145 mmol/L Not in Time Range    Total Bilirubin 1.5 (H) (12/20/2021) 0.3 - 1.2 mg/dL Not in Time Range    Total Protein 6.5 (12/20/2021) 6.4 - 8.3 g/dL Not in Time Range        Hemoglobin A1C (%)   Date Value   12/20/2021 5.8     LDL Calculated (mg/dL)   Date Value   12/20/2021 see below           Lab Results   Component Value Date    TSH 3.98 10/29/2020         ASSESSMENT/PLAN:     Eric Frost was seen today for hypertension, copd and hypothyroidism. Diagnoses and all orders for this visit:    Primary hypertension  -     Lipid Panel; Future  -     Comprehensive Metabolic Panel; Future  -     CBC; Future  BP is stable. I have advised him on low-sodium diet, exercise and weight control. I am going to continue current medication . Will monitor his renal function every few months, have advised him to check blood pressure frequently and to keep a record of this. Type 2 diabetes mellitus without complication, without long-term current use of insulin ([facility])  -     Hemoglobin A1C; Future  Stable. I advised him regarding diabetic diet, exercise and weight control. Also, I advised him to stay on the current medication, monitor his fingerstick closely. I am going to check the A1c every few months. I will check microalbumin on a yearly basis. I have also advised him to have a yearly eye exam and to monitor his feet closely. Along with instruction of possible complications related to diabetes, even with good control. A1C will be checked  in few months.    Lab Results   Component Value Date    LABA1C 5.8 12/20/2021       Enlarged prostate  He is following with urology as the procedure. Erectile dysfunction, unspecified erectile dysfunction type  He was in the impression that he can take medication because of cardiac issues will discuss with his cardiologist on the start Viagra. There are no discontinued medications.

## 2021-12-20 NOTE — PATIENT INSTRUCTIONS
Dr. Hameed's Preoperative Instructions Before and After UroLift Procedure at the surgery center  The following instructions will help you care for yourself, or be cared for before and after your procedure.      Diet  Drink plenty of liquids and eat light meals after the procedure.  It is very important to treat plenty of fluids  after your procedure.   Do not eat or drink anything after midnight the night before your procedure.    Anesthesia Precautions & Expectations  You will receive a twilight sedation anesthetic.  After anesthesia, rest for 24 hours.    Do not drive, drink alcoholic beverages or make any important decisions during this time.  You will need someone to drive you the day of your procedure.  Please take the medications 1 hour prior to your procedure.      What to take before the procedure  We will give you an antibiotic to be taken one hour ahead of time that day and for the next 3 days.   We will also give you an anti-inflammatory medication and a medication for urinary burning to be taken starting that day and for the next few days.     Take all the medicines 1 hour before your procedure.    Activity  Start normal activities in twenty-four (24) hours.    Wound Care and Hygiene  No restrictions, start normal routine.    What to Expect after Surgery  Mild pain with voiding.  Frequency or urgency - expect these to occur for 2-4 weeks after surgery. Call if these are severe and we will give a medication to help with them.  Bladder cramps.  Minimal bleeding with voiding.    Call your Doctor  Passing clots in urine preventing bladder emptying  Severe pain not controlled by oral medication  Temperature above 101.5 degrees  Inability to urinate within eight (8) hours after surgery    Other Contacts  Urology Office:  [id number]   [place] [id number] [phone] office  [phone] fax

## 2021-12-20 NOTE — PROGRESS NOTES
Chief Complaint   Patient presents with    Hypertension    COPD    Hypothyroidism       Have you seen any other physician or provider since your last visit yes - patient has been seeing a kidney specialist, Dr. Gulshan Disla and plans to have a procedure 01/04/2197    Have you had any other diagnostic tests since your last visit?  yes - cystoscopy, he also had labs    Have you changed or stopped any medications since your last visit? no

## 2022-01-04 ENCOUNTER — OUTSIDE FACILITY SERVICE (OUTPATIENT)
Dept: UROLOGY | Facility: CLINIC | Age: 67
End: 2022-01-04

## 2022-01-04 PROCEDURE — 52441 CYSTO INSJ TRNSPRSTC 1 IMPLT: CPT | Performed by: UROLOGY

## 2022-01-04 PROCEDURE — 52442 CYSTO INS TRNSPRSTC IMPLT EA: CPT | Performed by: UROLOGY

## 2022-02-03 ENCOUNTER — OFFICE VISIT (OUTPATIENT)
Dept: UROLOGY | Facility: CLINIC | Age: 67
End: 2022-02-03

## 2022-02-03 VITALS
WEIGHT: 239 LBS | SYSTOLIC BLOOD PRESSURE: 134 MMHG | DIASTOLIC BLOOD PRESSURE: 76 MMHG | HEIGHT: 68 IN | OXYGEN SATURATION: 94 % | BODY MASS INDEX: 36.22 KG/M2 | TEMPERATURE: 97.1 F | HEART RATE: 82 BPM

## 2022-02-03 DIAGNOSIS — N40.0 BENIGN PROSTATIC HYPERPLASIA WITHOUT LOWER URINARY TRACT SYMPTOMS: ICD-10-CM

## 2022-02-03 DIAGNOSIS — N52.9 ERECTILE DYSFUNCTION, UNSPECIFIED ERECTILE DYSFUNCTION TYPE: Primary | ICD-10-CM

## 2022-02-03 PROCEDURE — 99214 OFFICE O/P EST MOD 30 MIN: CPT | Performed by: PHYSICIAN ASSISTANT

## 2022-02-03 RX ORDER — SILDENAFIL CITRATE 20 MG/1
20 TABLET ORAL SEE ADMIN INSTRUCTIONS
Qty: 30 TABLET | Refills: 3 | Status: SHIPPED | OUTPATIENT
Start: 2022-02-03 | End: 2022-05-03

## 2022-02-03 NOTE — PROGRESS NOTES
Chief Complaint   Patient presents with   • Follow-up     4 wks w/ IPSS & PVR        HPI  Mr. Bowser is a 67 y.o. male with history of BPH s/p UroLift on January 4, 2022 who presents for follow up.     At this visit, had hematuria for less than one day after surgery, none since. No pain, no dysuria.  Very happy with results and no longer taking any medications for the prostate.  He is very interested in medications for erectile dysfunction.    IPSS Questionnaire (AUA-7):  Over the past month…    1)  Incomplete Emptying  How often have you had a sensation of not emptying your bladder?  2 - Less than half the time   2)  Frequency  How often have you had to urinate less than every two hours? 2 - Less than half the time   3)  Intermittency  How often have you found you stopped and started again several times when you urinated?  3 - About half the time   4) Urgency  How often have you found it difficult to postpone urination?  1 - Less than 1 time in 5   5) Weak Stream  How often have you had a weak urinary stream?  3 - About half the time   6) Straining  How often have you had to push or strain to begin urination?  2 - Less than half the time   7) Nocturia  How many times did you typically get up at night to urinate?  1 - 1 time   Total Score:  14       Quality of life due to urinary symptoms:  If you were to spend the rest of your life with your urinary condition the way it is now, how would you feel about that? 2-Mostly Satisfied   Urine Leakage (Incontinence) 0-No Leakage           Past Medical History:   Diagnosis Date   • Arthritis    • At risk for obstructive sleep apnea     Spouse reports patient snores and that he has questionable sleep apnea but has never had a sleep study done   • Borderline diabetes     Has never taken medication    • COPD (chronic obstructive pulmonary disease) ([facility])    • Coronary artery disease     1 stent   • Elevated cholesterol    • Hearing loss     Does not use hearing devices   •  History of bronchitis 2017   • History of chemotherapy     around 9/20/2019   • History of radiation therapy     around 9/20/2019   • Hyperlipidemia    • Hypertension    • Malignant neoplasm of right lung (HCC) 10/11/2019   • Melanoma (HCC)    • MI (myocardial infarction) (HCC) 01/20/2015    placement of stent x1   • MRSA (methicillin resistant Staphylococcus aureus) 01/2015    left hand - treated   • Seasonal allergies    • Skin cancer     skin, lung   • Squamous cell lung cancer (HCC) 7/24/2019   • Wears glasses     OTC glasses PRN for reading   • Wears partial dentures     Upper mouth       Past Surgical History:   Procedure Laterality Date   • BRONCHOSCOPY N/A 7/11/2019    Procedure: BRONCHOSCOPY WITH ENDOBRONCHIAL ULTRASOUND with General Anesthesia.  ;  Surgeon: Jeff Rubin MD;  Location:  [facility];  Service: Pulmonary   • BRONCHOSCOPY N/A 10/11/2019    Procedure: BRONCHOSCOPY;  Surgeon: Titus Flores MD;  Location:  [facility];  Service: Cardiothoracic   • CARDIAC CATHETERIZATION  01/20/2015    Placement of stent x1   • CATARACT EXTRACTION Right    • CHOLECYSTECTOMY      Laparoscopic   • COLONOSCOPY  2018   • CORONARY ANGIOPLASTY WITH STENT PLACEMENT     • HEMORRHOIDECTOMY     • KNEE MENISCAL REPAIR Bilateral    • MOUTH SURGERY      Post for oral implants in upper mouth x3   • ROTATOR CUFF REPAIR Bilateral    • SKIN BIOPSY     • SKIN CANCER EXCISION      melanoma removed from right neck and back.  Squamous cell removed multiple places.    • THORACOSCOPY VIDEO ASSISTED WITH LOBECTOMY Right 10/11/2019    Procedure: THORACOSCOPY VIDEO ASSISTED WITH RIGHT UPPER LOBECTOMY, MEDIASTINAL LYMPH NODE DISSECTION, INTERCOSTAL NERVE BLOCKS;  Surgeon: Titus Flores MD;  Location:  [facility];  Service: Cardiothoracic   • WISDOM TOOTH EXTRACTION           Current Outpatient Medications:   •  albuterol sulfate HFA (Ventolin HFA) 108 (90 Base) MCG/ACT inhaler, Inhale 2 puffs Every 4 (Four) Hours As Needed for Wheezing  "or Shortness of Air., Disp: 1 inhaler, Rfl: 5  •  aspirin 81 MG EC tablet, Take 1 tablet by mouth Every Night., Disp: 90 tablet, Rfl: 3  •  atorvastatin (LIPITOR) 40 MG tablet, Take 1 tablet by mouth Every Night., Disp: 90 tablet, Rfl: 3  •  B Complex Vitamins (VITAMIN-B COMPLEX PO), Take 1 tablet by mouth Daily., Disp: , Rfl:   •  Cholecalciferol (VITAMIN D3) 5000 units capsule capsule, Take 5,000 Units by mouth Daily., Disp: , Rfl:   •  Cyanocobalamin (B-12) 1000 MCG tablet, Take 1,000 mcg by mouth Daily., Disp: , Rfl:   •  fexofenadine (ALLEGRA) 180 MG tablet, Take 180 mg by mouth Daily., Disp: , Rfl:   •  metoprolol succinate XL (TOPROL-XL) 25 MG 24 hr tablet, Take 1 tablet by mouth Daily., Disp: 90 tablet, Rfl: 3  •  naproxen sodium (ALEVE) 220 MG tablet, Take 220 mg by mouth As Needed for Mild Pain ., Disp: , Rfl:   •  nitroglycerin (NITROSTAT) 0.4 MG SL tablet, Place 1 tablet under the tongue Every 5 (Five) Minutes As Needed for Chest Pain., Disp: 25 tablet, Rfl: 5  •  sulfamethoxazole-trimethoprim (Bactrim DS) 800-160 MG per tablet, Take 1 tablet by mouth 2 (Two) Times a Day. Start taking the morning of the procedure, Disp: 6 tablet, Rfl: 0  •  tamsulosin (FLOMAX) 0.4 MG capsule 24 hr capsule, Take 1 capsule by mouth Every Night., Disp: 30 capsule, Rfl: 0  •  tiotropium bromide-olodaterol (Stiolto Respimat) 2.5-2.5 MCG/ACT aerosol solution inhaler, Inhale 2 puffs Daily., Disp: 1 inhaler, Rfl: 2  •  Zinc 50 MG capsule, Take  by mouth Every Morning., Disp: , Rfl:      Physical Exam  Visit Vitals  /76   Pulse 82   Temp 97.1 °F (36.2 °C)   Ht 172.7 cm (67.99\")   Wt 108 kg (239 lb)   SpO2 94%   BMI 36.35 kg/m²       Labs  Brief Urine Lab Results  (Last result in the past 365 days)      Color   Clarity   Blood   Leuk Est   Nitrite   Protein   CREAT   Urine HCG        11/11/21 1117 Yellow   Clear   Negative   Negative   Negative   Negative                 Lab Results   Component Value Date    GLUCOSE 97 " 11/11/2021    CALCIUM 9.7 11/11/2021     11/11/2021    K 4.5 11/11/2021    CO2 30.0 (H) 11/11/2021     11/11/2021    BUN 10 11/11/2021    CREATININE 0.81 11/11/2021    EGFRIFAFRI 119 04/13/2018    EGFRIFNONA 96 11/11/2021    BCR 12.3 11/11/2021    ANIONGAP 5.0 11/11/2021       Lab Results   Component Value Date    WBC 10.6 12/20/2021    HGB 14.1 12/20/2021    HCT 42.8 12/20/2021    MCV 97.5 12/20/2021     12/20/2021            Lab Results   Component Value Date    PSA 1.490 11/11/2021           PVR  Post-void residual performed with ultrasound scanner by staff and interpreted by me -Ana cc    I have reviewed above labs and imaging.     Assessment  67 y.o. male with history of BPH s/p UroLift January 4, 2022 presenting for 1 month follow-up.  His IPSS has improved, previously 28, now 14.  He is very interested in medications for erectile dysfunction.  These have previously not been recommended as he has a prescription for nitroglycerin due to angina.  The patient states that he does not take nitroglycerin, has never needed it.  I counseled the patient very closely that if he takes a medication for erectile dysfunction within 24 hours of taking nitroglycerin, he could experience life-threatening hypotension.  I counseled the patient that if he takes a medication for erectile dysfunction and develops chest pain, he will need to go to the emergency department, and cannot take nitroglycerin.  He and his wife state understanding that these 2 medications taken together have significant risk of harm, potentially mortality.  With this close and explicit counseling, I believe that this patient has an understanding and can appropriately take an oral PDE 5 safely.  I counseled him to start with 1/2 tablet, 10 mg of sildenafil.  I counseled him that if he experiences headache, dizziness, flushing, to not take the medication again.    Plan  1.  BPH s/p UroLift   -IPSS 28->14    -Stop Flomax    2.  Erectile  dysfunction   -Start sildenafil 20 mg as needed   -As above, this patient has been counseled very closely to never take nitroglycerin within 24 hours of taking this medication; he affirms that he has never needed nitroglycerin and has never taken this prescription    Follow-up in 3 months to reevaluate ED

## 2022-02-23 DIAGNOSIS — I25.119 CORONARY ARTERY DISEASE INVOLVING NATIVE CORONARY ARTERY OF NATIVE HEART WITH ANGINA PECTORIS: ICD-10-CM

## 2022-02-23 RX ORDER — METOPROLOL SUCCINATE 25 MG/1
TABLET, EXTENDED RELEASE ORAL
Qty: 90 TABLET | Refills: 3 | Status: SHIPPED | OUTPATIENT
Start: 2022-02-23 | End: 2022-06-10 | Stop reason: SDUPTHER

## 2022-03-22 ENCOUNTER — OFFICE VISIT (OUTPATIENT)
Dept: ORTHOPEDIC SURGERY | Facility: CLINIC | Age: 67
End: 2022-03-22

## 2022-03-22 VITALS — HEIGHT: 68 IN | BODY MASS INDEX: 37.41 KG/M2 | WEIGHT: 246.8 LBS

## 2022-03-22 DIAGNOSIS — M17.12 PRIMARY OSTEOARTHRITIS OF LEFT KNEE: ICD-10-CM

## 2022-03-22 DIAGNOSIS — M25.562 ARTHRALGIA OF LEFT KNEE: Primary | ICD-10-CM

## 2022-03-22 PROCEDURE — 99203 OFFICE O/P NEW LOW 30 MIN: CPT | Performed by: PHYSICIAN ASSISTANT

## 2022-03-22 PROCEDURE — 20610 DRAIN/INJ JOINT/BURSA W/O US: CPT | Performed by: PHYSICIAN ASSISTANT

## 2022-03-22 RX ORDER — METHYLPREDNISOLONE ACETATE 40 MG/ML
40 INJECTION, SUSPENSION INTRA-ARTICULAR; INTRALESIONAL; INTRAMUSCULAR; SOFT TISSUE
Status: COMPLETED | OUTPATIENT
Start: 2022-03-22 | End: 2022-03-22

## 2022-03-22 RX ORDER — LIDOCAINE HYDROCHLORIDE 10 MG/ML
2 INJECTION, SOLUTION INFILTRATION; PERINEURAL
Status: COMPLETED | OUTPATIENT
Start: 2022-03-22 | End: 2022-03-22

## 2022-03-22 RX ADMIN — LIDOCAINE HYDROCHLORIDE 2 ML: 10 INJECTION, SOLUTION INFILTRATION; PERINEURAL at 09:33

## 2022-03-22 RX ADMIN — METHYLPREDNISOLONE ACETATE 40 MG: 40 INJECTION, SUSPENSION INTRA-ARTICULAR; INTRALESIONAL; INTRAMUSCULAR; SOFT TISSUE at 09:33

## 2022-03-22 NOTE — PROGRESS NOTES
Subjective   Patient ID: Erik Bowser is a 67 y.o. right hand dominant male  Pain of the left Knee (States it has been hurting for about two months. No trauma.)         History of Present Illness  Presents with left medial knee pain ongoing for 6 months without injury or trauma, but seems to have worsened over the last 2 months.   Denies mechanical locking.,   Takes aleve otc with minimal improvement.                                                 Past Medical History:   Diagnosis Date   • Arthritis    • At risk for obstructive sleep apnea     Spouse reports patient snores and that he has questionable sleep apnea but has never had a sleep study done   • Borderline diabetes     Has never taken medication    • COPD (chronic obstructive pulmonary disease) ([facility])    • Coronary artery disease     1 stent   • Elevated cholesterol    • Hearing loss     Does not use hearing devices   • History of bronchitis 2017   • History of chemotherapy     around 9/20/2019   • History of radiation therapy     around 9/20/2019   • Hyperlipidemia    • Hypertension    • Malignant neoplasm of right lung ([facility]) 10/11/2019   • Melanoma ([facility])    • MI (myocardial infarction) ([facility]) 01/20/2015    placement of stent x1   • MRSA (methicillin resistant Staphylococcus aureus) 01/2015    left hand - treated   • Seasonal allergies    • Skin cancer     skin, lung   • Squamous cell lung cancer ([facility]) 7/24/2019   • Wears glasses     OTC glasses PRN for reading   • Wears partial dentures     Upper mouth        Past Surgical History:   Procedure Laterality Date   • BRONCHOSCOPY N/A 07/11/2019    Procedure: BRONCHOSCOPY WITH ENDOBRONCHIAL ULTRASOUND with General Anesthesia.  ;  Surgeon: Jeff Rubin MD;  Location: [facility] OR;  Service: Pulmonary   • BRONCHOSCOPY N/A 10/11/2019    Procedure: BRONCHOSCOPY;  Surgeon: Titus Flores MD;  Location: [facility] OR;  Service: Cardiothoracic   • CARDIAC CATHETERIZATION  01/20/2015    Placement of stent x1   •  CATARACT EXTRACTION Right    • CHOLECYSTECTOMY      Laparoscopic   • COLONOSCOPY  2018   • CORONARY ANGIOPLASTY WITH STENT PLACEMENT     • HEMORRHOIDECTOMY     • KNEE MENISCAL REPAIR Bilateral 2010    Dr. Montiel ([facility])   • MOUTH SURGERY      Post for oral implants in upper mouth x3   • ROTATOR CUFF REPAIR Bilateral    • SKIN BIOPSY     • SKIN CANCER EXCISION      melanoma removed from right neck and back.  Squamous cell removed multiple places.    • THORACOSCOPY VIDEO ASSISTED WITH LOBECTOMY Right 10/11/2019    Procedure: THORACOSCOPY VIDEO ASSISTED WITH RIGHT UPPER LOBECTOMY, MEDIASTINAL LYMPH NODE DISSECTION, INTERCOSTAL NERVE BLOCKS;  Surgeon: Titus Flores MD;  [facility];  Service: Cardiothoracic   • WISDOM TOOTH EXTRACTION         Family History   Problem Relation Age of Onset   • Heart attack Mother 60   • Coronary artery disease Mother    • Hyperlipidemia Mother    • Lung cancer Mother    • Emphysema Father    • Hypertension Sister    • Heart attack Sister 45   • Hypertension Brother    • Heart attack Brother 50   • Hypertension Sister    • Hypertension Brother    • Diabetes Brother        Social History     Socioeconomic History   • Marital status:    • Number of children: 2   Tobacco Use   • Smoking status: Former Smoker     Packs/day: 2.00     Years: 46.00     Pack years: 92.00     Types: Cigarettes     Quit date: 2019     Years since quittin.6   • Smokeless tobacco: Former User     Types: Chew   Vaping Use   • Vaping Use: Never used   Substance and Sexual Activity   • Alcohol use: Yes     Comment: Social use, no history of abuse   • Drug use: No   • Sexual activity: Not Currently     Partners: Female         Current Outpatient Medications:   •  albuterol sulfate HFA (Ventolin HFA) 108 (90 Base) MCG/ACT inhaler, Inhale 2 puffs Every 4 (Four) Hours As Needed for Wheezing or Shortness of Air., Disp: 1 inhaler, Rfl: 5  •  aspirin 81 MG EC tablet, Take 1 tablet by mouth  Every Night., Disp: 90 tablet, Rfl: 3  •  atorvastatin (LIPITOR) 40 MG tablet, Take 1 tablet by mouth Every Night., Disp: 90 tablet, Rfl: 3  •  B Complex Vitamins (VITAMIN-B COMPLEX PO), Take 1 tablet by mouth Daily., Disp: , Rfl:   •  Cholecalciferol (VITAMIN D3) 5000 units capsule capsule, Take 5,000 Units by mouth Daily., Disp: , Rfl:   •  Cyanocobalamin (B-12) 1000 MCG tablet, Take 1,000 mcg by mouth Daily., Disp: , Rfl:   •  fexofenadine (ALLEGRA) 180 MG tablet, Take 180 mg by mouth Daily., Disp: , Rfl:   •  metoprolol succinate XL (TOPROL-XL) 25 MG 24 hr tablet, TAKE ONE TABLET BY MOUTH DAILY, Disp: 90 tablet, Rfl: 3  •  naproxen sodium (ALEVE) 220 MG tablet, Take 220 mg by mouth As Needed for Mild Pain ., Disp: , Rfl:   •  nitroglycerin (NITROSTAT) 0.4 MG SL tablet, Place 1 tablet under the tongue Every 5 (Five) Minutes As Needed for Chest Pain., Disp: 25 tablet, Rfl: 5  •  sildenafil (REVATIO) 20 MG tablet, Take 1 tablet by mouth See Admin Instructions. 1-2 daily as needed.  Do not exceed 2 pills per day., Disp: 30 tablet, Rfl: 3  •  tiotropium bromide-olodaterol (Stiolto Respimat) 2.5-2.5 MCG/ACT aerosol solution inhaler, Inhale 2 puffs Daily., Disp: 1 inhaler, Rfl: 2  •  Zinc 50 MG capsule, Take  by mouth Every Morning., Disp: , Rfl:     Allergies   Allergen Reactions   • Rosuvastatin Myalgia       Review of Systems   Constitutional: Negative for fever.   HENT: Negative for dental problem and voice change.    Eyes: Negative for visual disturbance.   Respiratory: Negative for shortness of breath.    Cardiovascular: Negative for chest pain.   Gastrointestinal: Negative for abdominal pain.   Genitourinary: Negative for dysuria.   Musculoskeletal: Positive for arthralgias (right knee). Negative for gait problem and joint swelling.   Skin: Negative for rash.   Neurological: Negative for speech difficulty.   Hematological: Does not bruise/bleed easily.   Psychiatric/Behavioral: Negative for confusion.       I  "have reviewed the medical and surgical history, family history, social history, medications, and/or allergies, and the review of systems of this report.    Objective   Ht 172.7 cm (67.99\")   Wt 112 kg (246 lb 12.8 oz)   BMI 37.53 kg/m²    Physical Exam  Vitals and nursing note reviewed.   Constitutional:       Appearance: Normal appearance.   Pulmonary:      Effort: Pulmonary effort is normal.   Neurological:      Mental Status: He is alert and oriented to person, place, and time.   Psychiatric:         Behavior: Behavior normal.       Left Knee Exam     Range of Motion   Extension: 5   Flexion: 110     Tests   Drawer:  Anterior - negative         Other   Erythema: absent  Sensation: normal  Pulse: present  Swelling: none           Extremity DVT signs are negative on physical exam with negative George sign, no calf pain, no palpable cords and no skin tone change   Neurologic Exam     Mental Status   Oriented to person, place, and time.            Assessment/Plan   Independent Review of Radiographic Studies:    AP standing of both knees, and lateral of let knee knee, indication to evaluate joint condition, no comparison views or not available, shows evident chronic advanced osteoarthritis.      Large Joint Arthrocentesis: L knee  Date/Time: 3/22/2022 9:33 AM  Consent given by: patient  Site marked: site marked  Timeout: Immediately prior to procedure a time out was called to verify the correct patient, procedure, equipment, support staff and site/side marked as required   Supporting Documentation  Indications: pain   Procedure Details  Location: knee - L knee  Preparation: Patient was prepped and draped in the usual sterile fashion  Needle size: 22 G  Approach: anterolateral  Medications administered: 2 mL lidocaine 1 %; 40 mg methylPREDNISolone acetate 40 MG/ML  Patient tolerance: patient tolerated the procedure well with no immediate complications             Diagnoses and all orders for this visit:    1. " Arthralgia of left knee (Primary)  -     XR Knee 1 or 2 View Left; Future  -     Large Joint Arthrocentesis: L knee    2. Primary osteoarthritis of left knee  -     Large Joint Arthrocentesis: L knee       Orthopedic activities reviewed and patient expressed appreciation  Discussion of orthopedic goals  Risk, benefits, and merits of treatment alternatives reviewed with the patient and questions answered  Ice, heat, and/or modalities as beneficial    Recommendations/Plan:  Exercise, medications, injections, other patient advice, and return appointment as noted.  Patient is encouraged to call or return for any issues or concerns.  Follow up in 3 months    Patient agreeable to call or return sooner for any concerns.             EMR Dragon-transcription disclaimer:  This encounter note is an electronic transcription of spoken language to printed text.  Electronic transcription of spoken language may permit erroneous or at times nonsensical words or phrases to be inadvertently transcribed.  Although I have reviewed the note for such errors, some may still exist

## 2022-05-03 ENCOUNTER — OFFICE VISIT (OUTPATIENT)
Dept: UROLOGY | Facility: CLINIC | Age: 67
End: 2022-05-03

## 2022-05-03 VITALS
WEIGHT: 246 LBS | SYSTOLIC BLOOD PRESSURE: 128 MMHG | HEIGHT: 68 IN | DIASTOLIC BLOOD PRESSURE: 70 MMHG | TEMPERATURE: 97.7 F | HEART RATE: 68 BPM | OXYGEN SATURATION: 96 % | BODY MASS INDEX: 37.28 KG/M2

## 2022-05-03 DIAGNOSIS — N52.9 ERECTILE DYSFUNCTION, UNSPECIFIED ERECTILE DYSFUNCTION TYPE: Primary | ICD-10-CM

## 2022-05-03 PROCEDURE — 99214 OFFICE O/P EST MOD 30 MIN: CPT | Performed by: PHYSICIAN ASSISTANT

## 2022-05-03 RX ORDER — TADALAFIL 10 MG/1
10 TABLET ORAL DAILY PRN
Qty: 30 TABLET | Refills: 3 | Status: SHIPPED | OUTPATIENT
Start: 2022-05-03 | End: 2022-06-01

## 2022-05-03 NOTE — PROGRESS NOTES
Chief Complaint   Patient presents with   • Follow-up     Patient is here for a 6 month follow up for LUTS.        HPI  Mr. Bowser is a 67 y.o. male with history of BPH s/p UroLift and ED who presents for follow up.     At this visit, took sildenafil 20mg twice, only developed flushing and itching with the medicine, did not notice an improvement in the ED.  He would like to seek other options for treatment.    Past Medical History:   Diagnosis Date   • Arthritis    • At risk for obstructive sleep apnea     Spouse reports patient snores and that he has questionable sleep apnea but has never had a sleep study done   • Borderline diabetes     Has never taken medication    • COPD (chronic obstructive pulmonary disease) (Formerly [facility])    • Coronary artery disease     1 stent   • Elevated cholesterol    • Hearing loss     Does not use hearing devices   • History of bronchitis 2017   • History of chemotherapy     around 9/20/2019   • History of radiation therapy     around 9/20/2019   • Hyperlipidemia    • Hypertension    • Malignant neoplasm of right lung (Formerly [facility]) 10/11/2019   • Melanoma (Formerly [facility])    • MI (myocardial infarction) (Formerly [facility]) 01/20/2015    placement of stent x1   • MRSA (methicillin resistant Staphylococcus aureus) 01/2015    left hand - treated   • Seasonal allergies    • Skin cancer     skin, lung   • Squamous cell lung cancer (Formerly [facility]) 7/24/2019   • Wears glasses     OTC glasses PRN for reading   • Wears partial dentures     Upper mouth       Past Surgical History:   Procedure Laterality Date   • BRONCHOSCOPY N/A 07/11/2019    Procedure: BRONCHOSCOPY WITH ENDOBRONCHIAL ULTRASOUND with General Anesthesia.  ;  Surgeon: Jeff Rubin MD;  Location: [facility] OR;  Service: Pulmonary   • BRONCHOSCOPY N/A 10/11/2019    Procedure: BRONCHOSCOPY;  Surgeon: Titus Flores MD;  Location: [facility] OR;  Service: Cardiothoracic   • CARDIAC CATHETERIZATION  01/20/2015    Placement of stent x1   • CATARACT EXTRACTION Right    • CHOLECYSTECTOMY       Laparoscopic   • COLONOSCOPY  2018   • CORONARY ANGIOPLASTY WITH STENT PLACEMENT     • HEMORRHOIDECTOMY     • KNEE MENISCAL REPAIR Bilateral 2010    Dr. Montiel ([facility])   • MOUTH SURGERY      Post for oral implants in upper mouth x3   • ROTATOR CUFF REPAIR Bilateral    • SKIN BIOPSY     • SKIN CANCER EXCISION      melanoma removed from right neck and back.  Squamous cell removed multiple places.    • THORACOSCOPY VIDEO ASSISTED WITH LOBECTOMY Right 10/11/2019    Procedure: THORACOSCOPY VIDEO ASSISTED WITH RIGHT UPPER LOBECTOMY, MEDIASTINAL LYMPH NODE DISSECTION, INTERCOSTAL NERVE BLOCKS;  Surgeon: Titus Flores MD;  Location: [facility];  Service: Cardiothoracic   • WISDOM TOOTH EXTRACTION           Current Outpatient Medications:   •  aspirin 81 MG EC tablet, Take 1 tablet by mouth Every Night., Disp: 90 tablet, Rfl: 3  •  atorvastatin (LIPITOR) 40 MG tablet, Take 1 tablet by mouth Every Night., Disp: 90 tablet, Rfl: 3  •  B Complex Vitamins (VITAMIN-B COMPLEX PO), Take 1 tablet by mouth Daily., Disp: , Rfl:   •  Cholecalciferol (VITAMIN D3) 5000 units capsule capsule, Take 5,000 Units by mouth Daily., Disp: , Rfl:   •  Cyanocobalamin (B-12) 1000 MCG tablet, Take 1,000 mcg by mouth Daily., Disp: , Rfl:   •  fexofenadine (ALLEGRA) 180 MG tablet, Take 180 mg by mouth Daily., Disp: , Rfl:   •  metoprolol succinate XL (TOPROL-XL) 25 MG 24 hr tablet, TAKE ONE TABLET BY MOUTH DAILY, Disp: 90 tablet, Rfl: 3  •  naproxen sodium (ALEVE) 220 MG tablet, Take 220 mg by mouth As Needed for Mild Pain ., Disp: , Rfl:   •  Zinc 50 MG capsule, Take  by mouth Every Morning., Disp: , Rfl:   •  albuterol sulfate HFA (Ventolin HFA) 108 (90 Base) MCG/ACT inhaler, Inhale 2 puffs Every 4 (Four) Hours As Needed for Wheezing or Shortness of Air., Disp: 1 inhaler, Rfl: 5  •  nitroglycerin (NITROSTAT) 0.4 MG SL tablet, Place 1 tablet under the tongue Every 5 (Five) Minutes As Needed for Chest Pain., Disp: 25 tablet, Rfl: 5  •   "sildenafil (REVATIO) 20 MG tablet, Take 1 tablet by mouth See Admin Instructions. 1-2 daily as needed.  Do not exceed 2 pills per day., Disp: 30 tablet, Rfl: 3  •  tiotropium bromide-olodaterol (Stiolto Respimat) 2.5-2.5 MCG/ACT aerosol solution inhaler, Inhale 2 puffs Daily., Disp: 1 inhaler, Rfl: 2     Physical Exam  Visit Vitals  /70 (BP Location: Right arm, Patient Position: Sitting, Cuff Size: Adult)   Pulse 68   Temp 97.7 °F (36.5 °C) (Temporal)   Ht 172.7 cm (67.99\")   Wt 112 kg (246 lb)   SpO2 96%   BMI 37.42 kg/m²       Labs  Brief Urine Lab Results  (Last result in the past 365 days)      Color   Clarity   Blood   Leuk Est   Nitrite   Protein   CREAT   Urine HCG        11/11/21 1117 Yellow   Clear   Negative   Negative   Negative   Negative                 Lab Results   Component Value Date    GLUCOSE 97 11/11/2021    CALCIUM 9.7 11/11/2021     11/11/2021    K 4.5 11/11/2021    CO2 30.0 (H) 11/11/2021     11/11/2021    BUN 10 11/11/2021    CREATININE 0.81 11/11/2021    EGFRIFAFRI 119 04/13/2018    EGFRIFNONA 96 11/11/2021    BCR 12.3 11/11/2021    ANIONGAP 5.0 11/11/2021       Lab Results   Component Value Date    WBC 10.6 12/20/2021    HGB 14.1 12/20/2021    HCT 42.8 12/20/2021    MCV 97.5 12/20/2021     12/20/2021            Lab Results   Component Value Date    PSA 1.490 11/11/2021         Assessment  67 y.o. male with history of BPH s/p UroLift.  IPSS continues to improve, now down to 11.  He is here to follow-up on his erectile dysfunction.  We started a trial of sildenafil.  Unfortunately, it did not treat his ED and rather gave him expected side effects of flushing, itching.  I counseled him that oral medications are generally interchangeable and their effects, but some may produce less side effects than others for each individual patient.  I also advised him of Trimix, intraurethral agents as a next step for ED if it is refractory to Cialis.    Plan  1.  Stop sildenafil  2. "  Start tadalafil 10 mg to 20 mg as needed    Follow-up in 3 months for reassessment

## 2022-05-25 ENCOUNTER — HOSPITAL ENCOUNTER (OUTPATIENT)
Dept: CT IMAGING | Facility: HOSPITAL | Age: 67
Discharge: HOME | End: 2022-05-25
Admitting: INTERNAL MEDICINE

## 2022-05-25 DIAGNOSIS — C34.91 SQUAMOUS CELL CARCINOMA OF RIGHT LUNG: ICD-10-CM

## 2022-05-25 LAB — CREAT BLDA-MCNC: 1 MG/DL (ref 0.6–1.3)

## 2022-05-25 PROCEDURE — 25010000002 IOPAMIDOL 61 % SOLUTION: Performed by: INTERNAL MEDICINE

## 2022-05-25 PROCEDURE — 82565 ASSAY OF CREATININE: CPT

## 2022-05-25 PROCEDURE — 74177 CT ABD & PELVIS W/CONTRAST: CPT

## 2022-05-25 PROCEDURE — 71260 CT THORAX DX C+: CPT

## 2022-05-25 RX ADMIN — IOPAMIDOL 95 ML: 612 INJECTION, SOLUTION INTRAVENOUS at 14:36

## 2022-05-26 ENCOUNTER — APPOINTMENT (OUTPATIENT)
Dept: CT IMAGING | Facility: HOSPITAL | Age: 67
End: 2022-05-26

## 2022-06-01 ENCOUNTER — OFFICE VISIT (OUTPATIENT)
Dept: ONCOLOGY | Facility: CLINIC | Age: 67
End: 2022-06-01

## 2022-06-01 ENCOUNTER — TELEPHONE (OUTPATIENT)
Dept: ONCOLOGY | Facility: CLINIC | Age: 67
End: 2022-06-01

## 2022-06-01 VITALS
DIASTOLIC BLOOD PRESSURE: 74 MMHG | HEIGHT: 68 IN | SYSTOLIC BLOOD PRESSURE: 132 MMHG | BODY MASS INDEX: 36.07 KG/M2 | HEART RATE: 70 BPM | WEIGHT: 238 LBS | RESPIRATION RATE: 16 BRPM | OXYGEN SATURATION: 98 % | TEMPERATURE: 98 F

## 2022-06-01 DIAGNOSIS — C34.11 MALIGNANT NEOPLASM OF UPPER LOBE OF RIGHT LUNG: Primary | ICD-10-CM

## 2022-06-01 PROCEDURE — 99214 OFFICE O/P EST MOD 30 MIN: CPT | Performed by: INTERNAL MEDICINE

## 2022-06-01 NOTE — PROGRESS NOTES
DATE OF VISIT: 6/1/2022    REASON FOR VISIT: Followup for right upper lobe lung cancer     PROBLEM LIST:  1. Squamous cell carcinoma of the lung T1b N2 M0 stage T3a:  A.  Presented with shortness of breath and cough  B.  CT chest with contrast done June 28, 2019 revealed right upper lobe 1.8 cm lung nodule with right hilar and mediastinal lymphadenopathy.  MRI brain whole-body PET scan done on July 22, 2019 felt show any other sites of disease.  C.  Diagnosed after bronchoscopy with biopsy done July 11, 2019  D.  Pathology revealed squamous cell carcinoma from level 4R and level 7.  E.  Started neoadjuvant concurrent chemotherapy with radiation using weekly carbo/taxol July 30, 2019.  Radiation was added August 6, 2019. This was completed 09/11/2019.  F.  Status post right upper lobe resection with lymph node sampling done by Dr. Flores October 11, 2019.  G.  Final pathology revealed complete pathologic response with no evidence of residual disease in the lung or in the hilar and mediastinal nodes.  2.   Hypercholesteremia  3.  Hypertension  4.  COPD:   A. Not oxygen dependent  B.  PFTs done on July 17, 2019 revealed FEV1 2.44L, 72% of predicted and DLCO 55% of predicted.  5.  History of superficial melanoma status post surgical resection      HISTORY OF PRESENT ILLNESS: The patient is a very pleasant 67 y.o. male  with past medical history significant for right upper lobe lung cancer diagnosed July 11, 2019.  Patient status post neoadjuvant chemotherapy with radiation followed by surgical resection done by Dr. Flores October 11, 2019.  Final pathology revealed complete pathologic response. The patient is here today for scheduled follow-up visit.    SUBJECTIVE: The patient is here today with his wife.  All in all he is doing fairly well.  No fever chills night sweats.  He is complaining of shortness of breath which is a chronic problem for him.    Past History:  Medical History: has a past medical history of  Arthritis, At risk for obstructive sleep apnea, Borderline diabetes, COPD (chronic obstructive pulmonary disease) (HCC), Coronary artery disease, Elevated cholesterol, Hearing loss, History of bronchitis (2017), History of chemotherapy, History of radiation therapy, Hyperlipidemia, Hypertension, Malignant neoplasm of right lung (HCC) (10/11/2019), Melanoma (HCC), MI (myocardial infarction) (HCC) (01/20/2015), MRSA (methicillin resistant Staphylococcus aureus) (01/2015), Seasonal allergies, Skin cancer, Squamous cell lung cancer (HCC) (7/24/2019), Wears glasses, and Wears partial dentures.   Surgical History: has a past surgical history that includes Rotator cuff repair (Bilateral); Knee Meniscal Repair (Bilateral, 2010); Cholecystectomy; Skin cancer excision; Cardiac catheterization (01/20/2015); Brooten tooth extraction; Hemorrhoid surgery; Mouth surgery; Bronchoscopy (N/A, 07/11/2019); Coronary angioplasty with stent; Colonoscopy (2018); Cataract extraction (Right); Skin biopsy; Bronchoscopy (N/A, 10/11/2019); and thoracoscopy video assisted with lobectomy (Right, 10/11/2019).   Family History: family history includes Coronary artery disease in his mother; Diabetes in his brother; Emphysema in his father; Heart attack (age of onset: 45) in his sister; Heart attack (age of onset: 50) in his brother; Heart attack (age of onset: 60) in his mother; Hyperlipidemia in his mother; Hypertension in his brother, brother, sister, and sister; Lung cancer in his mother.   Social History: reports that he quit smoking about 2 years ago. His smoking use included cigarettes. He has a 92.00 pack-year smoking history. He has quit using smokeless tobacco.  His smokeless tobacco use included chew. He reports current alcohol use. He reports that he does not use drugs.    (Not in a hospital admission)     Allergies: Rosuvastatin     Review of Systems   Respiratory: Positive for shortness of breath.    Gastrointestinal: Negative.   "  Psychiatric/Behavioral: Negative.        PHYSICAL EXAMINATION:   /74   Pulse 70   Temp 98 °F (36.7 °C) (Temporal)   Resp 16   Ht 172.7 cm (68\")   Wt 108 kg (238 lb)   SpO2 98%   BMI 36.19 kg/m²    Pain Score    06/01/22 1125   PainSc: 0-No pain        ECOG score: 1            ECOG Performance Status: 1 - Symptomatic but completely ambulatory      General Appearance:      alert, cooperative, no apparent distress and appears stated age   Lungs:   Clear to auscultation bilaterally; respirations regular, even, and unlabored bilaterally   Heart:  Regular rate and rhythm, no murmurs appreciated   Abdomen:   Soft, non-tender, non-distended and no organomegaly                 Hospital Outpatient Visit on 05/25/2022   Component Date Value Ref Range Status   • Creatinine 05/25/2022 1.00  0.60 - 1.30 mg/dL Final    Serial Number: [id number]:  [id number]        CT Chest With Contrast Diagnostic, CT Abdomen Pelvis With Contrast    Result Date: 5/25/2022  Narrative: DATE OF EXAM: 5/25/2022 2:15 PM  PROCEDURE: CT CHEST W CONTRAST DIAGNOSTIC-, CT ABDOMEN PELVIS W CONTRAST-  INDICATIONS: f/u scans; C34.91-Malignant neoplasm of unspecified part of right bronchus or lung  COMPARISON: 12/18/2019  TECHNIQUE: Routine transaxial slices were obtained through the chest after the intravenous administration of 95 mL of Isovue 300. Reconstructed coronal and sagittal images were also obtained. Automated exposure control and iterative construction methods were used.  The radiation dose reduction device was turned on for each scan per the ALARA (As Low as Reasonably Achievable) protocol.  FINDINGS: Chest: No pathologic axillary adenopathy or other worrisome body wall soft tissue finding in the chest. No pleural or pericardial effusion. No distinct pathologic mediastinal or hilar lymphadenopathy. Incidentally noted calcific coronary artery atherosclerosis is present. Nonaneurysmal mildly atherosclerotic thoracic aorta. Evaluation of " the osseous structures demonstrates no evidence of acute fracture or aggressive osseous lesion. Evaluation of the lung fields redemonstrates postoperative changes from prior right upper lobectomy. Areas of previously noted conspicuous soft tissue along the operative margins appears decreased in conspicuity, consistent with resolving postoperative changes. There is no evidence of new suspicious mass or nodularity to suggest local recurrence. There is no evidence of acute infectious or inflammatory process. Moderate emphysematous changes are present.  Abdomen and pelvis: The body wall soft tissues are unremarkable. The osseous structures demonstrate no evidence of fracture or aggressive osseous lesion. There is diffuse low attenuation of the liver parenchyma compatible with steatosis. No suspicious focal hepatic lesion. Prior cholecystectomy. The spleen, pancreas and bilateral adrenal glands demonstrate homogeneous enhancement without evidence of suspicious focal lesion. The kidneys demonstrate symmetric nephrogram and contrast excretion without evidence of hydronephrosis or contour deforming mass. Small and large bowel loops are nondilated. There is some submucosal fat proliferation present involving the proximal colon, similar to prior likely reflecting prior inflammation. There is no suspicious focal bowel wall thickening. No free fluid or pneumoperitoneum. Normal caliber atherosclerotic abdominal aorta. No bulky retroperitoneal lymphadenopathy. The pelvic viscera are unremarkable, with prostatic seeds noted in place.      Impression: Redemonstrated postoperative changes from prior right upper lobectomy. Previously noted areas of prominent soft tissue along the postoperative margins appears improved from comparison, likely reflecting resolving postoperative changes. There is no evidence of local recurrence or metastatic disease otherwise. No acute findings are present in the chest, abdomen and pelvis. Chronic  findings are present as above, including diffuse hepatic steatosis.  This report was finalized on 5/25/2022 8:11 PM by Raoul Sena.        ASSESSMENT: The patient is a very pleasant 67 y.o. male  with right upper lobe lung cancer      PLAN:    1.  Right upper lobe squamous cell carcinoma of the lung:  A.  I did go over the scan results with the patient from May 25, 2022 I reassured there is no evidence of relapsed disease.  The patient is almost 3 years out at this point and hence I will switch him to annual visits.  B.  He will follow-up with us in 1 year with repeat CT chest only.    2.  Hypertension:  A.  The patient will continue metoprolol XL.    3.  Hypercholesteremia:  A.  Patient continue Lipitor 40 mg daily    4.  COPD:  A.  He will continue follow-up with Dr. Rubin from pulmonary.  B.  I asked the patient to be more compliant with his inhalers.  C.  This could interfere with my management since her COPD symptoms could mimic disease progression.    FOLLOW UP: 1 year with CT scan    Kevin Salguero MD  6/1/2022

## 2022-06-01 NOTE — TELEPHONE ENCOUNTER
I returned Avril's phone call after discussing with Dr. Salguero and advised that he did not report any headaches during his visit and fine if they want to order that on patient. Patient reported to us he was doing well.

## 2022-06-01 NOTE — TELEPHONE ENCOUNTER
Avril with Dr. Seth's office dermatology called patient was seen today there, and told her he has been having very bad headaches for over a year now. She wants to know if Dr. Salguero wants to order a MRI or if she needs to? Please call

## 2022-06-02 ENCOUNTER — TRANSCRIBE ORDERS (OUTPATIENT)
Dept: ADMINISTRATIVE | Facility: HOSPITAL | Age: 67
End: 2022-06-02

## 2022-06-02 DIAGNOSIS — R51.9 NONINTRACTABLE HEADACHE, UNSPECIFIED CHRONICITY PATTERN, UNSPECIFIED HEADACHE TYPE: ICD-10-CM

## 2022-06-02 DIAGNOSIS — Z85.820 HISTORY OF MELANOMA: Primary | ICD-10-CM

## 2022-06-07 ENCOUNTER — HOSPITAL ENCOUNTER (OUTPATIENT)
Facility: HOSPITAL | Age: 67
Discharge: HOME | End: 2022-06-07
Payer: MEDICARE

## 2022-06-07 ENCOUNTER — OFFICE VISIT (OUTPATIENT)
Dept: PRIMARY CARE CLINIC | Age: 67
End: 2022-06-07
Payer: MEDICARE

## 2022-06-07 VITALS
TEMPERATURE: 98.5 F | OXYGEN SATURATION: 96 % | WEIGHT: 246.6 LBS | RESPIRATION RATE: 16 BRPM | HEIGHT: 69 IN | SYSTOLIC BLOOD PRESSURE: 122 MMHG | BODY MASS INDEX: 36.53 KG/M2 | DIASTOLIC BLOOD PRESSURE: 64 MMHG | HEART RATE: 94 BPM

## 2022-06-07 DIAGNOSIS — Z13.29 THYROID DISORDER SCREEN: ICD-10-CM

## 2022-06-07 DIAGNOSIS — R42 DIZZINESS: ICD-10-CM

## 2022-06-07 DIAGNOSIS — E55.9 VITAMIN D DEFICIENCY: ICD-10-CM

## 2022-06-07 DIAGNOSIS — E11.9 TYPE 2 DIABETES MELLITUS WITHOUT COMPLICATION, WITHOUT LONG-TERM CURRENT USE OF INSULIN (HCC): ICD-10-CM

## 2022-06-07 DIAGNOSIS — I25.10 CVD (CARDIOVASCULAR DISEASE): ICD-10-CM

## 2022-06-07 DIAGNOSIS — R06.02 SHORT OF BREATH ON EXERTION: Primary | ICD-10-CM

## 2022-06-07 DIAGNOSIS — J01.00 ACUTE NON-RECURRENT MAXILLARY SINUSITIS: ICD-10-CM

## 2022-06-07 DIAGNOSIS — I10 PRIMARY HYPERTENSION: ICD-10-CM

## 2022-06-07 PROCEDURE — G8417 CALC BMI ABV UP PARAM F/U: HCPCS | Performed by: NURSE PRACTITIONER

## 2022-06-07 PROCEDURE — 1036F TOBACCO NON-USER: CPT | Performed by: NURSE PRACTITIONER

## 2022-06-07 PROCEDURE — 3044F HG A1C LEVEL LT 7.0%: CPT | Performed by: NURSE PRACTITIONER

## 2022-06-07 PROCEDURE — 1123F ACP DISCUSS/DSCN MKR DOCD: CPT | Performed by: NURSE PRACTITIONER

## 2022-06-07 PROCEDURE — 84443 ASSAY THYROID STIM HORMONE: CPT

## 2022-06-07 PROCEDURE — 99214 OFFICE O/P EST MOD 30 MIN: CPT | Performed by: NURSE PRACTITIONER

## 2022-06-07 PROCEDURE — 82607 VITAMIN B-12: CPT

## 2022-06-07 PROCEDURE — 82746 ASSAY OF FOLIC ACID SERUM: CPT

## 2022-06-07 PROCEDURE — 80061 LIPID PANEL: CPT

## 2022-06-07 PROCEDURE — 80053 COMPREHEN METABOLIC PANEL: CPT

## 2022-06-07 PROCEDURE — 3017F COLORECTAL CA SCREEN DOC REV: CPT | Performed by: NURSE PRACTITIONER

## 2022-06-07 PROCEDURE — 82306 VITAMIN D 25 HYDROXY: CPT

## 2022-06-07 PROCEDURE — 83036 HEMOGLOBIN GLYCOSYLATED A1C: CPT

## 2022-06-07 PROCEDURE — 36415 COLL VENOUS BLD VENIPUNCTURE: CPT

## 2022-06-07 PROCEDURE — 2022F DILAT RTA XM EVC RTNOPTHY: CPT | Performed by: NURSE PRACTITIONER

## 2022-06-07 PROCEDURE — G8427 DOCREV CUR MEDS BY ELIG CLIN: HCPCS | Performed by: NURSE PRACTITIONER

## 2022-06-07 PROCEDURE — 85027 COMPLETE CBC AUTOMATED: CPT

## 2022-06-07 RX ORDER — TADALAFIL 10 MG/1
TABLET ORAL
COMMUNITY
Start: 2022-05-03

## 2022-06-07 RX ORDER — AMOXICILLIN AND CLAVULANATE POTASSIUM 875; 125 MG/1; MG/1
1 TABLET, FILM COATED ORAL 2 TIMES DAILY
Qty: 14 TABLET | Refills: 0 | Status: SHIPPED | OUTPATIENT
Start: 2022-06-07 | End: 2022-06-14

## 2022-06-07 ASSESSMENT — ENCOUNTER SYMPTOMS
SHORTNESS OF BREATH: 1
GASTROINTESTINAL NEGATIVE: 1
EYES NEGATIVE: 1
ALLERGIC/IMMUNOLOGIC NEGATIVE: 1

## 2022-06-07 NOTE — PROGRESS NOTES
Chief Complaint   Patient presents with    Shortness of Breath       Have you seen any other physician or provider since your last visit yes - oncology,urology,orthopedic    Have you had any other diagnostic tests since your last visit? no    Have you changed or stopped any medications since your last visit? no     I have recommended that this patient have a immunization for pneumonia and sigmoidoscopy but he declines at this time. I have discussed the risks and benefits of this examination with him. The patient verbalizes understanding. Diabetic retinal exam completed this year? No                       * If yes please have patient sign a records release to obtain record to update Health Maintenance                       * If no, please order referral for patient to be scheduled         SUBJECTIVE:    Patient ID:Dhruv Lucero is a 78 y.o. male. Chief Complaint   Patient presents with    Shortness of Breath     HPI:  Patient c/o shortness of breath for 6 months. It is worse with exertion. He has to take frequent breaks when he is working. Patient was diagnosed with CAD. S/P 1 stent. He has been compliant with taking his meds without side effect. He denies any CP or palpitation. He reported some shortness of breath and dizziness. He goes to Dr. BADILL[facility] but he can not get in until July. He is set up next week with scans for follow from his lung cancer. Patient's medications, allergies, past medical, surgical, social and family histories were reviewed and updated as appropriate. Review of Systems   Constitutional: Positive for fatigue. HENT: Negative. Eyes: Negative. Respiratory: Positive for shortness of breath. Cardiovascular: Negative. Gastrointestinal: Negative. Endocrine: Negative. Genitourinary: Negative. Musculoskeletal: Negative. Skin: Negative. Allergic/Immunologic: Negative. Neurological: Positive for dizziness. Hematological: Negative. Psychiatric/Behavioral: Negative. OBJECTIVE:  /64 (Site: Left Upper Arm, Position: Sitting, Cuff Size: Medium Adult)   Pulse 94   Temp 98.5 °F (36.9 °C) (Temporal)   Resp 16   Ht 5' 9\" (1.753 m)   Wt 246 lb 9.6 oz (111.9 kg)   SpO2 96% Comment: room air  BMI 36.42 kg/m²    Physical Exam  Vitals and nursing note reviewed. Constitutional:       Appearance: He is well-developed. HENT:      Head: Normocephalic and atraumatic. Eyes:      Conjunctiva/sclera: Conjunctivae normal.      Pupils: Pupils are equal, round, and reactive to light. Neck:      Thyroid: No thyromegaly. Vascular: No JVD. Cardiovascular:      Rate and Rhythm: Normal rate and regular rhythm. Heart sounds: No murmur heard. No friction rub. No gallop. Pulmonary:      Effort: Pulmonary effort is normal. No respiratory distress. Breath sounds: Normal breath sounds. No wheezing or rales. Abdominal:      General: Bowel sounds are normal. There is no distension. Palpations: Abdomen is soft. Tenderness: There is no abdominal tenderness. There is no guarding. Musculoskeletal:         General: No tenderness. Cervical back: Normal range of motion and neck supple. Skin:     General: Skin is warm and dry. Findings: No rash. Neurological:      Mental Status: He is alert and oriented to person, place, and time.    Psychiatric:         Judgment: Judgment normal.         Results in Past 30 Days  Result Component Current Result Ref Range Previous Result Ref Range   Albumin/Globulin Ratio 2.0 (6/7/2022) 0.8 - 2.0 Not in Time Range    Albumin 4.5 (6/7/2022) 3.4 - 4.8 g/dL Not in Time Range    Alkaline Phosphatase 87 (6/7/2022) 25 - 100 U/L Not in Time Range    ALT 44 (H) (6/7/2022) 4 - 36 U/L Not in Time Range    AST 30 (6/7/2022) 8 - 33 U/L Not in Time Range    BUN 16 (6/7/2022) 6 - 20 mg/dL Not in Time Range    Calcium 9.7 (6/7/2022) 8.5 - 10.5 mg/dL Not in Time Range    Chloride 103 (6/7/2022) 98 - 107 mmol/L Not in Time Range    CO2 29 (6/7/2022) 20 - 30 mmol/L Not in Time Range    CREATININE 0.8 (6/7/2022) 0.4 - 1.2 mg/dL Not in Time Range    GFR  >59 (6/7/2022) >59 Not in Time Range    GFR Non- >59 (6/7/2022) >59 Not in Time Range    Globulin 2.3 (6/7/2022) Not Established g/dL Not in Time Range    Glucose 105 (6/7/2022) 74 - 106 mg/dL Not in Time Range    Potassium 4.2 (6/7/2022) 3.4 - 5.1 mmol/L Not in Time Range    Sodium 141 (6/7/2022) 136 - 145 mmol/L Not in Time Range    Total Bilirubin 1.3 (H) (6/7/2022) 0.3 - 1.2 mg/dL Not in Time Range    Total Protein 6.8 (6/7/2022) 6.4 - 8.3 g/dL Not in Time Range        Hemoglobin A1C (%)   Date Value   06/07/2022 6.0     LDL Calculated (mg/dL)   Date Value   06/07/2022 see below         Lab Results   Component Value Date    WBC 10.9 06/07/2022    NEUTROABS 9.0 01/03/2019    HGB 14.5 06/07/2022    HCT 43.4 06/07/2022    MCV 95.2 06/07/2022     06/07/2022       Lab Results   Component Value Date    TSH 3.72 06/07/2022         ASSESSMENT/PLAN:     1. Short of breath on exertion    - XR Chest PA and Lateral; Future    2. CVD (cardiovascular disease)    - VL DUP CAROTID BILATERAL; Future  - Lipid Panel; Future    3. Primary hypertension  BP is stable. I have advised him on low-sodium diet, exercise and weight control. I am going to continue current medication . Will monitor his renal function every few months, have advised him to check blood pressure frequently and to keep a record of this. - VL DUP CAROTID BILATERAL; Future    4. Acute non-recurrent maxillary sinusitis    - amoxicillin-clavulanate (AUGMENTIN) 875-125 MG per tablet; Take 1 tablet by mouth 2 times daily for 7 days  Dispense: 14 tablet; Refill: 0    5. Dizziness    - VL DUP CAROTID BILATERAL; Future    6. Type 2 diabetes mellitus without complication, without long-term current use of insulin (HCC)  Stable.  I advised him regarding diabetic diet, exercise and weight control. Also, I advised him to stay on the current medication, monitor his fingerstick closely. I am going to check the A1c every few months. I will check microalbumin on a yearly basis. I have also advised him to have a yearly eye exam and to monitor his feet closely. Along with instruction of possible complications related to diabetes, even with good control. A1C will be checked  in few months. Lab Results   Component Value Date    LABA1C 6.0 06/07/2022       - Hemoglobin A1C; Future    7. Vitamin D deficiency    - Vitamin B12 & Folate; Future  - Comprehensive Metabolic Panel; Future  - CBC; Future  - Vitamin D 25 Hydroxy; Future    8. Thyroid disorder screen    - TSH;  Future       Written by Jalyn Peraza CHRISTINA, acting as a scribe for Asya Mcrae on 6/7/2022 at 2:25 PM.

## 2022-06-08 LAB
A/G RATIO: 2 (ref 0.8–2)
ALBUMIN SERPL-MCNC: 4.5 G/DL (ref 3.4–4.8)
ALP BLD-CCNC: 87 U/L (ref 25–100)
ALT SERPL-CCNC: 44 U/L (ref 4–36)
ANION GAP SERPL CALCULATED.3IONS-SCNC: 9 MMOL/L (ref 3–16)
AST SERPL-CCNC: 30 U/L (ref 8–33)
BILIRUB SERPL-MCNC: 1.3 MG/DL (ref 0.3–1.2)
BUN BLDV-MCNC: 16 MG/DL (ref 6–20)
CALCIUM SERPL-MCNC: 9.7 MG/DL (ref 8.5–10.5)
CHLORIDE BLD-SCNC: 103 MMOL/L (ref 98–107)
CHOLESTEROL, TOTAL: 254 MG/DL (ref 0–200)
CO2: 29 MMOL/L (ref 20–30)
CREAT SERPL-MCNC: 0.8 MG/DL (ref 0.4–1.2)
FOLATE: 17.17 NG/ML
GFR AFRICAN AMERICAN: >59
GFR NON-AFRICAN AMERICAN: >59
GLOBULIN: 2.3 G/DL
GLUCOSE BLD-MCNC: 105 MG/DL (ref 74–106)
HBA1C MFR BLD: 6 %
HCT VFR BLD CALC: 43.4 % (ref 40–54)
HDLC SERPL-MCNC: 33 MG/DL (ref 40–60)
HEMOGLOBIN: 14.5 G/DL (ref 13–18)
LDL CHOLESTEROL CALCULATED: ABNORMAL MG/DL
LDL CHOLESTEROL DIRECT: 137 MG/DL
MCH RBC QN AUTO: 31.8 PG (ref 27–32)
MCHC RBC AUTO-ENTMCNC: 33.4 G/DL (ref 31–35)
MCV RBC AUTO: 95.2 FL (ref 80–100)
PDW BLD-RTO: 12.4 % (ref 11–16)
PLATELET # BLD: 270 K/UL (ref 150–400)
PMV BLD AUTO: 10.2 FL (ref 6–10)
POTASSIUM SERPL-SCNC: 4.2 MMOL/L (ref 3.4–5.1)
RBC # BLD: 4.56 M/UL (ref 4.5–6)
SODIUM BLD-SCNC: 141 MMOL/L (ref 136–145)
TOTAL PROTEIN: 6.8 G/DL (ref 6.4–8.3)
TRIGL SERPL-MCNC: 500 MG/DL (ref 0–249)
TSH SERPL DL<=0.05 MIU/L-ACNC: 3.72 UIU/ML (ref 0.27–4.2)
VITAMIN B-12: 911 PG/ML (ref 211–911)
VITAMIN D 25-HYDROXY: 35.6 (ref 32–100)
VLDLC SERPL CALC-MCNC: ABNORMAL MG/DL
WBC # BLD: 10.9 K/UL (ref 4–11)

## 2022-06-08 NOTE — PROGRESS NOTES
"[facility] Cardiology      Identification: Erik Bowser is a 67 y.o. male who resides in [place], [place].    Reason for visit:  Dizzy  Short of breath  CAD  CV risk factors      Subjective      Erik returns to the office today.  He states that he has been experiencing worsening shortness of breath over the last 6 months.  He states that following his lung cancer surgery, he was breathing better than this.  He also has been experiencing frequent dizziness.  He is scheduled to undergo an MRI of the brain as well as a CT of his chest due to to these symptoms.  He is concerned his symptoms are heart related.  No recent ischemic evaluation.  Last echo in 2020 within normal limits.    The dizziness occurs erratically.  It is not necessarily once he stands up from a seated position.  He states that episodes last about 30 minutes.  He has checked his blood pressure during these episodes and it is not low.    Review of Systems   Respiratory: Positive for shortness of breath.    Neurological: Positive for dizziness.       Objective     /74 (BP Location: Right arm, Patient Position: Sitting)   Pulse 76   Ht 175.3 cm (69\")   Wt 110 kg (242 lb 9.6 oz)   SpO2 96%   BMI 35.83 kg/m²       Constitutional:       Appearance: Healthy appearance. Obese.   Eyes:      General: Lids are normal. No scleral icterus.  HENT:      Head: Normocephalic and atraumatic.   Neck:      Thyroid: No thyroid mass.      Vascular: No carotid bruit or JVD. JVD normal.   Pulmonary:      Effort: Pulmonary effort is normal.      Breath sounds: Normal breath sounds.   Cardiovascular:      Normal rate. Regular rhythm.      Murmurs: There is no murmur.      No gallop.   Musculoskeletal:      Extremities: No clubbing present.Skin:     General: Skin is warm and dry. There is no cyanosis.   Neurological:      General: No focal deficit present.      Mental Status: Alert.   Psychiatric:         Attention and Perception: Attention normal.         " Behavior: Behavior normal. Behavior is cooperative.         Result Review :    Lab Results   Component Value Date    GLUCOSE 97 11/11/2021    BUN 10 11/11/2021    CREATININE 1.00 05/25/2022    EGFRIFNONA 96 11/11/2021    BCR 12.3 11/11/2021    K 4.5 11/11/2021    CO2 30.0 (H) 11/11/2021    CALCIUM 9.7 11/11/2021    ALBUMIN 4.40 11/11/2021    AST 35 11/11/2021    ALT 44 (H) 11/11/2021     Lab Results   Component Value Date    WBC 10.9 06/07/2022    HGB 14.5 06/07/2022    HCT 43.4 06/07/2022    MCV 95.2 06/07/2022     06/07/2022     Lab Results   Component Value Date    CHOL 162 01/05/2021    CHLPL 254 (H) 06/07/2022    TRIG 500 (H) 06/07/2022    HDL 33 (L) 06/07/2022    LDL see below 06/07/2022     (H) 06/07/2022     Lab Results   Component Value Date    HGBA1C 6.0 06/07/2022             Assessment     Problem List Items Addressed This Visit        Cardiology Problems    Coronary artery disease involving native coronary artery of native heart with angina pectoris (HCC) - Primary    Overview     · Cardiac catheterization for inferior STEMI (1/20/2015):  RCA occlusion status post ISH (Xience 3.5 x 23 mm).  LVEF 45%.  · Echocardiogram (1/21/2015):  LVEF 55%, no valvular abnormalities, 01/21/2015.  · Echocardiogram (7/14/2020): EF 55%. Mild inferior hypokinesis. Trace MR.           Current Assessment & Plan     · No angina but worsening dyspnea possible anginal equivalent  · Risk stratify with exercise nuclear stress  · Continue low-dose aspirin and statin therapy  · Discontinue beta-blocker prior to stress test           Relevant Medications    nitroglycerin (NITROSTAT) 0.4 MG SL tablet    aspirin 81 MG EC tablet    Other Relevant Orders    Stress Test With Myocardial Perfusion (1 Day)    Essential hypertension    Overview     • Target blood pressure <130/80 mmHg           Current Assessment & Plan     · Controlled  · Will discontinue metoprolol to see if improves erectile dysfunction            Hyperlipidemia LDL goal <70    Overview     • High intensity statin therapy indicated given the presence of CAD           Current Assessment & Plan     · Not to goal  · Increase atorvastatin 80 mg nightly  · Start fenofibrate for triglycerides >500  · CMP and lipids in 6 weeks           Relevant Medications    atorvastatin (LIPITOR) 80 MG tablet    fenofibrate (TRICOR) 145 MG tablet    Other Relevant Orders    Comprehensive Metabolic Panel    LDL Cholesterol, Direct    Lipid Panel      Other Visit Diagnoses     Dizziness        Relevant Orders    CT Angiogram Neck    Dyspnea on exertion        Relevant Orders    Adult Transthoracic Echo Complete W/ Cont if Necessary Per Protocol          Plan   • Echo  • Exercise nuclear stress test  • Increase atorvastatin 80 mg nightly  • Start fenofibrate 145 mg daily  • CMP and lipids in 6 weeks  • CT angiogram the time of CT chest imaging to evaluate carotids  • If cardiac testing unremarkable, suspect noncardiac etiology for shortness of breath      Follow-up   Return in about 1 year (around 6/10/2023).        Mahendra Lacy MD, FACC, [facility]  6/10/2022

## 2022-06-10 ENCOUNTER — OFFICE VISIT (OUTPATIENT)
Dept: CARDIOLOGY | Facility: CLINIC | Age: 67
End: 2022-06-10

## 2022-06-10 VITALS
WEIGHT: 242.6 LBS | DIASTOLIC BLOOD PRESSURE: 74 MMHG | BODY MASS INDEX: 35.93 KG/M2 | HEART RATE: 76 BPM | OXYGEN SATURATION: 96 % | HEIGHT: 69 IN | SYSTOLIC BLOOD PRESSURE: 132 MMHG

## 2022-06-10 DIAGNOSIS — R42 DIZZINESS: ICD-10-CM

## 2022-06-10 DIAGNOSIS — I25.119 CORONARY ARTERY DISEASE INVOLVING NATIVE CORONARY ARTERY OF NATIVE HEART WITH ANGINA PECTORIS: Primary | ICD-10-CM

## 2022-06-10 DIAGNOSIS — I10 ESSENTIAL HYPERTENSION: ICD-10-CM

## 2022-06-10 DIAGNOSIS — E78.5 HYPERLIPIDEMIA LDL GOAL <70: ICD-10-CM

## 2022-06-10 DIAGNOSIS — R06.09 DYSPNEA ON EXERTION: ICD-10-CM

## 2022-06-10 PROBLEM — R59.0 MEDIASTINAL LYMPHADENOPATHY: Status: RESOLVED | Noted: 2019-07-08 | Resolved: 2022-06-10

## 2022-06-10 PROBLEM — D72.820 LYMPHOCYTOSIS: Status: RESOLVED | Noted: 2019-02-27 | Resolved: 2022-06-10

## 2022-06-10 PROCEDURE — 99214 OFFICE O/P EST MOD 30 MIN: CPT | Performed by: INTERNAL MEDICINE

## 2022-06-10 RX ORDER — ASPIRIN 81 MG/1
81 TABLET ORAL NIGHTLY
Qty: 90 TABLET | Refills: 3 | Status: SHIPPED | OUTPATIENT
Start: 2022-06-10

## 2022-06-10 RX ORDER — METOPROLOL SUCCINATE 25 MG/1
25 TABLET, EXTENDED RELEASE ORAL DAILY
Qty: 90 TABLET | Refills: 3 | Status: SHIPPED | OUTPATIENT
Start: 2022-06-10 | End: 2022-06-10

## 2022-06-10 RX ORDER — ATORVASTATIN CALCIUM 80 MG/1
80 TABLET, FILM COATED ORAL NIGHTLY
Qty: 90 TABLET | Refills: 3 | Status: SHIPPED | OUTPATIENT
Start: 2022-06-10

## 2022-06-10 RX ORDER — FENOFIBRATE 145 MG/1
145 TABLET, COATED ORAL DAILY
Qty: 90 TABLET | Refills: 3 | Status: SHIPPED | OUTPATIENT
Start: 2022-06-10

## 2022-06-10 RX ORDER — NITROGLYCERIN 0.4 MG/1
0.4 TABLET SUBLINGUAL
Qty: 25 TABLET | Refills: 5 | Status: SHIPPED | OUTPATIENT
Start: 2022-06-10

## 2022-06-10 NOTE — ASSESSMENT & PLAN NOTE
· No angina but worsening dyspnea possible anginal equivalent  · Risk stratify with exercise nuclear stress  · Continue low-dose aspirin and statin therapy  · Discontinue beta-blocker prior to stress test

## 2022-06-10 NOTE — ASSESSMENT & PLAN NOTE
· Not to goal  · Increase atorvastatin 80 mg nightly  · Start fenofibrate for triglycerides >500  · CMP and lipids in 6 weeks

## 2022-06-15 ENCOUNTER — OFFICE VISIT (OUTPATIENT)
Dept: PULMONOLOGY | Facility: CLINIC | Age: 67
End: 2022-06-15

## 2022-06-15 VITALS
BODY MASS INDEX: 35.25 KG/M2 | HEIGHT: 69 IN | WEIGHT: 238 LBS | SYSTOLIC BLOOD PRESSURE: 112 MMHG | HEART RATE: 76 BPM | DIASTOLIC BLOOD PRESSURE: 86 MMHG | RESPIRATION RATE: 16 BRPM | OXYGEN SATURATION: 97 %

## 2022-06-15 DIAGNOSIS — R06.02 SOB (SHORTNESS OF BREATH): Primary | ICD-10-CM

## 2022-06-15 DIAGNOSIS — J44.9 CHRONIC OBSTRUCTIVE PULMONARY DISEASE, UNSPECIFIED COPD TYPE: Primary | ICD-10-CM

## 2022-06-15 DIAGNOSIS — Z87.891 PERSONAL HISTORY OF TOBACCO USE, PRESENTING HAZARDS TO HEALTH: ICD-10-CM

## 2022-06-15 DIAGNOSIS — C34.91 SQUAMOUS CELL LUNG CANCER, RIGHT: ICD-10-CM

## 2022-06-15 DIAGNOSIS — R06.02 SOB (SHORTNESS OF BREATH): ICD-10-CM

## 2022-06-15 PROCEDURE — 99214 OFFICE O/P EST MOD 30 MIN: CPT | Performed by: NURSE PRACTITIONER

## 2022-06-15 PROCEDURE — 94060 EVALUATION OF WHEEZING: CPT | Performed by: INTERNAL MEDICINE

## 2022-06-15 PROCEDURE — 94618 PULMONARY STRESS TESTING: CPT | Performed by: NURSE PRACTITIONER

## 2022-06-15 PROCEDURE — 94729 DIFFUSING CAPACITY: CPT | Performed by: INTERNAL MEDICINE

## 2022-06-15 PROCEDURE — 94726 PLETHYSMOGRAPHY LUNG VOLUMES: CPT | Performed by: INTERNAL MEDICINE

## 2022-06-15 RX ORDER — BUDESONIDE, GLYCOPYRROLATE, AND FORMOTEROL FUMARATE 160; 9; 4.8 UG/1; UG/1; UG/1
2 AEROSOL, METERED RESPIRATORY (INHALATION) 2 TIMES DAILY
Qty: 10.7 G | Refills: 5 | Status: SHIPPED | OUTPATIENT
Start: 2022-06-15

## 2022-06-15 NOTE — PROGRESS NOTES
"Chief Complaint   Patient presents with   • Follow-up   • Shortness of Breath         Subjective   Erik Bowser is a 67 y.o. male.     History of Present Illness   Patient comes today for follow up of shortness of breath and COPD.     Dr. Salguero has recommended the patient return to pulmonology clinic for management of COPD.  The patient has not been seen in our clinic since June 2020.    Symptoms have been stable since the last clinic visit. Patient reports no recent exacerbations.     He does not use any inhalers.  His wife states that he tried a powder inhaler previously and did not like it at all.    Exercise tolerance has also remained stable. He gets SOB with any activity.     He had right upper lobe resection in 2019 followed by chemotherapy with radiation in 2019.  He has not had any cancer treatment since 2019.  He continues to follow with Dr. Salguero.    Quit smoking 3 years ago.    The following portions of the patient's history were reviewed and updated as appropriate: allergies, current medications, past family history, past medical history, past social history and past surgical history.    Review of Systems   HENT: Negative for sinus pressure.    Respiratory: Positive for shortness of breath.    Cardiovascular: Negative for palpitations.   Psychiatric/Behavioral: Positive for sleep disturbance.       Objective   Visit Vitals  /86   Pulse 76   Resp 16   Ht 175.3 cm (69\")   Wt 108 kg (238 lb)   SpO2 97%   BMI 35.15 kg/m²   ============================  ============================    6 MINUTE WALK TEST    Date of test: 06/15/22    Erik Bowser   1955             BASELINE   SpO2%: 97 % RA   Heart Rate 76   Blood Pressure 112/86     EXERCISE SpO2% HEART RATE RA or O2 @ LPM   1 MINUTE 93 47 RA   2 MINUTES 92 72 RA   3 MINUTES 93 85 RA   4 MINUTES 93 89 RA   5 MINUTES 93 91 RA   6 MINUTES 92 98 RA   (Number of laps:9 X 36 meters + Final partial lap: 0 meters =324 meters)            Distance " Walked:   324Meters   SpO2% Post Exercise: 97 %   HR Post Exercise: 81     Reason to stop (if applicable):   ____ Chest Pain   ____ Light Headedness   ____ Dyspnea Unrelieved by Rest   ____ Abnormal Gait Pattern   ____ Severe Fatigue   ____ Other (Specify: __________________________)    Tech Comments (if any):     Test performed by:RR    ============================  ============================      Physical Exam  Vitals reviewed.   HENT:      Head: Atraumatic.      Mouth/Throat:      Mouth: Mucous membranes are moist.   Eyes:      Extraocular Movements: Extraocular movements intact.   Cardiovascular:      Rate and Rhythm: Normal rate and regular rhythm.   Pulmonary:      Effort: Pulmonary effort is normal. No respiratory distress.      Comments: Somewhat decreased air entry without wheezing.  Musculoskeletal:      Cervical back: Neck supple.      Comments: Gait normal.   Skin:     General: Skin is warm.   Neurological:      Mental Status: He is alert and oriented to person, place, and time.           Assessment & Plan   Diagnoses and all orders for this visit:    1. Chronic obstructive pulmonary disease, unspecified COPD type (HCC) (Primary)    2. Squamous cell lung cancer, right (HCC)  Comments:  s/p RUL resection in 2019 with chemo and radiation    3. SOB (shortness of breath)  -     Converted Six Minute Walk  -     Pulmonary Function Test    4. Personal history of tobacco use, presenting hazards to health    Other orders  -     Budeson-Glycopyrrol-Formoterol (BreztrNiblitzphere) 160-9-4.8 MCG/ACT aerosol inhaler; Inhale 2 puffs 2 (Two) Times a Day.  Dispense: 10.7 g; Refill: 5           Return in about 5 months (around 11/15/2022) for Recheck, For Dr. Rubin.    DISCUSSION (if any):  His last PFT shows moderate obstruction. This was from 2020.     PFT today is consistent with moderate obstruction. I have discussed the results with the patient and his wife.    6-minute walk did not reveal exercise  hypoxemia.    Once a day inhaler would be best due to issues of compliance in the past however he has not tolerated powder inhalers well.  I have suggested trying Breztri as this is the only nonpowder inhaler that I have samples of today.  His wife states inhalers have not been covered well in the past so samples of this have been provided today and I will send a prescription to the pharmacy so that the patient will know how expensive the inhaler will be and if we need to change to a different inhaler due to cost.    He will need to rinse his mouth after using the inhaler.    Compliance with medications stressed.     Side effects of prescribed medications discussed with the patient.    He will continue to follow with Dr. Salguero.    I have asked patient to follow-up in 5 months.    Dictated utilizing Dragon dictation.    This document was electronically signed by SOBEIDA Carvajal Kelsey 15, 2022  13:18 EDT

## 2022-06-23 ENCOUNTER — TELEPHONE (OUTPATIENT)
Dept: PRIMARY CARE CLINIC | Age: 67
End: 2022-06-23

## 2022-06-23 RX ORDER — NITROGLYCERIN 0.4 MG/1
TABLET SUBLINGUAL
COMMUNITY
Start: 2022-06-10

## 2022-06-23 RX ORDER — ATORVASTATIN CALCIUM 80 MG/1
80 TABLET, FILM COATED ORAL DAILY
COMMUNITY

## 2022-06-23 RX ORDER — BUDESONIDE, GLYCOPYRROLATE, AND FORMOTEROL FUMARATE 160; 9; 4.8 UG/1; UG/1; UG/1
AEROSOL, METERED RESPIRATORY (INHALATION)
COMMUNITY
Start: 2022-06-15

## 2022-06-23 RX ORDER — FENOFIBRATE 145 MG/1
145 TABLET, COATED ORAL DAILY
COMMUNITY

## 2022-06-29 ENCOUNTER — HOSPITAL ENCOUNTER (OUTPATIENT)
Dept: CARDIOLOGY | Facility: HOSPITAL | Age: 67
Discharge: HOME | End: 2022-06-29

## 2022-06-29 ENCOUNTER — HOSPITAL ENCOUNTER (OUTPATIENT)
Dept: CT IMAGING | Facility: HOSPITAL | Age: 67
Discharge: HOME | End: 2022-06-29

## 2022-06-29 VITALS
DIASTOLIC BLOOD PRESSURE: 82 MMHG | WEIGHT: 235.89 LBS | BODY MASS INDEX: 34.94 KG/M2 | HEIGHT: 69 IN | SYSTOLIC BLOOD PRESSURE: 151 MMHG

## 2022-06-29 VITALS
DIASTOLIC BLOOD PRESSURE: 76 MMHG | BODY MASS INDEX: 34.94 KG/M2 | WEIGHT: 235.89 LBS | HEIGHT: 69 IN | OXYGEN SATURATION: 96 % | HEART RATE: 75 BPM | SYSTOLIC BLOOD PRESSURE: 133 MMHG

## 2022-06-29 DIAGNOSIS — R42 DIZZINESS: ICD-10-CM

## 2022-06-29 DIAGNOSIS — R06.09 DYSPNEA ON EXERTION: ICD-10-CM

## 2022-06-29 DIAGNOSIS — I25.119 CORONARY ARTERY DISEASE INVOLVING NATIVE CORONARY ARTERY OF NATIVE HEART WITH ANGINA PECTORIS: ICD-10-CM

## 2022-06-29 PROBLEM — I65.23 BILATERAL CAROTID ARTERY STENOSIS: Status: ACTIVE | Noted: 2022-06-29

## 2022-06-29 LAB
BH CV ECHO MEAS - AO MAX PG: 6.7 MMHG
BH CV ECHO MEAS - AO MEAN PG: 3 MMHG
BH CV ECHO MEAS - AO ROOT DIAM: 3.4 CM
BH CV ECHO MEAS - AO V2 MAX: 129 CM/SEC
BH CV ECHO MEAS - AO V2 VTI: 28.3 CM
BH CV ECHO MEAS - AVA(I,D): 3.1 CM2
BH CV ECHO MEAS - EDV(CUBED): 144.7 ML
BH CV ECHO MEAS - EDV(MOD-SP2): 61 ML
BH CV ECHO MEAS - EDV(MOD-SP4): 89 ML
BH CV ECHO MEAS - EF(MOD-BP): 64 %
BH CV ECHO MEAS - EF(MOD-SP2): 60.7 %
BH CV ECHO MEAS - EF(MOD-SP4): 66.3 %
BH CV ECHO MEAS - ESV(CUBED): 50.2 ML
BH CV ECHO MEAS - ESV(MOD-SP2): 24 ML
BH CV ECHO MEAS - ESV(MOD-SP4): 30 ML
BH CV ECHO MEAS - FS: 29.7 %
BH CV ECHO MEAS - IVS/LVPW: 0.98 CM
BH CV ECHO MEAS - IVSD: 0.86 CM
BH CV ECHO MEAS - LA DIMENSION: 4.2 CM
BH CV ECHO MEAS - LV DIASTOLIC VOL/BSA (35-75): 40 CM2
BH CV ECHO MEAS - LV MASS(C)D: 162.4 GRAMS
BH CV ECHO MEAS - LV MAX PG: 5.6 MMHG
BH CV ECHO MEAS - LV MEAN PG: 2 MMHG
BH CV ECHO MEAS - LV SYSTOLIC VOL/BSA (12-30): 13.5 CM2
BH CV ECHO MEAS - LV V1 MAX: 118 CM/SEC
BH CV ECHO MEAS - LV V1 VTI: 25.4 CM
BH CV ECHO MEAS - LVIDD: 5.3 CM
BH CV ECHO MEAS - LVIDS: 3.7 CM
BH CV ECHO MEAS - LVOT AREA: 3.5 CM2
BH CV ECHO MEAS - LVOT DIAM: 2.1 CM
BH CV ECHO MEAS - LVPWD: 0.87 CM
BH CV ECHO MEAS - MV A MAX VEL: 96.3 CM/SEC
BH CV ECHO MEAS - MV DEC SLOPE: 287 CM/SEC2
BH CV ECHO MEAS - MV DEC TIME: 0.24 MSEC
BH CV ECHO MEAS - MV E MAX VEL: 85.9 CM/SEC
BH CV ECHO MEAS - MV E/A: 0.89
BH CV ECHO MEAS - MV P1/2T: 86.4 MSEC
BH CV ECHO MEAS - MVA(P1/2T): 2.5 CM2
BH CV ECHO MEAS - PA ACC SLOPE: 540 CM/SEC2
BH CV ECHO MEAS - PA ACC TIME: 0.14 SEC
BH CV ECHO MEAS - PA PR(ACCEL): 16 MMHG
BH CV ECHO MEAS - SI(MOD-SP2): 16.6 ML/M2
BH CV ECHO MEAS - SI(MOD-SP4): 26.5 ML/M2
BH CV ECHO MEAS - SV(LVOT): 88 ML
BH CV ECHO MEAS - SV(MOD-SP2): 37 ML
BH CV ECHO MEAS - SV(MOD-SP4): 59 ML
CREAT BLDA-MCNC: 1.3 MG/DL (ref 0.6–1.3)
LEFT ATRIUM VOLUME INDEX: 20.3 ML/M2
LV EF 2D ECHO EST: 65 %
MAXIMAL PREDICTED HEART RATE: 154 BPM
STRESS TARGET HR: 131 BPM

## 2022-06-29 PROCEDURE — 93018 CV STRESS TEST I&R ONLY: CPT | Performed by: INTERNAL MEDICINE

## 2022-06-29 PROCEDURE — A9500 TC99M SESTAMIBI: HCPCS | Performed by: INTERNAL MEDICINE

## 2022-06-29 PROCEDURE — 93306 TTE W/DOPPLER COMPLETE: CPT | Performed by: INTERNAL MEDICINE

## 2022-06-29 PROCEDURE — 78452 HT MUSCLE IMAGE SPECT MULT: CPT

## 2022-06-29 PROCEDURE — 70498 CT ANGIOGRAPHY NECK: CPT

## 2022-06-29 PROCEDURE — 25010000002 SULFUR HEXAFLUORIDE MICROSPH 60.7-25 MG RECONSTITUTED SUSPENSION: Performed by: INTERNAL MEDICINE

## 2022-06-29 PROCEDURE — 93306 TTE W/DOPPLER COMPLETE: CPT

## 2022-06-29 PROCEDURE — 82565 ASSAY OF CREATININE: CPT

## 2022-06-29 PROCEDURE — 0 IOPAMIDOL PER 1 ML: Performed by: INTERNAL MEDICINE

## 2022-06-29 PROCEDURE — 0 TECHNETIUM SESTAMIBI: Performed by: INTERNAL MEDICINE

## 2022-06-29 PROCEDURE — 93017 CV STRESS TEST TRACING ONLY: CPT

## 2022-06-29 PROCEDURE — 78452 HT MUSCLE IMAGE SPECT MULT: CPT | Performed by: INTERNAL MEDICINE

## 2022-06-29 RX ADMIN — IOPAMIDOL 95 ML: 755 INJECTION, SOLUTION INTRAVENOUS at 13:10

## 2022-06-29 RX ADMIN — TECHNETIUM TC 99M SESTAMIBI 1 DOSE: 1 INJECTION INTRAVENOUS at 07:50

## 2022-06-29 RX ADMIN — SULFUR HEXAFLUORIDE 3 ML: KIT at 08:35

## 2022-06-29 RX ADMIN — TECHNETIUM TC 99M SESTAMIBI 1 DOSE: 1 INJECTION INTRAVENOUS at 09:10

## 2022-07-03 LAB
BH CV REST NUCLEAR ISOTOPE DOSE: 9.6 MCI
BH CV STRESS BP STAGE 1: NORMAL
BH CV STRESS DURATION MIN STAGE 1: 4
BH CV STRESS DURATION SEC STAGE 1: 54
BH CV STRESS GRADE STAGE 1: 10
BH CV STRESS HR STAGE 1: 130
BH CV STRESS METS STAGE 1: 5
BH CV STRESS NUCLEAR ISOTOPE DOSE: 33 MCI
BH CV STRESS O2 STAGE 1: 88
BH CV STRESS PROTOCOL 1: NORMAL
BH CV STRESS RECOVERY BP: NORMAL MMHG
BH CV STRESS RECOVERY HR: 85 BPM
BH CV STRESS RECOVERY O2: 96 %
BH CV STRESS SPEED STAGE 1: 1.7
BH CV STRESS STAGE 1: 1
LV EF NUC BP: 59 %
MAXIMAL PREDICTED HEART RATE: 154 BPM
PERCENT MAX PREDICTED HR: 86.36 %
STRESS BASELINE BP: NORMAL MMHG
STRESS BASELINE HR: 75 BPM
STRESS O2 SAT REST: 96 %
STRESS PERCENT HR: 102 %
STRESS POST ESTIMATED WORKLOAD: 4.6 METS
STRESS POST EXERCISE DUR MIN: 4 MIN
STRESS POST EXERCISE DUR SEC: 54 SEC
STRESS POST O2 SAT PEAK: 88 %
STRESS POST PEAK BP: NORMAL MMHG
STRESS POST PEAK HR: 133 BPM
STRESS TARGET HR: 131 BPM

## 2022-07-11 ENCOUNTER — HOSPITAL ENCOUNTER (OUTPATIENT)
Dept: CT IMAGING | Facility: HOSPITAL | Age: 67
Discharge: HOME | End: 2022-07-11

## 2022-07-11 ENCOUNTER — HOSPITAL ENCOUNTER (OUTPATIENT)
Dept: MRI IMAGING | Facility: HOSPITAL | Age: 67
Discharge: HOME | End: 2022-07-11

## 2022-07-11 DIAGNOSIS — R51.9 NONINTRACTABLE HEADACHE, UNSPECIFIED CHRONICITY PATTERN, UNSPECIFIED HEADACHE TYPE: ICD-10-CM

## 2022-07-11 DIAGNOSIS — Z85.820 HISTORY OF MELANOMA: ICD-10-CM

## 2022-07-11 DIAGNOSIS — C34.11 MALIGNANT NEOPLASM OF UPPER LOBE OF RIGHT LUNG: ICD-10-CM

## 2022-07-11 LAB — CREAT BLDA-MCNC: 1 MG/DL (ref 0.6–1.3)

## 2022-07-11 PROCEDURE — 82565 ASSAY OF CREATININE: CPT

## 2022-07-11 PROCEDURE — 0 GADOBENATE DIMEGLUMINE 529 MG/ML SOLUTION: Performed by: PHYSICIAN ASSISTANT

## 2022-07-11 PROCEDURE — 71260 CT THORAX DX C+: CPT

## 2022-07-11 PROCEDURE — 70553 MRI BRAIN STEM W/O & W/DYE: CPT

## 2022-07-11 PROCEDURE — 25010000002 IOPAMIDOL 61 % SOLUTION: Performed by: INTERNAL MEDICINE

## 2022-07-11 PROCEDURE — A9577 INJ MULTIHANCE: HCPCS | Performed by: PHYSICIAN ASSISTANT

## 2022-07-11 RX ADMIN — GADOBENATE DIMEGLUMINE 20 ML: 529 INJECTION, SOLUTION INTRAVENOUS at 14:05

## 2022-07-11 RX ADMIN — IOPAMIDOL 95 ML: 612 INJECTION, SOLUTION INTRAVENOUS at 13:23

## 2022-07-13 ENCOUNTER — TELEPHONE (OUTPATIENT)
Dept: ONCOLOGY | Facility: CLINIC | Age: 67
End: 2022-07-13

## 2022-07-13 NOTE — TELEPHONE ENCOUNTER
Called and let Deidra know they need to call the ordering office to get the results of the MRI.  I advised we can't release another providers results.  She will contact that office that ordered it.

## 2022-07-13 NOTE — TELEPHONE ENCOUNTER
Caller: Deidra Bowser    Relationship: Emergency Contact    Best call back number: [phone]    What test was performed: MRI BRAIN    When was the test performed: 07/11    Where was the test performed:

## 2022-11-04 ENCOUNTER — OFFICE VISIT (OUTPATIENT)
Dept: UROLOGY | Facility: CLINIC | Age: 67
End: 2022-11-04

## 2022-11-04 VITALS
DIASTOLIC BLOOD PRESSURE: 86 MMHG | TEMPERATURE: 98.1 F | BODY MASS INDEX: 34.94 KG/M2 | SYSTOLIC BLOOD PRESSURE: 130 MMHG | HEIGHT: 69 IN | WEIGHT: 235.89 LBS | HEART RATE: 80 BPM | OXYGEN SATURATION: 97 %

## 2022-11-04 DIAGNOSIS — R39.15 URGENCY OF URINATION: Primary | ICD-10-CM

## 2022-11-04 DIAGNOSIS — R39.9 LOWER URINARY TRACT SYMPTOMS (LUTS): ICD-10-CM

## 2022-11-04 LAB
BILIRUB BLD-MCNC: NEGATIVE MG/DL
CLARITY, POC: CLEAR
COLOR UR: YELLOW
EXPIRATION DATE: NORMAL
GLUCOSE UR STRIP-MCNC: NEGATIVE MG/DL
KETONES UR QL: NEGATIVE
LEUKOCYTE EST, POC: NEGATIVE
Lab: NORMAL
NITRITE UR-MCNC: NEGATIVE MG/ML
PH UR: 6 [PH] (ref 5–8)
PROT UR STRIP-MCNC: NEGATIVE MG/DL
RBC # UR STRIP: NEGATIVE /UL
SP GR UR: 1.02 (ref 1–1.03)
UROBILINOGEN UR QL: NORMAL

## 2022-11-04 PROCEDURE — 81003 URINALYSIS AUTO W/O SCOPE: CPT | Performed by: UROLOGY

## 2022-11-04 PROCEDURE — 99214 OFFICE O/P EST MOD 30 MIN: CPT | Performed by: UROLOGY

## 2022-11-04 PROCEDURE — 51798 US URINE CAPACITY MEASURE: CPT | Performed by: UROLOGY

## 2022-11-04 RX ORDER — ATORVASTATIN CALCIUM 80 MG/1
80 TABLET, FILM COATED ORAL
COMMUNITY

## 2023-04-05 ENCOUNTER — OFFICE VISIT (OUTPATIENT)
Dept: PRIMARY CARE CLINIC | Age: 68
End: 2023-04-05
Payer: MEDICARE

## 2023-04-05 ENCOUNTER — HOSPITAL ENCOUNTER (OUTPATIENT)
Facility: HOSPITAL | Age: 68
Discharge: HOME | End: 2023-04-05
Payer: MEDICARE

## 2023-04-05 VITALS
TEMPERATURE: 98 F | RESPIRATION RATE: 20 BRPM | HEIGHT: 69 IN | OXYGEN SATURATION: 94 % | SYSTOLIC BLOOD PRESSURE: 138 MMHG | BODY MASS INDEX: 35.93 KG/M2 | WEIGHT: 242.6 LBS | HEART RATE: 89 BPM | DIASTOLIC BLOOD PRESSURE: 82 MMHG

## 2023-04-05 DIAGNOSIS — I10 PRIMARY HYPERTENSION: Primary | ICD-10-CM

## 2023-04-05 DIAGNOSIS — E11.9 TYPE 2 DIABETES MELLITUS WITHOUT COMPLICATION, WITHOUT LONG-TERM CURRENT USE OF INSULIN (HCC): ICD-10-CM

## 2023-04-05 DIAGNOSIS — M1A.0720 CHRONIC GOUT OF LEFT FOOT, UNSPECIFIED CAUSE: ICD-10-CM

## 2023-04-05 DIAGNOSIS — E55.9 VITAMIN D DEFICIENCY: ICD-10-CM

## 2023-04-05 DIAGNOSIS — E78.00 PURE HYPERCHOLESTEROLEMIA: ICD-10-CM

## 2023-04-05 LAB
CREAT UR-MCNC: 111.9 MG/DL (ref 1.5–300)
MICROALBUMIN UR DL<=1MG/L-MCNC: <1.2 MG/DL (ref 0–22)
MICROALBUMIN/CREAT UR: NORMAL MG/G (ref 0–30)
URATE SERPL-MCNC: 7.1 MG/DL (ref 3.7–8.6)

## 2023-04-05 PROCEDURE — 1123F ACP DISCUSS/DSCN MKR DOCD: CPT | Performed by: NURSE PRACTITIONER

## 2023-04-05 PROCEDURE — 2022F DILAT RTA XM EVC RTNOPTHY: CPT | Performed by: NURSE PRACTITIONER

## 2023-04-05 PROCEDURE — G8417 CALC BMI ABV UP PARAM F/U: HCPCS | Performed by: NURSE PRACTITIONER

## 2023-04-05 PROCEDURE — 82043 UR ALBUMIN QUANTITATIVE: CPT

## 2023-04-05 PROCEDURE — 84550 ASSAY OF BLOOD/URIC ACID: CPT

## 2023-04-05 PROCEDURE — 3017F COLORECTAL CA SCREEN DOC REV: CPT | Performed by: NURSE PRACTITIONER

## 2023-04-05 PROCEDURE — 1036F TOBACCO NON-USER: CPT | Performed by: NURSE PRACTITIONER

## 2023-04-05 PROCEDURE — 99214 OFFICE O/P EST MOD 30 MIN: CPT | Performed by: NURSE PRACTITIONER

## 2023-04-05 PROCEDURE — 3078F DIAST BP <80 MM HG: CPT | Performed by: NURSE PRACTITIONER

## 2023-04-05 PROCEDURE — 3046F HEMOGLOBIN A1C LEVEL >9.0%: CPT | Performed by: NURSE PRACTITIONER

## 2023-04-05 PROCEDURE — G8427 DOCREV CUR MEDS BY ELIG CLIN: HCPCS | Performed by: NURSE PRACTITIONER

## 2023-04-05 PROCEDURE — 3075F SYST BP GE 130 - 139MM HG: CPT | Performed by: NURSE PRACTITIONER

## 2023-04-05 PROCEDURE — 82570 ASSAY OF URINE CREATININE: CPT

## 2023-04-05 RX ORDER — LISINOPRIL 10 MG/1
10 TABLET ORAL DAILY
Qty: 90 TABLET | Refills: 2 | Status: SHIPPED | OUTPATIENT
Start: 2023-04-05

## 2023-04-05 SDOH — ECONOMIC STABILITY: INCOME INSECURITY: HOW HARD IS IT FOR YOU TO PAY FOR THE VERY BASICS LIKE FOOD, HOUSING, MEDICAL CARE, AND HEATING?: NOT HARD AT ALL

## 2023-04-05 SDOH — ECONOMIC STABILITY: FOOD INSECURITY: WITHIN THE PAST 12 MONTHS, YOU WORRIED THAT YOUR FOOD WOULD RUN OUT BEFORE YOU GOT MONEY TO BUY MORE.: NEVER TRUE

## 2023-04-05 SDOH — ECONOMIC STABILITY: HOUSING INSECURITY
IN THE LAST 12 MONTHS, WAS THERE A TIME WHEN YOU DID NOT HAVE A STEADY PLACE TO SLEEP OR SLEPT IN A SHELTER (INCLUDING NOW)?: NO

## 2023-04-05 SDOH — ECONOMIC STABILITY: FOOD INSECURITY: WITHIN THE PAST 12 MONTHS, THE FOOD YOU BOUGHT JUST DIDN'T LAST AND YOU DIDN'T HAVE MONEY TO GET MORE.: NEVER TRUE

## 2023-04-05 ASSESSMENT — PATIENT HEALTH QUESTIONNAIRE - PHQ9
SUM OF ALL RESPONSES TO PHQ QUESTIONS 1-9: 0
1. LITTLE INTEREST OR PLEASURE IN DOING THINGS: 0
SUM OF ALL RESPONSES TO PHQ QUESTIONS 1-9: 0
SUM OF ALL RESPONSES TO PHQ QUESTIONS 1-9: 0
SUM OF ALL RESPONSES TO PHQ9 QUESTIONS 1 & 2: 0
SUM OF ALL RESPONSES TO PHQ QUESTIONS 1-9: 0
2. FEELING DOWN, DEPRESSED OR HOPELESS: 0

## 2023-04-05 ASSESSMENT — ENCOUNTER SYMPTOMS
ALLERGIC/IMMUNOLOGIC NEGATIVE: 1
EYES NEGATIVE: 1
RESPIRATORY NEGATIVE: 1
GASTROINTESTINAL NEGATIVE: 1

## 2023-04-05 NOTE — PATIENT INSTRUCTIONS
patches by folding them in half so that the sticky sides meet, and then flushing them down a toilet. They should not be placed in the household trash where children or pets can find them. If you have any questions, ask your provider or pharmacist for more information. Be sure to keep all appointments for provider visits or tests. We are committed to providing you with the best care possible. In order to help us achieve these goals please remember to bring all medications, herbal products, and over the counter supplements with you to each visit. If your provider has ordered testing for you, please be sure to follow up with our office if you have not received results within 7 days after the testing took place. *If you receive a survey after visiting one of our offices, please take time to share your experience concerning your physician office visit. These surveys are confidential and no health information about you is shared. We are eager to improve for you and we are counting on your feedback to help make that happen. We are committed to providing you with the best care possible. In order to help us achieve these goals please remember to bring all medications, herbal products, and over the counter supplements with you to each visit. If your provider has ordered testing for you, please be sure to follow up with our office if you have not received results within 7 days after the testing took place. *If you receive a survey after visiting one of our offices, please take time to share your experience concerning your physician office visit. These surveys are confidential and no health information about you is shared. We are eager to improve for you and we are counting on your feedback to help make that happen.

## 2023-04-05 NOTE — PROGRESS NOTES
Negative. Endocrine: Negative. Genitourinary: Negative. Musculoskeletal: Negative. Skin: Negative. Allergic/Immunologic: Negative. Neurological: Negative. Hematological: Negative. Psychiatric/Behavioral: Negative. OBJECTIVE:  /82 (Site: Right Upper Arm, Position: Sitting, Cuff Size: Medium Adult)   Pulse 89   Temp 98 °F (36.7 °C) (Temporal)   Resp 20   Ht 5' 9\" (1.753 m)   Wt 242 lb 9.6 oz (110 kg)   SpO2 94% Comment: room air  BMI 35.83 kg/m²    Physical Exam  Vitals and nursing note reviewed. Constitutional:       Appearance: He is well-developed. HENT:      Head: Normocephalic and atraumatic. Eyes:      Conjunctiva/sclera: Conjunctivae normal.      Pupils: Pupils are equal, round, and reactive to light. Neck:      Thyroid: No thyromegaly. Vascular: No JVD. Cardiovascular:      Rate and Rhythm: Normal rate and regular rhythm. Heart sounds: No murmur heard. No friction rub. No gallop. Pulmonary:      Effort: Pulmonary effort is normal. No respiratory distress. Breath sounds: Normal breath sounds. No wheezing or rales. Abdominal:      General: Bowel sounds are normal. There is no distension. Palpations: Abdomen is soft. Tenderness: There is no abdominal tenderness. There is no guarding. Musculoskeletal:         General: No tenderness. Cervical back: Normal range of motion and neck supple. Skin:     General: Skin is warm and dry. Findings: No rash. Neurological:      Mental Status: He is alert and oriented to person, place, and time. Psychiatric:         Judgment: Judgment normal.       No results found for requested labs within last 30 days.        Hemoglobin A1C (%)   Date Value   06/07/2022 6.0     LDL Calculated (mg/dL)   Date Value   06/07/2022 see below         Lab Results   Component Value Date/Time    WBC 10.9 06/07/2022 03:15 PM    NEUTROABS 9.0 01/03/2019 08:58 AM    HGB 14.5 06/07/2022 03:15 PM    HCT 43.4

## 2023-06-13 NOTE — PROGRESS NOTES
"    Cardiology Outpatient Visit      Identification: Erik Bowser is a 68 y.o. male who resides in [place], [place].     Reason for visit:  CAD  CV risk factors  Carotid artery disease      Subjective      Mr. Bowser returns to the office today.  He is feeling well and denies anginal symptoms.  He continues to work as a .  He is accompanied by his wife who states he drinks too many Emilee-[id number].  The patient's not had his cholesterol checked since last year.    ROS    Allergies   Allergen Reactions    Rosuvastatin Myalgia         Current Outpatient Medications   Medication Instructions    aspirin 81 mg, Oral, Nightly    atorvastatin (LIPITOR) 80 mg, Oral, Nightly    B Complex Vitamins (VITAMIN-B COMPLEX PO) 1 tablet, Oral, Daily    B-12 1,000 mcg, Oral, Daily    Budeson-Glycopyrrol-Formoterol (Breztri Aerosphere) 160-9-4.8 MCG/ACT aerosol inhaler 2 puffs, Inhalation, 2 Times Daily    fenofibrate (TRICOR) 145 mg, Oral, Daily    fexofenadine (ALLEGRA) 180 mg, Oral, Daily, As needed    lisinopril (PRINIVIL,ZESTRIL) 10 mg, Oral, Daily    metoprolol succinate XL (TOPROL-XL) 25 mg, Oral, Daily    naproxen sodium (ALEVE) 220 mg, Oral, As Needed    nitroglycerin (NITROSTAT) 0.4 mg, Sublingual, Every 5 Minutes PRN    vitamin D3 5,000 Units, Oral, Daily    Zinc 50 MG capsule Oral, Every Morning         Objective     /74 (BP Location: Right arm, Patient Position: Sitting)   Pulse 68   Ht 172.7 cm (68\")   Wt 107 kg (235 lb 9.6 oz)   SpO2 96%   BMI 35.82 kg/m²       Constitutional:       Appearance: Healthy appearance. Obese.   Eyes:      General: No scleral icterus.  Neck:      Thyroid: No thyroid mass.      Vascular: No carotid bruit or JVD. JVD normal.   Pulmonary:      Effort: Pulmonary effort is normal.      Breath sounds: Normal breath sounds.   Cardiovascular:      Normal rate. Regular rhythm.      Murmurs: There is no murmur.      No gallop.    Edema:     Peripheral edema absent.   Skin:     General: Skin is " warm. There is no cyanosis.   Neurological:      General: No focal deficit present.      Mental Status: Alert.   Psychiatric:         Attention and Perception: Attention normal.       Result Review  (reviewed with patient):            Lab Results   Component Value Date    GLUCOSE 97 11/11/2021    BUN 10 11/11/2021    CREATININE 1.00 07/11/2022    EGFR 79 02/26/2015    BCR 12.3 11/11/2021    K 4.5 11/11/2021    CO2 30.0 (H) 11/11/2021    CALCIUM 9.7 11/11/2021    PROTENTOTREF 7.1 04/13/2018    ALBUMIN 4.40 11/11/2021    BILITOT 1.5 (H) 11/11/2021    AST 35 11/11/2021    ALT 44 (H) 11/11/2021     Lab Results   Component Value Date    WBC 10.9 06/07/2022    HGB 14.5 06/07/2022    HCT 43.4 06/07/2022    MCV 95.2 06/07/2022     06/07/2022     Lab Results   Component Value Date    CHOL 162 01/05/2021    CHLPL 254 (H) 06/07/2022    TRIG 500 (H) 06/07/2022    HDL 33 (L) 06/07/2022    LDL see below 06/07/2022     (H) 06/07/2022     Lab Results   Component Value Date    HGBA1C 6.0 06/07/2022           Assessment     Diagnoses and all orders for this visit:    1. Coronary artery disease involving native coronary artery of native heart with angina pectoris (Primary)  Overview:  Cardiac catheterization for inferior STEMI (1/20/2015):  RCA occlusion status post ISH (Xience 3.5 x 23 mm).  LVEF 45%.  Echocardiogram (1/21/2015):  LVEF 55%, no valvular abnormalities, 01/21/2015.  Echocardiogram (7/14/2020): EF 55%. Mild inferior hypokinesis. Trace MR.  Echo (6/29/2022): LVEF 65%. No significant valvular abnormalities.   Nuclear stress test (7/3/2022): No evidence of ischemia. LVEF 59%.     Assessment & Plan:  No angina  Continue aspirin, beta-blocker, statin    Orders:  -     CBC (No Diff); Future  -     metoprolol succinate XL (TOPROL-XL) 25 MG 24 hr tablet; Take 1 tablet by mouth Daily.  Dispense: 90 tablet; Refill: 3  -     nitroglycerin (NITROSTAT) 0.4 MG SL tablet; Place 1 tablet under the tongue Every 5 (Five)  Minutes As Needed for Chest Pain.  Dispense: 25 tablet; Refill: 5    2. Hyperlipidemia LDL goal <70  Overview:  High intensity statin therapy indicated given the presence of CAD    Assessment & Plan:  LDL not well controlled on previous lab analysis  Repeat lipids  If LDL >70, recommend ezetimibe and Repatha  .    Orders:  -     Comprehensive Metabolic Panel; Future  -     Lipid Panel; Future  -     LDL Cholesterol, Direct; Future  -     atorvastatin (LIPITOR) 80 MG tablet; Take 1 tablet by mouth Every Night.  Dispense: 90 tablet; Refill: 3  -     fenofibrate (TRICOR) 145 MG tablet; Take 1 tablet by mouth Daily.  Dispense: 90 tablet; Refill: 3    3. Bilateral carotid artery stenosis  Overview:  CT angiogram (6/29/2022): Circumdifferential <50% stenoses at the origins of bilateral ICAs.      4. Essential hypertension  Overview:  Target blood pressure <130/80 mmHg    Assessment & Plan:  Controlled  Continue present medical therapy    Orders:  -     lisinopril (PRINIVIL,ZESTRIL) 10 MG tablet; Take 1 tablet by mouth Daily.  Dispense: 90 tablet; Refill: 3          Plan   Obtain lab work including CBC, CMP, lipids  If LDL >70, start ezetimibe and plan for Repatha      Follow-up   Return in about 1 year (around 6/14/2024).        Mahendra Lacy MD, FACC, [facility]  6/14/2023

## 2023-06-14 ENCOUNTER — OFFICE VISIT (OUTPATIENT)
Dept: CARDIOLOGY | Facility: CLINIC | Age: 68
End: 2023-06-14
Payer: MEDICARE

## 2023-06-14 VITALS
HEART RATE: 68 BPM | WEIGHT: 235.6 LBS | SYSTOLIC BLOOD PRESSURE: 118 MMHG | DIASTOLIC BLOOD PRESSURE: 74 MMHG | BODY MASS INDEX: 35.71 KG/M2 | OXYGEN SATURATION: 96 % | HEIGHT: 68 IN

## 2023-06-14 DIAGNOSIS — I25.119 CORONARY ARTERY DISEASE INVOLVING NATIVE CORONARY ARTERY OF NATIVE HEART WITH ANGINA PECTORIS: Primary | ICD-10-CM

## 2023-06-14 DIAGNOSIS — E78.5 HYPERLIPIDEMIA LDL GOAL <70: ICD-10-CM

## 2023-06-14 DIAGNOSIS — I65.23 BILATERAL CAROTID ARTERY STENOSIS: ICD-10-CM

## 2023-06-14 DIAGNOSIS — I10 ESSENTIAL HYPERTENSION: ICD-10-CM

## 2023-06-14 PROCEDURE — 1160F RVW MEDS BY RX/DR IN RCRD: CPT | Performed by: INTERNAL MEDICINE

## 2023-06-14 PROCEDURE — 99214 OFFICE O/P EST MOD 30 MIN: CPT | Performed by: INTERNAL MEDICINE

## 2023-06-14 PROCEDURE — 3074F SYST BP LT 130 MM HG: CPT | Performed by: INTERNAL MEDICINE

## 2023-06-14 PROCEDURE — 3078F DIAST BP <80 MM HG: CPT | Performed by: INTERNAL MEDICINE

## 2023-06-14 PROCEDURE — 1159F MED LIST DOCD IN RCRD: CPT | Performed by: INTERNAL MEDICINE

## 2023-06-14 RX ORDER — METOPROLOL SUCCINATE 25 MG/1
25 TABLET, EXTENDED RELEASE ORAL DAILY
Qty: 90 TABLET | Refills: 3 | Status: SHIPPED | OUTPATIENT
Start: 2023-06-14

## 2023-06-14 RX ORDER — NITROGLYCERIN 0.4 MG/1
0.4 TABLET SUBLINGUAL
Qty: 25 TABLET | Refills: 5 | Status: SHIPPED | OUTPATIENT
Start: 2023-06-14

## 2023-06-14 RX ORDER — METOPROLOL SUCCINATE 25 MG/1
25 TABLET, EXTENDED RELEASE ORAL DAILY
COMMUNITY
Start: 2023-03-12 | End: 2023-06-14 | Stop reason: SDUPTHER

## 2023-06-14 RX ORDER — ATORVASTATIN CALCIUM 80 MG/1
80 TABLET, FILM COATED ORAL NIGHTLY
Qty: 90 TABLET | Refills: 3 | Status: SHIPPED | OUTPATIENT
Start: 2023-06-14

## 2023-06-14 RX ORDER — LISINOPRIL 10 MG/1
10 TABLET ORAL DAILY
COMMUNITY
Start: 2023-04-05 | End: 2023-06-14 | Stop reason: SDUPTHER

## 2023-06-14 RX ORDER — FENOFIBRATE 145 MG/1
145 TABLET, COATED ORAL DAILY
Qty: 90 TABLET | Refills: 3 | Status: SHIPPED | OUTPATIENT
Start: 2023-06-14

## 2023-06-14 RX ORDER — LISINOPRIL 10 MG/1
10 TABLET ORAL DAILY
Qty: 90 TABLET | Refills: 3 | Status: SHIPPED | OUTPATIENT
Start: 2023-06-14

## 2023-06-14 NOTE — LETTER
June 14, 2023       No Recipients    Patient: Erik Bowser   YOB: 1955   Date of Visit: 6/14/2023       Dear Dr. Post Recipients:    Thank you for referring Erik Bowser to me for evaluation. Below are the relevant portions of my assessment and plan of care.    If you have questions, please do not hesitate to call me. I look forward to following Erik along with you.         Sincerely,        Ronan Lacy IV, MD        CC:   No Recipients    Ronan Lacy IV, MD  06/14/23 7648  Signed      Cardiology Outpatient Visit      Identification: Erik Bowser is a 68 y.o. male who resides in [place], [place].     Reason for visit:  CAD  CV risk factors  Carotid artery disease      Subjective     Mr. Bowser returns to the office today.  He is feeling well and denies anginal symptoms.  He continues to work as a .  He is accompanied by his wife who states he drinks too many [id number].  The patient's not had his cholesterol checked since last year.    ROS    Allergies   Allergen Reactions   • Rosuvastatin Myalgia         Current Outpatient Medications   Medication Instructions   • aspirin 81 mg, Oral, Nightly   • atorvastatin (LIPITOR) 80 mg, Oral, Nightly   • B Complex Vitamins (VITAMIN-B COMPLEX PO) 1 tablet, Oral, Daily   • B-12 1,000 mcg, Oral, Daily   • Budeson-Glycopyrrol-Formoterol (Breztri Aerosphere) 160-9-4.8 MCG/ACT aerosol inhaler 2 puffs, Inhalation, 2 Times Daily   • fenofibrate (TRICOR) 145 mg, Oral, Daily   • fexofenadine (ALLEGRA) 180 mg, Oral, Daily, As needed   • lisinopril (PRINIVIL,ZESTRIL) 10 mg, Oral, Daily   • metoprolol succinate XL (TOPROL-XL) 25 mg, Oral, Daily   • naproxen sodium (ALEVE) 220 mg, Oral, As Needed   • nitroglycerin (NITROSTAT) 0.4 mg, Sublingual, Every 5 Minutes PRN   • vitamin D3 5,000 Units, Oral, Daily   • Zinc 50 MG capsule Oral, Every Morning         Objective    /74 (BP Location: Right arm, Patient Position: Sitting)   Pulse 68  "  Ht 172.7 cm (68\")   Wt 107 kg (235 lb 9.6 oz)   SpO2 96%   BMI 35.82 kg/m²       Constitutional:       Appearance: Healthy appearance. Obese.   Eyes:      General: No scleral icterus.  Neck:      Thyroid: No thyroid mass.      Vascular: No carotid bruit or JVD. JVD normal.   Pulmonary:      Effort: Pulmonary effort is normal.      Breath sounds: Normal breath sounds.   Cardiovascular:      Normal rate. Regular rhythm.      Murmurs: There is no murmur.      No gallop.    Edema:     Peripheral edema absent.   Skin:     General: Skin is warm. There is no cyanosis.   Neurological:      General: No focal deficit present.      Mental Status: Alert.   Psychiatric:         Attention and Perception: Attention normal.       Result Review (reviewed with patient):            Lab Results   Component Value Date    GLUCOSE 97 11/11/2021    BUN 10 11/11/2021    CREATININE 1.00 07/11/2022    EGFR 79 02/26/2015    BCR 12.3 11/11/2021    K 4.5 11/11/2021    CO2 30.0 (H) 11/11/2021    CALCIUM 9.7 11/11/2021    PROTENTOTREF 7.1 04/13/2018    ALBUMIN 4.40 11/11/2021    BILITOT 1.5 (H) 11/11/2021    AST 35 11/11/2021    ALT 44 (H) 11/11/2021     Lab Results   Component Value Date    WBC 10.9 06/07/2022    HGB 14.5 06/07/2022    HCT 43.4 06/07/2022    MCV 95.2 06/07/2022     06/07/2022     Lab Results   Component Value Date    CHOL 162 01/05/2021    CHLPL 254 (H) 06/07/2022    TRIG 500 (H) 06/07/2022    HDL 33 (L) 06/07/2022    LDL see below 06/07/2022     (H) 06/07/2022     Lab Results   Component Value Date    HGBA1C 6.0 06/07/2022           Assessment    Diagnoses and all orders for this visit:    1. Coronary artery disease involving native coronary artery of native heart with angina pectoris (Primary)  Overview:  Cardiac catheterization for inferior STEMI (1/20/2015):  RCA occlusion status post ISH (Xience 3.5 x 23 mm).  LVEF 45%.  Echocardiogram (1/21/2015):  LVEF 55%, no valvular abnormalities, " 01/21/2015.  Echocardiogram (7/14/2020): EF 55%. Mild inferior hypokinesis. Trace MR.  Echo (6/29/2022): LVEF 65%. No significant valvular abnormalities.   Nuclear stress test (7/3/2022): No evidence of ischemia. LVEF 59%.     Assessment & Plan:  No angina  Continue aspirin, beta-blocker, statin    Orders:  -     CBC (No Diff); Future  -     metoprolol succinate XL (TOPROL-XL) 25 MG 24 hr tablet; Take 1 tablet by mouth Daily.  Dispense: 90 tablet; Refill: 3  -     nitroglycerin (NITROSTAT) 0.4 MG SL tablet; Place 1 tablet under the tongue Every 5 (Five) Minutes As Needed for Chest Pain.  Dispense: 25 tablet; Refill: 5    2. Hyperlipidemia LDL goal <70  Overview:  High intensity statin therapy indicated given the presence of CAD    Assessment & Plan:  LDL not well controlled on previous lab analysis  Repeat lipids  If LDL >70, recommend ezetimibe and Repatha  .    Orders:  -     Comprehensive Metabolic Panel; Future  -     Lipid Panel; Future  -     LDL Cholesterol, Direct; Future  -     atorvastatin (LIPITOR) 80 MG tablet; Take 1 tablet by mouth Every Night.  Dispense: 90 tablet; Refill: 3  -     fenofibrate (TRICOR) 145 MG tablet; Take 1 tablet by mouth Daily.  Dispense: 90 tablet; Refill: 3    3. Bilateral carotid artery stenosis  Overview:  CT angiogram (6/29/2022): Circumdifferential <50% stenoses at the origins of bilateral ICAs.      4. Essential hypertension  Overview:  Target blood pressure <130/80 mmHg    Assessment & Plan:  Controlled  Continue present medical therapy    Orders:  -     lisinopril (PRINIVIL,ZESTRIL) 10 MG tablet; Take 1 tablet by mouth Daily.  Dispense: 90 tablet; Refill: 3          Plan  Obtain lab work including CBC, CMP, lipids  If LDL >70, start ezetimibe and plan for Repatha      Follow-up   Return in about 1 year (around 6/14/2024).        Mahendra Lacy MD, [facility], [facility]  6/14/2023

## 2023-06-14 NOTE — ASSESSMENT & PLAN NOTE
Controlled  Continue present medical therapy  
LDL not well controlled on previous lab analysis  Repeat lipids  If LDL >70, recommend ezetimibe and Repatha  .  
No angina  Continue aspirin, beta-blocker, statin  
0 = understands/communicates without difficulty

## 2023-06-19 DIAGNOSIS — C34.11 MALIGNANT NEOPLASM OF UPPER LOBE OF RIGHT LUNG: Primary | ICD-10-CM

## 2023-06-20 ENCOUNTER — PATIENT MESSAGE (OUTPATIENT)
Dept: PRIMARY CARE CLINIC | Age: 68
End: 2023-06-20

## 2023-06-28 NOTE — ASSESSMENT & PLAN NOTE
· Continue atorvastatin with a goal LDL less than 70   Mohs Rapid Report Verbiage: The area of clinically evident tumor was marked with skin marking ink and appropriately hatched.  The initial incision was made following the Mohs approach through the skin.  The specimen was taken to the lab, divided into the necessary number of pieces, chromacoded and processed according to the Mohs protocol.  This was repeated in successive stages until a tumor free defect was achieved.

## 2023-08-07 ENCOUNTER — OFFICE VISIT (OUTPATIENT)
Dept: PRIMARY CARE CLINIC | Age: 68
End: 2023-08-07
Payer: MEDICARE

## 2023-08-07 VITALS
HEIGHT: 69 IN | SYSTOLIC BLOOD PRESSURE: 123 MMHG | OXYGEN SATURATION: 96 % | WEIGHT: 235.2 LBS | BODY MASS INDEX: 34.83 KG/M2 | TEMPERATURE: 97.9 F | DIASTOLIC BLOOD PRESSURE: 70 MMHG | HEART RATE: 80 BPM | RESPIRATION RATE: 16 BRPM

## 2023-08-07 DIAGNOSIS — E11.9 TYPE 2 DIABETES MELLITUS WITHOUT COMPLICATION, WITHOUT LONG-TERM CURRENT USE OF INSULIN (HCC): Primary | ICD-10-CM

## 2023-08-07 DIAGNOSIS — E78.00 PURE HYPERCHOLESTEROLEMIA: ICD-10-CM

## 2023-08-07 DIAGNOSIS — G56.03 BILATERAL CARPAL TUNNEL SYNDROME: ICD-10-CM

## 2023-08-07 DIAGNOSIS — Z13.29 THYROID DISORDER SCREEN: ICD-10-CM

## 2023-08-07 DIAGNOSIS — E53.8 VITAMIN B 12 DEFICIENCY: ICD-10-CM

## 2023-08-07 DIAGNOSIS — E55.9 VITAMIN D DEFICIENCY: ICD-10-CM

## 2023-08-07 LAB — HBA1C MFR BLD: 6.3 %

## 2023-08-07 PROCEDURE — 99214 OFFICE O/P EST MOD 30 MIN: CPT | Performed by: NURSE PRACTITIONER

## 2023-08-07 PROCEDURE — 3017F COLORECTAL CA SCREEN DOC REV: CPT | Performed by: NURSE PRACTITIONER

## 2023-08-07 PROCEDURE — 1036F TOBACCO NON-USER: CPT | Performed by: NURSE PRACTITIONER

## 2023-08-07 PROCEDURE — 3044F HG A1C LEVEL LT 7.0%: CPT | Performed by: NURSE PRACTITIONER

## 2023-08-07 PROCEDURE — 3078F DIAST BP <80 MM HG: CPT | Performed by: NURSE PRACTITIONER

## 2023-08-07 PROCEDURE — 2022F DILAT RTA XM EVC RTNOPTHY: CPT | Performed by: NURSE PRACTITIONER

## 2023-08-07 PROCEDURE — 1123F ACP DISCUSS/DSCN MKR DOCD: CPT | Performed by: NURSE PRACTITIONER

## 2023-08-07 PROCEDURE — G8417 CALC BMI ABV UP PARAM F/U: HCPCS | Performed by: NURSE PRACTITIONER

## 2023-08-07 PROCEDURE — G8427 DOCREV CUR MEDS BY ELIG CLIN: HCPCS | Performed by: NURSE PRACTITIONER

## 2023-08-07 PROCEDURE — 3074F SYST BP LT 130 MM HG: CPT | Performed by: NURSE PRACTITIONER

## 2023-08-07 RX ORDER — EZETIMIBE 10 MG/1
TABLET ORAL
COMMUNITY
Start: 2023-06-23

## 2023-08-07 ASSESSMENT — ENCOUNTER SYMPTOMS
RESPIRATORY NEGATIVE: 1
EYES NEGATIVE: 1
ALLERGIC/IMMUNOLOGIC NEGATIVE: 1
GASTROINTESTINAL NEGATIVE: 1

## 2023-08-07 NOTE — PROGRESS NOTES
25 Hydroxy;  Future       Written by Denise SEPULVEDA, acting as a scribe for Jerral Dial on 8/7/2023 at 11:22 PM.

## 2023-08-08 ENCOUNTER — HOSPITAL ENCOUNTER (OUTPATIENT)
Facility: HOSPITAL | Age: 68
Discharge: HOME | End: 2023-08-08
Payer: MEDICARE

## 2023-08-08 DIAGNOSIS — E55.9 VITAMIN D DEFICIENCY: ICD-10-CM

## 2023-08-08 DIAGNOSIS — E53.8 VITAMIN B 12 DEFICIENCY: ICD-10-CM

## 2023-08-08 DIAGNOSIS — E11.9 TYPE 2 DIABETES MELLITUS WITHOUT COMPLICATION, WITHOUT LONG-TERM CURRENT USE OF INSULIN (HCC): ICD-10-CM

## 2023-08-08 DIAGNOSIS — Z13.29 THYROID DISORDER SCREEN: ICD-10-CM

## 2023-08-08 DIAGNOSIS — E78.00 PURE HYPERCHOLESTEROLEMIA: ICD-10-CM

## 2023-08-08 LAB
25(OH)D3 SERPL-MCNC: 25.4 NG/ML (ref 32–100)
ALBUMIN SERPL-MCNC: 4.4 G/DL (ref 3.4–4.8)
ALBUMIN/GLOB SERPL: 2 {RATIO} (ref 0.8–2)
ALP SERPL-CCNC: 69 U/L (ref 25–100)
ALT SERPL-CCNC: 28 U/L (ref 4–36)
ANION GAP SERPL CALCULATED.3IONS-SCNC: 13 MMOL/L (ref 3–16)
AST SERPL-CCNC: 26 U/L (ref 8–33)
BILIRUB SERPL-MCNC: 1.2 MG/DL (ref 0.3–1.2)
BUN SERPL-MCNC: 23 MG/DL (ref 6–20)
CALCIUM SERPL-MCNC: 9.9 MG/DL (ref 8.5–10.5)
CHLORIDE SERPL-SCNC: 101 MMOL/L (ref 98–107)
CHOLEST SERPL-MCNC: 125 MG/DL (ref 0–200)
CO2 SERPL-SCNC: 26 MMOL/L (ref 20–30)
CREAT SERPL-MCNC: 1.1 MG/DL (ref 0.4–1.2)
ERYTHROCYTE [DISTWIDTH] IN BLOOD BY AUTOMATED COUNT: 12.5 % (ref 11–16)
FOLATE SERPL-MCNC: 11.84 NG/ML
GFR SERPLBLD CREATININE-BSD FMLA CKD-EPI: >60 ML/MIN/{1.73_M2}
GLOBULIN SER CALC-MCNC: 2.2 G/DL
GLUCOSE SERPL-MCNC: 154 MG/DL (ref 74–106)
HCT VFR BLD AUTO: 41 % (ref 40–54)
HDLC SERPL-MCNC: 32 MG/DL (ref 40–60)
HGB BLD-MCNC: 13.7 G/DL (ref 13–18)
LDLC SERPL CALC-MCNC: 62 MG/DL
MCH RBC QN AUTO: 31.8 PG (ref 27–32)
MCHC RBC AUTO-ENTMCNC: 33.4 G/DL (ref 31–35)
MCV RBC AUTO: 95.1 FL (ref 80–100)
PLATELET # BLD AUTO: 301 K/UL (ref 150–400)
PMV BLD AUTO: 9.8 FL (ref 6–10)
POTASSIUM SERPL-SCNC: 4.4 MMOL/L (ref 3.4–5.1)
PROT SERPL-MCNC: 6.6 G/DL (ref 6.4–8.3)
RBC # BLD AUTO: 4.31 M/UL (ref 4.5–6)
SODIUM SERPL-SCNC: 140 MMOL/L (ref 136–145)
TRIGL SERPL-MCNC: 155 MG/DL (ref 0–249)
TSH SERPL DL<=0.005 MIU/L-ACNC: 2.7 UIU/ML (ref 0.27–4.2)
VIT B12 SERPL-MCNC: 451 PG/ML (ref 211–911)
VLDLC SERPL CALC-MCNC: 31 MG/DL
WBC # BLD AUTO: 10.9 K/UL (ref 4–11)

## 2023-08-08 PROCEDURE — 80061 LIPID PANEL: CPT

## 2023-08-08 PROCEDURE — 82746 ASSAY OF FOLIC ACID SERUM: CPT

## 2023-08-08 PROCEDURE — 82306 VITAMIN D 25 HYDROXY: CPT

## 2023-08-08 PROCEDURE — 85027 COMPLETE CBC AUTOMATED: CPT

## 2023-08-08 PROCEDURE — 82607 VITAMIN B-12: CPT

## 2023-08-08 PROCEDURE — 80053 COMPREHEN METABOLIC PANEL: CPT

## 2023-08-08 PROCEDURE — 84443 ASSAY THYROID STIM HORMONE: CPT

## 2023-08-14 ENCOUNTER — NURSE ONLY (OUTPATIENT)
Dept: PRIMARY CARE CLINIC | Age: 68
End: 2023-08-14

## 2023-08-14 DIAGNOSIS — E11.9 TYPE 2 DIABETES MELLITUS WITHOUT COMPLICATION, WITHOUT LONG-TERM CURRENT USE OF INSULIN (HCC): Primary | ICD-10-CM

## 2023-08-14 LAB — HBA1C MFR BLD: 6.2 %

## 2024-03-01 ENCOUNTER — HOSPITAL ENCOUNTER (OUTPATIENT)
Facility: HOSPITAL | Age: 69
Discharge: HOME | End: 2024-03-01
Payer: MEDICARE

## 2024-03-01 DIAGNOSIS — Z13.29 THYROID DISORDER SCREEN: ICD-10-CM

## 2024-03-01 DIAGNOSIS — E55.9 VITAMIN D DEFICIENCY: ICD-10-CM

## 2024-03-01 DIAGNOSIS — I10 PRIMARY HYPERTENSION: ICD-10-CM

## 2024-03-01 DIAGNOSIS — E11.9 TYPE 2 DIABETES MELLITUS WITHOUT COMPLICATION, WITHOUT LONG-TERM CURRENT USE OF INSULIN (HCC): ICD-10-CM

## 2024-03-01 LAB
25(OH)D3 SERPL-MCNC: 25.8 NG/ML (ref 32–100)
ALBUMIN SERPL-MCNC: 4.2 G/DL (ref 3.4–4.8)
ALBUMIN/GLOB SERPL: 2 {RATIO} (ref 0.8–2)
ALP SERPL-CCNC: 61 U/L (ref 25–100)
ALT SERPL-CCNC: 33 U/L (ref 4–36)
ANION GAP SERPL CALCULATED.3IONS-SCNC: 10 MMOL/L (ref 3–16)
AST SERPL-CCNC: 34 U/L (ref 8–33)
BILIRUB SERPL-MCNC: 1 MG/DL (ref 0.3–1.2)
BUN SERPL-MCNC: 22 MG/DL (ref 6–20)
CALCIUM SERPL-MCNC: 9.9 MG/DL (ref 8.5–10.5)
CHLORIDE SERPL-SCNC: 101 MMOL/L (ref 98–107)
CHOLEST SERPL-MCNC: 108 MG/DL (ref 0–200)
CO2 SERPL-SCNC: 28 MMOL/L (ref 20–30)
ERYTHROCYTE [DISTWIDTH] IN BLOOD BY AUTOMATED COUNT: 12.6 % (ref 11–16)
FOLATE SERPL-MCNC: 10.33 NG/ML
GFR SERPLBLD CREATININE-BSD FMLA CKD-EPI: >60 ML/MIN/{1.73_M2}
GLOBULIN SER CALC-MCNC: 2.1 G/DL
GLUCOSE SERPL-MCNC: 125 MG/DL (ref 74–106)
HCT VFR BLD AUTO: 44.2 % (ref 40–54)
HDLC SERPL-MCNC: 35 MG/DL (ref 40–60)
HGB BLD-MCNC: 14.5 G/DL (ref 13–18)
LDLC SERPL CALC-MCNC: 49 MG/DL
MCH RBC QN AUTO: 31.8 PG (ref 27–32)
MCHC RBC AUTO-ENTMCNC: 32.8 G/DL (ref 31–35)
MCV RBC AUTO: 96.9 FL (ref 80–100)
PLATELET # BLD AUTO: 326 K/UL (ref 150–400)
PMV BLD AUTO: 10.2 FL (ref 6–10)
POTASSIUM SERPL-SCNC: 4.3 MMOL/L (ref 3.4–5.1)
PROT SERPL-MCNC: 6.3 G/DL (ref 6.4–8.3)
RBC # BLD AUTO: 4.56 M/UL (ref 4.5–6)
SODIUM SERPL-SCNC: 139 MMOL/L (ref 136–145)
TRIGL SERPL-MCNC: 118 MG/DL (ref 0–249)
TSH SERPL DL<=0.005 MIU/L-ACNC: 2.81 UIU/ML (ref 0.27–4.2)
VIT B12 SERPL-MCNC: 952 PG/ML (ref 211–911)
VLDLC SERPL CALC-MCNC: 24 MG/DL
WBC # BLD AUTO: 9.9 K/UL (ref 4–11)

## 2024-03-01 PROCEDURE — 85027 COMPLETE CBC AUTOMATED: CPT

## 2024-03-01 PROCEDURE — 36415 COLL VENOUS BLD VENIPUNCTURE: CPT

## 2024-03-01 PROCEDURE — 82306 VITAMIN D 25 HYDROXY: CPT

## 2024-03-01 PROCEDURE — 80061 LIPID PANEL: CPT

## 2024-03-01 PROCEDURE — 82607 VITAMIN B-12: CPT

## 2024-03-01 PROCEDURE — 84443 ASSAY THYROID STIM HORMONE: CPT

## 2024-03-01 PROCEDURE — 82746 ASSAY OF FOLIC ACID SERUM: CPT

## 2024-03-01 PROCEDURE — 80053 COMPREHEN METABOLIC PANEL: CPT

## 2024-04-08 ENCOUNTER — OFFICE VISIT (OUTPATIENT)
Dept: PRIMARY CARE CLINIC | Age: 69
End: 2024-04-08
Payer: MEDICARE

## 2024-04-08 VITALS
SYSTOLIC BLOOD PRESSURE: 121 MMHG | HEART RATE: 75 BPM | OXYGEN SATURATION: 96 % | DIASTOLIC BLOOD PRESSURE: 79 MMHG | TEMPERATURE: 97.8 F | WEIGHT: 235 LBS | BODY MASS INDEX: 34.7 KG/M2

## 2024-04-08 DIAGNOSIS — E78.00 PURE HYPERCHOLESTEROLEMIA: ICD-10-CM

## 2024-04-08 DIAGNOSIS — E55.9 VITAMIN D DEFICIENCY: ICD-10-CM

## 2024-04-08 DIAGNOSIS — E11.9 TYPE 2 DIABETES MELLITUS WITHOUT COMPLICATION, WITHOUT LONG-TERM CURRENT USE OF INSULIN (HCC): Primary | ICD-10-CM

## 2024-04-08 DIAGNOSIS — I10 PRIMARY HYPERTENSION: ICD-10-CM

## 2024-04-08 DIAGNOSIS — G56.03 BILATERAL CARPAL TUNNEL SYNDROME: ICD-10-CM

## 2024-04-08 LAB — HBA1C MFR BLD: 5.9 %

## 2024-04-08 PROCEDURE — 1123F ACP DISCUSS/DSCN MKR DOCD: CPT | Performed by: NURSE PRACTITIONER

## 2024-04-08 PROCEDURE — 3044F HG A1C LEVEL LT 7.0%: CPT | Performed by: NURSE PRACTITIONER

## 2024-04-08 PROCEDURE — 1036F TOBACCO NON-USER: CPT | Performed by: NURSE PRACTITIONER

## 2024-04-08 PROCEDURE — 2022F DILAT RTA XM EVC RTNOPTHY: CPT | Performed by: NURSE PRACTITIONER

## 2024-04-08 PROCEDURE — G8427 DOCREV CUR MEDS BY ELIG CLIN: HCPCS | Performed by: NURSE PRACTITIONER

## 2024-04-08 PROCEDURE — 3017F COLORECTAL CA SCREEN DOC REV: CPT | Performed by: NURSE PRACTITIONER

## 2024-04-08 PROCEDURE — 3078F DIAST BP <80 MM HG: CPT | Performed by: NURSE PRACTITIONER

## 2024-04-08 PROCEDURE — G8417 CALC BMI ABV UP PARAM F/U: HCPCS | Performed by: NURSE PRACTITIONER

## 2024-04-08 PROCEDURE — 83036 HEMOGLOBIN GLYCOSYLATED A1C: CPT | Performed by: NURSE PRACTITIONER

## 2024-04-08 PROCEDURE — 3074F SYST BP LT 130 MM HG: CPT | Performed by: NURSE PRACTITIONER

## 2024-04-08 PROCEDURE — 99214 OFFICE O/P EST MOD 30 MIN: CPT | Performed by: NURSE PRACTITIONER

## 2024-04-08 RX ORDER — METOPROLOL SUCCINATE 25 MG/1
TABLET, EXTENDED RELEASE ORAL
COMMUNITY
Start: 2024-03-12

## 2024-04-08 RX ORDER — ATORVASTATIN CALCIUM 80 MG/1
TABLET, FILM COATED ORAL
COMMUNITY
Start: 2024-03-12

## 2024-04-08 SDOH — ECONOMIC STABILITY: FOOD INSECURITY: WITHIN THE PAST 12 MONTHS, YOU WORRIED THAT YOUR FOOD WOULD RUN OUT BEFORE YOU GOT MONEY TO BUY MORE.: NEVER TRUE

## 2024-04-08 SDOH — ECONOMIC STABILITY: FOOD INSECURITY: WITHIN THE PAST 12 MONTHS, THE FOOD YOU BOUGHT JUST DIDN'T LAST AND YOU DIDN'T HAVE MONEY TO GET MORE.: NEVER TRUE

## 2024-04-08 SDOH — ECONOMIC STABILITY: INCOME INSECURITY: HOW HARD IS IT FOR YOU TO PAY FOR THE VERY BASICS LIKE FOOD, HOUSING, MEDICAL CARE, AND HEATING?: NOT HARD AT ALL

## 2024-04-08 ASSESSMENT — PATIENT HEALTH QUESTIONNAIRE - PHQ9
SUM OF ALL RESPONSES TO PHQ QUESTIONS 1-9: 0
SUM OF ALL RESPONSES TO PHQ QUESTIONS 1-9: 0
1. LITTLE INTEREST OR PLEASURE IN DOING THINGS: NOT AT ALL
2. FEELING DOWN, DEPRESSED OR HOPELESS: NOT AT ALL
SUM OF ALL RESPONSES TO PHQ QUESTIONS 1-9: 0
SUM OF ALL RESPONSES TO PHQ9 QUESTIONS 1 & 2: 0
SUM OF ALL RESPONSES TO PHQ QUESTIONS 1-9: 0

## 2024-04-08 NOTE — PROGRESS NOTES
Have you seen any other physician or provider since your last visit yes - patient had injection for his hand.     Have you had any other diagnostic tests since your last visit? yes - labs    Have you changed or stopped any medications since your last visit? no     Chief Complaint   Patient presents with    Diabetes     4 month follow up.     Carpal Tunnel     Patient had a cortisone injection since his last visit and it is better.        
back: Normal range of motion and neck supple.   Skin:     General: Skin is warm and dry.      Findings: No rash.   Neurological:      Mental Status: He is alert and oriented to person, place, and time.   Psychiatric:         Judgment: Judgment normal.         No results found for requested labs within last 30 days.       Hemoglobin A1C (%)   Date Value   04/08/2024 5.9     LDL Calculated (mg/dL)   Date Value   03/01/2024 49     LDL Direct (mg/dL)   Date Value   06/07/2022 137 (H)         Lab Results   Component Value Date/Time    WBC 9.9 03/01/2024 10:35 AM    NEUTROABS 9.0 01/03/2019 08:58 AM    HGB 14.5 03/01/2024 10:35 AM    HCT 44.2 03/01/2024 10:35 AM    MCV 96.9 03/01/2024 10:35 AM     03/01/2024 10:35 AM       Lab Results   Component Value Date    TSH 2.81 03/01/2024         ASSESSMENT/PLAN:     1. Type 2 diabetes mellitus without complication, without long-term current use of insulin ([facility])  Hemoglobin A1C   Date Value Ref Range Status   04/08/2024 5.9 % Final       - Vitamin B12 & Folate; Future  - TSH; Future  - Lipid Panel; Future  - Comprehensive Metabolic Panel; Future  - CBC; Future  - Vitamin D 25 Hydroxy; Future  - Hemoglobin A1C; Future  - POCT glycosylated hemoglobin (Hb A1C)    2. Primary hypertension  Lisinopril 10 mg daily   - TSH; Future  - Hemoglobin A1C; Future  - POCT glycosylated hemoglobin (Hb A1C)    3. Bilateral carpal tunnel syndrome  Continue braces at night and injections prn     4. Vitamin D deficiency    - Vitamin D 25 Hydroxy; Future     5. Hyperlipidemia   Tricor 145 mg daily   Lipitor 80 mg daily   Zetia 10 mg daily   Written by Roselia SEPULVEDA, acting as a scribe for Fariha Lovett on 4/8/2024 at 10:38 PM.

## 2024-04-13 ASSESSMENT — ENCOUNTER SYMPTOMS
RESPIRATORY NEGATIVE: 1
EYES NEGATIVE: 1
GASTROINTESTINAL NEGATIVE: 1

## 2024-06-13 NOTE — PROGRESS NOTES
"    Cardiology Outpatient Visit      Identification: Erik Bowser is a 69 y.o. male who resides in [place], [place].     Reason for visit:  CAD with previous STEMI      Subjective      Erik returns to the office today for routine follow-up.  He is doing well with no cardiovascular complaints of angina or heart failure.  He does get acid reflux from time to time when he eats onions.  The symptoms improved with Tums.  He does not get the symptoms with exertion.  He continues to work as a .    He is scheduled to get blood work with Nat Martins in the near future.  She will be testing him for diabetes as he has been prediabetic.    ROS    Allergies   Allergen Reactions    Rosuvastatin Myalgia         Current Outpatient Medications   Medication Instructions    aspirin 81 mg, Oral, Nightly    atorvastatin (LIPITOR) 80 mg, Oral, Nightly    B Complex Vitamins (VITAMIN-B COMPLEX PO) 1 tablet, Oral, Daily    B-12 1,000 mcg, Oral, Daily    fenofibrate (TRICOR) 145 mg, Oral, Daily    fexofenadine (ALLEGRA) 180 mg, Oral, Daily, As needed    lisinopril (PRINIVIL,ZESTRIL) 10 mg, Oral, Daily    metoprolol succinate XL (TOPROL-XL) 25 mg, Oral, Daily    Misc Natural Products (YumVs Beet Root-Tart Cherry) 250-0.5 MG chewable tablet Oral    naproxen sodium (ALEVE) 220 mg, Oral, As Needed    nitroglycerin (NITROSTAT) 0.4 mg, Sublingual, Every 5 Minutes PRN    vitamin D3 5,000 Units, Oral, Daily    Zinc 50 MG capsule Oral, Every Morning         Objective     /60 (BP Location: Right arm, Patient Position: Sitting, Cuff Size: Adult)   Pulse 84   Ht 172.7 cm (68\")   Wt 107 kg (235 lb)   SpO2 96%   BMI 35.73 kg/m²       Constitutional:       Appearance: Healthy appearance.   Eyes:      General: No scleral icterus.  Neck:      Thyroid: No thyroid mass.      Vascular: No carotid bruit or JVD. JVD normal.   Pulmonary:      Effort: Pulmonary effort is normal.      Breath sounds: Normal breath sounds.   Cardiovascular:      " Normal rate. Regular rhythm.      Murmurs: There is no murmur.      No gallop.    Edema:     Peripheral edema absent.   Skin:     General: Skin is warm. There is no cyanosis.   Neurological:      General: No focal deficit present.      Mental Status: Alert.   Psychiatric:         Attention and Perception: Attention normal.         Result Review  (reviewed with patient):            Lab Results   Component Value Date    GLUCOSE 119 (H) 06/20/2023    BUN 21 06/20/2023    CREATININE 1.11 06/20/2023    EGFRRESULT 73 06/20/2023    EGFR 79 02/26/2015    BCR 19 06/20/2023    K 4.8 06/20/2023    CO2 26 06/20/2023    CALCIUM 9.7 06/20/2023    PROTENTOTREF 6.9 06/20/2023    ALBUMIN 4.4 06/20/2023    BILITOT 0.9 06/20/2023    AST 26 06/20/2023    ALT 33 06/20/2023     Lab Results   Component Value Date    WBC 9.9 03/01/2024    HGB 14.5 03/01/2024    HCT 44.2 03/01/2024    MCV 96.9 03/01/2024     03/01/2024     Lab Results   Component Value Date    CHOL 162 01/05/2021    CHLPL 108 03/01/2024    TRIG 118 03/01/2024    HDL 35 (L) 03/01/2024    LDL 49 03/01/2024     Lab Results   Component Value Date    HGBA1C 6.0 06/07/2022           Assessment     Diagnoses and all orders for this visit:    1. Coronary artery disease involving native coronary artery of native heart without angina pectoris (Primary)  Overview:  Cardiac catheterization for inferior STEMI (1/20/2015):  RCA occlusion status post ISH (Xience 3.5 x 23 mm).  LVEF 45%.  Echocardiogram (1/21/2015):  LVEF 55%, no valvular abnormalities, 01/21/2015.  Echocardiogram (7/14/2020): EF 55%. Mild inferior hypokinesis. Trace MR.  Echo (6/29/2022): LVEF 65%. No significant valvular abnormalities.   Nuclear stress test (7/3/2022): No evidence of ischemia. LVEF 59%.     Assessment & Plan:  No angina  Continue aspirin, statin     Orders:  -     metoprolol succinate XL (TOPROL-XL) 25 MG 24 hr tablet; Take 1 tablet by mouth Daily.  Dispense: 90 tablet; Refill: 3  -      nitroglycerin (NITROSTAT) 0.4 MG SL tablet; Place 1 tablet under the tongue Every 5 (Five) Minutes As Needed for Chest Pain.  Dispense: 25 tablet; Refill: 5  -     aspirin 81 MG EC tablet; Take 1 tablet by mouth Every Night.  Dispense: 90 tablet; Refill: 3    2. Prediabetes  Assessment & Plan:  If patient becomes diabetic, would be candidate for GLP agonist therapy due to history of cardiovascular disease, previous MI, and obesity      3. Hyperlipidemia LDL goal <50  Overview:  High intensity statin therapy indicated given the presence of CAD  Intolerant to rosuvastatin    Assessment & Plan:  LDL reasonably controlled on atorvastatin    Orders:  -     atorvastatin (LIPITOR) 80 MG tablet; Take 1 tablet by mouth Every Night.  Dispense: 90 tablet; Refill: 3    4. Bilateral carotid artery stenosis  Overview:  CT angiogram (6/29/2022): <50% stenoses at the origins of bilateral ICAs.    Assessment & Plan:  No TIA or stroke symptoms  Continue antiplatelet and statin      5. Essential hypertension  Overview:  Target blood pressure <130/80 mmHg    Assessment & Plan:  Controlled  Continue present medical therapy            Plan   Continue present medical therapy  Consider semaglutide if patient becomes diabetic due to cardiovascular benefit and obesity      Follow-up   No follow-ups on file.        Mahendra Lacy MD, FACC, [facility]  6/14/2024

## 2024-06-14 ENCOUNTER — OFFICE VISIT (OUTPATIENT)
Dept: CARDIOLOGY | Facility: CLINIC | Age: 69
End: 2024-06-14
Payer: MEDICARE

## 2024-06-14 VITALS
OXYGEN SATURATION: 96 % | SYSTOLIC BLOOD PRESSURE: 108 MMHG | DIASTOLIC BLOOD PRESSURE: 60 MMHG | WEIGHT: 235 LBS | HEART RATE: 84 BPM | BODY MASS INDEX: 35.61 KG/M2 | HEIGHT: 68 IN

## 2024-06-14 DIAGNOSIS — R73.03 PREDIABETES: ICD-10-CM

## 2024-06-14 DIAGNOSIS — E78.5 HYPERLIPIDEMIA LDL GOAL <50: ICD-10-CM

## 2024-06-14 DIAGNOSIS — I65.23 BILATERAL CAROTID ARTERY STENOSIS: ICD-10-CM

## 2024-06-14 DIAGNOSIS — I25.10 CORONARY ARTERY DISEASE INVOLVING NATIVE CORONARY ARTERY OF NATIVE HEART WITHOUT ANGINA PECTORIS: Primary | ICD-10-CM

## 2024-06-14 DIAGNOSIS — E78.5 HYPERLIPIDEMIA LDL GOAL <70: ICD-10-CM

## 2024-06-14 DIAGNOSIS — I10 ESSENTIAL HYPERTENSION: ICD-10-CM

## 2024-06-14 DIAGNOSIS — I25.119 CORONARY ARTERY DISEASE INVOLVING NATIVE CORONARY ARTERY OF NATIVE HEART WITH ANGINA PECTORIS: ICD-10-CM

## 2024-06-14 RX ORDER — METOPROLOL SUCCINATE 25 MG/1
25 TABLET, EXTENDED RELEASE ORAL DAILY
Qty: 90 TABLET | Refills: 3 | Status: SHIPPED | OUTPATIENT
Start: 2024-06-14 | End: 2024-06-14 | Stop reason: SDUPTHER

## 2024-06-14 RX ORDER — FENOFIBRATE 145 MG/1
145 TABLET, COATED ORAL DAILY
Qty: 90 TABLET | Refills: 3 | Status: SHIPPED | OUTPATIENT
Start: 2024-06-14

## 2024-06-14 RX ORDER — ASCORBIC ACID 125 MG
TABLET,CHEWABLE ORAL
COMMUNITY

## 2024-06-14 RX ORDER — ATORVASTATIN CALCIUM 80 MG/1
80 TABLET, FILM COATED ORAL NIGHTLY
Qty: 90 TABLET | Refills: 3 | Status: SHIPPED | OUTPATIENT
Start: 2024-06-14

## 2024-06-14 RX ORDER — ATORVASTATIN CALCIUM 80 MG/1
80 TABLET, FILM COATED ORAL NIGHTLY
Qty: 90 TABLET | Refills: 3 | Status: SHIPPED | OUTPATIENT
Start: 2024-06-14 | End: 2024-06-14 | Stop reason: SDUPTHER

## 2024-06-14 RX ORDER — NITROGLYCERIN 0.4 MG/1
0.4 TABLET SUBLINGUAL
Qty: 25 TABLET | Refills: 5 | Status: SHIPPED | OUTPATIENT
Start: 2024-06-14

## 2024-06-14 RX ORDER — METOPROLOL SUCCINATE 25 MG/1
25 TABLET, EXTENDED RELEASE ORAL DAILY
Qty: 90 TABLET | Refills: 3 | Status: SHIPPED | OUTPATIENT
Start: 2024-06-14

## 2024-06-14 RX ORDER — ASPIRIN 81 MG/1
81 TABLET ORAL NIGHTLY
Qty: 90 TABLET | Refills: 3 | Status: SHIPPED | OUTPATIENT
Start: 2024-06-14

## 2024-06-14 NOTE — ASSESSMENT & PLAN NOTE
If patient becomes diabetic, would be candidate for GLP agonist therapy due to history of cardiovascular disease, previous MI, and obesity

## 2024-06-14 NOTE — LETTER
June 17, 2024     SOBEIDA Benton  [address]  [facility] [address]    Patient: Erik Bowser   YOB: 1955   Date of Visit: 6/14/2024     Dear SOBEIDA Benton:       Thank you for referring Erik Bowser to me for evaluation. Below are the relevant portions of my assessment and plan of care.    If you have questions, please do not hesitate to call me. I look forward to following Erik along with you.         Sincerely,        Ronan Lacy IV, MD        CC: No Recipients    Ronan Lacy IV, MD  06/14/24 1538  Signed      Cardiology Outpatient Visit      Identification: Erik Bowser is a 69 y.o. male who resides in [place], [place].     Reason for visit:  CAD with previous STEMI      Subjective     Erik returns to the office today for routine follow-up.  He is doing well with no cardiovascular complaints of angina or heart failure.  He does get acid reflux from time to time when he eats onions.  The symptoms improved with Tums.  He does not get the symptoms with exertion.  He continues to work as a .    He is scheduled to get blood work with Nat Martins in the near future.  She will be testing him for diabetes as he has been prediabetic.    ROS    Allergies   Allergen Reactions   • Rosuvastatin Myalgia         Current Outpatient Medications   Medication Instructions   • aspirin 81 mg, Oral, Nightly   • atorvastatin (LIPITOR) 80 mg, Oral, Nightly   • B Complex Vitamins (VITAMIN-B COMPLEX PO) 1 tablet, Oral, Daily   • B-12 1,000 mcg, Oral, Daily   • fenofibrate (TRICOR) 145 mg, Oral, Daily   • fexofenadine (ALLEGRA) 180 mg, Oral, Daily, As needed   • lisinopril (PRINIVIL,ZESTRIL) 10 mg, Oral, Daily   • metoprolol succinate XL (TOPROL-XL) 25 mg, Oral, Daily   • Misc Natural Products (YumVs Beet Root-Tart Cherry) 250-0.5 MG chewable tablet Oral   • naproxen sodium (ALEVE) 220 mg, Oral, As Needed   • nitroglycerin (NITROSTAT) 0.4 mg, Sublingual, Every 5  "Minutes PRN   • vitamin D3 5,000 Units, Oral, Daily   • Zinc 50 MG capsule Oral, Every Morning         Objective    /60 (BP Location: Right arm, Patient Position: Sitting, Cuff Size: Adult)   Pulse 84   Ht 172.7 cm (68\")   Wt 107 kg (235 lb)   SpO2 96%   BMI 35.73 kg/m²       Constitutional:       Appearance: Healthy appearance.   Eyes:      General: No scleral icterus.  Neck:      Thyroid: No thyroid mass.      Vascular: No carotid bruit or JVD. JVD normal.   Pulmonary:      Effort: Pulmonary effort is normal.      Breath sounds: Normal breath sounds.   Cardiovascular:      Normal rate. Regular rhythm.      Murmurs: There is no murmur.      No gallop.    Edema:     Peripheral edema absent.   Skin:     General: Skin is warm. There is no cyanosis.   Neurological:      General: No focal deficit present.      Mental Status: Alert.   Psychiatric:         Attention and Perception: Attention normal.         Result Review (reviewed with patient):            Lab Results   Component Value Date    GLUCOSE 119 (H) 06/20/2023    BUN 21 06/20/2023    CREATININE 1.11 06/20/2023    EGFRRESULT 73 06/20/2023    EGFR 79 02/26/2015    BCR 19 06/20/2023    K 4.8 06/20/2023    CO2 26 06/20/2023    CALCIUM 9.7 06/20/2023    PROTENTOTREF 6.9 06/20/2023    ALBUMIN 4.4 06/20/2023    BILITOT 0.9 06/20/2023    AST 26 06/20/2023    ALT 33 06/20/2023     Lab Results   Component Value Date    WBC 9.9 03/01/2024    HGB 14.5 03/01/2024    HCT 44.2 03/01/2024    MCV 96.9 03/01/2024     03/01/2024     Lab Results   Component Value Date    CHOL 162 01/05/2021    CHLPL 108 03/01/2024    TRIG 118 03/01/2024    HDL 35 (L) 03/01/2024    LDL 49 03/01/2024     Lab Results   Component Value Date    HGBA1C 6.0 06/07/2022           Assessment    Diagnoses and all orders for this visit:    1. Coronary artery disease involving native coronary artery of native heart without angina pectoris (Primary)  Overview:  Cardiac catheterization for " inferior STEMI (1/20/2015):  RCA occlusion status post ISH (Xience 3.5 x 23 mm).  LVEF 45%.  Echocardiogram (1/21/2015):  LVEF 55%, no valvular abnormalities, 01/21/2015.  Echocardiogram (7/14/2020): EF 55%. Mild inferior hypokinesis. Trace MR.  Echo (6/29/2022): LVEF 65%. No significant valvular abnormalities.   Nuclear stress test (7/3/2022): No evidence of ischemia. LVEF 59%.     Assessment & Plan:  No angina  Continue aspirin, statin     Orders:  -     metoprolol succinate XL (TOPROL-XL) 25 MG 24 hr tablet; Take 1 tablet by mouth Daily.  Dispense: 90 tablet; Refill: 3  -     nitroglycerin (NITROSTAT) 0.4 MG SL tablet; Place 1 tablet under the tongue Every 5 (Five) Minutes As Needed for Chest Pain.  Dispense: 25 tablet; Refill: 5  -     aspirin 81 MG EC tablet; Take 1 tablet by mouth Every Night.  Dispense: 90 tablet; Refill: 3    2. Prediabetes  Assessment & Plan:  If patient becomes diabetic, would be candidate for GLP agonist therapy due to history of cardiovascular disease, previous MI, and obesity      3. Hyperlipidemia LDL goal <50  Overview:  High intensity statin therapy indicated given the presence of CAD  Intolerant to rosuvastatin    Assessment & Plan:  LDL reasonably controlled on atorvastatin    Orders:  -     atorvastatin (LIPITOR) 80 MG tablet; Take 1 tablet by mouth Every Night.  Dispense: 90 tablet; Refill: 3    4. Bilateral carotid artery stenosis  Overview:  CT angiogram (6/29/2022): <50% stenoses at the origins of bilateral ICAs.    Assessment & Plan:  No TIA or stroke symptoms  Continue antiplatelet and statin      5. Essential hypertension  Overview:  Target blood pressure <130/80 mmHg    Assessment & Plan:  Controlled  Continue present medical therapy            Plan  Continue present medical therapy  Consider semaglutide if patient becomes diabetic due to cardiovascular benefit and obesity      Follow-up   No follow-ups on file.        Mahendra Lacy MD, FACC, [facility]  6/14/2024

## 2024-06-14 NOTE — TELEPHONE ENCOUNTER
Lab Results   Component Value Date    CHOL 162 01/05/2021    CHLPL 125 08/08/2023    TRIG 155 08/08/2023    HDL 32 (L) 08/08/2023    LDL 62 08/08/2023      Lab Results   Component Value Date    GLUCOSE 119 (H) 06/20/2023    BUN 21 06/20/2023    CREATININE 1.11 06/20/2023    EGFRRESULT 73 06/20/2023    EGFR 79 02/26/2015    BCR 19 06/20/2023    K 4.8 06/20/2023    CO2 26 06/20/2023    CALCIUM 9.7 06/20/2023    PROTENTOTREF 6.9 06/20/2023    ALBUMIN 4.4 06/20/2023    BILITOT 0.9 06/20/2023    AST 26 06/20/2023    ALT 33 06/20/2023

## 2024-06-14 NOTE — LETTER
June 17, 2024     SOBEIDA Benton  [address]  [facility] [address]    Patient: Erik Bowser   YOB: 1955   Date of Visit: 6/14/2024     Dear SOBEIDA Benton:       Thank you for referring Erik Bowser to me for evaluation. Below are the relevant portions of my assessment and plan of care.    If you have questions, please do not hesitate to call me. I look forward to following Erik along with you.         Sincerely,        Ronan Lacy IV, MD        CC: No Recipients    Ronan Lacy IV, MD  06/14/24 1538  Signed      Cardiology Outpatient Visit      Identification: Erik Bowser is a 69 y.o. male who resides in [place], [place].     Reason for visit:  CAD with previous STEMI      Subjective     Erik returns to the office today for routine follow-up.  He is doing well with no cardiovascular complaints of angina or heart failure.  He does get acid reflux from time to time when he eats onions.  The symptoms improved with Tums.  He does not get the symptoms with exertion.  He continues to work as a .    He is scheduled to get blood work with Nat Martins in the near future.  She will be testing him for diabetes as he has been prediabetic.    ROS    Allergies   Allergen Reactions   • Rosuvastatin Myalgia         Current Outpatient Medications   Medication Instructions   • aspirin 81 mg, Oral, Nightly   • atorvastatin (LIPITOR) 80 mg, Oral, Nightly   • B Complex Vitamins (VITAMIN-B COMPLEX PO) 1 tablet, Oral, Daily   • B-12 1,000 mcg, Oral, Daily   • fenofibrate (TRICOR) 145 mg, Oral, Daily   • fexofenadine (ALLEGRA) 180 mg, Oral, Daily, As needed   • lisinopril (PRINIVIL,ZESTRIL) 10 mg, Oral, Daily   • metoprolol succinate XL (TOPROL-XL) 25 mg, Oral, Daily   • Misc Natural Products (YumVs Beet Root-Tart Cherry) 250-0.5 MG chewable tablet Oral   • naproxen sodium (ALEVE) 220 mg, Oral, As Needed   • nitroglycerin (NITROSTAT) 0.4 mg, Sublingual, Every 5  "Minutes PRN   • vitamin D3 5,000 Units, Oral, Daily   • Zinc 50 MG capsule Oral, Every Morning         Objective    /60 (BP Location: Right arm, Patient Position: Sitting, Cuff Size: Adult)   Pulse 84   Ht 172.7 cm (68\")   Wt 107 kg (235 lb)   SpO2 96%   BMI 35.73 kg/m²       Constitutional:       Appearance: Healthy appearance.   Eyes:      General: No scleral icterus.  Neck:      Thyroid: No thyroid mass.      Vascular: No carotid bruit or JVD. JVD normal.   Pulmonary:      Effort: Pulmonary effort is normal.      Breath sounds: Normal breath sounds.   Cardiovascular:      Normal rate. Regular rhythm.      Murmurs: There is no murmur.      No gallop.    Edema:     Peripheral edema absent.   Skin:     General: Skin is warm. There is no cyanosis.   Neurological:      General: No focal deficit present.      Mental Status: Alert.   Psychiatric:         Attention and Perception: Attention normal.         Result Review (reviewed with patient):            Lab Results   Component Value Date    GLUCOSE 119 (H) 06/20/2023    BUN 21 06/20/2023    CREATININE 1.11 06/20/2023    EGFRRESULT 73 06/20/2023    EGFR 79 02/26/2015    BCR 19 06/20/2023    K 4.8 06/20/2023    CO2 26 06/20/2023    CALCIUM 9.7 06/20/2023    PROTENTOTREF 6.9 06/20/2023    ALBUMIN 4.4 06/20/2023    BILITOT 0.9 06/20/2023    AST 26 06/20/2023    ALT 33 06/20/2023     Lab Results   Component Value Date    WBC 9.9 03/01/2024    HGB 14.5 03/01/2024    HCT 44.2 03/01/2024    MCV 96.9 03/01/2024     03/01/2024     Lab Results   Component Value Date    CHOL 162 01/05/2021    CHLPL 108 03/01/2024    TRIG 118 03/01/2024    HDL 35 (L) 03/01/2024    LDL 49 03/01/2024     Lab Results   Component Value Date    HGBA1C 6.0 06/07/2022           Assessment    Diagnoses and all orders for this visit:    1. Coronary artery disease involving native coronary artery of native heart without angina pectoris (Primary)  Overview:  Cardiac catheterization for " inferior STEMI (1/20/2015):  RCA occlusion status post ISH (Xience 3.5 x 23 mm).  LVEF 45%.  Echocardiogram (1/21/2015):  LVEF 55%, no valvular abnormalities, 01/21/2015.  Echocardiogram (7/14/2020): EF 55%. Mild inferior hypokinesis. Trace MR.  Echo (6/29/2022): LVEF 65%. No significant valvular abnormalities.   Nuclear stress test (7/3/2022): No evidence of ischemia. LVEF 59%.     Assessment & Plan:  No angina  Continue aspirin, statin     Orders:  -     metoprolol succinate XL (TOPROL-XL) 25 MG 24 hr tablet; Take 1 tablet by mouth Daily.  Dispense: 90 tablet; Refill: 3  -     nitroglycerin (NITROSTAT) 0.4 MG SL tablet; Place 1 tablet under the tongue Every 5 (Five) Minutes As Needed for Chest Pain.  Dispense: 25 tablet; Refill: 5  -     aspirin 81 MG EC tablet; Take 1 tablet by mouth Every Night.  Dispense: 90 tablet; Refill: 3    2. Prediabetes  Assessment & Plan:  If patient becomes diabetic, would be candidate for GLP agonist therapy due to history of cardiovascular disease, previous MI, and obesity      3. Hyperlipidemia LDL goal <50  Overview:  High intensity statin therapy indicated given the presence of CAD  Intolerant to rosuvastatin    Assessment & Plan:  LDL reasonably controlled on atorvastatin    Orders:  -     atorvastatin (LIPITOR) 80 MG tablet; Take 1 tablet by mouth Every Night.  Dispense: 90 tablet; Refill: 3    4. Bilateral carotid artery stenosis  Overview:  CT angiogram (6/29/2022): <50% stenoses at the origins of bilateral ICAs.    Assessment & Plan:  No TIA or stroke symptoms  Continue antiplatelet and statin      5. Essential hypertension  Overview:  Target blood pressure <130/80 mmHg    Assessment & Plan:  Controlled  Continue present medical therapy            Plan  Continue present medical therapy  Consider semaglutide if patient becomes diabetic due to cardiovascular benefit and obesity      Follow-up   No follow-ups on file.        Mahendra Lacy MD, FACC, [facility]  6/14/2024

## 2024-06-17 ENCOUNTER — HOSPITAL ENCOUNTER (OUTPATIENT)
Dept: CT IMAGING | Facility: HOSPITAL | Age: 69
Discharge: HOME | End: 2024-06-17
Admitting: INTERNAL MEDICINE
Payer: MEDICARE

## 2024-06-17 DIAGNOSIS — C34.91 SQUAMOUS CELL CARCINOMA OF RIGHT LUNG: ICD-10-CM

## 2024-06-17 PROCEDURE — 25510000001 IOPAMIDOL 61 % SOLUTION: Performed by: INTERNAL MEDICINE

## 2024-06-17 PROCEDURE — 71260 CT THORAX DX C+: CPT

## 2024-06-17 RX ADMIN — IOPAMIDOL 100 ML: 612 INJECTION, SOLUTION INTRAVENOUS at 12:05

## 2024-06-19 ENCOUNTER — OFFICE VISIT (OUTPATIENT)
Dept: ONCOLOGY | Facility: CLINIC | Age: 69
End: 2024-06-19
Payer: MEDICARE

## 2024-06-19 VITALS
HEART RATE: 70 BPM | WEIGHT: 237 LBS | RESPIRATION RATE: 16 BRPM | DIASTOLIC BLOOD PRESSURE: 64 MMHG | TEMPERATURE: 97.7 F | OXYGEN SATURATION: 97 % | HEIGHT: 68 IN | SYSTOLIC BLOOD PRESSURE: 135 MMHG | BODY MASS INDEX: 35.92 KG/M2

## 2024-06-19 DIAGNOSIS — C34.11 MALIGNANT NEOPLASM OF UPPER LOBE OF RIGHT LUNG: Primary | ICD-10-CM

## 2024-06-19 PROCEDURE — 1126F AMNT PAIN NOTED NONE PRSNT: CPT | Performed by: NURSE PRACTITIONER

## 2024-06-19 PROCEDURE — 99214 OFFICE O/P EST MOD 30 MIN: CPT | Performed by: NURSE PRACTITIONER

## 2024-06-19 PROCEDURE — 1160F RVW MEDS BY RX/DR IN RCRD: CPT | Performed by: NURSE PRACTITIONER

## 2024-06-19 PROCEDURE — 1159F MED LIST DOCD IN RCRD: CPT | Performed by: NURSE PRACTITIONER

## 2024-06-19 PROCEDURE — 3075F SYST BP GE 130 - 139MM HG: CPT | Performed by: NURSE PRACTITIONER

## 2024-06-19 PROCEDURE — 3078F DIAST BP <80 MM HG: CPT | Performed by: NURSE PRACTITIONER

## 2024-06-19 NOTE — PROGRESS NOTES
DATE OF VISIT: 6/19/2024    REASON FOR VISIT: Followup for right upper lobe lung cancer     PROBLEM LIST:  1. Squamous cell carcinoma of the lung T1b N2 M0 stage T3a:  A.  Presented with shortness of breath and cough  B.  CT chest with contrast done June 28, 2019 revealed right upper lobe 1.8 cm lung nodule with right hilar and mediastinal lymphadenopathy.  MRI brain whole-body PET scan done on July 22, 2019 felt show any other sites of disease.  C.  Diagnosed after bronchoscopy with biopsy done July 11, 2019  D.  Pathology revealed squamous cell carcinoma from level 4R and level 7.  E.  Started neoadjuvant concurrent chemotherapy with radiation using weekly carbo/taxol July 30, 2019.  Radiation was added August 6, 2019. This was completed 09/11/2019.  F.  Status post right upper lobe resection with lymph node sampling done by Dr. Flores October 11, 2019.  G.  Final pathology revealed complete pathologic response with no evidence of residual disease in the lung or in the hilar and mediastinal nodes.  2.   Hypercholesteremia  3.  Hypertension  4.  COPD:   A. Not oxygen dependent  B.  PFTs done on July 17, 2019 revealed FEV1 2.44L, 72% of predicted and DLCO 55% of predicted.  5.  History of superficial melanoma status post surgical resection      HISTORY OF PRESENT ILLNESS: The patient is a very pleasant 69 y.o. male with past medical history significant for right upper lobe lung cancer diagnosed July 11, 2019.  Patient status post neoadjuvant chemotherapy with radiation followed by surgical resection done by Dr. Flores October 11, 2019.  Final pathology revealed complete pathologic response. The patient is here today for scheduled follow-up visit.    SUBJECTIVE: Erik is here today with his wife.  Overall he is doing fairly well.  Denies any shortness of breath.  Energy level is good.  He is having some arthritis problems in his shoulder.  He says he is bone-on-bone.  They are trying to avoid a shoulder replacement  currently.  He is anxious about scan results.        Past History:  Medical History: has a past medical history of Arthritis, At risk for obstructive sleep apnea, Borderline diabetes, COPD (chronic obstructive pulmonary disease), Coronary artery disease, Elevated cholesterol, Hearing loss, History of bronchitis (2017), History of chemotherapy, History of radiation therapy, Hyperlipidemia, Hypertension, Malignant neoplasm of right lung (10/11/2019), Melanoma, MI (myocardial infarction) (01/20/2015), MRSA (methicillin resistant Staphylococcus aureus) (01/2015), Seasonal allergies, Skin cancer, Squamous cell lung cancer (7/24/2019), Wears glasses, and Wears partial dentures.   Surgical History: has a past surgical history that includes Rotator cuff repair (Bilateral); Knee Meniscal Repair (Bilateral, 2010); Cholecystectomy; Skin cancer excision; Cardiac catheterization (01/20/2015); [place] tooth extraction; Hemorrhoid surgery; Mouth surgery; Bronchoscopy (N/A, 07/11/2019); Coronary angioplasty with stent; Colonoscopy (2018); Cataract extraction (Right); Skin biopsy; Bronchoscopy (N/A, 10/11/2019); and thoracoscopy video assisted with lobectomy (Right, 10/11/2019).   Family History: family history includes Coronary artery disease in his mother; Diabetes in his brother; Emphysema in his father; Heart attack (age of onset: 45) in his sister; Heart attack (age of onset: 50) in his brother; Heart attack (age of onset: 60) in his mother; Hyperlipidemia in his mother; Hypertension in his brother, brother, sister, and sister; Lung cancer in his mother.   Social History: reports that he quit smoking about 5 years ago. His smoking use included cigarettes. He started smoking about 51 years ago. He has a 92 pack-year smoking history. He has quit using smokeless tobacco.  His smokeless tobacco use included chew. He reports current alcohol use. He reports that he does not use drugs.    (Not in a hospital admission)     Allergies:  "Rosuvastatin     Review of Systems   Constitutional: Negative.    Respiratory: Negative.     Gastrointestinal: Negative.    Psychiatric/Behavioral: Negative.         PHYSICAL EXAMINATION:   /64   Pulse 70   Temp 97.7 °F (36.5 °C)   Resp 16   Ht 172.7 cm (68\")   Wt 108 kg (237 lb)   SpO2 97%   BMI 36.04 kg/m²    Pain Score    06/19/24 0907   PainSc: 0-No pain          ECOG score: 0            ECOG Performance Status: 1 - Symptomatic but completely ambulatory      General Appearance:      alert, cooperative, no apparent distress and appears stated age   Lungs:   Clear to auscultation bilaterally; respirations regular, even, and unlabored bilaterally   Heart:  Regular rate and rhythm, no murmurs appreciated   Abdomen:   Soft, non-tender, and non-distended                 No visits with results within 2 Week(s) from this visit.   Latest known visit with results is:   Orders Only on 06/20/2023   Component Date Value Ref Range Status    Glucose 06/20/2023 119 (H)  70 - 99 mg/dL Final    BUN 06/20/2023 21  8 - 27 mg/dL Final    Creatinine 06/20/2023 1.11  0.76 - 1.27 mg/dL Final    EGFR Result 06/20/2023 73  >59 mL/min/1.73 Final    BUN/Creatinine Ratio 06/20/2023 19  10 - 24 Final    Sodium 06/20/2023 143  134 - 144 mmol/L Final    Potassium 06/20/2023 4.8  3.5 - 5.2 mmol/L Final    Chloride 06/20/2023 104  96 - 106 mmol/L Final    Total CO2 06/20/2023 26  20 - 29 mmol/L Final    Calcium 06/20/2023 9.7  8.6 - 10.2 mg/dL Final    Total Protein 06/20/2023 6.9  6.0 - 8.5 g/dL Final    Albumin 06/20/2023 4.4  3.8 - 4.8 g/dL Final    Globulin 06/20/2023 2.5  1.5 - 4.5 g/dL Final    A/G Ratio 06/20/2023 1.8  1.2 - 2.2 Final    Total Bilirubin 06/20/2023 0.9  0.0 - 1.2 mg/dL Final    Alkaline Phosphatase 06/20/2023 59  44 - 121 IU/L Final    AST (SGOT) 06/20/2023 26  0 - 40 IU/L Final    ALT (SGPT) 06/20/2023 33  0 - 44 IU/L Final    WBC 06/20/2023 9.1  3.4 - 10.8 x10E3/uL Final    RBC 06/20/2023 4.57  4.14 - 5.80 " x10E6/uL Final    Hemoglobin 06/20/2023 14.3  13.0 - 17.7 g/dL Final    Hematocrit 06/20/2023 42.8  37.5 - 51.0 % Final    MCV 06/20/2023 94  79 - 97 fL Final    MCH 06/20/2023 31.3  26.6 - 33.0 pg Final    MCHC 06/20/2023 33.4  31.5 - 35.7 g/dL Final    RDW 06/20/2023 12.4  11.6 - 15.4 % Final    Platelets 06/20/2023 319  150 - 450 x10E3/uL Final    Total Cholesterol 06/20/2023 225 (H)  100 - 199 mg/dL Final    Triglycerides 06/20/2023 192 (H)  0 - 149 mg/dL Final    HDL Cholesterol 06/20/2023 42  >39 mg/dL Final    VLDL Cholesterol Case 06/20/2023 35  5 - 40 mg/dL Final    LDL Chol Calc (NIH) 06/20/2023 148 (H)  0 - 99 mg/dL Final    LDL Cholesterol  06/20/2023 144 (H)  0 - 99 mg/dL Final        CT Chest With Contrast Diagnostic    Result Date: 6/18/2024  Narrative: PROCEDURE: CT CHEST W CONTRAST DIAGNOSTIC-  HISTORY: f/u scans; C34.91-Malignant neoplasm of unspecified part of right bronchus or lung  COMPARISON: June 2023.  PROCEDURE: Multiple axial CT images were obtained from the thoracic inlet through the upper abdomen following the administration of intravenous contrast.  FINDINGS: Soft tissue windows demonstrate no adenopathy within the chest. The heart is normal in size. There are multivessel coronary artery calcifications. The aorta is normal in caliber. There is no pericardial or pleural effusion. There are postsurgical changes of the right lung. Lung windows reveal no suspicious infiltrate or nodules . There is moderate emphysema. The visualized upper abdomen shows a simple left renal cyst. There is hepatic steatosis. Degenerative changes are seen of the spine. Bone windows reveal no acute osseous abnormalities.      Impression: No evidence of metastatic disease.   This study was performed with techniques to keep radiation doses as low as reasonably achievable (ALARA). Individualized dose reduction techniques using automated exposure control or adjustment of mA and/or kV according to the patient size were  employed.    CTDI: 6.08 mGy DLP: 244.27 mGy.cm      Images were reviewed, interpreted, and dictated by Dr. Linda Ricci MD Transcribed by Leidy Caba PA-C.  This report was signed and finalized on 6/18/2024 9:36 AM by Linda Ricci MD.         ASSESSMENT: The patient is a very pleasant 69 y.o. male  with right upper lobe lung cancer      PLAN:    1.  Right upper lobe squamous cell carcinoma of the lung:  A.  I did go over the scan results with the patient from June 17, 2024.  I reassured the patient there was no evidence of recurrent or metastatic disease.    B.  At this point we will continue watchful waiting.  He will follow-up with us in 1 year with repeated scan.  C.  We discussed labs from March 2024 showed CBC within normal limits.    2.  Hypertension:  A.  The patient will continue metoprolol XL.    3.  Hypercholesteremia:  A.  Patient continue Lipitor 40 mg daily    4.  COPD:  A.  He will continue follow-up with Dr. Rubin from pulmonary.  B.  I asked the patient to be more compliant with his inhalers.  C.  This could interfere with my management since her COPD symptoms could mimic disease progression.    5.  Right shoulder pain  A.  Continue to follow with orthopedics.    FOLLOW UP: 1 year with CT scan    SOBEIDA Almonte  6/19/2024

## 2024-07-08 ENCOUNTER — HOSPITAL ENCOUNTER (OUTPATIENT)
Facility: HOSPITAL | Age: 69
Discharge: HOME | End: 2024-07-08
Payer: MEDICARE

## 2024-07-08 DIAGNOSIS — E11.9 TYPE 2 DIABETES MELLITUS WITHOUT COMPLICATION, WITHOUT LONG-TERM CURRENT USE OF INSULIN (HCC): ICD-10-CM

## 2024-07-08 DIAGNOSIS — I10 PRIMARY HYPERTENSION: ICD-10-CM

## 2024-07-08 DIAGNOSIS — E55.9 VITAMIN D DEFICIENCY: ICD-10-CM

## 2024-07-08 LAB
25(OH)D3 SERPL-MCNC: 45.7 NG/ML (ref 32–100)
ALBUMIN SERPL-MCNC: 4 G/DL (ref 3.4–4.8)
ALBUMIN/GLOB SERPL: 1.9 {RATIO} (ref 0.8–2)
ALP SERPL-CCNC: 48 U/L (ref 25–100)
ALT SERPL-CCNC: 31 U/L (ref 4–36)
ANION GAP SERPL CALCULATED.3IONS-SCNC: 10 MMOL/L (ref 3–16)
AST SERPL-CCNC: 27 U/L (ref 8–33)
BILIRUB SERPL-MCNC: 0.7 MG/DL (ref 0.3–1.2)
BUN SERPL-MCNC: 37 MG/DL (ref 6–20)
CALCIUM SERPL-MCNC: 10.1 MG/DL (ref 8.5–10.5)
CHLORIDE SERPL-SCNC: 101 MMOL/L (ref 98–107)
CHOLEST SERPL-MCNC: 148 MG/DL (ref 0–200)
CO2 SERPL-SCNC: 26 MMOL/L (ref 20–30)
CREAT SERPL-MCNC: 1.6 MG/DL (ref 0.4–1.2)
ERYTHROCYTE [DISTWIDTH] IN BLOOD BY AUTOMATED COUNT: 12.7 % (ref 11–16)
FOLATE SERPL-MCNC: 10.56 NG/ML
GFR SERPLBLD CREATININE-BSD FMLA CKD-EPI: 46 ML/MIN/{1.73_M2}
GLOBULIN SER CALC-MCNC: 2.1 G/DL
GLUCOSE SERPL-MCNC: 124 MG/DL (ref 74–106)
HBA1C MFR BLD: 6.4 %
HCT VFR BLD AUTO: 40 % (ref 40–54)
HDLC SERPL-MCNC: 44 MG/DL (ref 40–60)
HGB BLD-MCNC: 13.4 G/DL (ref 13–18)
LDLC SERPL CALC-MCNC: 81 MG/DL
MCH RBC QN AUTO: 31.8 PG (ref 27–32)
MCHC RBC AUTO-ENTMCNC: 33.5 G/DL (ref 31–35)
MCV RBC AUTO: 94.8 FL (ref 80–100)
PLATELET # BLD AUTO: 330 K/UL (ref 150–400)
PMV BLD AUTO: 9.7 FL (ref 6–10)
POTASSIUM SERPL-SCNC: 4.1 MMOL/L (ref 3.4–5.1)
PROT SERPL-MCNC: 6.1 G/DL (ref 6.4–8.3)
RBC # BLD AUTO: 4.22 M/UL (ref 4.5–6)
SODIUM SERPL-SCNC: 137 MMOL/L (ref 136–145)
TRIGL SERPL-MCNC: 117 MG/DL (ref 0–249)
TSH SERPL DL<=0.005 MIU/L-ACNC: 4.42 UIU/ML (ref 0.27–4.2)
VIT B12 SERPL-MCNC: 686 PG/ML (ref 211–911)
VLDLC SERPL CALC-MCNC: 23 MG/DL
WBC # BLD AUTO: 9.7 K/UL (ref 4–11)

## 2024-07-08 PROCEDURE — 85027 COMPLETE CBC AUTOMATED: CPT

## 2024-07-08 PROCEDURE — 83036 HEMOGLOBIN GLYCOSYLATED A1C: CPT

## 2024-07-08 PROCEDURE — 82607 VITAMIN B-12: CPT

## 2024-07-08 PROCEDURE — 84443 ASSAY THYROID STIM HORMONE: CPT

## 2024-07-08 PROCEDURE — 82746 ASSAY OF FOLIC ACID SERUM: CPT

## 2024-07-08 PROCEDURE — 80053 COMPREHEN METABOLIC PANEL: CPT

## 2024-07-08 PROCEDURE — 80061 LIPID PANEL: CPT

## 2024-07-08 PROCEDURE — 82306 VITAMIN D 25 HYDROXY: CPT

## 2024-08-09 ENCOUNTER — OFFICE VISIT (OUTPATIENT)
Dept: PRIMARY CARE CLINIC | Age: 69
End: 2024-08-09
Payer: MEDICARE

## 2024-08-09 VITALS
TEMPERATURE: 97.3 F | WEIGHT: 239.8 LBS | OXYGEN SATURATION: 95 % | HEIGHT: 69 IN | RESPIRATION RATE: 16 BRPM | SYSTOLIC BLOOD PRESSURE: 125 MMHG | DIASTOLIC BLOOD PRESSURE: 71 MMHG | BODY MASS INDEX: 35.52 KG/M2 | HEART RATE: 73 BPM

## 2024-08-09 DIAGNOSIS — G56.03 BILATERAL CARPAL TUNNEL SYNDROME: ICD-10-CM

## 2024-08-09 DIAGNOSIS — E55.9 VITAMIN D DEFICIENCY: ICD-10-CM

## 2024-08-09 DIAGNOSIS — I10 PRIMARY HYPERTENSION: ICD-10-CM

## 2024-08-09 DIAGNOSIS — R79.89 ELEVATED TSH: ICD-10-CM

## 2024-08-09 DIAGNOSIS — Z13.29 THYROID DISORDER SCREEN: ICD-10-CM

## 2024-08-09 DIAGNOSIS — E11.9 TYPE 2 DIABETES MELLITUS WITHOUT COMPLICATION, WITHOUT LONG-TERM CURRENT USE OF INSULIN (HCC): Primary | ICD-10-CM

## 2024-08-09 PROCEDURE — G8427 DOCREV CUR MEDS BY ELIG CLIN: HCPCS | Performed by: NURSE PRACTITIONER

## 2024-08-09 PROCEDURE — 3017F COLORECTAL CA SCREEN DOC REV: CPT | Performed by: NURSE PRACTITIONER

## 2024-08-09 PROCEDURE — 3078F DIAST BP <80 MM HG: CPT | Performed by: NURSE PRACTITIONER

## 2024-08-09 PROCEDURE — 1123F ACP DISCUSS/DSCN MKR DOCD: CPT | Performed by: NURSE PRACTITIONER

## 2024-08-09 PROCEDURE — 3044F HG A1C LEVEL LT 7.0%: CPT | Performed by: NURSE PRACTITIONER

## 2024-08-09 PROCEDURE — 99214 OFFICE O/P EST MOD 30 MIN: CPT | Performed by: NURSE PRACTITIONER

## 2024-08-09 PROCEDURE — G8417 CALC BMI ABV UP PARAM F/U: HCPCS | Performed by: NURSE PRACTITIONER

## 2024-08-09 PROCEDURE — 1036F TOBACCO NON-USER: CPT | Performed by: NURSE PRACTITIONER

## 2024-08-09 PROCEDURE — 3074F SYST BP LT 130 MM HG: CPT | Performed by: NURSE PRACTITIONER

## 2024-08-09 PROCEDURE — 2022F DILAT RTA XM EVC RTNOPTHY: CPT | Performed by: NURSE PRACTITIONER

## 2024-08-09 ASSESSMENT — ENCOUNTER SYMPTOMS
RESPIRATORY NEGATIVE: 1
ALLERGIC/IMMUNOLOGIC NEGATIVE: 1
GASTROINTESTINAL NEGATIVE: 1
EYES NEGATIVE: 1

## 2024-08-09 NOTE — PROGRESS NOTES
Chief Complaint   Patient presents with    Diabetes    Hypertension    Hyperlipidemia    CVD       Have you seen any other physician or provider since your last visit yes - Oncology,Cardiology    Have you had any other diagnostic tests since your last visit? yes - labs,CT chest    Have you changed or stopped any medications since your last visit? Yes stopped Zetia and started Lipitor     I have recommended that this patient have a immunization for pneumonia but he due to refusal reason: not comfortable with test   I have discussed the risks and benefits of this examination with him. The patient verbalizes understanding.  Provider will be informed of refusal.      Diabetic retinal exam completed this year? Yes                       * If yes please have patient sign a records release to obtain record to update Health Maintenance                       * If no, please order referral for patient to be scheduled   SUBJECTIVE:    Patient ID:Dhruv Clark is a 69 y.o. male.    Chief Complaint   Patient presents with    Diabetes    Hypertension    Hyperlipidemia    CVD     HPI:  Patient has had hyperlipidemia for few years. He has  been compliant with low fat diet. He has been compliant with taking the medications, without side effects. His weight is stable compared to last visit. He is active, and never exercises.    Patient has had hypertension for several years. He has been compliant with taking medications, without side effects from it. He has been following a low-sodium, is active and never exercises.  Weight is stable, compared to last visit. His blood pressure is stable at this time.    Patient has had diabetes for the past few years.  He does not take any medications. He has not been monitoring fingersticks on a daily basis.  His fingerstick range is unknown. He denies any hypoglycemic symptoms. He has been following a diabetic diet and has  been active.  His last eye exam was less than a year ago.    Patient's

## 2024-08-12 ENCOUNTER — TELEPHONE (OUTPATIENT)
Dept: UROLOGY | Facility: CLINIC | Age: 69
End: 2024-08-12

## 2024-08-12 NOTE — TELEPHONE ENCOUNTER
Provider: DR HASSAN    Caller: CHOLO BREWER    Relationship to Patient: SPOUSE    Reason for Call: PATIENT HAS BEEN HAVING BACK PAIN FOR A COUPLE OF WEEKS, SOME DIFFICULTY URINATING AND TODAY GROSS HEMATURIA, HAS HAD BLOOD IN URINE FOR A WHILE NOW    HUB AGENT UNABLE TO WARM TRANSFER - DID SUGGEST ER     PLEASE REACH OUT BEST NUMBER [phone] IF NO ANSWER PLEASE LEAVE A MESSAGE     When was the patient last seen: 2022

## 2024-08-20 ENCOUNTER — OFFICE VISIT (OUTPATIENT)
Dept: UROLOGY | Facility: CLINIC | Age: 69
End: 2024-08-20
Payer: MEDICARE

## 2024-08-20 ENCOUNTER — LAB (OUTPATIENT)
Dept: LAB | Facility: HOSPITAL | Age: 69
End: 2024-08-20
Payer: MEDICARE

## 2024-08-20 VITALS
DIASTOLIC BLOOD PRESSURE: 60 MMHG | HEART RATE: 67 BPM | OXYGEN SATURATION: 96 % | BODY MASS INDEX: 34.8 KG/M2 | WEIGHT: 235 LBS | TEMPERATURE: 98.6 F | SYSTOLIC BLOOD PRESSURE: 112 MMHG | HEIGHT: 69 IN

## 2024-08-20 DIAGNOSIS — R79.89 BLOOD CREATININE INCREASED COMPARED WITH PRIOR MEASUREMENT: ICD-10-CM

## 2024-08-20 DIAGNOSIS — N40.1 BPH WITH OBSTRUCTION/LOWER URINARY TRACT SYMPTOMS: ICD-10-CM

## 2024-08-20 DIAGNOSIS — N13.8 BPH WITH OBSTRUCTION/LOWER URINARY TRACT SYMPTOMS: ICD-10-CM

## 2024-08-20 DIAGNOSIS — R31.0 GROSS HEMATURIA: Primary | ICD-10-CM

## 2024-08-20 LAB
ANION GAP SERPL CALCULATED.3IONS-SCNC: 11.6 MMOL/L (ref 5–15)
BILIRUB BLD-MCNC: NEGATIVE MG/DL
BUN SERPL-MCNC: 14 MG/DL (ref 8–23)
BUN/CREAT SERPL: 13 (ref 7–25)
CALCIUM SPEC-SCNC: 10 MG/DL (ref 8.6–10.5)
CHLORIDE SERPL-SCNC: 104 MMOL/L (ref 98–107)
CLARITY, POC: CLEAR
CO2 SERPL-SCNC: 24.4 MMOL/L (ref 22–29)
COLOR UR: YELLOW
CREAT SERPL-MCNC: 1.08 MG/DL (ref 0.76–1.27)
EGFRCR SERPLBLD CKD-EPI 2021: 74.7 ML/MIN/1.73
EXPIRATION DATE: NORMAL
GLUCOSE SERPL-MCNC: 110 MG/DL (ref 65–99)
GLUCOSE UR STRIP-MCNC: NEGATIVE MG/DL
KETONES UR QL: NEGATIVE
LEUKOCYTE EST, POC: NEGATIVE
Lab: NORMAL
NITRITE UR-MCNC: NEGATIVE MG/ML
PH UR: 6 [PH] (ref 5–8)
POTASSIUM SERPL-SCNC: 4.3 MMOL/L (ref 3.5–5.2)
PROT UR STRIP-MCNC: NEGATIVE MG/DL
PSA SERPL-MCNC: 2.92 NG/ML (ref 0–4)
RBC # UR STRIP: NEGATIVE /UL
SODIUM SERPL-SCNC: 140 MMOL/L (ref 136–145)
SP GR UR: 1.02 (ref 1–1.03)
UROBILINOGEN UR QL: NORMAL

## 2024-08-20 PROCEDURE — 36415 COLL VENOUS BLD VENIPUNCTURE: CPT

## 2024-08-20 PROCEDURE — 84153 ASSAY OF PSA TOTAL: CPT

## 2024-08-20 PROCEDURE — 80048 BASIC METABOLIC PNL TOTAL CA: CPT

## 2024-08-20 NOTE — PROGRESS NOTES
Office Visit Established Male Patient     Patient Name: Erik Bowser  : 1955   MRN: [id number]     Chief Complaint:   Chief Complaint   Patient presents with    Follow-up     Saw blood in his urine on and off for the past couple of weeks and describes radiating L flank pain       History of Present Illness: Mr. Erik Bowser is a 69 y.o. male who presents today for gross hematuria and LUTS s/p urolift .  States that a week ago he woke up and noticed his urine was red.  He was last seen in  and recommend cystoscopy to evaluate LUTS at that time.  He did not have cystoscopy and is unsure why.  He also reports low back pain radiating to the left, worse upon standing.        IPSS Questionnaire (AUA-7):  Incomplete emptying  Over the past month, how often have you had a sensation of not emptying your bladder completely after you finished urinating?: Almost always (24)  Frequency  Over the past month, how often have you had to urinate again less than two hours after you finishied urinating ?: More than half the time (24)  Intermittency  Over the past month, how often have you found you stopped and started again several time when you urinated ?: Almost always (24)  Urgency  Over the last month, how often have you found it difficult  have you found it difficult to postpone urination ?: About half the time (24)  Weak Stream  Over the past month, how often have you had a weak urinary stream ?: Almost always (24)  Straining  Over the past month, how often have you had to push or strain to begin urination ?: More than half the time (24)  Nocturia  Over the past month, how many times did you most typically get up to urinate from the time you went to bed until the time you got up in the morning ?: 3 times (24)  Quality of life due to urinary symptoms  If you were to spend the rest of your life with your urinary condition the  way it is now, how would feel about that?: Mostly dissatisfied (08/20/24 0837)    Scores  Total IPSS Score: (!) 29 (08/20/24 0837)  Total Score = Symptomatic Level: Severely symptomatic: 20-35 (08/20/24 0837)        Subjective      Review of System:   As noted in HPI.    Past Medical History:   Past Medical History:   Diagnosis Date    Arthritis     At risk for obstructive sleep apnea     Spouse reports patient snores and that he has questionable sleep apnea but has never had a sleep study done    Borderline diabetes     Has never taken medication     COPD (chronic obstructive pulmonary disease)     Coronary artery disease     1 stent    Elevated cholesterol     Hearing loss     Does not use hearing devices    History of bronchitis 2017    History of chemotherapy     around 9/20/2019    History of radiation therapy     around 9/20/2019    Hyperlipidemia     Hypertension     Malignant neoplasm of right lung 10/11/2019    Melanoma     MI (myocardial infarction) 01/20/2015    placement of stent x1    MRSA (methicillin resistant Staphylococcus aureus) 01/2015    left hand - treated    Seasonal allergies     Skin cancer     skin, lung    Squamous cell lung cancer 7/24/2019    Wears glasses     OTC glasses PRN for reading    Wears partial dentures     Upper mouth       Past Surgical History:   Past Surgical History:   Procedure Laterality Date    BRONCHOSCOPY N/A 07/11/2019    Procedure: BRONCHOSCOPY WITH ENDOBRONCHIAL ULTRASOUND with General Anesthesia.  ;  Surgeon: Jeff Rubin MD;  Location: [facility] OR;  Service: Pulmonary    BRONCHOSCOPY N/A 10/11/2019    Procedure: BRONCHOSCOPY;  Surgeon: Titus Flores MD;  Location: [facility] OR;  Service: Cardiothoracic    CARDIAC CATHETERIZATION  01/20/2015    Placement of stent x1    CATARACT EXTRACTION Right     CHOLECYSTECTOMY      Laparoscopic    COLONOSCOPY  2018    CORONARY ANGIOPLASTY WITH STENT PLACEMENT      HEMORRHOIDECTOMY      KNEE MENISCAL REPAIR Bilateral 2010     Dr. Montiel ([facility])    MOUTH SURGERY      Post for oral implants in upper mouth x3    ROTATOR CUFF REPAIR Bilateral     SKIN BIOPSY      SKIN CANCER EXCISION      melanoma removed from right neck and back.  Squamous cell removed multiple places.     SKIN CANCER EXCISION  2024    THORACOSCOPY VIDEO ASSISTED WITH LOBECTOMY Right 10/11/2019    Procedure: THORACOSCOPY VIDEO ASSISTED WITH RIGHT UPPER LOBECTOMY, MEDIASTINAL LYMPH NODE DISSECTION, INTERCOSTAL NERVE BLOCKS;  Surgeon: Titus Flores MD;  Location: [facility];  Service: Cardiothoracic    WISDOM TOOTH EXTRACTION         Family History:   Family History   Problem Relation Age of Onset    Heart attack Mother 60    Coronary artery disease Mother     Hyperlipidemia Mother     Lung cancer Mother     Emphysema Father     Hypertension Sister     Heart attack Sister 45    Hypertension Brother     Heart attack Brother 50    Hypertension Sister     Hypertension Brother     Diabetes Brother        Social History:   Social History     Socioeconomic History    Marital status:     Number of children: 2   Tobacco Use    Smoking status: Former     Current packs/day: 0.00     Average packs/day: 2.0 packs/day for 46.0 years (92.0 ttl pk-yrs)     Types: Cigarettes     Start date: 1973     Quit date: 2019     Years since quittin.1     Passive exposure: Past    Smokeless tobacco: Former     Types: Chew   Vaping Use    Vaping status: Never Used   Substance and Sexual Activity    Alcohol use: Yes     Comment: Social use, no history of abuse    Drug use: No    Sexual activity: Not Currently     Partners: Female       Medications:     Current Outpatient Medications:     aspirin 81 MG EC tablet, Take 1 tablet by mouth Every Night., Disp: 90 tablet, Rfl: 3    atorvastatin (LIPITOR) 80 MG tablet, Take 1 tablet by mouth Every Night., Disp: 90 tablet, Rfl: 3    B Complex Vitamins (VITAMIN-B COMPLEX PO), Take 1 tablet by mouth Daily., Disp: , Rfl:      "Cholecalciferol (VITAMIN D3) 5000 units capsule capsule, Take 1 capsule by mouth Daily., Disp: , Rfl:     Cyanocobalamin (B-12) 1000 MCG tablet, Take 1,000 mcg by mouth Daily., Disp: , Rfl:     fexofenadine (ALLEGRA) 180 MG tablet, Take 1 tablet by mouth Daily. As needed, Disp: , Rfl:     lisinopril (PRINIVIL,ZESTRIL) 10 MG tablet, Take 1 tablet by mouth Daily., Disp: 90 tablet, Rfl: 3    metoprolol succinate XL (TOPROL-XL) 25 MG 24 hr tablet, Take 1 tablet by mouth Daily., Disp: 90 tablet, Rfl: 3    Misc Natural Products (YumVs Beet Root-Tart Cherry) 250-0.5 MG chewable tablet, Chew., Disp: , Rfl:     naproxen sodium (ALEVE) 220 MG tablet, Take 1 tablet by mouth As Needed for Mild Pain., Disp: , Rfl:     nitroglycerin (NITROSTAT) 0.4 MG SL tablet, Place 1 tablet under the tongue Every 5 (Five) Minutes As Needed for Chest Pain., Disp: 25 tablet, Rfl: 5    Zinc 50 MG capsule, Take  by mouth Every Morning., Disp: , Rfl:     Allergies:   Allergies   Allergen Reactions    Rosuvastatin Myalgia       Objective     Physical Exam:   Vital Signs:   Vitals:    08/20/24 0833   BP: 112/60   Pulse: 67   Temp: 98.6 °F (37 °C)   SpO2: 96%   Weight: 107 kg (235 lb)   Height: 176.1 cm (69.33\")     Body mass index is 34.37 kg/m².   Physical Exam  Vitals and nursing note reviewed.   Constitutional:       General: He is awake. He is not in acute distress.     Appearance: He is obese. He is not ill-appearing.      Comments: Accompanied by wife   Pulmonary:      Effort: Pulmonary effort is normal.   Skin:     General: Skin is warm and dry.   Neurological:      Mental Status: He is alert and oriented to person, place, and time. Mental status is at baseline.   Psychiatric:         Mood and Affect: Mood normal.         Behavior: Behavior is cooperative.            Labs  Brief Urine Lab Results  (Last result in the past 365 days)        Color   Clarity   Blood   Leuk Est   Nitrite   Protein   CREAT   Urine HCG        08/20/24 0839 Yellow   " Clear   Negative   Negative   Negative   Negative                   Lab Results   Component Value Date    GLUCOSE 119 (H) 06/20/2023    CALCIUM 9.7 06/20/2023     06/20/2023    K 4.8 06/20/2023    CO2 26 06/20/2023     06/20/2023    BUN 21 06/20/2023    CREATININE 1.11 06/20/2023    EGFRIFAFRI 119 04/13/2018    EGFRIFNONA 96 11/11/2021    BCR 19 06/20/2023    ANIONGAP 5.0 11/11/2021       Lab Results   Component Value Date    WBC 9.7 07/08/2024    HGB 13.4 07/08/2024    HCT 40.0 07/08/2024    MCV 94.8 07/08/2024     07/08/2024            Lab Results   Component Value Date    PSA 1.490 11/11/2021       Office Visit on 08/20/2024   Component Date Value Ref Range Status    Color 08/20/2024 Yellow  Yellow, Straw, Dark Yellow, Sindi Final    Clarity, UA 08/20/2024 Clear  Clear Final    Specific Gravity  08/20/2024 1.025  1.005 - 1.030 Final    pH, Urine 08/20/2024 6.0  5.0 - 8.0 Final    Leukocytes 08/20/2024 Negative  Negative Final    Nitrite, UA 08/20/2024 Negative  Negative Final    Protein, POC 08/20/2024 Negative  Negative mg/dL Final    Glucose, UA 08/20/2024 Negative  Negative mg/dL Final    Ketones, UA 08/20/2024 Negative  Negative Final    Urobilinogen, UA 08/20/2024 0.2 E.U./dL  Normal, 0.2 E.U./dL Final    Bilirubin 08/20/2024 Negative  Negative Final    Blood, UA 08/20/2024 Negative  Negative Final    Lot Number 08/20/2024 [id number]   Final    Expiration Date 08/20/2024 5/31/2025   Final         I have reviewed the above labs.     PVR  Post-void residual performed with ultrasound by staff and interpreted by me - 0 mL     Assessment / Plan      Assessment:   Diagnoses and all orders for this visit:    1. Gross hematuria (Primary)  -     POC Urinalysis Dipstick, Automated  -     CT Abdomen Pelvis With & Without Contrast; Future    2. Blood creatinine increased compared with prior measurement  -     Basic Metabolic Panel; Future    3. BPH with obstruction/lower urinary tract symptoms  -     POC  Urinalysis Dipstick, Automated  -     PSA Diagnostic; Future         69 y.o. male presents for gross hematuria and LUTS s/p urolift 2021.  States that a week ago he woke up and noticed his urine was red.  He was last seen in 2022 and recommend cystoscopy to evaluate LUTS at that time.  He did not have cystoscopy and is unsure why.  He also reports low back pain radiating to the left, worse upon standing.  UA today negative for blood or signs of infection.  He is a former smoker of 35 years, exposure to second hand smoke as well as chemo and radiation for lung cancer.  Patient had abnormal renal function on 1 set of labs, will repeat BMP and proceed with CT urogram and cystoscopy with BPH workup.      LUTS symptoms worsening mainly weak stream, incomplete emptying, and intermittency.  His IPSS was 29 with PVR 0 mL.  Will repeat BPH workup.  PSA today as I do not see any recent PSA.  Last PSA in 2021, WNL.        Plan:    CT urogram  BMP and PSA today  Cystoscopy, TRUS, uroflow, KIM, and GE with Dr. Hameed.     Follow Up:   Return for for cysto, TRUS, uroflow, KIM, and GE, with Dr. Hameed.      SOBEIDA Hassan  [facility] [place] [facility]

## 2024-09-20 DIAGNOSIS — I10 ESSENTIAL HYPERTENSION: ICD-10-CM

## 2024-09-20 RX ORDER — LISINOPRIL 10 MG/1
10 TABLET ORAL DAILY
Qty: 90 TABLET | Refills: 3 | Status: SHIPPED | OUTPATIENT
Start: 2024-09-20

## 2024-09-25 ENCOUNTER — HOSPITAL ENCOUNTER (OUTPATIENT)
Dept: CT IMAGING | Facility: HOSPITAL | Age: 69
Discharge: HOME | End: 2024-09-25
Admitting: NURSE PRACTITIONER
Payer: MEDICARE

## 2024-09-25 DIAGNOSIS — R31.0 GROSS HEMATURIA: ICD-10-CM

## 2024-09-25 PROCEDURE — 25510000001 IOPAMIDOL 61 % SOLUTION: Performed by: NURSE PRACTITIONER

## 2024-09-25 PROCEDURE — 74178 CT ABD&PLV WO CNTR FLWD CNTR: CPT

## 2024-09-25 RX ORDER — IOPAMIDOL 612 MG/ML
100 INJECTION, SOLUTION INTRAVASCULAR
Status: COMPLETED | OUTPATIENT
Start: 2024-09-25 | End: 2024-09-25

## 2024-09-25 RX ADMIN — IOPAMIDOL 100 ML: 612 INJECTION, SOLUTION INTRAVENOUS at 15:22

## 2024-10-01 ENCOUNTER — HOSPITAL ENCOUNTER (OUTPATIENT)
Facility: HOSPITAL | Age: 69
Discharge: HOME | End: 2024-10-01
Payer: MEDICARE

## 2024-10-01 DIAGNOSIS — Z13.29 THYROID DISORDER SCREEN: ICD-10-CM

## 2024-10-01 DIAGNOSIS — E11.9 TYPE 2 DIABETES MELLITUS WITHOUT COMPLICATION, WITHOUT LONG-TERM CURRENT USE OF INSULIN (HCC): ICD-10-CM

## 2024-10-01 LAB
ALBUMIN SERPL-MCNC: 4.1 G/DL (ref 3.4–4.8)
ALBUMIN/GLOB SERPL: 1.6 {RATIO} (ref 0.8–2)
ALP SERPL-CCNC: 77 U/L (ref 25–100)
ALT SERPL-CCNC: 33 U/L (ref 4–36)
ANION GAP SERPL CALCULATED.3IONS-SCNC: 10 MMOL/L (ref 3–16)
AST SERPL-CCNC: 31 U/L (ref 8–33)
BILIRUB SERPL-MCNC: 1.4 MG/DL (ref 0.3–1.2)
BUN SERPL-MCNC: 18 MG/DL (ref 6–20)
CALCIUM SERPL-MCNC: 9.9 MG/DL (ref 8.5–10.5)
CHLORIDE SERPL-SCNC: 100 MMOL/L (ref 98–107)
CO2 SERPL-SCNC: 29 MMOL/L (ref 20–30)
CREAT SERPL-MCNC: 0.9 MG/DL (ref 0.4–1.2)
ERYTHROCYTE [DISTWIDTH] IN BLOOD BY AUTOMATED COUNT: 12.3 % (ref 11–16)
GFR SERPLBLD CREATININE-BSD FMLA CKD-EPI: >90 ML/MIN/{1.73_M2}
GLOBULIN SER CALC-MCNC: 2.5 G/DL
GLUCOSE SERPL-MCNC: 111 MG/DL (ref 74–106)
HBA1C MFR BLD: 6 %
HCT VFR BLD AUTO: 43.6 % (ref 40–54)
HGB BLD-MCNC: 14.5 G/DL (ref 13–18)
MCH RBC QN AUTO: 31.9 PG (ref 27–32)
MCHC RBC AUTO-ENTMCNC: 33.3 G/DL (ref 31–35)
MCV RBC AUTO: 96 FL (ref 80–100)
PLATELET # BLD AUTO: 296 K/UL (ref 150–400)
PMV BLD AUTO: 10.1 FL (ref 6–10)
POTASSIUM SERPL-SCNC: 4.3 MMOL/L (ref 3.4–5.1)
PROT SERPL-MCNC: 6.6 G/DL (ref 6.4–8.3)
RBC # BLD AUTO: 4.54 M/UL (ref 4.5–6)
SODIUM SERPL-SCNC: 139 MMOL/L (ref 136–145)
T4 FREE SERPL-MCNC: 1.22 NG/DL (ref 0.92–1.68)
TSH SERPL-MCNC: 4.63 UIU/ML (ref 0.27–4.2)
WBC # BLD AUTO: 11.4 K/UL (ref 4–11)

## 2024-10-01 PROCEDURE — 83036 HEMOGLOBIN GLYCOSYLATED A1C: CPT

## 2024-10-01 PROCEDURE — 80053 COMPREHEN METABOLIC PANEL: CPT

## 2024-10-01 PROCEDURE — 84439 ASSAY OF FREE THYROXINE: CPT

## 2024-10-01 PROCEDURE — 84443 ASSAY THYROID STIM HORMONE: CPT

## 2024-10-01 PROCEDURE — 85027 COMPLETE CBC AUTOMATED: CPT

## 2024-10-02 SDOH — HEALTH STABILITY: PHYSICAL HEALTH: ON AVERAGE, HOW MANY MINUTES DO YOU ENGAGE IN EXERCISE AT THIS LEVEL?: 0 MIN

## 2024-10-02 SDOH — HEALTH STABILITY: PHYSICAL HEALTH: ON AVERAGE, HOW MANY DAYS PER WEEK DO YOU ENGAGE IN MODERATE TO STRENUOUS EXERCISE (LIKE A BRISK WALK)?: 0 DAYS

## 2024-10-02 ASSESSMENT — LIFESTYLE VARIABLES
HOW MANY STANDARD DRINKS CONTAINING ALCOHOL DO YOU HAVE ON A TYPICAL DAY: 1
HOW MANY STANDARD DRINKS CONTAINING ALCOHOL DO YOU HAVE ON A TYPICAL DAY: 1 OR 2
HOW OFTEN DO YOU HAVE SIX OR MORE DRINKS ON ONE OCCASION: 1
HOW OFTEN DO YOU HAVE A DRINK CONTAINING ALCOHOL: MONTHLY OR LESS
HOW OFTEN DO YOU HAVE A DRINK CONTAINING ALCOHOL: 2

## 2024-10-02 ASSESSMENT — PATIENT HEALTH QUESTIONNAIRE - PHQ9
SUM OF ALL RESPONSES TO PHQ9 QUESTIONS 1 & 2: 2
SUM OF ALL RESPONSES TO PHQ QUESTIONS 1-9: 2
2. FEELING DOWN, DEPRESSED OR HOPELESS: SEVERAL DAYS
SUM OF ALL RESPONSES TO PHQ QUESTIONS 1-9: 2
1. LITTLE INTEREST OR PLEASURE IN DOING THINGS: SEVERAL DAYS
SUM OF ALL RESPONSES TO PHQ QUESTIONS 1-9: 2
SUM OF ALL RESPONSES TO PHQ QUESTIONS 1-9: 2

## 2024-10-04 ENCOUNTER — OFFICE VISIT (OUTPATIENT)
Dept: PRIMARY CARE CLINIC | Age: 69
End: 2024-10-04

## 2024-10-04 VITALS
WEIGHT: 237.2 LBS | DIASTOLIC BLOOD PRESSURE: 77 MMHG | HEART RATE: 71 BPM | BODY MASS INDEX: 35.13 KG/M2 | OXYGEN SATURATION: 95 % | HEIGHT: 69 IN | TEMPERATURE: 97.7 F | SYSTOLIC BLOOD PRESSURE: 129 MMHG | RESPIRATION RATE: 16 BRPM

## 2024-10-04 DIAGNOSIS — E03.9 ACQUIRED HYPOTHYROIDISM: ICD-10-CM

## 2024-10-04 DIAGNOSIS — Z00.00 MEDICARE ANNUAL WELLNESS VISIT, SUBSEQUENT: ICD-10-CM

## 2024-10-04 DIAGNOSIS — N52.9 ERECTILE DYSFUNCTION, UNSPECIFIED ERECTILE DYSFUNCTION TYPE: ICD-10-CM

## 2024-10-04 DIAGNOSIS — Z23 NEED FOR INFLUENZA VACCINATION: ICD-10-CM

## 2024-10-04 DIAGNOSIS — E11.9 TYPE 2 DIABETES MELLITUS WITHOUT COMPLICATION, WITHOUT LONG-TERM CURRENT USE OF INSULIN (HCC): ICD-10-CM

## 2024-10-04 DIAGNOSIS — Z23 NEED FOR PROPHYLACTIC VACCINATION AGAINST STREPTOCOCCUS PNEUMONIAE (PNEUMOCOCCUS): Primary | ICD-10-CM

## 2024-10-04 RX ORDER — MELOXICAM 15 MG/1
15 TABLET ORAL DAILY
COMMUNITY
Start: 2024-10-01

## 2024-10-04 RX ORDER — LEVOTHYROXINE SODIUM 50 UG/1
50 TABLET ORAL DAILY
Qty: 90 TABLET | Refills: 1 | Status: SHIPPED | OUTPATIENT
Start: 2024-10-04

## 2024-10-04 RX ORDER — SILDENAFIL 100 MG/1
100 TABLET, FILM COATED ORAL PRN
Qty: 12 TABLET | Refills: 1 | Status: SHIPPED | OUTPATIENT
Start: 2024-10-04 | End: 2024-11-03

## 2024-10-04 NOTE — PROGRESS NOTES
Chief Complaint   Patient presents with    Medicare AWV       Have you seen any other physician or provider since your last visit yes - Urology     Have you had any other diagnostic tests since your last visit? yes - labs,CT abdomen    Have you changed or stopped any medications since your last visit? yes - stopped Fenofibrate  started meloxicam    Patient is here for an AWV. Pt would like to discuss alternative for cialis

## 2024-10-04 NOTE — PROGRESS NOTES
Medicare Annual Wellness Visit    Dhruv Clark is here for Medicare AWV    Assessment & Plan   Need for prophylactic vaccination against Streptococcus pneumoniae (pneumococcus)  -     Pneumococcal Conjugate PCV20, PF (Prevnar 20)  Medicare annual wellness visit, subsequent  Erectile dysfunction, unspecified erectile dysfunction type  -     sildenafil (VIAGRA) 100 MG tablet; Take 1 tablet by mouth as needed for Erectile Dysfunction, Disp-12 tablet, R-1Normal  Acquired hypothyroidism  -     levothyroxine (SYNTHROID) 50 MCG tablet; Take 1 tablet by mouth daily, Disp-90 tablet, R-1Normal  Type 2 diabetes mellitus without complication, without long-term current use of insulin (HCC)  -     Microalbumin / creatinine urine ratio; Future  Need for influenza vaccination  -     Influenza, FLUAD Trivalent, (age 65 y+), IM, Preservative Free, 0.5mL    Recommendations for Preventive Services Due: see orders and patient instructions/AVS.  Recommended screening schedule for the next 5-10 years is provided to the patient in written form: see Patient Instructions/AVS.     No follow-ups on file.     Subjective   The following acute and/or chronic problems were also addressed today:  Discussed memory issues today.he felt like he was doing well.     Patient's complete Health Risk Assessment and screening values have been reviewed and are found in Flowsheets. The following problems were reviewed today and where indicated follow up appointments were made and/or referrals ordered.    Positive Risk Factor Screenings with Interventions:               General HRA Questions:  Select all that apply: (!) New or Increased Pain, Stress  Interventions - Pain:  Patient declined any further interventions or treatment  Interventions - Stress:  He continues to work but says he is doing well        Inactivity:  On average, how many days per week do you engage in moderate to strenuous exercise (like a brisk walk)?: 0 days (!) Abnormal  On average, how

## 2024-10-07 ENCOUNTER — PROCEDURE VISIT (OUTPATIENT)
Dept: UROLOGY | Facility: CLINIC | Age: 69
End: 2024-10-07
Payer: MEDICARE

## 2024-10-07 DIAGNOSIS — N13.8 BPH WITH OBSTRUCTION/LOWER URINARY TRACT SYMPTOMS: Primary | ICD-10-CM

## 2024-10-07 DIAGNOSIS — N40.1 BPH WITH OBSTRUCTION/LOWER URINARY TRACT SYMPTOMS: Primary | ICD-10-CM

## 2024-10-07 PROCEDURE — 51741 ELECTRO-UROFLOWMETRY FIRST: CPT | Performed by: UROLOGY

## 2024-10-07 PROCEDURE — 99214 OFFICE O/P EST MOD 30 MIN: CPT | Performed by: UROLOGY

## 2024-10-07 PROCEDURE — 52000 CYSTOURETHROSCOPY: CPT | Performed by: UROLOGY

## 2024-10-07 RX ORDER — SODIUM CHLORIDE 0.9 % (FLUSH) 0.9 %
3 SYRINGE (ML) INJECTION EVERY 12 HOURS SCHEDULED
OUTPATIENT
Start: 2024-10-07

## 2024-10-07 RX ORDER — SODIUM CHLORIDE 0.9 % (FLUSH) 0.9 %
3-10 SYRINGE (ML) INJECTION AS NEEDED
OUTPATIENT
Start: 2024-10-07

## 2024-10-07 RX ORDER — SODIUM CHLORIDE 9 MG/ML
50 INJECTION, SOLUTION INTRAVENOUS CONTINUOUS
OUTPATIENT
Start: 2024-10-07 | End: 2024-10-08

## 2024-10-07 NOTE — PROGRESS NOTES
CC  LUTS / BPH Workup    HPI  Ms. Bowser is a 69 y.o. male with history below in assessment, who presents for follow up.     At this visit patient is here for BPH workup and discussion.     Past Medical History:   Diagnosis Date    Arthritis     At risk for obstructive sleep apnea     Spouse reports patient snores and that he has questionable sleep apnea but has never had a sleep study done    Borderline diabetes     Has never taken medication     COPD (chronic obstructive pulmonary disease)     Coronary artery disease     1 stent    Elevated cholesterol     Hearing loss     Does not use hearing devices    History of bronchitis 2017    History of chemotherapy     around 9/20/2019    History of radiation therapy     around 9/20/2019    Hyperlipidemia     Hypertension     Malignant neoplasm of right lung 10/11/2019    Melanoma     MI (myocardial infarction) 01/20/2015    placement of stent x1    MRSA (methicillin resistant Staphylococcus aureus) 01/2015    left hand - treated    Seasonal allergies     Skin cancer     skin, lung    Squamous cell lung cancer 7/24/2019    Wears glasses     OTC glasses PRN for reading    Wears partial dentures     Upper mouth       Past Surgical History:   Procedure Laterality Date    BRONCHOSCOPY N/A 07/11/2019    Procedure: BRONCHOSCOPY WITH ENDOBRONCHIAL ULTRASOUND with General Anesthesia.  ;  Surgeon: Jeff Rubin MD;  Location: [facility] OR;  Service: Pulmonary    BRONCHOSCOPY N/A 10/11/2019    Procedure: BRONCHOSCOPY;  Surgeon: Titus Flores MD;  Location: [facility] OR;  Service: Cardiothoracic    CARDIAC CATHETERIZATION  01/20/2015    Placement of stent x1    CATARACT EXTRACTION Right     CHOLECYSTECTOMY      Laparoscopic    COLONOSCOPY  2018    CORONARY ANGIOPLASTY WITH STENT PLACEMENT      HEMORRHOIDECTOMY      KNEE MENISCAL REPAIR Bilateral 2010    Dr. Montiel ([facility])    MOUTH SURGERY      Post for oral implants in upper mouth x3    ROTATOR CUFF REPAIR Bilateral      SKIN BIOPSY      SKIN CANCER EXCISION      melanoma removed from right neck and back.  Squamous cell removed multiple places.     SKIN CANCER EXCISION  08/01/2024    THORACOSCOPY VIDEO ASSISTED WITH LOBECTOMY Right 10/11/2019    Procedure: THORACOSCOPY VIDEO ASSISTED WITH RIGHT UPPER LOBECTOMY, MEDIASTINAL LYMPH NODE DISSECTION, INTERCOSTAL NERVE BLOCKS;  Surgeon: Titus Flores MD;  [facility];  Service: Cardiothoracic    WISDOM TOOTH EXTRACTION           Current Outpatient Medications:     aspirin 81 MG EC tablet, Take 1 tablet by mouth Every Night., Disp: 90 tablet, Rfl: 3    atorvastatin (LIPITOR) 80 MG tablet, Take 1 tablet by mouth Every Night., Disp: 90 tablet, Rfl: 3    B Complex Vitamins (VITAMIN-B COMPLEX PO), Take 1 tablet by mouth Daily., Disp: , Rfl:     Cholecalciferol (VITAMIN D3) 5000 units capsule capsule, Take 1 capsule by mouth Daily., Disp: , Rfl:     Cyanocobalamin (B-12) 1000 MCG tablet, Take 1,000 mcg by mouth Daily., Disp: , Rfl:     fexofenadine (ALLEGRA) 180 MG tablet, Take 1 tablet by mouth Daily. As needed, Disp: , Rfl:     lisinopril (PRINIVIL,ZESTRIL) 10 MG tablet, TAKE 1 TABLET BY MOUTH DAILY, Disp: 90 tablet, Rfl: 3    metoprolol succinate XL (TOPROL-XL) 25 MG 24 hr tablet, Take 1 tablet by mouth Daily., Disp: 90 tablet, Rfl: 3    Misc Natural Products (YumVs Beet Root-Tart Cherry) 250-0.5 MG chewable tablet, Chew., Disp: , Rfl:     naproxen sodium (ALEVE) 220 MG tablet, Take 1 tablet by mouth As Needed for Mild Pain., Disp: , Rfl:     nitroglycerin (NITROSTAT) 0.4 MG SL tablet, Place 1 tablet under the tongue Every 5 (Five) Minutes As Needed for Chest Pain., Disp: 25 tablet, Rfl: 5    Zinc 50 MG capsule, Take  by mouth Every Morning., Disp: , Rfl:      Physical Exam  There were no vitals taken for this visit.    Labs  Brief Urine Lab Results  (Last result in the past 365 days)        Color   Clarity   Blood   Leuk Est   Nitrite   Protein   CREAT   Urine HCG        08/20/24  0839 Yellow   Clear   Negative   Negative   Negative   Negative                   Lab Results   Component Value Date    GLUCOSE 110 (H) 08/20/2024    CALCIUM 10.0 08/20/2024     08/20/2024    K 4.3 08/20/2024    CO2 24.4 08/20/2024     08/20/2024    BUN 14 08/20/2024    CREATININE 1.08 08/20/2024    EGFRIFAFRI 119 04/13/2018    EGFRIFNONA 96 11/11/2021    BCR 13.0 08/20/2024    ANIONGAP 11.6 08/20/2024       Lab Results   Component Value Date    WBC 11.4 (H) 10/01/2024    HGB 14.5 10/01/2024    HCT 43.6 10/01/2024    MCV 96.0 10/01/2024     10/01/2024            Lab Results   Component Value Date    PSA 2.920 08/20/2024    PSA 1.490 11/11/2021       Radiographic Studies  CT Abdomen Pelvis With & Without Contrast  Result Date: 9/25/2024  No nephrolithiasis or suspicious solid renal mass seen.  Benign, bilateral renal cysts.  Minimal contrast identified in the bladder with no gross abnormality identified.  Fatty infiltrated, enlarged liver.   CTDI: 9.89 mGy DLP:1922.75 mGy.cm  This report was signed and finalized on 9/25/2024 5:27 PM by Simran Myers MD.      CT Chest With Contrast Diagnostic  Result Date: 6/18/2024  No evidence of metastatic disease.   This study was performed with techniques to keep radiation doses as low as reasonably achievable (ALARA). Individualized dose reduction techniques using automated exposure control or adjustment of mA and/or kV according to the patient size were employed.    CTDI: 6.08 mGy DLP: 244.27 mGy.cm      Images were reviewed, interpreted, and dictated by Dr. Linda Ricci MD Transcribed by Leidy Caba PA-C.  This report was signed and finalized on 6/18/2024 9:36 AM by Linda Ricci MD.        I have reviewed above labs and imaging.     CYSTOSCOPY  Preprocedure diagnosis  LUTS  Postprocedure diagnosis  Same  Procedure  Flexible Cystourethroscopy  Attending surgeon  Noel Hameed MD  Anesthesia  2% lidocaine jelly  intraurethrally  Complications  None  Indications  69 y.o. male undergoing a flexible cystoscopy for the above mentioned indications.  Informed consent was obtained.    Findings  Cystoscopic findings included one right and left ureteral orifice in the normal anatomic position with normal bladder mucosa and no tumors, masses or stones. The urethral urothelium was within normal limits with no strictures.  There was a prominent median lobe.  The lateral lobes were obstructive in appearance.    Procedure  The patient was placed in supine position and prepped and draped in sterile fashion with lidocaine jelly per urethra for anesthesia.  A timeout was performed.  The 14F flexible cystoscope was lubricated and gently placed through the penile urethra and into the bladder.  The bladder was completely visualized.  The cystoscope was retroflexed and the bladder neck and prostate visualized.  The cystoscope was slowly withdrawn while visualizing the urethra and the procedure terminated.  The patient tolerated the procedure well.      Prostate on CT urogram measured to be 35 cc.    UROFLOW  Peak flow rate - 6.3 mL/sec  Average flow rate - 3.2 mL/sec  Flow curve - staccato  Voided volume - 163 mL    I personally reviewed  and interpreted this study.       Assessment  69 y.o. male with worsening of chronic lower urinary tract symptoms. BPH s/p UroLift in 2021.  Unfortunately lower urinary tract symptoms are worsening now and IPSS is quite elevated at 29.  No signs of infection and PVR is 0.  Recently had gross hematuria Hx of CAD and MI in 2019, but no CHF or CKD. EF was    In a separate room and encounter after his workup, we discussed risks and benefits of HoLEP.  Risks discussed included but were not limited to bleeding, infection, damage to prostate or bladder, cardiopulmonary complications from anesthesia, fluid absorption.    Plan  1. Schedule for HoLEP. Risks are Hx of CAD

## 2024-10-09 PROCEDURE — 93005 ELECTROCARDIOGRAM TRACING: CPT | Performed by: UROLOGY

## 2024-11-06 ENCOUNTER — HOSPITAL ENCOUNTER (OUTPATIENT)
Facility: HOSPITAL | Age: 69
Discharge: HOME | End: 2024-11-06
Payer: MEDICARE

## 2024-11-06 DIAGNOSIS — E11.9 TYPE 2 DIABETES MELLITUS WITHOUT COMPLICATION, WITHOUT LONG-TERM CURRENT USE OF INSULIN (HCC): ICD-10-CM

## 2024-11-06 LAB
CREAT UR-MCNC: 64.8 MG/DL (ref 39–259)
MICROALBUMIN UR DL<=1MG/L-MCNC: <1.2 MG/DL (ref 0–22)
MICROALBUMIN/CREAT UR: NORMAL MG/G (ref 0–30)

## 2024-11-06 PROCEDURE — 82570 ASSAY OF URINE CREATININE: CPT

## 2024-11-06 PROCEDURE — 82043 UR ALBUMIN QUANTITATIVE: CPT

## 2024-11-11 ENCOUNTER — OFFICE VISIT (OUTPATIENT)
Dept: PRIMARY CARE CLINIC | Age: 69
End: 2024-11-11
Payer: MEDICARE

## 2024-11-11 VITALS
HEART RATE: 80 BPM | BODY MASS INDEX: 34.42 KG/M2 | WEIGHT: 232.4 LBS | RESPIRATION RATE: 16 BRPM | TEMPERATURE: 98 F | SYSTOLIC BLOOD PRESSURE: 119 MMHG | HEIGHT: 69 IN | DIASTOLIC BLOOD PRESSURE: 70 MMHG | OXYGEN SATURATION: 97 %

## 2024-11-11 DIAGNOSIS — E55.9 VITAMIN D DEFICIENCY: ICD-10-CM

## 2024-11-11 DIAGNOSIS — E11.9 TYPE 2 DIABETES MELLITUS WITHOUT COMPLICATION, WITHOUT LONG-TERM CURRENT USE OF INSULIN (HCC): ICD-10-CM

## 2024-11-11 DIAGNOSIS — G56.03 BILATERAL CARPAL TUNNEL SYNDROME: Primary | ICD-10-CM

## 2024-11-11 DIAGNOSIS — E03.9 ACQUIRED HYPOTHYROIDISM: ICD-10-CM

## 2024-11-11 DIAGNOSIS — I10 PRIMARY HYPERTENSION: ICD-10-CM

## 2024-11-11 PROCEDURE — 1036F TOBACCO NON-USER: CPT | Performed by: NURSE PRACTITIONER

## 2024-11-11 PROCEDURE — G8417 CALC BMI ABV UP PARAM F/U: HCPCS | Performed by: NURSE PRACTITIONER

## 2024-11-11 PROCEDURE — 99214 OFFICE O/P EST MOD 30 MIN: CPT | Performed by: NURSE PRACTITIONER

## 2024-11-11 PROCEDURE — 3078F DIAST BP <80 MM HG: CPT | Performed by: NURSE PRACTITIONER

## 2024-11-11 PROCEDURE — 2022F DILAT RTA XM EVC RTNOPTHY: CPT | Performed by: NURSE PRACTITIONER

## 2024-11-11 PROCEDURE — 1123F ACP DISCUSS/DSCN MKR DOCD: CPT | Performed by: NURSE PRACTITIONER

## 2024-11-11 PROCEDURE — G8427 DOCREV CUR MEDS BY ELIG CLIN: HCPCS | Performed by: NURSE PRACTITIONER

## 2024-11-11 PROCEDURE — G8482 FLU IMMUNIZE ORDER/ADMIN: HCPCS | Performed by: NURSE PRACTITIONER

## 2024-11-11 PROCEDURE — 3044F HG A1C LEVEL LT 7.0%: CPT | Performed by: NURSE PRACTITIONER

## 2024-11-11 PROCEDURE — 3074F SYST BP LT 130 MM HG: CPT | Performed by: NURSE PRACTITIONER

## 2024-11-11 PROCEDURE — 1159F MED LIST DOCD IN RCRD: CPT | Performed by: NURSE PRACTITIONER

## 2024-11-11 PROCEDURE — 3017F COLORECTAL CA SCREEN DOC REV: CPT | Performed by: NURSE PRACTITIONER

## 2024-11-11 RX ORDER — LEVOTHYROXINE SODIUM 75 UG/1
75 TABLET ORAL DAILY
Qty: 90 TABLET | Refills: 2 | Status: SHIPPED | OUTPATIENT
Start: 2024-11-11

## 2024-11-11 ASSESSMENT — ENCOUNTER SYMPTOMS
GASTROINTESTINAL NEGATIVE: 1
EYES NEGATIVE: 1
RESPIRATORY NEGATIVE: 1
ALLERGIC/IMMUNOLOGIC NEGATIVE: 1

## 2024-11-11 NOTE — PROGRESS NOTES
Chief Complaint   Patient presents with    Hypertension    Hyperlipidemia    Hypothyroidism       Have you seen any other physician or provider since your last visit yes - Urology    Have you had any other diagnostic tests since your last visit? yes - labs    Have you changed or stopped any medications since your last visit? no     I have recommended that this patient have a immunization for pneumonia but he due to refusal reason: not comfortable with test   I have discussed the risks and benefits of this examination with him. The patient verbalizes understanding.  Provider will be informed of refusal.      Diabetic retinal exam completed this year? Yes                       * If yes please have patient sign a records release to obtain record to update Health Maintenance                       * If no, please order referral for patient to be scheduled   SUBJECTIVE:    Patient ID:Dhruv Clark is a 69 y.o. male.    Chief Complaint   Patient presents with    Hypertension    Hyperlipidemia    Hypothyroidism     HPI:  Patient has had hypertension for few years. He has been compliant with taking medications, without side effects from it. He has been following a low-sodium, is active and never exercises.  Weight is decreasing steadily, compared to last visit. His blood pressure is stable at this time.    Patient has had hyperlipidemia for few years. He has been compliant with low fat diet. He has been compliant with taking the medications, without side effects. His weight is down compared to last visit. He is active, and never exercises.    Patient  has had hypothyroidism for the past few years.  He has been compliant with taking his medication without side effects.  There are no symptoms of hypo or hyper thyroidism.  He has had blood work and is here today for follow up.    He has been having trouble with his hands for several years.  Really bad the past year.  It keeps him up at night.  The pain is significant.  He is

## 2025-01-15 ENCOUNTER — LAB (OUTPATIENT)
Age: 70
End: 2025-01-15

## 2025-01-15 NOTE — PROGRESS NOTES
Chief Complaint   Patient presents with    Suture / Staple Removal       Patient presented to the office for suture removal. States he had sutures placed at Dermatology by  after a procedure. Patient's PCP KATHY Salas examined the wound and cleared for removal. I removed sutures without incident and placed 5 steri-strips on the area. Patient given care instructions. He verbalized understanding.

## 2025-02-13 ENCOUNTER — OFFICE VISIT (OUTPATIENT)
Age: 70
End: 2025-02-13
Payer: MEDICARE

## 2025-02-13 ENCOUNTER — HOSPITAL ENCOUNTER (OUTPATIENT)
Facility: HOSPITAL | Age: 70
Discharge: HOME | End: 2025-02-13
Payer: MEDICARE

## 2025-02-13 VITALS
TEMPERATURE: 97.8 F | HEART RATE: 77 BPM | WEIGHT: 245.2 LBS | OXYGEN SATURATION: 95 % | SYSTOLIC BLOOD PRESSURE: 115 MMHG | BODY MASS INDEX: 36.32 KG/M2 | HEIGHT: 69 IN | RESPIRATION RATE: 16 BRPM | DIASTOLIC BLOOD PRESSURE: 64 MMHG

## 2025-02-13 DIAGNOSIS — I10 PRIMARY HYPERTENSION: ICD-10-CM

## 2025-02-13 DIAGNOSIS — G56.03 BILATERAL CARPAL TUNNEL SYNDROME: ICD-10-CM

## 2025-02-13 DIAGNOSIS — E11.9 TYPE 2 DIABETES MELLITUS WITHOUT COMPLICATION, WITHOUT LONG-TERM CURRENT USE OF INSULIN (HCC): ICD-10-CM

## 2025-02-13 DIAGNOSIS — R79.89 ELEVATED TSH: ICD-10-CM

## 2025-02-13 DIAGNOSIS — E11.9 TYPE 2 DIABETES MELLITUS WITHOUT COMPLICATION, WITHOUT LONG-TERM CURRENT USE OF INSULIN (HCC): Primary | ICD-10-CM

## 2025-02-13 DIAGNOSIS — N52.9 ERECTILE DYSFUNCTION, UNSPECIFIED ERECTILE DYSFUNCTION TYPE: ICD-10-CM

## 2025-02-13 DIAGNOSIS — E03.9 ACQUIRED HYPOTHYROIDISM: ICD-10-CM

## 2025-02-13 LAB
CHOLEST SERPL-MCNC: 156 MG/DL (ref 0–200)
ERYTHROCYTE [DISTWIDTH] IN BLOOD BY AUTOMATED COUNT: 13 % (ref 11–16)
HBA1C MFR BLD: 6.2 %
HCT VFR BLD AUTO: 46.6 % (ref 40–54)
HDLC SERPL-MCNC: 38 MG/DL (ref 40–60)
HGB BLD-MCNC: 15.2 G/DL (ref 13–18)
LDLC SERPL CALC-MCNC: 76 MG/DL
MCH RBC QN AUTO: 31 PG (ref 27–32)
MCHC RBC AUTO-ENTMCNC: 32.6 G/DL (ref 31–35)
MCV RBC AUTO: 94.9 FL (ref 80–100)
PLATELET # BLD AUTO: 322 K/UL (ref 150–400)
PMV BLD AUTO: 10.4 FL (ref 6–10)
RBC # BLD AUTO: 4.91 M/UL (ref 4.5–6)
TRIGL SERPL-MCNC: 212 MG/DL (ref 0–249)
TSH SERPL DL<=0.005 MIU/L-ACNC: 2.03 UIU/ML (ref 0.27–4.2)
VLDLC SERPL CALC-MCNC: 42 MG/DL
WBC # BLD AUTO: 11.6 K/UL (ref 4–11)

## 2025-02-13 PROCEDURE — 84443 ASSAY THYROID STIM HORMONE: CPT

## 2025-02-13 PROCEDURE — G8417 CALC BMI ABV UP PARAM F/U: HCPCS | Performed by: NURSE PRACTITIONER

## 2025-02-13 PROCEDURE — 83036 HEMOGLOBIN GLYCOSYLATED A1C: CPT

## 2025-02-13 PROCEDURE — 3074F SYST BP LT 130 MM HG: CPT | Performed by: NURSE PRACTITIONER

## 2025-02-13 PROCEDURE — 1036F TOBACCO NON-USER: CPT | Performed by: NURSE PRACTITIONER

## 2025-02-13 PROCEDURE — 99214 OFFICE O/P EST MOD 30 MIN: CPT | Performed by: NURSE PRACTITIONER

## 2025-02-13 PROCEDURE — 3044F HG A1C LEVEL LT 7.0%: CPT | Performed by: NURSE PRACTITIONER

## 2025-02-13 PROCEDURE — 1159F MED LIST DOCD IN RCRD: CPT | Performed by: NURSE PRACTITIONER

## 2025-02-13 PROCEDURE — 1123F ACP DISCUSS/DSCN MKR DOCD: CPT | Performed by: NURSE PRACTITIONER

## 2025-02-13 PROCEDURE — 3078F DIAST BP <80 MM HG: CPT | Performed by: NURSE PRACTITIONER

## 2025-02-13 PROCEDURE — 80061 LIPID PANEL: CPT

## 2025-02-13 PROCEDURE — 3017F COLORECTAL CA SCREEN DOC REV: CPT | Performed by: NURSE PRACTITIONER

## 2025-02-13 PROCEDURE — G8427 DOCREV CUR MEDS BY ELIG CLIN: HCPCS | Performed by: NURSE PRACTITIONER

## 2025-02-13 PROCEDURE — 2022F DILAT RTA XM EVC RTNOPTHY: CPT | Performed by: NURSE PRACTITIONER

## 2025-02-13 PROCEDURE — 85027 COMPLETE CBC AUTOMATED: CPT

## 2025-02-13 SDOH — ECONOMIC STABILITY: FOOD INSECURITY: WITHIN THE PAST 12 MONTHS, THE FOOD YOU BOUGHT JUST DIDN'T LAST AND YOU DIDN'T HAVE MONEY TO GET MORE.: NEVER TRUE

## 2025-02-13 SDOH — ECONOMIC STABILITY: FOOD INSECURITY: WITHIN THE PAST 12 MONTHS, YOU WORRIED THAT YOUR FOOD WOULD RUN OUT BEFORE YOU GOT MONEY TO BUY MORE.: NEVER TRUE

## 2025-02-13 ASSESSMENT — PATIENT HEALTH QUESTIONNAIRE - PHQ9
SUM OF ALL RESPONSES TO PHQ QUESTIONS 1-9: 0
2. FEELING DOWN, DEPRESSED OR HOPELESS: NOT AT ALL
1. LITTLE INTEREST OR PLEASURE IN DOING THINGS: NOT AT ALL
SUM OF ALL RESPONSES TO PHQ QUESTIONS 1-9: 0
SUM OF ALL RESPONSES TO PHQ9 QUESTIONS 1 & 2: 0

## 2025-02-13 ASSESSMENT — ENCOUNTER SYMPTOMS
ALLERGIC/IMMUNOLOGIC NEGATIVE: 1
EYES NEGATIVE: 1
GASTROINTESTINAL NEGATIVE: 1
RESPIRATORY NEGATIVE: 1

## 2025-02-13 NOTE — PROGRESS NOTES
Date/Time    WBC 11.6 02/13/2025 10:01 AM    NEUTROABS 9.0 01/03/2019 08:58 AM    HGB 15.2 02/13/2025 10:01 AM    HCT 46.6 02/13/2025 10:01 AM    MCV 94.9 02/13/2025 10:01 AM     02/13/2025 10:01 AM       Lab Results   Component Value Date    TSH 2.03 02/13/2025         ASSESSMENT/PLAN:   Assessment & Plan  1. Hand pain.  The patient reports that the previous injections provided significant relief for his hand pain. He is advised to continue seeing the hand specialist for further management.    2. Weight gain.  The patient has gained approximately 20 pounds over the winter. He is advised to resume his diet and incorporate regular physical activity, such as walking for 15-20 minutes after meals, to aid digestion, reduce blood sugar levels, and manage weight.    3. Erectile dysfunction.  The patient reports that Viagra has been inconsistently effective. He is advised to discuss this issue with his urologist during his next visit to explore alternative treatment options.    4. Shortness of breath.  The patient experiences shortness of breath, likely due to his history of lung cancer and smoking. He is advised to stop and rest when experiencing breathlessness during physical activity. He has follow up with cardiology and pulmonolgy.     5. Hypertension.  His blood pressure and heart rate are well-controlled on his current regimen of lisinopril 10 mg and metoprolol XL 25 mg. He is advised to continue his current medications.    6. Arthritis.  He is currently taking Mobic for arthritis management and should continue this medication as prescribed.    PROCEDURE  The patient received an injection for hand pain approximately 3 months ago, which provided significant relief.      1. Type 2 diabetes mellitus without complication, without long-term current use of insulin (HCC)  Hemoglobin A1C   Date Value Ref Range Status   02/13/2025 6.2 (H) See comment % Final     Comment:     Comment:  Diagnosis of Diabetes: > or =

## 2025-06-16 ENCOUNTER — HOSPITAL ENCOUNTER (OUTPATIENT)
Dept: CT IMAGING | Facility: HOSPITAL | Age: 70
Discharge: HOME | End: 2025-06-16
Admitting: NURSE PRACTITIONER
Payer: MEDICARE

## 2025-06-16 ENCOUNTER — OFFICE VISIT (OUTPATIENT)
Age: 70
End: 2025-06-16
Payer: MEDICARE

## 2025-06-16 VITALS
BODY MASS INDEX: 35.04 KG/M2 | DIASTOLIC BLOOD PRESSURE: 67 MMHG | TEMPERATURE: 97.4 F | HEIGHT: 69 IN | RESPIRATION RATE: 16 BRPM | HEART RATE: 75 BPM | OXYGEN SATURATION: 96 % | SYSTOLIC BLOOD PRESSURE: 108 MMHG | WEIGHT: 236.6 LBS

## 2025-06-16 DIAGNOSIS — E53.9 VITAMIN B DEFICIENCY: ICD-10-CM

## 2025-06-16 DIAGNOSIS — C34.11 MALIGNANT NEOPLASM OF UPPER LOBE OF RIGHT LUNG: ICD-10-CM

## 2025-06-16 DIAGNOSIS — E55.9 VITAMIN D DEFICIENCY: ICD-10-CM

## 2025-06-16 DIAGNOSIS — E78.5 HYPERLIPIDEMIA LDL GOAL <50: ICD-10-CM

## 2025-06-16 DIAGNOSIS — G56.03 BILATERAL CARPAL TUNNEL SYNDROME: ICD-10-CM

## 2025-06-16 DIAGNOSIS — I25.10 CVD (CARDIOVASCULAR DISEASE): ICD-10-CM

## 2025-06-16 DIAGNOSIS — I25.10 CORONARY ARTERY DISEASE INVOLVING NATIVE CORONARY ARTERY OF NATIVE HEART WITHOUT ANGINA PECTORIS: ICD-10-CM

## 2025-06-16 DIAGNOSIS — C34.11 MALIGNANT NEOPLASM OF UPPER LOBE OF RIGHT LUNG (HCC): ICD-10-CM

## 2025-06-16 DIAGNOSIS — E03.9 ACQUIRED HYPOTHYROIDISM: ICD-10-CM

## 2025-06-16 DIAGNOSIS — E11.9 TYPE 2 DIABETES MELLITUS WITHOUT COMPLICATION, WITHOUT LONG-TERM CURRENT USE OF INSULIN (HCC): ICD-10-CM

## 2025-06-16 DIAGNOSIS — N52.9 ERECTILE DYSFUNCTION, UNSPECIFIED ERECTILE DYSFUNCTION TYPE: ICD-10-CM

## 2025-06-16 DIAGNOSIS — Z87.891 PERSONAL HISTORY OF TOBACCO USE: Primary | ICD-10-CM

## 2025-06-16 PROCEDURE — 1159F MED LIST DOCD IN RCRD: CPT | Performed by: NURSE PRACTITIONER

## 2025-06-16 PROCEDURE — G8427 DOCREV CUR MEDS BY ELIG CLIN: HCPCS | Performed by: NURSE PRACTITIONER

## 2025-06-16 PROCEDURE — 71260 CT THORAX DX C+: CPT

## 2025-06-16 PROCEDURE — 3044F HG A1C LEVEL LT 7.0%: CPT | Performed by: NURSE PRACTITIONER

## 2025-06-16 PROCEDURE — 3017F COLORECTAL CA SCREEN DOC REV: CPT | Performed by: NURSE PRACTITIONER

## 2025-06-16 PROCEDURE — 25510000001 IOPAMIDOL 61 % SOLUTION: Performed by: NURSE PRACTITIONER

## 2025-06-16 PROCEDURE — 2022F DILAT RTA XM EVC RTNOPTHY: CPT | Performed by: NURSE PRACTITIONER

## 2025-06-16 PROCEDURE — 99213 OFFICE O/P EST LOW 20 MIN: CPT | Performed by: NURSE PRACTITIONER

## 2025-06-16 PROCEDURE — G8417 CALC BMI ABV UP PARAM F/U: HCPCS | Performed by: NURSE PRACTITIONER

## 2025-06-16 PROCEDURE — 3078F DIAST BP <80 MM HG: CPT | Performed by: NURSE PRACTITIONER

## 2025-06-16 PROCEDURE — 1123F ACP DISCUSS/DSCN MKR DOCD: CPT | Performed by: NURSE PRACTITIONER

## 2025-06-16 PROCEDURE — 3074F SYST BP LT 130 MM HG: CPT | Performed by: NURSE PRACTITIONER

## 2025-06-16 PROCEDURE — 1036F TOBACCO NON-USER: CPT | Performed by: NURSE PRACTITIONER

## 2025-06-16 RX ORDER — ATORVASTATIN CALCIUM 80 MG/1
80 TABLET, FILM COATED ORAL NIGHTLY
Qty: 90 TABLET | Refills: 3 | Status: SHIPPED | OUTPATIENT
Start: 2025-06-16

## 2025-06-16 RX ORDER — METOPROLOL SUCCINATE 25 MG/1
25 TABLET, EXTENDED RELEASE ORAL DAILY
Qty: 90 TABLET | Refills: 3 | Status: SHIPPED | OUTPATIENT
Start: 2025-06-16

## 2025-06-16 RX ORDER — IOPAMIDOL 612 MG/ML
100 INJECTION, SOLUTION INTRAVASCULAR
Status: COMPLETED | OUTPATIENT
Start: 2025-06-16 | End: 2025-06-16

## 2025-06-16 RX ADMIN — IOPAMIDOL 100 ML: 612 INJECTION, SOLUTION INTRAVENOUS at 16:05

## 2025-06-16 NOTE — PROGRESS NOTES
Constitutional: Negative.    HENT: Negative.     Eyes: Negative.    Respiratory: Negative.     Cardiovascular: Negative.    Gastrointestinal: Negative.    Endocrine: Negative.    Genitourinary: Negative.    Musculoskeletal: Negative.    Skin: Negative.    Allergic/Immunologic: Negative.    Neurological: Negative.    Hematological: Negative.    Psychiatric/Behavioral: Negative.         OBJECTIVE:  /67 (BP Site: Left Upper Arm, Patient Position: Sitting, BP Cuff Size: Medium Adult)   Pulse 75   Temp 97.4 °F (36.3 °C) (Temporal)   Resp 16   Ht 1.753 m (5' 9.02\")   Wt 107.3 kg (236 lb 9.6 oz)   SpO2 96% Comment: room air  BMI 34.92 kg/m²    Physical Exam  Vitals and nursing note reviewed.   Constitutional:       Appearance: He is well-developed.   HENT:      Head: Normocephalic and atraumatic.   Eyes:      Conjunctiva/sclera: Conjunctivae normal.      Pupils: Pupils are equal, round, and reactive to light.   Neck:      Thyroid: No thyromegaly.      Vascular: No JVD.   Cardiovascular:      Rate and Rhythm: Normal rate and regular rhythm.      Heart sounds: No murmur heard.     No friction rub. No gallop.   Pulmonary:      Effort: Pulmonary effort is normal. No respiratory distress.      Breath sounds: Normal breath sounds. No wheezing or rales.   Abdominal:      General: Bowel sounds are normal. There is no distension.      Palpations: Abdomen is soft.      Tenderness: There is no abdominal tenderness. There is no guarding.   Musculoskeletal:         General: No tenderness.      Cervical back: Normal range of motion and neck supple.   Skin:     General: Skin is warm and dry.      Findings: No rash.   Neurological:      Mental Status: He is alert and oriented to person, place, and time.   Psychiatric:         Judgment: Judgment normal.         No results found for requested labs within last 30 days.       Hemoglobin A1C (%)   Date Value   02/13/2025 6.2 (H)     LDL Direct (mg/dL)   Date Value   06/07/2022

## 2025-06-16 NOTE — PATIENT INSTRUCTIONS
patches by folding them in half so that the sticky sides meet, and then flushing them down a toilet. They should not be placed in the household trash where children or pets can find them.  If you have any questions, ask your provider or pharmacist for more information.   Be sure to keep all appointments for provider visits or tests. We are committed to providing you with the best care possible.   In order to help us achieve these goals please remember to bring all medications, herbal products, and over the counter supplements with you to each visit.     If your provider has ordered testing for you, please be sure to follow up with our office if you have not received results within 7 days after the testing took place.     *If you receive a survey after visiting one of our offices, please take time to share your experience concerning your physician office visit. These surveys are confidential and no health information about you is shared.  We are eager to improve for you and we are counting on your feedback to help make that happen.        Learning About Lung Cancer Screening  What is screening for lung cancer?     Lung cancer screening is a way to find some lung cancers early, before a person has any symptoms of the cancer.  Lung cancer screening may help those who have the highest risk for lung cancer--people age 50 and older who are or were heavy smokers. For most people, who aren't at increased risk, screening for lung cancer probably isn't helpful.  Screening won't prevent cancer. And it may not find all lung cancers. Lung cancer screening may lower the risk of dying from lung cancer in a small number of people.  How is it done?  Lung cancer screening is done with a low-dose CT (computed tomography) scan. A CT scan uses X-rays, or radiation, to make detailed pictures of your body. Experts recommend that screening be done in medical centers that focus on finding and treating lung cancer.  Who is screening

## 2025-06-18 ENCOUNTER — OFFICE VISIT (OUTPATIENT)
Dept: ONCOLOGY | Facility: CLINIC | Age: 70
End: 2025-06-18
Payer: MEDICARE

## 2025-06-18 VITALS
TEMPERATURE: 97.8 F | SYSTOLIC BLOOD PRESSURE: 133 MMHG | HEIGHT: 69 IN | OXYGEN SATURATION: 93 % | RESPIRATION RATE: 16 BRPM | BODY MASS INDEX: 34.51 KG/M2 | HEART RATE: 70 BPM | DIASTOLIC BLOOD PRESSURE: 77 MMHG | WEIGHT: 233 LBS

## 2025-06-18 DIAGNOSIS — C34.91 SQUAMOUS CELL CARCINOMA OF RIGHT LUNG: Primary | ICD-10-CM

## 2025-06-18 PROCEDURE — 1126F AMNT PAIN NOTED NONE PRSNT: CPT | Performed by: INTERNAL MEDICINE

## 2025-06-18 PROCEDURE — 3075F SYST BP GE 130 - 139MM HG: CPT | Performed by: INTERNAL MEDICINE

## 2025-06-18 PROCEDURE — 99214 OFFICE O/P EST MOD 30 MIN: CPT | Performed by: INTERNAL MEDICINE

## 2025-06-18 PROCEDURE — 3078F DIAST BP <80 MM HG: CPT | Performed by: INTERNAL MEDICINE

## 2025-06-18 RX ORDER — LEVOTHYROXINE SODIUM 75 UG/1
TABLET ORAL
COMMUNITY
Start: 2025-05-11

## 2025-06-18 RX ORDER — ZINC CITRATE/PHYTASE 25MG-500MG
CAPSULE ORAL DAILY
COMMUNITY

## 2025-06-18 NOTE — PROGRESS NOTES
DATE OF VISIT: 6/18/2025    REASON FOR VISIT: Followup for right upper lobe lung cancer     PROBLEM LIST:  1. Squamous cell carcinoma of the lung T1b N2 M0 stage T3a:  A.  Presented with shortness of breath and cough  B.  CT chest with contrast done June 28, 2019 revealed right upper lobe 1.8 cm lung nodule with right hilar and mediastinal lymphadenopathy.  MRI brain whole-body PET scan done on July 22, 2019 felt show any other sites of disease.  C.  Diagnosed after bronchoscopy with biopsy done July 11, 2019  D.  Pathology revealed squamous cell carcinoma from level 4R and level 7.  E.  Started neoadjuvant concurrent chemotherapy with radiation using weekly carbo/taxol July 30, 2019.  Radiation was added August 6, 2019. This was completed 09/11/2019.  F.  Status post right upper lobe resection with lymph node sampling done by Dr. Flores October 11, 2019.  G.  Final pathology revealed complete pathologic response with no evidence of residual disease in the lung or in the hilar and mediastinal nodes.  2.   Hypercholesteremia  3.  Hypertension  4.  COPD:   A. Not oxygen dependent  B.  PFTs done on July 17, 2019 revealed FEV1 2.44L, 72% of predicted and DLCO 55% of predicted.  5.  History of superficial melanoma status post surgical resection      HISTORY OF PRESENT ILLNESS: The patient is a very pleasant 70 y.o. adult with past medical history significant for right upper lobe lung cancer diagnosed July 11, 2019.  Patient status post neoadjuvant chemotherapy with radiation followed by surgical resection done by Dr. Flores October 11, 2019.  Final pathology revealed complete pathologic response. The patient is here today for scheduled follow-up visit.     SUBJECTIVE: Erik is here today with his wife.  All in all he is doing well.  Denies any fever or chills no night sweats.    Past History:  Medical History: has a past medical history of Arthritis, At risk for obstructive sleep apnea, Borderline diabetes, COPD  (chronic obstructive pulmonary disease), Coronary artery disease, Elevated cholesterol, Hearing loss, History of bronchitis (2017), History of chemotherapy, History of radiation therapy, Hyperlipidemia, Hypertension, Malignant neoplasm of right lung (10/11/2019), Melanoma, MI (myocardial infarction) (01/20/2015), MRSA (methicillin resistant Staphylococcus aureus) (01/2015), Seasonal allergies, Skin cancer, Squamous cell lung cancer (7/24/2019), Wears glasses, and Wears partial dentures.   Surgical History: has a past surgical history that includes Rotator cuff repair (Bilateral); Knee Meniscal Repair (Bilateral, 2010); Cholecystectomy; Skin cancer excision; Cardiac catheterization (01/20/2015); [place] tooth extraction; Hemorrhoid surgery; Mouth surgery; Bronchoscopy (N/A, 07/11/2019); Coronary angioplasty with stent; Colonoscopy (2018); Cataract extraction (Right); Skin biopsy; Bronchoscopy (N/A, 10/11/2019); thoracoscopy video assisted with lobectomy (Right, 10/11/2019); and Skin cancer excision (08/01/2024).   Family History: family history includes Coronary artery disease in his mother; Diabetes in his brother; Emphysema in his father; Heart attack (age of onset: 45) in his sister; Heart attack (age of onset: 50) in his brother; Heart attack (age of onset: 60) in his mother; Hyperlipidemia in his mother; Hypertension in his brother, brother, sister, and sister; Lung cancer in his mother.   Social History: reports that he quit smoking about 6 years ago. His smoking use included cigarettes. He started smoking about 52 years ago. He has a 92 pack-year smoking history. He has been exposed to tobacco smoke. He has quit using smokeless tobacco.  His smokeless tobacco use included chew. He reports current alcohol use. He reports that he does not use drugs.    (Not in a hospital admission)     Allergies: Rosuvastatin     Review of Systems   Constitutional: Negative.    Respiratory: Negative.     Gastrointestinal:  Negative.    Psychiatric/Behavioral: Negative.         PHYSICAL EXAMINATION:   There were no vitals taken for this visit.   There were no vitals filed for this visit.                      ECOG Performance Status: 1 - Symptomatic but completely ambulatory      General Appearance:      alert, cooperative, no apparent distress and appears stated age   Lungs:   Clear to auscultation bilaterally; respirations regular, even, and unlabored bilaterally   Heart:  Regular rate and rhythm, no murmurs appreciated   Abdomen:   Soft, non-tender, and non-distended                 No visits with results within 2 Week(s) from this visit.   Latest known visit with results is:   Lab on 08/20/2024   Component Date Value Ref Range Status    Glucose 08/20/2024 110 (H)  65 - 99 mg/dL Final    BUN 08/20/2024 14  8 - 23 mg/dL Final    Creatinine 08/20/2024 1.08  0.76 - 1.27 mg/dL Final    Sodium 08/20/2024 140  136 - 145 mmol/L Final    Potassium 08/20/2024 4.3  3.5 - 5.2 mmol/L Final    Chloride 08/20/2024 104  98 - 107 mmol/L Final    CO2 08/20/2024 24.4  22.0 - 29.0 mmol/L Final    Calcium 08/20/2024 10.0  8.6 - 10.5 mg/dL Final    BUN/Creatinine Ratio 08/20/2024 13.0  7.0 - 25.0 Final    Anion Gap 08/20/2024 11.6  5.0 - 15.0 mmol/L Final    eGFR 08/20/2024 74.7  >60.0 mL/min/1.73 Final    PSA 08/20/2024 2.920  0.000 - 4.000 ng/mL Final        CT Chest With Contrast Diagnostic  Result Date: 6/17/2025  Narrative: PROCEDURE: CT CHEST W CONTRAST DIAGNOSTIC-  HISTORY: Follow up right upper lobe lung cancer; C34.11-Malignant neoplasm of upper lobe, right bronchus or lung  COMPARISON: 6/17/2024.  PROCEDURE: Multiple axial CT images were obtained from the thoracic inlet through the upper abdomen following the administration of intravenous contrast.  FINDINGS: Soft tissue windows demonstrate no adenopathy within the chest. The heart is normal in size. The aorta is normal in caliber.There is no pericardial or pleural effusion. Postoperative  changes of right upper lobectomy. Minimal right lung scarring. No suspicious pulmonary nodule. The visualized upper abdomen is unremarkable. Bone windows reveal no acute osseous abnormalities.      Impression: Stable exam without evidence of metastatic disease.     This study was performed with techniques to keep radiation doses as low as reasonably achievable (ALARA). Individualized dose reduction techniques using automated exposure control or adjustment of mA and/or kV according to the patient size were employed.    Images were reviewed, interpreted, and dictated by Dr. Moe Pascal MD Transcribed by Keagan Jaquez PA-C.  This report was signed and finalized on 6/17/2025 3:37 PM by Moe Pascal MD.          ASSESSMENT: The patient is a very pleasant 70 y.o. adult  with right upper lobe lung cancer      PLAN:    1.  Right upper lobe squamous cell carcinoma of the lung:  A.  I did go over the scan results with the patient from June 17, 2025 and reassured no evidence of metastatic disease.  B.  I will continue annual surveillance.    2.  Hypertension:  A.  The patient will continue metoprolol XL.    3.  Hypercholesteremia:  A.  Patient continue Lipitor 40 mg daily    4.  COPD:  A.  He will continue follow-up with Dr. Rubin from pulmonary.  B.  I asked the patient to be more compliant with his inhalers.  C.  This could interfere with my management since her COPD symptoms could mimic disease progression.    FOLLOW UP: 1 year with CT scan    Kevin Salguero MD  6/18/2025

## 2025-06-19 NOTE — PROGRESS NOTES
"    Cardiology Outpatient Visit      Identification: Erik Bowser is a 70 y.o. adult who resides in [place], [place].     Reason for visit:  CAD  CV risk factors      Subjective      Erik returns to the office today for routine follow-up.  No present anginal symptoms.  No heart failure symptoms.  Patient has had persistent shortness of breath since undergoing right upper lobectomy for lung cancer.    ROS    Allergies   Allergen Reactions    Rosuvastatin Myalgia         Current Outpatient Medications   Medication Instructions    aspirin 81 mg, Oral, Nightly    atorvastatin (LIPITOR) 80 mg, Oral, Nightly    B Complex Vitamins (VITAMIN-B COMPLEX PO) 1 tablet, Daily    B-12 1,000 mcg, Daily    fexofenadine (ALLEGRA) 180 mg, Daily    levothyroxine (SYNTHROID, LEVOTHROID) 75 MCG tablet     lisinopril (PRINIVIL,ZESTRIL) 10 mg, Oral, Daily    metoprolol succinate XL (TOPROL-XL) 25 mg, Oral, Daily    Misc Natural Products (YumVs Beet Root-Tart Cherry) 250-0.5 MG chewable tablet Chew.    naproxen sodium (ALEVE) 220 mg, As Needed    nitroglycerin (NITROSTAT) 0.4 mg, Sublingual, Every 5 Minutes PRN    Zinc 50 MG capsule Every Morning    Zinc Citrate-Phytase (Zytaze)  MG capsule Daily         Objective     /60 (BP Location: Right arm, Patient Position: Sitting, Cuff Size: Adult)   Pulse 76   Ht 172.7 cm (68\")   Wt 107 kg (236 lb)   SpO2 95%   BMI 35.88 kg/m²       Constitutional:       Appearance: Healthy appearance.   Eyes:      General: No scleral icterus.  Neck:      Thyroid: No thyroid mass.      Vascular: No carotid bruit or JVD. JVD normal.   Pulmonary:      Effort: Pulmonary effort is normal.      Breath sounds: Examination of the right-upper field reveals decreased breath sounds. Decreased breath sounds present.   Cardiovascular:      Normal rate. Regular rhythm.      Murmurs: There is no murmur.      No gallop.    Edema:     Peripheral edema absent.   Skin:     General: Skin is warm. There is no " cyanosis.   Neurological:      General: No focal deficit present.      Mental Status: Alert.   Psychiatric:         Attention and Perception: Attention normal.         Result Review  (reviewed with patient):            Labs (2/13/2025):    Chol 156, Trig 202, HDL 38, LDL 76    Lab Results   Component Value Date    WBC 11.6 (H) 02/13/2025    HGB 15.2 02/13/2025    HCT 46.6 02/13/2025    MCV 94.9 02/13/2025     02/13/2025     Lab Results   Component Value Date    HGBA1C 6.2 (H) 02/13/2025             Assessment     Diagnoses and all orders for this visit:    1. Coronary artery disease involving native coronary artery of native heart without angina pectoris (Primary)  Overview:  Cardiac catheterization for inferior STEMI (1/20/2015):  RCA occlusion status post ISH (Xience 3.5 x 23 mm).  LVEF 45%.  Echocardiogram (1/21/2015):  LVEF 55%, no valvular abnormalities, 01/21/2015.  Echocardiogram (7/14/2020): EF 55%. Mild inferior hypokinesis. Trace MR.  Echo (6/29/2022): LVEF 65%. No significant valvular abnormalities.   Nuclear stress test (7/3/2022): No evidence of ischemia. LVEF 59%.     Assessment & Plan:  No angina  Continue aspirin, statin     Orders:  -     Cancel: LDL Cholesterol, Direct; Future  -     Cancel: Comprehensive Metabolic Panel; Future    2. Hyperlipidemia LDL goal <50  Overview:  High intensity statin therapy indicated given the presence of CAD  Intolerant to rosuvastatin    Assessment & Plan:  LDL reasonably controlled on atorvastatin    Orders:  -     Cancel: LDL Cholesterol, Direct; Future  -     Cancel: Comprehensive Metabolic Panel; Future    3. Coronary artery disease involving native coronary artery of native heart with angina pectoris  Overview:  Cardiac catheterization for inferior STEMI (1/20/2015):  RCA occlusion status post ISH (Xience 3.5 x 23 mm).  LVEF 45%.  Echocardiogram (1/21/2015):  LVEF 55%, no valvular abnormalities, 01/21/2015.  Echocardiogram (7/14/2020): EF 55%. Mild  inferior hypokinesis. Trace MR.  Echo (6/29/2022): LVEF 65%. No significant valvular abnormalities.   Nuclear stress test (7/3/2022): No evidence of ischemia. LVEF 59%.     Assessment & Plan:  No angina  Continue aspirin, statin       4. Essential hypertension  Overview:  Target blood pressure <130/80 mmHg    Assessment & Plan:  Controlled  Continue present medical therapy      5. Bilateral carotid artery stenosis  Overview:  CT angiogram (6/29/2022): <50% stenoses at the origins of bilateral ICAs.            Plan   Continue present medical therapy      Follow-up   Return in about 1 year (around 6/20/2026).        Mahendra Lacy MD, FACC, [facility]  6/20/2025

## 2025-06-20 ENCOUNTER — OFFICE VISIT (OUTPATIENT)
Dept: CARDIOLOGY | Facility: CLINIC | Age: 70
End: 2025-06-20
Payer: MEDICARE

## 2025-06-20 VITALS
SYSTOLIC BLOOD PRESSURE: 110 MMHG | HEIGHT: 68 IN | BODY MASS INDEX: 35.77 KG/M2 | HEART RATE: 76 BPM | DIASTOLIC BLOOD PRESSURE: 60 MMHG | WEIGHT: 236 LBS | OXYGEN SATURATION: 95 %

## 2025-06-20 DIAGNOSIS — I10 ESSENTIAL HYPERTENSION: ICD-10-CM

## 2025-06-20 DIAGNOSIS — E78.5 HYPERLIPIDEMIA LDL GOAL <50: ICD-10-CM

## 2025-06-20 DIAGNOSIS — I65.23 BILATERAL CAROTID ARTERY STENOSIS: ICD-10-CM

## 2025-06-20 DIAGNOSIS — I25.119 CORONARY ARTERY DISEASE INVOLVING NATIVE CORONARY ARTERY OF NATIVE HEART WITH ANGINA PECTORIS: ICD-10-CM

## 2025-06-20 DIAGNOSIS — I25.10 CORONARY ARTERY DISEASE INVOLVING NATIVE CORONARY ARTERY OF NATIVE HEART WITHOUT ANGINA PECTORIS: Primary | ICD-10-CM

## 2025-06-20 NOTE — LETTER
"June 20, 2025     SOBEIDA Benton  [address]  [facility] [address]    Patient: Erik Bowser   YOB: 1955   Date of Visit: 6/20/2025     Dear SOBEIDA Benton:       Thank you for referring Erik Bowser to me for evaluation. Below are the relevant portions of my assessment and plan of care.    If you have questions, please do not hesitate to call me. I look forward to following Erik along with you.         Sincerely,        Ronan Lacy IV, MD        CC: No Recipients    Ronan Lacy IV, MD  06/20/25 1217  Signed      Cardiology Outpatient Visit      Identification: Erik Bowser is a 70 y.o. adult who resides in [place], [place].     Reason for visit:  CAD  CV risk factors      Subjective     Erik returns to the office today for routine follow-up.  No present anginal symptoms.  No heart failure symptoms.  Patient has had persistent shortness of breath since undergoing right upper lobectomy for lung cancer.    ROS    Allergies   Allergen Reactions   • Rosuvastatin Myalgia         Current Outpatient Medications   Medication Instructions   • aspirin 81 mg, Oral, Nightly   • atorvastatin (LIPITOR) 80 mg, Oral, Nightly   • B Complex Vitamins (VITAMIN-B COMPLEX PO) 1 tablet, Daily   • B-12 1,000 mcg, Daily   • fexofenadine (ALLEGRA) 180 mg, Daily   • levothyroxine (SYNTHROID, LEVOTHROID) 75 MCG tablet    • lisinopril (PRINIVIL,ZESTRIL) 10 mg, Oral, Daily   • metoprolol succinate XL (TOPROL-XL) 25 mg, Oral, Daily   • Misc Natural Products (YumVs Beet Root-Tart Cherry) 250-0.5 MG chewable tablet Chew.   • naproxen sodium (ALEVE) 220 mg, As Needed   • nitroglycerin (NITROSTAT) 0.4 mg, Sublingual, Every 5 Minutes PRN   • Zinc 50 MG capsule Every Morning   • Zinc Citrate-Phytase (Zytaze)  MG capsule Daily         Objective    /60 (BP Location: Right arm, Patient Position: Sitting, Cuff Size: Adult)   Pulse 76   Ht 172.7 cm (68\")   Wt 107 kg (236 lb) "   SpO2 95%   BMI 35.88 kg/m²       Constitutional:       Appearance: Healthy appearance.   Eyes:      General: No scleral icterus.  Neck:      Thyroid: No thyroid mass.      Vascular: No carotid bruit or JVD. JVD normal.   Pulmonary:      Effort: Pulmonary effort is normal.      Breath sounds: Examination of the right-upper field reveals decreased breath sounds. Decreased breath sounds present.   Cardiovascular:      Normal rate. Regular rhythm.      Murmurs: There is no murmur.      No gallop.    Edema:     Peripheral edema absent.   Skin:     General: Skin is warm. There is no cyanosis.   Neurological:      General: No focal deficit present.      Mental Status: Alert.   Psychiatric:         Attention and Perception: Attention normal.         Result Review (reviewed with patient):            Labs (2/13/2025):    Chol 156, Trig 202, HDL 38, LDL 76    Lab Results   Component Value Date    WBC 11.6 (H) 02/13/2025    HGB 15.2 02/13/2025    HCT 46.6 02/13/2025    MCV 94.9 02/13/2025     02/13/2025     Lab Results   Component Value Date    HGBA1C 6.2 (H) 02/13/2025             Assessment    Diagnoses and all orders for this visit:    1. Coronary artery disease involving native coronary artery of native heart without angina pectoris (Primary)  Overview:  Cardiac catheterization for inferior STEMI (1/20/2015):  RCA occlusion status post ISH (Xience 3.5 x 23 mm).  LVEF 45%.  Echocardiogram (1/21/2015):  LVEF 55%, no valvular abnormalities, 01/21/2015.  Echocardiogram (7/14/2020): EF 55%. Mild inferior hypokinesis. Trace MR.  Echo (6/29/2022): LVEF 65%. No significant valvular abnormalities.   Nuclear stress test (7/3/2022): No evidence of ischemia. LVEF 59%.     Assessment & Plan:  No angina  Continue aspirin, statin     Orders:  -     Cancel: LDL Cholesterol, Direct; Future  -     Cancel: Comprehensive Metabolic Panel; Future    2. Hyperlipidemia LDL goal <50  Overview:  High intensity statin therapy indicated  given the presence of CAD  Intolerant to rosuvastatin    Assessment & Plan:  LDL reasonably controlled on atorvastatin    Orders:  -     Cancel: LDL Cholesterol, Direct; Future  -     Cancel: Comprehensive Metabolic Panel; Future    3. Coronary artery disease involving native coronary artery of native heart with angina pectoris  Overview:  Cardiac catheterization for inferior STEMI (1/20/2015):  RCA occlusion status post ISH (Xience 3.5 x 23 mm).  LVEF 45%.  Echocardiogram (1/21/2015):  LVEF 55%, no valvular abnormalities, 01/21/2015.  Echocardiogram (7/14/2020): EF 55%. Mild inferior hypokinesis. Trace MR.  Echo (6/29/2022): LVEF 65%. No significant valvular abnormalities.   Nuclear stress test (7/3/2022): No evidence of ischemia. LVEF 59%.     Assessment & Plan:  No angina  Continue aspirin, statin       4. Essential hypertension  Overview:  Target blood pressure <130/80 mmHg    Assessment & Plan:  Controlled  Continue present medical therapy      5. Bilateral carotid artery stenosis  Overview:  CT angiogram (6/29/2022): <50% stenoses at the origins of bilateral ICAs.            Plan  Continue present medical therapy      Follow-up   Return in about 1 year (around 6/20/2026).        Mahendra Lacy MD, FACC, [facility]  6/20/2025

## 2025-07-09 ENCOUNTER — PRE-ADMISSION TESTING (OUTPATIENT)
Dept: PREADMISSION TESTING | Facility: HOSPITAL | Age: 70
End: 2025-07-09
Payer: MEDICARE

## 2025-07-09 VITALS — HEIGHT: 68 IN | BODY MASS INDEX: 35.77 KG/M2 | WEIGHT: 236 LBS

## 2025-07-09 LAB
ANION GAP SERPL CALCULATED.3IONS-SCNC: 13 MMOL/L (ref 5–15)
BUN SERPL-MCNC: 18 MG/DL (ref 8–23)
BUN/CREAT SERPL: 22.5 (ref 7–25)
CALCIUM SPEC-SCNC: 9.8 MG/DL (ref 8.6–10.5)
CHLORIDE SERPL-SCNC: 101 MMOL/L (ref 98–107)
CO2 SERPL-SCNC: 24 MMOL/L (ref 22–29)
CREAT SERPL-MCNC: 0.8 MG/DL (ref 0.76–1.27)
DEPRECATED RDW RBC AUTO: 42.9 FL (ref 37–54)
EGFRCR SERPLBLD CKD-EPI 2021: 95.8 ML/MIN/1.73
ERYTHROCYTE [DISTWIDTH] IN BLOOD BY AUTOMATED COUNT: 12.5 % (ref 12.3–15.4)
GLUCOSE SERPL-MCNC: 125 MG/DL (ref 65–99)
HCT VFR BLD AUTO: 44.7 % (ref 37.5–51)
HGB BLD-MCNC: 15.1 G/DL (ref 13–17.7)
MCH RBC QN AUTO: 31.3 PG (ref 26.6–33)
MCHC RBC AUTO-ENTMCNC: 33.8 G/DL (ref 31.5–35.7)
MCV RBC AUTO: 92.5 FL (ref 79–97)
PLATELET # BLD AUTO: 291 10*3/MM3 (ref 140–450)
PMV BLD AUTO: 9.8 FL (ref 6–12)
POTASSIUM SERPL-SCNC: 4.9 MMOL/L (ref 3.5–5.2)
QT INTERVAL: 424 MS
QTC INTERVAL: 454 MS
RBC # BLD AUTO: 4.83 10*6/MM3 (ref 4.14–5.8)
SODIUM SERPL-SCNC: 138 MMOL/L (ref 136–145)
WBC NRBC COR # BLD AUTO: 9.89 10*3/MM3 (ref 3.4–10.8)

## 2025-07-09 PROCEDURE — 93005 ELECTROCARDIOGRAM TRACING: CPT | Performed by: UROLOGY

## 2025-07-09 PROCEDURE — 80048 BASIC METABOLIC PNL TOTAL CA: CPT | Performed by: UROLOGY

## 2025-07-09 PROCEDURE — 85027 COMPLETE CBC AUTOMATED: CPT | Performed by: UROLOGY

## 2025-07-09 PROCEDURE — 36415 COLL VENOUS BLD VENIPUNCTURE: CPT | Performed by: UROLOGY

## 2025-07-09 NOTE — DISCHARGE INSTRUCTIONS
Home Care After Holmium Laser Enucleation of the Prostate  The following instructions will help you care for yourself, or be cared for upon your return home today.  These are guidelines for your care right after surgery only.     Diet  Drink plenty of liquids and eat light meals today.    Start your regular diet tomorrow.    Activity  Start normal activities in twenty-four (24) hours.  Limit heavy lifting and strenuous activity for 5-7 days.    Wound Care and Hygiene  No restrictions, start normal routine.    Anesthesia Precautions & Expectations  After anesthesia, rest for 24 hours.  Do not drive, drink alcoholic beverages or make any important decisions during this time.  General anesthesia may cause a sore throat, jaw discomfort or muscle aches.  These symptoms can last for one or two days.     What to Expect after Surgery  Mild pain or burning with voiding.  Frequency or urgency with urination.  Bladder cramps.  Minimal bleeding with voiding.    Call your Doctor  Passing clots in urine preventing bladder emptying  Severe pain not controlled by oral medication  Temperature above 101.5 degrees  Inability to urinate within eight (8) hours after surgery    Catheter care  Empty the catheter bag as it fills.  Call if it is not draining.    You can either remove the catheter at home yourself or have a fill and void trial in the office 1 after your surgery. Dr. Hameed will have discussed this with you in preop. Sometimes we are able to remove the catheter the same day as surgery before you go home.     After catheter removal  It is common to have blood-tinged urine for 3-5 days.  It is common to have urgency with urination.    Urinary Leakage  It is not uncommon to have urinary leakage after this surgery, as discussed preoperatively. Please refer to the pelvic floor exercises in your preop handout to resolve this by strengthening your pelvic floor.     Other Instructions  Burning with urination is very common.  Drink  plenty of water to limit this feeling.    Follow up Appointment  Please schedule an appointment with Dr. Hameed in 12 weeks.

## 2025-07-14 ENCOUNTER — OFFICE VISIT (OUTPATIENT)
Dept: UROLOGY | Facility: CLINIC | Age: 70
End: 2025-07-14
Payer: MEDICARE

## 2025-07-14 VITALS
RESPIRATION RATE: 14 BRPM | HEIGHT: 68 IN | OXYGEN SATURATION: 96 % | WEIGHT: 235.89 LBS | TEMPERATURE: 98 F | BODY MASS INDEX: 35.75 KG/M2 | HEART RATE: 73 BPM

## 2025-07-14 DIAGNOSIS — N13.8 BPH WITH OBSTRUCTION/LOWER URINARY TRACT SYMPTOMS: Primary | ICD-10-CM

## 2025-07-14 DIAGNOSIS — N40.1 BPH WITH OBSTRUCTION/LOWER URINARY TRACT SYMPTOMS: Primary | ICD-10-CM

## 2025-07-14 NOTE — PROGRESS NOTES
Office Visit     Patient Name: Erik Bowser  : 1955   MRN: [id number]     Chief Complaint  No chief complaint on file.      Referring Provider: No ref. provider found    Primary Care Provider: Nat Umana APRN     Subjective     History of Present Illness         IPSS Questionnaire (AUA-7):                Past Medical History:   Diagnosis Date    Arthritis     At risk for obstructive sleep apnea     Spouse reports patient snores and that he has questionable sleep apnea but has never had a sleep study done    Borderline diabetes     Has never taken medication     Clotting disorder 2024    COPD (chronic obstructive pulmonary disease)     Coronary artery disease     1 stent    Disease of thyroid gland     Elevated cholesterol     Erectile dysfunction     After chemo    Hearing loss     Does not use hearing devices    History of bronchitis     History of chemotherapy     around 2019    History of radiation therapy     around 2019    Hyperlipidemia     Hypertension     Malignant neoplasm of right lung 10/11/2019    Melanoma     MI (myocardial infarction) 2015    placement of stent x1    MRSA (methicillin resistant Staphylococcus aureus) 2015    left hand - treated    Seasonal allergies     Skin cancer     skin, lung    Squamous cell lung cancer 2019    Urinary incontinence ?    Wears glasses     OTC glasses PRN for reading    Wears partial dentures     Upper mouth     Past Surgical History:   Procedure Laterality Date    BRONCHOSCOPY N/A 2019    Procedure: BRONCHOSCOPY WITH ENDOBRONCHIAL ULTRASOUND with General Anesthesia.  ;  Surgeon: Jeff Rubin MD;  Location: [facility] OR;  Service: Pulmonary    BRONCHOSCOPY N/A 10/11/2019    Procedure: BRONCHOSCOPY;  Surgeon: Titus Flores MD;  Location: [facility] OR;  Service: Cardiothoracic    CARDIAC CATHETERIZATION  2015    Placement of stent x1    CATARACT EXTRACTION Bilateral     CHOLECYSTECTOMY       Laparoscopic    COLONOSCOPY  2018    CORONARY ANGIOPLASTY WITH STENT PLACEMENT  2015    x1    HEMORRHOIDECTOMY      KNEE MENISCAL REPAIR Bilateral 2010    Dr. Montiel ([facility])    MOUTH SURGERY      Post for oral implants in upper mouth x3    ROTATOR CUFF REPAIR Bilateral     SKIN BIOPSY      SKIN CANCER EXCISION      melanoma removed from right neck and back.  Squamous cell removed multiple places.     SKIN CANCER EXCISION  2024    THORACOSCOPY VIDEO ASSISTED WITH LOBECTOMY Right 10/11/2019    Procedure: THORACOSCOPY VIDEO ASSISTED WITH RIGHT UPPER LOBECTOMY, MEDIASTINAL LYMPH NODE DISSECTION, INTERCOSTAL NERVE BLOCKS;  Surgeon: Titus Flores MD;  Location: [facility];  Service: Cardiothoracic    WISDOM TOOTH EXTRACTION       Family History   Problem Relation Age of Onset    Heart attack Mother 60    Coronary artery disease Mother     Hyperlipidemia Mother     Lung cancer Mother     Hypertension Mother     Cancer Mother         Mother  from lung cancer    Kidney disease Mother         Sister & brother also    Emphysema Father     Heart disease Father         Heart disease runs in family    Hypertension Sister     Heart attack Sister 45    Hypertension Brother     Heart attack Brother 50    Hypertension Sister     Hypertension Brother     Diabetes Brother     Heart disease Brother     Cancer Paternal Uncle         Prostate cancer    Prostate cancer Paternal Uncle      Social History     Socioeconomic History    Marital status:     Number of children: 2   Tobacco Use    Smoking status: Former     Current packs/day: 0.00     Average packs/day: 2.0 packs/day for 46.0 years (92.0 ttl pk-yrs)     Types: Cigarettes     Start date: 1973     Quit date: 2019     Years since quittin.9     Passive exposure: Past    Smokeless tobacco: Former     Types: Chew   Vaping Use    Vaping status: Never Used   Substance and Sexual Activity    Alcohol use: Not Currently     Alcohol/week: 2.0 -  "3.0 standard drinks of alcohol    Drug use: No    Sexual activity: Not Currently     Partners: Female       Current Outpatient Medications:     aspirin 81 MG EC tablet, Take 1 tablet by mouth Every Night., Disp: 90 tablet, Rfl: 3    atorvastatin (LIPITOR) 80 MG tablet, Take 1 tablet by mouth Every Night., Disp: 90 tablet, Rfl: 3    B Complex Vitamins (VITAMIN-B COMPLEX PO), Take 1 tablet by mouth Daily., Disp: , Rfl:     Cyanocobalamin (B-12) 1000 MCG tablet, Take 1,000 mcg by mouth Daily., Disp: , Rfl:     fexofenadine (ALLEGRA) 180 MG tablet, Take 1 tablet by mouth Daily. As needed, Disp: , Rfl:     levothyroxine (SYNTHROID, LEVOTHROID) 75 MCG tablet, Take 1 tablet by mouth Every Morning., Disp: , Rfl:     lisinopril (PRINIVIL,ZESTRIL) 10 MG tablet, TAKE 1 TABLET BY MOUTH DAILY, Disp: 90 tablet, Rfl: 3    metoprolol succinate XL (TOPROL-XL) 25 MG 24 hr tablet, Take 1 tablet by mouth Daily., Disp: 90 tablet, Rfl: 3    naproxen sodium (ALEVE) 220 MG tablet, Take 1 tablet by mouth As Needed for Mild Pain., Disp: , Rfl:     nitroglycerin (NITROSTAT) 0.4 MG SL tablet, Place 1 tablet under the tongue Every 5 (Five) Minutes As Needed for Chest Pain., Disp: 25 tablet, Rfl: 5    Zinc 50 MG capsule, Take  by mouth Every Morning., Disp: , Rfl:     Zinc Citrate-Phytase (Zytaze)  MG capsule, Take  by mouth Daily., Disp: , Rfl:   Allergies   Allergen Reactions    Rosuvastatin Myalgia     Objective   Visit Vitals  Pulse 73   Temp 98 °F (36.7 °C) (Infrared)   Resp 14   Ht 172.7 cm (67.99\")   Wt 107 kg (235 lb 14.3 oz)   SpO2 96%   BMI 35.88 kg/m²      Body mass index is 35.88 kg/m².     Physical exam was notable for: Obesity    Labs  Lab Results   Component Value Date    COLORU Yellow 08/20/2024    CLARITYU Clear 08/20/2024    SPECGRAV 1.025 08/20/2024    PHUR 6.0 08/20/2024    LEUKOCYTESUR Negative 08/20/2024    NITRITE Negative 08/20/2024    PROTEINPOCUA Negative 08/20/2024    GLUCOSEUR Negative 08/20/2024    KETONESU " "Negative 08/20/2024    UROBILINOGEN 0.2 E.U./dL 08/20/2024    BILIRUBINUR Negative 08/20/2024    RBCUR Negative 08/20/2024      No results found for: \"WBCUA\", \"RBCUA\", \"BACTERIA\", \"HYALCASTU\", \"SQUAMEPIUA\"     Lab Results   Component Value Date    WBC 9.89 07/09/2025    HGB 15.1 07/09/2025    HCT 44.7 07/09/2025    MCV 92.5 07/09/2025     07/09/2025     Lab Results   Component Value Date    GLUCOSE 125 (H) 07/09/2025    CALCIUM 9.8 07/09/2025     07/09/2025    K 4.9 07/09/2025    CO2 24.0 07/09/2025     07/09/2025    BUN 18.0 07/09/2025    BUN 14 08/20/2024    CREATININE 0.80 07/09/2025    CREATININE 1.08 08/20/2024    EGFR 95.8 07/09/2025    EGFR 74.7 08/20/2024    BCR 22.5 07/09/2025    ANIONGAP 13.0 07/09/2025    ALT 33 06/20/2023    AST 26 06/20/2023       Lab Results   Component Value Date    HGBA1C 6.2 (H) 02/13/2025        Lab Results   Component Value Date    PSA 2.920 08/20/2024    PSA 1.490 11/11/2021       No results found for: \"URICACIDSTN\", \"NXVF8YCYKGW\", \"ITGT8HMGPJ\", \"LABMAGN\"  Lab Results   Component Value Date    ZTVQ58TE 45.7 07/08/2024    URICACID 7.1 04/05/2023     No results found for: \"CYSTINE\", \"URINEVOLUM\", \"CALCIUMUR\", \"OXALATEU\", \"CITRATEUR\", \"LABPH\", \"URICUR\", \"NAUR\", \"KUR\", \"MAGNESIUMUR\", \"PHOSUR\", \"AMMONIUMUR\", \"CLUR\", \"[id number]\", \"UREAUR\", \"LABCREAUR\"    Lab Results   Component Value Date    PSA 2.920 08/20/2024         Radiographic Studies  CT Chest With Contrast Diagnostic  Result Date: 6/17/2025  Stable exam without evidence of metastatic disease.     This study was performed with techniques to keep radiation doses as low as reasonably achievable (ALARA). Individualized dose reduction techniques using automated exposure control or adjustment of mA and/or kV according to the patient size were employed.    Images were reviewed, interpreted, and dictated by Dr. Moe Pascal MD Transcribed by Keagan Jaquez PA-C.  This report was signed and finalized on 6/17/2025 " 3:37 PM by Moe Pascal MD.        I have reviewed the above labs and imaging.       Assessment / Plan      Diagnoses and all orders for this visit:    1. BPH with obstruction/lower urinary tract symptoms (Primary)    70-year-old male with BPH, chronic lower urinary tract symptoms. BPH s/p UroLift in 2021.  Unfortunately lower urinary tract symptoms are worsening now and IPSS is quite elevated at 29.  No signs of infection and PVR is 0.  Recently had gross hematuria Hx of CAD and MI in 2019, but no CHF or CKD. EF was    Today we once again discussed risks and benefits of HoLEP.  Risks discussed included but were not limited to bleeding, infection, damage to prostate or bladder, cardiopulmonary complications from anesthesia, fluid absorption.    Plan  1.  Proceed with HoLEP. Risks are Hx of CAD      Noel Hameed MD  Urologic Surgery  [facility]  [id number]   [place] [address]

## 2025-07-22 ENCOUNTER — ANESTHESIA EVENT (OUTPATIENT)
Dept: PERIOP | Facility: HOSPITAL | Age: 70
End: 2025-07-22
Payer: MEDICARE

## 2025-07-22 NOTE — ANESTHESIA PREPROCEDURE EVALUATION
Anesthesia Evaluation     Patient summary reviewed and Nursing notes reviewed   NPO Solid Status: > 8 hours  NPO Liquid Status: > 4 hours           Airway   Mallampati: II  TM distance: >3 FB  Neck ROM: full  No difficulty expected  Dental    (+) partials    Pulmonary    (+) a smoker (92 pack years quit 2019) Former, Abstained day of surgery, cigarettes, lung cancer, COPD,decreased breath sounds    ROS comment: Malignant neoplasm of upper lobe of right lung  R Upper Lobectomy (VATS) 10/11/19  Squamous cell lung cancer        At risk for obstructive sleep apnea Spouse reports patient snores and that he has questionable sleep apnea but has never had a sleep study done  Cardiovascular - normal exam    ECG reviewed  Patient on routine beta blocker  Rhythm: regular  Rate: normal    (+) hypertension well controlled 2 medications or greater, past MI  >12 months, CAD, cardiac stents more than 12 months ago , angina, hyperlipidemia,  carotid artery disease carotid bilateral    ROS comment: Coronary artery disease involving native coronary artery of native heart with angina pectoris  Essential hypertension    · Cardiac catheterization for inferior STEMI (1/20/2015):  RCA occlusion status post ISH (Xience 3.5 x 23 mm).  LVEF 45%.  · Echocardiogram (1/21/2015):  LVEF 55%, no valvular abnormalities, 01/21/2015.  · Echocardiogram (7/14/2020): EF 55%. Mild inferior hypokinesis. Trace MR.  · Echo (6/29/2022): LVEF 65%. No significant valvular abnormalities.   · Nuclear stress test (7/3/2022): No evidence of ischemia. LVEF 59%.       Test Reason : pre-op  Blood Pressure :   */*   mmHG  Vent. Rate :  69 BPM     Atrial Rate :  69 BPM     P-R Int : 174 ms          QRS Dur : 122 ms      QT Int : 424 ms       P-R-T Axes :  50  58  46 degrees    QTcB Int : 454 ms     Normal sinus rhythm  Right bundle branch block  Abnormal ECG  When compared with ECG of 27-Feb-2015 13:44,  Borderline criteria for Inferior infarct are no longer present  T  wave inversion no longer evident in Inferior leads  Confirmed by NOE LOZANO ([id number]) on 7/9/2025 2:44:51 PM    2022 Stress Test    · Normal exercise stress test by clinical and EKG criteria  · Severely reduced exercise capacity. Expected exercise duration = 7:50. Actual = 4:54.  · Myocardial perfusion imaging indicates a normal myocardial perfusion study with no evidence of ischemia.  · Impressions are consistent with a low risk study.  · Findings consistent with a normal ECG stress test.    2022 ECHO    · Left ventricular systolic function is normal. Estimated left ventricular EF = 65%.  · Left ventricular diastolic function is consistent with (grade I) impaired relaxation.  · The cardiac valves are anatomically and functionally normal.             Neuro/Psych- negative ROS  GI/Hepatic/Renal/Endo    (+) obesity (bmi 36), diabetes mellitus (pre diabetic), thyroid problem hypothyroidism    ROS Comment: BPH with obstruction/lower urinary tract symptoms  Gross hematuria    Urinary incontinence   Erectile dysfunction After chemo        Musculoskeletal     Abdominal   (+) obese   Substance History - negative use     OB/GYN negative ob/gyn ROS         Other   arthritis,   history of cancer (lung/ skin)    ROS/Med Hx Other: MRSA (methicillin resistant Staphylococcus aureus) left hand - treated    Hx chemotherapy/radiation  LD 2019                Anesthesia Plan    ASA 3     general     (Risks and benefits discussed including risk of aspiration, recall and dental damage. All patient questions answered.    Will continue with plan of care.)  intravenous induction     Anesthetic plan, risks, benefits, and alternatives have been provided, discussed and informed consent has been obtained with: patient.  Pre-procedure education provided    CODE STATUS:

## 2025-07-23 ENCOUNTER — HOSPITAL ENCOUNTER (OUTPATIENT)
Facility: HOSPITAL | Age: 70
Setting detail: HOSPITAL OUTPATIENT SURGERY
Discharge: HOME | End: 2025-07-23
Attending: UROLOGY | Admitting: UROLOGY
Payer: MEDICARE

## 2025-07-23 ENCOUNTER — ANESTHESIA (OUTPATIENT)
Dept: PERIOP | Facility: HOSPITAL | Age: 70
End: 2025-07-23
Payer: MEDICARE

## 2025-07-23 VITALS
TEMPERATURE: 97 F | HEART RATE: 76 BPM | DIASTOLIC BLOOD PRESSURE: 68 MMHG | RESPIRATION RATE: 17 BRPM | OXYGEN SATURATION: 95 % | SYSTOLIC BLOOD PRESSURE: 117 MMHG

## 2025-07-23 DIAGNOSIS — N40.1 BPH WITH OBSTRUCTION/LOWER URINARY TRACT SYMPTOMS: ICD-10-CM

## 2025-07-23 DIAGNOSIS — N13.8 BPH WITH OBSTRUCTION/LOWER URINARY TRACT SYMPTOMS: ICD-10-CM

## 2025-07-23 PROCEDURE — 52649 PROSTATE LASER ENUCLEATION: CPT | Performed by: UROLOGY

## 2025-07-23 PROCEDURE — 25010000002 HYDROMORPHONE 1 MG/ML SOLUTION: Performed by: NURSE ANESTHETIST, CERTIFIED REGISTERED

## 2025-07-23 PROCEDURE — 25010000002 DEXAMETHASONE PER 1 MG: Performed by: NURSE ANESTHETIST, CERTIFIED REGISTERED

## 2025-07-23 PROCEDURE — S0260 H&P FOR SURGERY: HCPCS | Performed by: UROLOGY

## 2025-07-23 PROCEDURE — 25010000002 AMPICILLIN-SULBACTAM PER 1.5 G: Performed by: UROLOGY

## 2025-07-23 PROCEDURE — 88305 TISSUE EXAM BY PATHOLOGIST: CPT

## 2025-07-23 PROCEDURE — 25010000002 ONDANSETRON PER 1 MG: Performed by: NURSE ANESTHETIST, CERTIFIED REGISTERED

## 2025-07-23 PROCEDURE — 25010000002 SUGAMMADEX 200 MG/2ML SOLUTION: Performed by: NURSE ANESTHETIST, CERTIFIED REGISTERED

## 2025-07-23 PROCEDURE — 25810000003 LACTATED RINGERS PER 1000 ML: Performed by: NURSE ANESTHETIST, CERTIFIED REGISTERED

## 2025-07-23 PROCEDURE — 25010000002 GENTAMICIN PER 80 MG: Performed by: UROLOGY

## 2025-07-23 PROCEDURE — C1758 CATHETER, URETERAL: HCPCS | Performed by: UROLOGY

## 2025-07-23 PROCEDURE — 25010000002 HYDROMORPHONE PER 4 MG: Performed by: NURSE ANESTHETIST, CERTIFIED REGISTERED

## 2025-07-23 PROCEDURE — C1782 MORCELLATOR: HCPCS | Performed by: UROLOGY

## 2025-07-23 PROCEDURE — 25010000002 FUROSEMIDE PER 20 MG: Performed by: NURSE ANESTHETIST, CERTIFIED REGISTERED

## 2025-07-23 PROCEDURE — 25010000002 FENTANYL CITRATE (PF) 50 MCG/ML SOLUTION PREFILLED SYRINGE: Performed by: NURSE ANESTHETIST, CERTIFIED REGISTERED

## 2025-07-23 PROCEDURE — 25010000002 PROPOFOL 10 MG/ML EMULSION: Performed by: NURSE ANESTHETIST, CERTIFIED REGISTERED

## 2025-07-23 PROCEDURE — 25010000002 LIDOCAINE (CARDIAC): Performed by: NURSE ANESTHETIST, CERTIFIED REGISTERED

## 2025-07-23 RX ORDER — TRANEXAMIC ACID 10 MG/ML
1000 INJECTION, SOLUTION INTRAVENOUS ONCE
Status: COMPLETED | OUTPATIENT
Start: 2025-07-23 | End: 2025-07-23

## 2025-07-23 RX ORDER — DEXAMETHASONE SODIUM PHOSPHATE 10 MG/ML
8 INJECTION, SOLUTION INTRAMUSCULAR; INTRAVENOUS ONCE AS NEEDED
Status: DISCONTINUED | OUTPATIENT
Start: 2025-07-23 | End: 2025-07-23 | Stop reason: HOSPADM

## 2025-07-23 RX ORDER — FUROSEMIDE 10 MG/ML
INJECTION INTRAMUSCULAR; INTRAVENOUS AS NEEDED
Status: DISCONTINUED | OUTPATIENT
Start: 2025-07-23 | End: 2025-07-23 | Stop reason: SURG

## 2025-07-23 RX ORDER — LABETALOL HYDROCHLORIDE 5 MG/ML
5 INJECTION, SOLUTION INTRAVENOUS
Status: DISCONTINUED | OUTPATIENT
Start: 2025-07-23 | End: 2025-07-23 | Stop reason: HOSPADM

## 2025-07-23 RX ORDER — PHENAZOPYRIDINE HYDROCHLORIDE 100 MG/1
100 TABLET, FILM COATED ORAL DAILY PRN
Qty: 5 TABLET | Refills: 0 | Status: SHIPPED | OUTPATIENT
Start: 2025-07-23

## 2025-07-23 RX ORDER — MEPERIDINE HYDROCHLORIDE 25 MG/ML
12.5 INJECTION INTRAMUSCULAR; INTRAVENOUS; SUBCUTANEOUS
Status: DISCONTINUED | OUTPATIENT
Start: 2025-07-23 | End: 2025-07-23 | Stop reason: HOSPADM

## 2025-07-23 RX ORDER — HYDRALAZINE HYDROCHLORIDE 20 MG/ML
5 INJECTION INTRAMUSCULAR; INTRAVENOUS
Status: DISCONTINUED | OUTPATIENT
Start: 2025-07-23 | End: 2025-07-23 | Stop reason: HOSPADM

## 2025-07-23 RX ORDER — IPRATROPIUM BROMIDE AND ALBUTEROL SULFATE 2.5; .5 MG/3ML; MG/3ML
3 SOLUTION RESPIRATORY (INHALATION) ONCE AS NEEDED
Status: DISCONTINUED | OUTPATIENT
Start: 2025-07-23 | End: 2025-07-23 | Stop reason: HOSPADM

## 2025-07-23 RX ORDER — PROPOFOL 10 MG/ML
VIAL (ML) INTRAVENOUS AS NEEDED
Status: DISCONTINUED | OUTPATIENT
Start: 2025-07-23 | End: 2025-07-23 | Stop reason: SURG

## 2025-07-23 RX ORDER — ACETAMINOPHEN 500 MG
1000 TABLET ORAL EVERY 6 HOURS
Qty: 30 TABLET | Refills: 0 | Status: SHIPPED | OUTPATIENT
Start: 2025-07-23 | End: 2025-07-26

## 2025-07-23 RX ORDER — CEFDINIR 300 MG/1
300 CAPSULE ORAL 2 TIMES DAILY
Qty: 6 CAPSULE | Refills: 0 | Status: SHIPPED | OUTPATIENT
Start: 2025-07-23 | End: 2025-07-26

## 2025-07-23 RX ORDER — OXYBUTYNIN CHLORIDE 10 MG/1
10 TABLET, EXTENDED RELEASE ORAL DAILY PRN
Qty: 10 TABLET | Refills: 0 | Status: SHIPPED | OUTPATIENT
Start: 2025-07-23

## 2025-07-23 RX ORDER — FENTANYL CITRATE 50 UG/ML
INJECTION, SOLUTION INTRAMUSCULAR; INTRAVENOUS AS NEEDED
Status: DISCONTINUED | OUTPATIENT
Start: 2025-07-23 | End: 2025-07-23 | Stop reason: SURG

## 2025-07-23 RX ORDER — MAGNESIUM HYDROXIDE 1200 MG/15ML
LIQUID ORAL AS NEEDED
Status: DISCONTINUED | OUTPATIENT
Start: 2025-07-23 | End: 2025-07-23 | Stop reason: HOSPADM

## 2025-07-23 RX ORDER — DEXAMETHASONE SODIUM PHOSPHATE 4 MG/ML
INJECTION, SOLUTION INTRA-ARTICULAR; INTRALESIONAL; INTRAMUSCULAR; INTRAVENOUS; SOFT TISSUE AS NEEDED
Status: DISCONTINUED | OUTPATIENT
Start: 2025-07-23 | End: 2025-07-23 | Stop reason: SURG

## 2025-07-23 RX ORDER — HYDROMORPHONE HYDROCHLORIDE 2 MG/ML
INJECTION, SOLUTION INTRAMUSCULAR; INTRAVENOUS; SUBCUTANEOUS AS NEEDED
Status: DISCONTINUED | OUTPATIENT
Start: 2025-07-23 | End: 2025-07-23 | Stop reason: SURG

## 2025-07-23 RX ORDER — ROCURONIUM BROMIDE 50 MG/5 ML
SYRINGE (ML) INTRAVENOUS AS NEEDED
Status: DISCONTINUED | OUTPATIENT
Start: 2025-07-23 | End: 2025-07-23 | Stop reason: SURG

## 2025-07-23 RX ORDER — EPHEDRINE SULFATE 50 MG/ML
INJECTION INTRAVENOUS AS NEEDED
Status: DISCONTINUED | OUTPATIENT
Start: 2025-07-23 | End: 2025-07-23 | Stop reason: SURG

## 2025-07-23 RX ORDER — ONDANSETRON 2 MG/ML
4 INJECTION INTRAMUSCULAR; INTRAVENOUS ONCE AS NEEDED
Status: DISCONTINUED | OUTPATIENT
Start: 2025-07-23 | End: 2025-07-23 | Stop reason: HOSPADM

## 2025-07-23 RX ORDER — ONDANSETRON 2 MG/ML
INJECTION INTRAMUSCULAR; INTRAVENOUS AS NEEDED
Status: DISCONTINUED | OUTPATIENT
Start: 2025-07-23 | End: 2025-07-23 | Stop reason: SURG

## 2025-07-23 RX ORDER — SODIUM CHLORIDE, SODIUM LACTATE, POTASSIUM CHLORIDE, CALCIUM CHLORIDE 600; 310; 30; 20 MG/100ML; MG/100ML; MG/100ML; MG/100ML
INJECTION, SOLUTION INTRAVENOUS CONTINUOUS PRN
Status: DISCONTINUED | OUTPATIENT
Start: 2025-07-23 | End: 2025-07-23 | Stop reason: SURG

## 2025-07-23 RX ADMIN — PROPOFOL 200 MG: 10 INJECTION, EMULSION INTRAVENOUS at 07:49

## 2025-07-23 RX ADMIN — SUGAMMADEX 200 MG: 100 INJECTION, SOLUTION INTRAVENOUS at 09:11

## 2025-07-23 RX ADMIN — LIDOCAINE HYDROCHLORIDE 60 MG: 20 INJECTION, SOLUTION INTRAVENOUS at 07:49

## 2025-07-23 RX ADMIN — FUROSEMIDE 20 MG: 10 INJECTION, SOLUTION INTRAMUSCULAR; INTRAVENOUS at 08:52

## 2025-07-23 RX ADMIN — HYDROMORPHONE HYDROCHLORIDE 0.5 MG: 2 INJECTION, SOLUTION INTRAMUSCULAR; INTRAVENOUS; SUBCUTANEOUS at 08:23

## 2025-07-23 RX ADMIN — DEXAMETHASONE SODIUM PHOSPHATE 4 MG: 4 INJECTION, SOLUTION INTRA-ARTICULAR; INTRALESIONAL; INTRAMUSCULAR; INTRAVENOUS; SOFT TISSUE at 08:04

## 2025-07-23 RX ADMIN — AMPICILLIN SODIUM AND SULBACTAM SODIUM 3 G: 2; 1 INJECTION, POWDER, FOR SOLUTION INTRAMUSCULAR; INTRAVENOUS at 08:01

## 2025-07-23 RX ADMIN — Medication 50 MG: at 07:49

## 2025-07-23 RX ADMIN — DEXAMETHASONE SODIUM PHOSPHATE 4 MG: 4 INJECTION, SOLUTION INTRA-ARTICULAR; INTRALESIONAL; INTRAMUSCULAR; INTRAVENOUS; SOFT TISSUE at 08:23

## 2025-07-23 RX ADMIN — FENTANYL CITRATE 50 MCG: 50 INJECTION, SOLUTION INTRAMUSCULAR; INTRAVENOUS at 07:49

## 2025-07-23 RX ADMIN — ONDANSETRON 4 MG: 2 INJECTION INTRAMUSCULAR; INTRAVENOUS at 07:49

## 2025-07-23 RX ADMIN — SODIUM CHLORIDE, POTASSIUM CHLORIDE, SODIUM LACTATE AND CALCIUM CHLORIDE: 600; 310; 30; 20 INJECTION, SOLUTION INTRAVENOUS at 07:42

## 2025-07-23 RX ADMIN — EPHEDRINE SULFATE 10 MG: 50 INJECTION INTRAVENOUS at 07:59

## 2025-07-23 RX ADMIN — TRANEXAMIC ACID 1000 MG: 10 INJECTION, SOLUTION INTRAVENOUS at 07:46

## 2025-07-23 RX ADMIN — GENTAMICIN SULFATE 540 MG: 40 INJECTION, SOLUTION INTRAMUSCULAR; INTRAVENOUS at 06:59

## 2025-07-23 RX ADMIN — HYDROMORPHONE HYDROCHLORIDE 0.5 MG: 1 INJECTION, SOLUTION INTRAMUSCULAR; INTRAVENOUS; SUBCUTANEOUS at 09:47

## 2025-07-23 NOTE — OP NOTE
Preoperative diagnosis  Clinical benign prostatic hyperplasia (symptoms included acute urinary retention, weak stream, straining, hesitancy, and incomplete emptying)    Postoperative diagnosis  Clinical benign prostatic hyperplasia (symptoms included acute urinary retention, weak stream, straining, hesitancy, and incomplete emptying)    Procedure performed  1.  Rigid cystoscopy  2.  Holmium laser nucleation of the prostate with prostate tissue morcellation - (CPT 67319)    Attending surgeon  Noel Hameed MD    Anesthesia  General    EBL  10 mL    Complications  None    Specimen  Morcellated prostate tissue for pathology    Findings  Cystoscopy revealed bilateral ureteral orifices . This area was not involved in the enucleate.  Enucleation proceded and remained proximal to the verumontanum /external urethral sphincter    Total enucleation time: 39  Hemostasis time: 10  Total morcellation time: 10    Indications  70 y.o. male agreed to undergo the above named procedure after discussion of the alternatives, risks and benefits.    He was found to have BPH symptoms listed above and failed medical therapy. Informed consent was obtained.      Procedure  The patient was taken to the operating room and placed supine on the operating table. Preoperative antibiotics were administered.  Bilateral lower extremity SCDs were placed.  After induction of general anesthesia, the patient was positioned in dorsal lithotomy and his catheter was removed.  A time-out was performed and the patient was prepped and draped in a sterile fashion.      A 26 Korean continuous flow resectoscope was introduced into the bladder.   The visual obturator was replaced with a laser bridge and a 550 µm Xiang pulse modulated laser fiber.  We began the procedure with an en bloc technique.  This was performed with laser settings of 2 J and 50 Hz.  White lines were created proximal to the sphincter to demarcate that area and create an early detachment to  facilitate continence.  We then began with a posterior plane lateral to the verumontanum.  This was created and deepened and carried on both sides and connected.  We then performed the lateral planes and connected them to the posterior plane of dissection.  Attention was then turned to creating access incisions and dissecting anteriorly to connect both sides.  This commenced in a circumferential fashion down to the bladder neck, which was entered anteriorly.  The prostate was then swept down laterally, and we finished the posterior dissection, taking care to avoid harming the ureteral orifice ease.  They were checked multiple times.  After the prostate was enucleated and pushed into the bladder, we performed hemostasis with settings of 1 J and 20 Hz, then switched to morcellation with the nephroscope and the cyber blade disposable morcellator.  Great care was taken not to injure the ureteral orifices, and no damage to the bladder was observed.  After all the tissue was morcellated, we once again performed hemostasis so that the urine was clear.  A 24 Vietnamese three-way catheter was then placed with 30 cc in the balloon.  CBI was run until PACU, then will be shut off prior to him going home.    The patient tolerated the procedure well, was awakened, extubated and transferred to the recovery room in stable condition.      He will remove the catheter at home tomorrow.

## 2025-07-23 NOTE — ANESTHESIA PROCEDURE NOTES
Airway  Reason: elective    Date/Time: 7/23/2025 7:49 AM  Airway not difficult    General Information and Staff    Patient location during procedure: OR  CRNA/CAA: Maya Thomas CRNA    Indications and Patient Condition  Indications for airway management: airway protection    Preoxygenated: yes  MILS maintained throughout    Mask difficulty assessment: 1 - vent by mask    Final Airway Details    Final airway type: endotracheal airway      Successful airway: ETT  Cuffed: yes   Successful intubation technique: direct laryngoscopy  Endotracheal tube insertion site: oral  Blade: Dorie  Blade size: 3  ETT size (mm): 7.5  Cormack-Lehane Classification: grade IIa - partial view of glottis  Placement verified by: chest auscultation and capnometry   Cuff volume (mL): 8  Measured from: teeth  ETT/EBT  to teeth (cm): 21  Number of attempts at approach: 1  Assessment: lips, teeth, and gum same as pre-op and atraumatic intubation    Additional Comments  AOI x 1 PASS, +ETCO2, +R/F/C. MOUTH TEETH GUMS SAME AS PREOP

## 2025-07-23 NOTE — ANESTHESIA POSTPROCEDURE EVALUATION
Patient: Erik Bowser    Procedure Summary       Date: 07/23/25 Room / Location: [facility] /  [facility]    Anesthesia Start: 0742 Anesthesia Stop: 0922    Procedure: Holmium Laser enucleation of the prostate Diagnosis:       BPH with obstruction/lower urinary tract symptoms      (BPH with obstruction/lower urinary tract symptoms [N40.1, N13.8])    Surgeons: Noel Hameed MD Provider: Maya Thomas CRNA    Anesthesia Type: general ASA Status: 3            Anesthesia Type: general    Vitals  Vitals Value Taken Time   /91 07/23/25 10:00   Temp 97 °F (36.1 °C) 07/23/25 09:26   Pulse 68 07/23/25 10:08   Resp 18 07/23/25 10:00   SpO2 99 % 07/23/25 10:08   Vitals shown include unfiled device data.        Post Anesthesia Care and Evaluation    Patient location during evaluation: PACU  Patient participation: complete - patient participated  Level of consciousness: awake and alert  Pain score: 1  Pain management: satisfactory to patient    Airway patency: patent  Anesthetic complications: No anesthetic complications  PONV Status: none  Cardiovascular status: acceptable and stable  Respiratory status: acceptable, nonlabored ventilation, nasal cannula and spontaneous ventilation  Hydration status: acceptable    Comments: Vitals signs as noted in nursing documentation as per protocol.

## 2025-07-23 NOTE — H&P
CC  No chief complaint on file.       HPI  Erik Bowser is a 70 y.o. with history of No diagnosis found.     No recent fevers or new LUTS  Does not take any blood thinners    Past Medical History  Past Medical History:   Diagnosis Date    Arthritis     At risk for obstructive sleep apnea     Spouse reports patient snores and that he has questionable sleep apnea but has never had a sleep study done    Borderline diabetes     Has never taken medication     Clotting disorder 8-4-2024    COPD (chronic obstructive pulmonary disease)     Coronary artery disease     1 stent    Disease of thyroid gland     Elevated cholesterol     Erectile dysfunction 2019    After chemo    Hearing loss     Does not use hearing devices    History of bronchitis 2017    History of chemotherapy     around 9/20/2019    History of radiation therapy     around 9/20/2019    Hyperlipidemia     Hypertension     Malignant neoplasm of right lung 10/11/2019    Melanoma     MI (myocardial infarction) 01/20/2015    placement of stent x1    MRSA (methicillin resistant Staphylococcus aureus) 01/2015    left hand - treated    Seasonal allergies     Skin cancer     skin, lung    Squamous cell lung cancer 07/24/2019    Urinary incontinence ?    Wears glasses     OTC glasses PRN for reading    Wears partial dentures     Upper mouth       Past Surgical History  Past Surgical History:   Procedure Laterality Date    BRONCHOSCOPY N/A 07/11/2019    Procedure: BRONCHOSCOPY WITH ENDOBRONCHIAL ULTRASOUND with General Anesthesia.  ;  Surgeon: Jeff Rubin MD;  Location: [facility] OR;  Service: Pulmonary    BRONCHOSCOPY N/A 10/11/2019    Procedure: BRONCHOSCOPY;  Surgeon: Titus Flores MD;  Location: [facility] OR;  Service: Cardiothoracic    CARDIAC CATHETERIZATION  01/20/2015    Placement of stent x1    CATARACT EXTRACTION Bilateral     CHOLECYSTECTOMY      Laparoscopic    COLONOSCOPY  2018    CORONARY ANGIOPLASTY WITH STENT PLACEMENT  2015    x1     HEMORRHOIDECTOMY      KNEE MENISCAL REPAIR Bilateral 2010    Dr. Montiel ([facility])    MOUTH SURGERY      Post for oral implants in upper mouth x3    ROTATOR CUFF REPAIR Bilateral     SKIN BIOPSY      SKIN CANCER EXCISION      melanoma removed from right neck and back.  Squamous cell removed multiple places.     SKIN CANCER EXCISION  2024    THORACOSCOPY VIDEO ASSISTED WITH LOBECTOMY Right 10/11/2019    Procedure: THORACOSCOPY VIDEO ASSISTED WITH RIGHT UPPER LOBECTOMY, MEDIASTINAL LYMPH NODE DISSECTION, INTERCOSTAL NERVE BLOCKS;  Surgeon: Titus Flores MD;  Location: [facility];  Service: Cardiothoracic    WISDOM TOOTH EXTRACTION         Medications    Current Facility-Administered Medications:     ampicillin-sulbactam (UNASYN) 3 g in sodium chloride 0.9 % 100 mL IVPB-VTB, 3 g, Intravenous, Once, Noel Hameed MD    tranexamic acid 1000 mg in 100 mL 0.7% NaCl infusion (premix), 1,000 mg, Intravenous, Once, Noel Hameed MD    Allergies  Allergies   Allergen Reactions    Rosuvastatin Myalgia       Social History  Social History     Socioeconomic History    Marital status:     Number of children: 2   Tobacco Use    Smoking status: Former     Current packs/day: 0.00     Average packs/day: 2.0 packs/day for 46.0 years (92.0 ttl pk-yrs)     Types: Cigarettes     Start date: 1973     Quit date: 2019     Years since quittin.9     Passive exposure: Past    Smokeless tobacco: Former     Types: Chew   Vaping Use    Vaping status: Never Used   Substance and Sexual Activity    Alcohol use: Not Currently     Alcohol/week: 2.0 - 3.0 standard drinks of alcohol    Drug use: No    Sexual activity: Not Currently     Partners: Female       Review of Systems  Constitutional: No fevers or chills  Skin: Negative for rash  Endocrine: No heat/cold intolerance   Cardiovascular: Negative for chest pain or dyspnea on exertion  Respiratory: Negative for shortness of breath or  wheezing  Gastrointestinal: No constipation, nausea or vomiting  Genitourinary: Negative for new lower urinary tract symptoms, current gross hematuria or dysuria.  Musculoskeletal: No flank pain  Neurological:  Negative for frequent headaches or dizziness  Lymph/Heme: Negative for leg swelling or calf pain.    Physical Exam  Visit Vitals  /76 (BP Location: Right arm, Patient Position: Sitting)   Pulse 66   Temp 97.5 °F (36.4 °C) (Temporal)   Resp 16   SpO2 96%     Constitutional: NAD, WDWN.   HEENT: NCAT. Conjunctivae normal.  MMM.    Cardiovascular: Regular rate.  Pulmonary/Chest: Respirations are even and unlabored bilaterally.  Abdominal: Soft. No distension, tenderness, masses or guarding. No CVA tenderness.  Neurological: A + O x 3.  Cranial Nerves II-XII grossly intact. Normal gait.  Extremities: RAFA x 4, Warm. No clubbing.  No cyanosis.    Skin: Pink, warm and dry.  No rashes noted.  Psychiatric:  Normal mood and affect    Labs & Imaging  Lab Results   Component Value Date    GLUCOSE 125 (H) 07/09/2025    CALCIUM 9.8 07/09/2025     07/09/2025    K 4.9 07/09/2025    CO2 24.0 07/09/2025     07/09/2025    BUN 18.0 07/09/2025    CREATININE 0.80 07/09/2025    EGFRIFAFRI 119 04/13/2018    EGFRIFNONA 96 11/11/2021    BCR 22.5 07/09/2025    ANIONGAP 13.0 07/09/2025     Lab Results   Component Value Date    WBC 9.89 07/09/2025    HGB 15.1 07/09/2025    HCT 44.7 07/09/2025    MCV 92.5 07/09/2025     07/09/2025     Brief Urine Lab Results  (Last result in the past 365 days)        Color   Clarity   Blood   Leuk Est   Nitrite   Protein   CREAT   Urine HCG        08/20/24 0839 Yellow   Clear   Negative   Negative   Negative   Negative                      No results found.        Assessment  Erik Bowser is a 70 y.o. adult who presents with the following diagnosis:  No diagnosis found.     Plan  1. To OR for Holmium Laser enucleation of the prostate     Noel Hameed MD

## 2025-07-24 ENCOUNTER — TELEPHONE (OUTPATIENT)
Dept: UROLOGY | Facility: CLINIC | Age: 70
End: 2025-07-24

## 2025-07-24 ENCOUNTER — HOSPITAL ENCOUNTER (EMERGENCY)
Facility: HOSPITAL | Age: 70
Discharge: HOME | End: 2025-07-24
Attending: STUDENT IN AN ORGANIZED HEALTH CARE EDUCATION/TRAINING PROGRAM | Admitting: STUDENT IN AN ORGANIZED HEALTH CARE EDUCATION/TRAINING PROGRAM
Payer: MEDICARE

## 2025-07-24 VITALS
TEMPERATURE: 97.7 F | HEIGHT: 68 IN | HEART RATE: 76 BPM | DIASTOLIC BLOOD PRESSURE: 67 MMHG | OXYGEN SATURATION: 96 % | RESPIRATION RATE: 16 BRPM | BODY MASS INDEX: 37.44 KG/M2 | SYSTOLIC BLOOD PRESSURE: 118 MMHG | WEIGHT: 247 LBS

## 2025-07-24 DIAGNOSIS — R33.9 URINARY RETENTION: Primary | ICD-10-CM

## 2025-07-24 LAB
ALBUMIN SERPL-MCNC: 3.7 G/DL (ref 3.5–5.2)
ALBUMIN/GLOB SERPL: 1.5 G/DL
ALP SERPL-CCNC: 82 U/L (ref 39–117)
ALT SERPL W P-5'-P-CCNC: 68 U/L (ref 1–41)
ANION GAP SERPL CALCULATED.3IONS-SCNC: 11.6 MMOL/L (ref 5–15)
AST SERPL-CCNC: 46 U/L (ref 1–40)
BACTERIA UR QL AUTO: ABNORMAL /HPF
BASOPHILS # BLD AUTO: 0.08 10*3/MM3 (ref 0–0.2)
BASOPHILS NFR BLD AUTO: 0.5 % (ref 0–1.5)
BILIRUB SERPL-MCNC: 1.4 MG/DL (ref 0–1.2)
BILIRUB UR QL STRIP: NEGATIVE
BUN SERPL-MCNC: 21 MG/DL (ref 8–23)
BUN/CREAT SERPL: 20.6 (ref 7–25)
CALCIUM SPEC-SCNC: 8.9 MG/DL (ref 8.6–10.5)
CHLORIDE SERPL-SCNC: 105 MMOL/L (ref 98–107)
CLARITY UR: CLEAR
CO2 SERPL-SCNC: 21.4 MMOL/L (ref 22–29)
COLOR UR: ABNORMAL
CREAT SERPL-MCNC: 1.02 MG/DL (ref 0.76–1.27)
DEPRECATED RDW RBC AUTO: 44 FL (ref 37–54)
EGFRCR SERPLBLD CKD-EPI 2021: 79.6 ML/MIN/1.73
EOSINOPHIL # BLD AUTO: 0.65 10*3/MM3 (ref 0–0.4)
EOSINOPHIL NFR BLD AUTO: 3.7 % (ref 0.3–6.2)
ERYTHROCYTE [DISTWIDTH] IN BLOOD BY AUTOMATED COUNT: 12.9 % (ref 12.3–15.4)
GLOBULIN UR ELPH-MCNC: 2.5 GM/DL
GLUCOSE SERPL-MCNC: 122 MG/DL (ref 65–99)
GLUCOSE UR STRIP-MCNC: NEGATIVE MG/DL
HCT VFR BLD AUTO: 39.8 % (ref 37.5–51)
HGB BLD-MCNC: 13.2 G/DL (ref 13–17.7)
HGB UR QL STRIP.AUTO: ABNORMAL
HYALINE CASTS UR QL AUTO: ABNORMAL /LPF
IMM GRANULOCYTES # BLD AUTO: 0.06 10*3/MM3 (ref 0–0.05)
IMM GRANULOCYTES NFR BLD AUTO: 0.3 % (ref 0–0.5)
KETONES UR QL STRIP: NEGATIVE
LEUKOCYTE ESTERASE UR QL STRIP.AUTO: ABNORMAL
LYMPHOCYTES # BLD AUTO: 1.44 10*3/MM3 (ref 0.7–3.1)
LYMPHOCYTES NFR BLD AUTO: 8.2 % (ref 19.6–45.3)
MCH RBC QN AUTO: 31.4 PG (ref 26.6–33)
MCHC RBC AUTO-ENTMCNC: 33.2 G/DL (ref 31.5–35.7)
MCV RBC AUTO: 94.5 FL (ref 79–97)
MONOCYTES # BLD AUTO: 1.17 10*3/MM3 (ref 0.1–0.9)
MONOCYTES NFR BLD AUTO: 6.7 % (ref 5–12)
NEUTROPHILS NFR BLD AUTO: 14.13 10*3/MM3 (ref 1.7–7)
NEUTROPHILS NFR BLD AUTO: 80.6 % (ref 42.7–76)
NITRITE UR QL STRIP: POSITIVE
NRBC BLD AUTO-RTO: 0 /100 WBC (ref 0–0.2)
PH UR STRIP.AUTO: <=5 [PH] (ref 5–8)
PLATELET # BLD AUTO: 243 10*3/MM3 (ref 140–450)
PMV BLD AUTO: 9.6 FL (ref 6–12)
POTASSIUM SERPL-SCNC: 4.7 MMOL/L (ref 3.5–5.2)
PROT SERPL-MCNC: 6.2 G/DL (ref 6–8.5)
PROT UR QL STRIP: ABNORMAL
RBC # BLD AUTO: 4.21 10*6/MM3 (ref 4.14–5.8)
RBC # UR STRIP: ABNORMAL /HPF
REF LAB TEST METHOD: ABNORMAL
REF LAB TEST METHOD: NORMAL
SODIUM SERPL-SCNC: 138 MMOL/L (ref 136–145)
SP GR UR STRIP: 1.02 (ref 1–1.03)
SQUAMOUS #/AREA URNS HPF: ABNORMAL /HPF
UROBILINOGEN UR QL STRIP: ABNORMAL
WBC # UR STRIP: ABNORMAL /HPF
WBC NRBC COR # BLD AUTO: 17.53 10*3/MM3 (ref 3.4–10.8)

## 2025-07-24 PROCEDURE — 99283 EMERGENCY DEPT VISIT LOW MDM: CPT | Performed by: STUDENT IN AN ORGANIZED HEALTH CARE EDUCATION/TRAINING PROGRAM

## 2025-07-24 PROCEDURE — 81001 URINALYSIS AUTO W/SCOPE: CPT | Performed by: STUDENT IN AN ORGANIZED HEALTH CARE EDUCATION/TRAINING PROGRAM

## 2025-07-24 PROCEDURE — 36415 COLL VENOUS BLD VENIPUNCTURE: CPT

## 2025-07-24 PROCEDURE — 80053 COMPREHEN METABOLIC PANEL: CPT | Performed by: STUDENT IN AN ORGANIZED HEALTH CARE EDUCATION/TRAINING PROGRAM

## 2025-07-24 PROCEDURE — 85025 COMPLETE CBC W/AUTO DIFF WBC: CPT | Performed by: STUDENT IN AN ORGANIZED HEALTH CARE EDUCATION/TRAINING PROGRAM

## 2025-07-24 PROCEDURE — 93005 ELECTROCARDIOGRAM TRACING: CPT | Performed by: STUDENT IN AN ORGANIZED HEALTH CARE EDUCATION/TRAINING PROGRAM

## 2025-07-24 PROCEDURE — 51702 INSERT TEMP BLADDER CATH: CPT

## 2025-07-24 NOTE — DISCHARGE INSTRUCTIONS
You were evaluated for urinary retention after prostate procedure.  We placed a Wilkinson catheter and you are able to urinate and drain your urine.  There is no concern for infection in your urine.  You are now stable for discharge.  Would recommend following closely with Dr. Hameed who performed your surgery to ensure that you are able to urinate without your catheter.  Would continue to take the antibiotics that you are previously prescribed.  If you have fever or your catheter stops draining please come back to the to the Emergency Department for further evaluation.  You are now stable for discharge.

## 2025-07-24 NOTE — ED PROVIDER NOTES
Subjective:  History of Present Illness:    Patient is a 70-year-old male with history of COPD, CAD, hypothyroidism, hyperlipidemia, obesity, hypertension, hyperlipidemia, lung cancer who presents today after a procedure of his prostate.  Patient reports that he had a Procedure with Dr. Hameed yesterday.  Reports that he was initially well postop but then began to have hematuria.  Reports that he had passed some clots however, shortly thereafter, he was unable to urinate.  Has been unable to urinate all night and has had increasing suprapubic pain.  Now presents to our emergency department.  Denies any preceding medical complaints.  No fevers.  Denies any chest pain or shortness of breath.  No nausea vomiting or diarrhea.  Denies any new leg swelling or leg pain.      Nurses Notes reviewed and agree, including vitals, allergies, social history and prior medical history.     REVIEW OF SYSTEMS: All systems reviewed and not pertinent unless noted.  Review of Systems   Constitutional:  Positive for activity change. Negative for appetite change, chills, fatigue and fever.   HENT:  Negative for rhinorrhea, sinus pressure and sinus pain.    Eyes:  Negative for discharge and itching.   Respiratory:  Negative for cough and shortness of breath.    Cardiovascular:  Negative for chest pain and leg swelling.   Gastrointestinal:  Negative for abdominal distention, abdominal pain, nausea and vomiting.   Endocrine: Negative for cold intolerance and heat intolerance.   Genitourinary:  Positive for difficulty urinating and hematuria. Negative for decreased urine volume, flank pain, frequency, urgency, vaginal bleeding, vaginal discharge and vaginal pain.   Musculoskeletal:  Negative for gait problem, neck pain and neck stiffness.   Skin:  Negative for color change.   Allergic/Immunologic: Negative for environmental allergies.   Neurological:  Negative for seizures, syncope, facial asymmetry and speech difficulty.    Psychiatric/Behavioral:  Negative for self-injury and suicidal ideas.        Past Medical History:   Diagnosis Date    Arthritis     At risk for obstructive sleep apnea     Spouse reports patient snores and that he has questionable sleep apnea but has never had a sleep study done    Borderline diabetes     Has never taken medication     Clotting disorder 8-4-2024    COPD (chronic obstructive pulmonary disease)     Coronary artery disease     1 stent    Disease of thyroid gland     Elevated cholesterol     Erectile dysfunction 2019    After chemo    Hearing loss     Does not use hearing devices    History of bronchitis 2017    History of chemotherapy     around 9/20/2019    History of radiation therapy     around 9/20/2019    Hyperlipidemia     Hypertension     Malignant neoplasm of right lung 10/11/2019    Melanoma     MI (myocardial infarction) 01/20/2015    placement of stent x1    MRSA (methicillin resistant Staphylococcus aureus) 01/2015    left hand - treated    Seasonal allergies     Skin cancer     skin, lung    Squamous cell lung cancer 07/24/2019    Urinary incontinence ?    Wears glasses     OTC glasses PRN for reading    Wears partial dentures     Upper mouth       Allergies:    Rosuvastatin      Past Surgical History:   Procedure Laterality Date    BRONCHOSCOPY N/A 07/11/2019    Procedure: BRONCHOSCOPY WITH ENDOBRONCHIAL ULTRASOUND with General Anesthesia.  ;  Surgeon: Jeff Rubin MD;  Location: [facility] OR;  Service: Pulmonary    BRONCHOSCOPY N/A 10/11/2019    Procedure: BRONCHOSCOPY;  Surgeon: Titus Flores MD;  Location: [facility];  Service: Cardiothoracic    CARDIAC CATHETERIZATION  01/20/2015    Placement of stent x1    CATARACT EXTRACTION Bilateral     CHOLECYSTECTOMY      Laparoscopic    COLONOSCOPY  2018    CORONARY ANGIOPLASTY WITH STENT PLACEMENT  2015    x1    CYSTOSCOPY TRANSURETHRAL RESECTION OF PROSTATE N/A 7/23/2025    Procedure: Holmium Laser enucleation of the prostate;   Surgeon: Noel Hameed MD;  Location: [facility] OR;  Service: Urology;  Laterality: N/A;    HEMORRHOIDECTOMY      KNEE MENISCAL REPAIR Bilateral     Dr. Montiel ([facility])    MOUTH SURGERY      Post for oral implants in upper mouth x3    ROTATOR CUFF REPAIR Bilateral     SKIN BIOPSY      SKIN CANCER EXCISION      melanoma removed from right neck and back.  Squamous cell removed multiple places.     SKIN CANCER EXCISION  2024    THORACOSCOPY VIDEO ASSISTED WITH LOBECTOMY Right 10/11/2019    Procedure: THORACOSCOPY VIDEO ASSISTED WITH RIGHT UPPER LOBECTOMY, MEDIASTINAL LYMPH NODE DISSECTION, INTERCOSTAL NERVE BLOCKS;  Surgeon: Titus Flores MD;  Location[facility];  Service: Cardiothoracic    WISDOM TOOTH EXTRACTION           Social History     Socioeconomic History    Marital status:     Number of children: 2   Tobacco Use    Smoking status: Former     Current packs/day: 0.00     Average packs/day: 2.0 packs/day for 46.0 years (92.0 ttl pk-yrs)     Types: Cigarettes     Start date: 1973     Quit date: 2019     Years since quittin.9     Passive exposure: Past    Smokeless tobacco: Former     Types: Chew   Vaping Use    Vaping status: Never Used   Substance and Sexual Activity    Alcohol use: Not Currently     Alcohol/week: 2.0 - 3.0 standard drinks of alcohol    Drug use: No    Sexual activity: Not Currently     Partners: Female         Family History   Problem Relation Age of Onset    Heart attack Mother 60    Coronary artery disease Mother     Hyperlipidemia Mother     Lung cancer Mother     Hypertension Mother     Cancer Mother         Mother  from lung cancer    Kidney disease Mother         Sister & brother also    Emphysema Father     Heart disease Father         Heart disease runs in family    Hypertension Sister     Heart attack Sister 45    Hypertension Brother     Heart attack Brother 50    Hypertension Sister     Hypertension Brother     Diabetes Brother   "   Heart disease Brother     Cancer Paternal Uncle         Prostate cancer    Prostate cancer Paternal Uncle        Objective  Physical Exam:  /67   Pulse 76   Temp 97.7 °F (36.5 °C) (Oral)   Resp 16   Ht 172.7 cm (68\")   Wt 112 kg (247 lb)   SpO2 96%   BMI 37.56 kg/m²      Physical Exam  Constitutional:       General: He is not in acute distress.     Appearance: Normal appearance. He is obese. He is not ill-appearing.   HENT:      Head: Normocephalic and atraumatic.      Nose: Nose normal. No congestion or rhinorrhea.      Mouth/Throat:      Mouth: Mucous membranes are dry.      Pharynx: Oropharynx is clear. No oropharyngeal exudate or posterior oropharyngeal erythema.   Eyes:      Extraocular Movements: Extraocular movements intact.      Conjunctiva/sclera: Conjunctivae normal.      Pupils: Pupils are equal, round, and reactive to light.   Cardiovascular:      Rate and Rhythm: Normal rate and regular rhythm.      Pulses: Normal pulses.      Heart sounds: Normal heart sounds.   Pulmonary:      Effort: Pulmonary effort is normal. No respiratory distress.      Breath sounds: Normal breath sounds.   Chest:      Chest wall: No tenderness.   Abdominal:      General: Abdomen is flat. Bowel sounds are normal. There is no distension.      Palpations: Abdomen is soft.      Tenderness: There is abdominal tenderness. There is no guarding or rebound.      Comments: Suprapubic tenderness on exam   Musculoskeletal:         General: No swelling or tenderness. Normal range of motion.      Cervical back: Normal range of motion and neck supple.   Skin:     General: Skin is warm and dry.      Capillary Refill: Capillary refill takes less than 2 seconds.   Neurological:      General: No focal deficit present.      Mental Status: He is alert and oriented to person, place, and time. Mental status is at baseline.      Cranial Nerves: No cranial nerve deficit.      Sensory: No sensory deficit.      Motor: No weakness.      " Coordination: Coordination normal.   Psychiatric:         Mood and Affect: Mood normal.         Behavior: Behavior normal.         Thought Content: Thought content normal.         Judgment: Judgment normal.         Procedures    ED Course:    ED Course as of 07/24/25 1448   Thu Jul 24, 2025   0807 EKG interpreted by me, normal sinus rhythm with incomplete right bundle branch block, no concerning ST changes noted, rate of 71, abnormal EKG [JE]      ED Course User Index  [JE] Silvino Ventura MD       Lab Results (last 24 hours)       Procedure Component Value Units Date/Time    Urinalysis With Culture If Indicated - Urine, Catheter [[id number]]  (Abnormal) Collected: 07/24/25 0831    Specimen: Urine, Catheter Updated: 07/24/25 0850     Color, UA Dark Yellow     Appearance, UA Clear     pH, UA <=5.0     Specific Gravity, UA 1.019     Glucose, UA Negative     Ketones, UA Negative     Bilirubin, UA Negative     Blood, UA Large (3+)     Protein,  mg/dL (2+)     Leuk Esterase, UA Small (1+)     Nitrite, UA Positive     Urobilinogen, UA 1.0 E.U./dL    Narrative:      In absence of clinical symptoms, the presence of pyuria, bacteria, and/or nitrites on the urinalysis result does not correlate with infection.    Urinalysis, Microscopic Only - Urine, Catheter [[id number]]  (Abnormal) Collected: 07/24/25 0831    Specimen: Urine, Catheter Updated: 07/24/25 0858     RBC, UA Too Numerous to Count /HPF      WBC, UA 3-5 /HPF      Comment: Urine culture not indicated.        Bacteria, UA Trace /HPF      Squamous Epithelial Cells, UA 0-2 /HPF      Hyaline Casts, UA None Seen /LPF      Methodology Manual Light Microscopy    CBC & Differential [[id number]]  (Abnormal) Collected: 07/24/25 0837    Specimen: Blood Updated: 07/24/25 0844    Narrative:      The following orders were created for panel order CBC & Differential.  Procedure                               Abnormality         Status                     ---------                                -----------         ------                     CBC Auto Differential[[id number]]        Abnormal            Final result                 Please view results for these tests on the individual orders.    Comprehensive Metabolic Panel [[id number]]  (Abnormal) Collected: 07/24/25 0837    Specimen: Blood Updated: 07/24/25 0902     Glucose 122 mg/dL      BUN 21.0 mg/dL      Creatinine 1.02 mg/dL      Sodium 138 mmol/L      Potassium 4.7 mmol/L      Chloride 105 mmol/L      CO2 21.4 mmol/L      Calcium 8.9 mg/dL      Total Protein 6.2 g/dL      Albumin 3.7 g/dL      ALT (SGPT) 68 U/L      AST (SGOT) 46 U/L      Alkaline Phosphatase 82 U/L      Total Bilirubin 1.4 mg/dL      Globulin 2.5 gm/dL      A/G Ratio 1.5 g/dL      BUN/Creatinine Ratio 20.6     Anion Gap 11.6 mmol/L      eGFR 79.6 mL/min/1.73     Narrative:      GFR Categories in Chronic Kidney Disease (CKD)              GFR Category          GFR (mL/min/1.73)    Interpretation  G1                    90 or greater        Normal or high (1)  G2                    60-89                Mild decrease (1)  G3a                   45-59                Mild to moderate decrease  G3b                   30-44                Moderate to severe decrease  G4                    15-29                Severe decrease  G5                    14 or less           Kidney failure    (1)In the absence of evidence of kidney disease, neither GFR category G1 or G2 fulfill the criteria for CKD.    eGFR calculation 2021 CKD-EPI creatinine equation, which does not include race as a factor    CBC Auto Differential [[id number]]  (Abnormal) Collected: 07/24/25 0837    Specimen: Blood Updated: 07/24/25 0844     WBC 17.53 10*3/mm3      RBC 4.21 10*6/mm3      Hemoglobin 13.2 g/dL      Hematocrit 39.8 %      MCV 94.5 fL      MCH 31.4 pg      MCHC 33.2 g/dL      RDW 12.9 %      RDW-SD 44.0 fl      MPV 9.6 fL      Platelets 243 10*3/mm3      Neutrophil % 80.6 %      Lymphocyte % 8.2 %       Monocyte % 6.7 %      Eosinophil % 3.7 %      Basophil % 0.5 %      Immature Grans % 0.3 %      Neutrophils, Absolute 14.13 10*3/mm3      Lymphocytes, Absolute 1.44 10*3/mm3      Monocytes, Absolute 1.17 10*3/mm3      Eosinophils, Absolute 0.65 10*3/mm3      Basophils, Absolute 0.08 10*3/mm3      Immature Grans, Absolute 0.06 10*3/mm3      nRBC 0.0 /100 WBC              No radiology results from the last 24 hrs       MDM      Initial impression of presenting illness: Abdominal pain    DDX: includes but is not limited to: Urinary retention, hematuria, urinary tract infection, sepsis    Patient arrives stable with vitals interpreted by myself.     Pertinent features from physical exam: Clear to auscultation, regular rate and rhythm, no murmur, nontender to abdominal palpation.    Initial diagnostic plan: CBC, CMP, lipase, UA, CRP, lactic acid, magnesium, EKG, bladder scan    Results from initial plan were reviewed and interpreted by me revealing bladder scan performed showing urinary retention    Diagnostic information from other sources: Discussed with patient's wife at the bedside reviewed past medical records    Interventions / Re-evaluation: Wilkinson catheter was placed, immediate return of urine with no further hematuria or obvious clots    Results/clinical rationale were discussed with patient at bedside    Consultations/Discussion of results with other physicians: Discussed urinary retention is etiology of his presentation today.  Patient now has good flow urine with urinary catheter.  I recommended close outpatient follow-up with urology to ensure resolution of symptoms and to consider void trial.  Given strict turn precaution for patient not draining urine, severe increase in abdominal pain or fever.    Disposition plan: Discharge  -----        Final diagnoses:   Urinary retention          Silvino Ventura MD  07/24/25 [id number]

## 2025-07-24 NOTE — Clinical Note
[facility] EMERGENCY DEPARTMENT  [address] [address]  Phone: [phone]    Erik Bowser was seen and treated in our emergency department on 7/24/2025.  He may return to work on 07/29/2025.         Thank you for choosing [facility].    Silvino Ventura MD

## 2025-08-15 DIAGNOSIS — E03.9 ACQUIRED HYPOTHYROIDISM: ICD-10-CM

## 2025-08-15 RX ORDER — LEVOTHYROXINE SODIUM 75 UG/1
75 TABLET ORAL DAILY
Qty: 90 TABLET | Refills: 1 | Status: SHIPPED | OUTPATIENT
Start: 2025-08-15

## 2025-08-21 ENCOUNTER — OFFICE VISIT (OUTPATIENT)
Dept: UROLOGY | Facility: CLINIC | Age: 70
End: 2025-08-21
Payer: MEDICARE

## 2025-08-21 VITALS
RESPIRATION RATE: 18 BRPM | BODY MASS INDEX: 37.42 KG/M2 | HEART RATE: 78 BPM | HEIGHT: 68 IN | DIASTOLIC BLOOD PRESSURE: 66 MMHG | OXYGEN SATURATION: 92 % | SYSTOLIC BLOOD PRESSURE: 122 MMHG | WEIGHT: 246.91 LBS | TEMPERATURE: 98.1 F

## 2025-08-21 DIAGNOSIS — N40.1 BPH WITH OBSTRUCTION/LOWER URINARY TRACT SYMPTOMS: Primary | ICD-10-CM

## 2025-08-21 DIAGNOSIS — N13.8 BPH WITH OBSTRUCTION/LOWER URINARY TRACT SYMPTOMS: Primary | ICD-10-CM

## 2025-08-21 PROCEDURE — 99024 POSTOP FOLLOW-UP VISIT: CPT | Performed by: UROLOGY

## 2025-08-21 RX ORDER — OXYBUTYNIN CHLORIDE 10 MG/1
10 TABLET, EXTENDED RELEASE ORAL DAILY
Qty: 30 TABLET | Refills: 1 | Status: SHIPPED | OUTPATIENT
Start: 2025-08-21

## 2025-09-06 RX ORDER — CIPROFLOXACIN 500 MG/1
500 TABLET, FILM COATED ORAL 2 TIMES DAILY
Qty: 20 TABLET | Refills: 0 | Status: SHIPPED | OUTPATIENT
Start: 2025-09-06 | End: 2025-09-16

## (undated) DEVICE — SAFESECURE,SECUREMENT,FOLEY CATH,STERILE: Brand: MEDLINE

## (undated) DEVICE — ST NDL BRONCH ASP VIZISHOT 2 FLX 19GA

## (undated) DEVICE — SUCTION CANISTER, 2500CC, RIGID: Brand: DEROYAL

## (undated) DEVICE — ADAPT SWVL FIBROPTIC BRONCH

## (undated) DEVICE — TUBING, SUCTION, 1/4" X 12', STRAIGHT: Brand: MEDLINE

## (undated) DEVICE — LINEAR ADAPTER: Brand: SIGNIA

## (undated) DEVICE — Device

## (undated) DEVICE — GLV SURG SENSICARE MICRO PF LF 7 STRL

## (undated) DEVICE — ELECTRD BLD EDGE/INSUL1P 2.4X5.1MM STRL

## (undated) DEVICE — ENDOPATH 5MM ENDOSCOPIC BLUNT TIP DISSECTORS (12 POUCHES CONTAINING 3 DISSECTORS EACH): Brand: ENDOPATH

## (undated) DEVICE — OASIS DRAIN, SINGLE, INLINE & ATS COMPATIBLE: Brand: OASIS

## (undated) DEVICE — FIBR LASR MOSES 550 DFL 6J 80HZ 120W

## (undated) DEVICE — TUBING, SUCTION, 1/4" X 10', STRAIGHT: Brand: MEDLINE

## (undated) DEVICE — TP CLTH ADH PORUS 3IN 10YD

## (undated) DEVICE — NDL HYPO ECLPS SFTY 18G 1 1/2IN

## (undated) DEVICE — SPNG GZ WOVN 4X4IN 12PLY 10/BX STRL

## (undated) DEVICE — CLTH CLENS READYCLEANSE PERI CARE PK/5

## (undated) DEVICE — SYR SLPTP 30CC

## (undated) DEVICE — MAGNETIC DRAPE: Brand: DEVON

## (undated) DEVICE — CATH URETRL LASR 7.1F 40CM

## (undated) DEVICE — SYS PROB ABL/CRYO CRYOSPHERE 18IN

## (undated) DEVICE — SYR LL TP 10ML STRL

## (undated) DEVICE — TBG IRRI TUR Y/TYP NONVENT 98IN LF

## (undated) DEVICE — SINGLE USE SUCTION VALVE MAJ-209: Brand: SINGLE USE SUCTION VALVE (STERILE)

## (undated) DEVICE — COVER,MAYO STAND,STERILE: Brand: MEDLINE

## (undated) DEVICE — SINGLE USE ASPIRATION NEEDLE: Brand: SINGLE USE ASPIRATION NEEDLE

## (undated) DEVICE — SUT ETHIB 1 CT1 30IN  X425H

## (undated) DEVICE — VISUALIZATION SYSTEM: Brand: CLEARIFY

## (undated) DEVICE — SUT MNCRYL PLS ANTIB UD 4/0 PS2 18IN

## (undated) DEVICE — TISSUE RETRIEVAL SYSTEM: Brand: INZII RETRIEVAL SYSTEM

## (undated) DEVICE — CATH FOL LUBRICATH 3WY 24F 30CC

## (undated) DEVICE — MAJ-1414 SINGLE USE ADPATER BIOPSY VALV: Brand: SINGLE USE ADAPTOR BIOPSY VALVE

## (undated) DEVICE — SLV SCD CALF HEMOFORCE DVT THERP REPROC MD

## (undated) DEVICE — SUT VIC 2/0 CT2 27IN J269H

## (undated) DEVICE — SINGLE USE BIOPSY VALVE MAJ-210: Brand: SINGLE USE BIOPSY VALVE (STERILE)

## (undated) DEVICE — ANTIBACTERIAL UNDYED BRAIDED (POLYGLACTIN 910), SYNTHETIC ABSORBABLE SURGICAL SUTURE: Brand: COATED VICRYL

## (undated) DEVICE — PK THORACOTOMY 10

## (undated) DEVICE — GLOVE,SURG,SENSICARE,ALOE,LF,PF,7: Brand: MEDLINE

## (undated) DEVICE — TRAP,MUCUS SPECIMEN,40CC: Brand: MEDLINE

## (undated) DEVICE — BOWL UTIL STRL 32OZ

## (undated) DEVICE — CUFF SCD HEMOFORCE SEQ CALF STD MD

## (undated) DEVICE — SYR SLP TP 10ML DISP

## (undated) DEVICE — 1860S HEALTH CARE RESPIRATOR N95 120EA/C: Brand: 3M™

## (undated) DEVICE — GLV SURG SENSICARE W/ALOE PF LF 7.5 STRL

## (undated) DEVICE — ELECTRD BLD EDGE/INSUL1P SFTY SLV 2.75IN

## (undated) DEVICE — RICH CYSTO: Brand: MEDLINE INDUSTRIES, INC.

## (undated) DEVICE — SPNG GZ STRL 2S 4X4 12PLY

## (undated) DEVICE — SET,IRRIGATION,CYSTO/TUR: Brand: MEDLINE

## (undated) DEVICE — TOTAL TRAY, 16FR 10ML SIL FOLEY, URN: Brand: MEDLINE

## (undated) DEVICE — 3-WAY STOPCOCK: Brand: MAXGUARD

## (undated) DEVICE — Device: Brand: MEDEX

## (undated) DEVICE — SOL NACL 0.9PCT 1000ML

## (undated) DEVICE — SKIN AFFIX SURG ADHESIVE 72/CS 0.55ML: Brand: MEDLINE

## (undated) DEVICE — CVR HNDL LT SURG ACCSSRY BLU STRL

## (undated) DEVICE — ELECTRD BLD EXT EDGE/INSUL 6IN

## (undated) DEVICE — Device: Brand: BALLOON